# Patient Record
Sex: MALE | Race: WHITE | NOT HISPANIC OR LATINO | Employment: OTHER | URBAN - METROPOLITAN AREA
[De-identification: names, ages, dates, MRNs, and addresses within clinical notes are randomized per-mention and may not be internally consistent; named-entity substitution may affect disease eponyms.]

---

## 2017-07-11 ENCOUNTER — ALLSCRIPTS OFFICE VISIT (OUTPATIENT)
Dept: OTHER | Facility: OTHER | Age: 82
End: 2017-07-11

## 2018-01-13 VITALS
SYSTOLIC BLOOD PRESSURE: 108 MMHG | WEIGHT: 255.38 LBS | HEART RATE: 71 BPM | OXYGEN SATURATION: 97 % | HEIGHT: 75 IN | DIASTOLIC BLOOD PRESSURE: 68 MMHG | BODY MASS INDEX: 31.75 KG/M2 | RESPIRATION RATE: 15 BRPM

## 2018-01-15 ENCOUNTER — ALLSCRIPTS OFFICE VISIT (OUTPATIENT)
Dept: OTHER | Facility: OTHER | Age: 83
End: 2018-01-15

## 2018-01-16 NOTE — PROGRESS NOTES
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) (T84 59XA,Z96 659)   2  Paroxysmal SVT (supraventricular tachycardia) (427 0) (I47 1)   3  Impaired fasting glucose (790 21) (R73 01)   4  Benign essential hypertension (401 1) (I10)    Plan   Benign essential hypertension    · (1) MICROALBUMIN CREATININE RATIO, RANDOM URINE; Status:Active; Requested    for:15Jan2018;    · (1) URINALYSIS (will reflex a microscopy if leukocytes, occult blood, protein or nitrites are    not within normal limits); Status:Active; Requested [id number]; Impaired fasting glucose    · (1) COMPREHENSIVE METABOLIC PANEL; Status:Active; Requested for:15Jan2018;    · (1) HEMOGLOBIN A1C; Status:Active; Requested [id number]; Infected prosthetic knee joint    · Doxycycline Hyclate 100 MG Oral Capsule; Take 1 capsule twice daily   · (1) CBC/PLT/DIFF; Status:Active; Requested for:15Jan2018;    · (1) C-REACTIVE PROTEIN; Status:Active; Requested [id number];    · (1) SED RATE; Status:Active; Requested for:15Jan2018;   Paroxysmal SVT (supraventricular tachycardia)    · (1) T4, FREE; Status:Active; Requested for:15Jan2018;    · (1) TSH; Status:Active; Requested [id number]; Discussion/Summary   Possible side effects of new medications were reviewed with the patient/guardian today  The treatment plan was reviewed with the patient/guardian  The patient/guardian understands and agrees with the treatment plan      Chief Complaint   FOLLOW UP  NO ISSUES    Patient is here today for follow up of chronic conditions described in HPI  History of Present Illness   He sees orthopedics for a h/o MRSA  He is on life-long doxycycline  He does not want to travel to [place] to see him any longer  He has no pain, swelling, or redness  He has no F/C  He takes amoxicillin prior to dental procedures  BP is at goal  He has no HA or vision changes  He has no CP or SOB  He denies palpitations      TSH and free T-4 are at goal on his current dose of Synthroid  He has no hypo or hyperthyroid sx's  Review of Systems        Constitutional: No fever or chills, feels well, no tiredness, no recent weight gain or weight loss  Eyes: No complaints of eye pain, no red eyes, no discharge from eyes, no itchy eyes  ENT: no complaints of earache, no hearing loss, no nosebleeds, no nasal discharge, no sore throat, no hoarseness  Cardiovascular: No complaints of slow heart rate, no fast heart rate, no chest pain, no palpitations, no leg claudication, no lower extremity  Respiratory: No complaints of shortness of breath, no wheezing, no cough, no SOB on exertion, no orthopnea or PND  Gastrointestinal: No complaints of abdominal pain, no constipation, no nausea or vomiting, no diarrhea or bloody stools  Genitourinary: No complaints of dysuria, no incontinence, no hesitancy, no nocturia, no genital lesion, no testicular pain  Musculoskeletal: No complaints of arthralgia, no myalgias, no joint swelling or stiffness, no limb pain or swelling  Integumentary: No complaints of skin rash or skin lesions, no itching, no skin wound, no dry skin  Neurological: No compliants of headache, no confusion, no convulsions, no numbness or tingling, no dizziness or fainting, no limb weakness, no difficulty walking  Psychiatric: Is not suicidal, no sleep disturbances, no anxiety or depression, no change in personality, no emotional problems  Endocrine: No complaints of proptosis, no hot flashes, no muscle weakness, no erectile dysfunction, no deepening of the voice, no feelings of weakness  Hematologic/Lymphatic: No complaints of swollen glands, no swollen glands in the neck, does not bleed easily, no easy bruising  Active Problems   1  Benign essential hypertension (401 1) (I10)   2  Denied: History of substance abuse   3  Impaired fasting glucose (790 21) (R73 01)   4   Infected prosthetic knee joint (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,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) (T84 59XA,Z96 659)   5  Male erectile disorder (607 84) (N52 9)   6  Denied: History of Mental health disorder   7  Mobility impaired (799 89) (Z74 09)   8  Need for influenza vaccination (V04 81) (Z23)   9  Paroxysmal SVT (supraventricular tachycardia) (427 0) (I47 1)    Past Medical History   1  Denied: History of substance abuse   2  Denied: History of Mental health disorder    Surgical History   1  History of Hernia Repair   2  History of Knee Replacement   3  History of Prostate Surgery    Family History   Family History    1  Denied: Family history of mental disorder   2  Denied: Family history of substance abuse    Social History    · Never a smoker    Current Meds    1  Amoxicillin 500 MG Oral Capsule; Therapy: (Recorded:86Xkf9501) to Recorded   2  Doxycycline Hyclate 100 MG Oral Capsule; Take 1 capsule twice daily; Therapy: (Recorded:51Spb5302) to Recorded   3  Irbesartan 300 MG Oral Tablet; take 1 tablet every other day  Requested for: 59RDH7259;     Last Rx:[id number] Ordered   4  Levothyroxine Sodium 150 MCG Oral Tablet; Therapy: (Recorded:10Psy6403) to Recorded   5  Metrogel 1 % External Gel; Therapy: (Recorded:37Ecd4837) to Recorded   6  Viagra 100 MG Oral Tablet; TAKE 1 TABLET DAILY 1 HOUR BEFORE NEEDED; Therapy: 45BFH3513 to (Evaluate:85Fgi2098); Last Rx:[id number] Ordered    Allergies   1  No Known Drug Allergies    Vitals   Vital Signs    Recorded: 76XTH3162 09:09AM   Heart Rate 86   Respiration 15   Systolic 067   Diastolic 72   Height 6 ft 3 in   Weight 256 lb    BMI Calculated 32   BSA Calculated 2 44   O2 Saturation 97     Physical Exam        Constitutional      General appearance: No acute distress, well appearing and well nourished  Pulmonary      Respiratory effort: No increased work of breathing or signs of respiratory distress  Auscultation of lungs: Clear to auscultation, equal breath sounds bilaterally, no wheezes, no rales, no rhonci         Cardiovascular Auscultation of heart: Normal rate and rhythm, normal S1 and S2, without murmurs  Examination of extremities for edema and/or varicosities: Normal        Abdomen      Abdomen: Non-tender, no masses  Liver and spleen: No hepatomegaly or splenomegaly            Signatures    Electronically signed by : Terry Rosen MD; Frankie 15 2018  9:37AM EST                       (Author)

## 2018-01-23 VITALS
SYSTOLIC BLOOD PRESSURE: 122 MMHG | RESPIRATION RATE: 15 BRPM | WEIGHT: 256 LBS | OXYGEN SATURATION: 97 % | DIASTOLIC BLOOD PRESSURE: 72 MMHG | HEART RATE: 86 BPM | HEIGHT: 75 IN | BODY MASS INDEX: 31.83 KG/M2

## 2018-01-31 LAB
CREAT ?TM UR-SCNC: 55.3 UMOL/L
MICROALBUMIN UR-MCNC: 3 MG/L (ref 0–20)
MICROALBUMIN/CREAT UR: 5.4 MG/G{CREAT}

## 2018-02-03 LAB — HBA1C MFR BLD HPLC: 5.8 %

## 2018-02-14 ENCOUNTER — TELEPHONE (OUTPATIENT)
Dept: FAMILY MEDICINE CLINIC | Facility: CLINIC | Age: 83
End: 2018-02-14

## 2018-03-16 ENCOUNTER — OFFICE VISIT (OUTPATIENT)
Dept: FAMILY MEDICINE CLINIC | Facility: CLINIC | Age: 83
End: 2018-03-16
Payer: COMMERCIAL

## 2018-03-16 VITALS
DIASTOLIC BLOOD PRESSURE: 70 MMHG | SYSTOLIC BLOOD PRESSURE: 122 MMHG | BODY MASS INDEX: 31.95 KG/M2 | OXYGEN SATURATION: 96 % | HEART RATE: 70 BPM | RESPIRATION RATE: 15 BRPM | WEIGHT: 255.6 LBS

## 2018-03-16 DIAGNOSIS — E03.9 HYPOTHYROIDISM, UNSPECIFIED TYPE: Primary | ICD-10-CM

## 2018-03-16 DIAGNOSIS — I10 ESSENTIAL HYPERTENSION: ICD-10-CM

## 2018-03-16 DIAGNOSIS — E03.9 HYPOTHYROIDISM, UNSPECIFIED TYPE: ICD-10-CM

## 2018-03-16 DIAGNOSIS — Z96.659 INFECTION OF PROSTHETIC KNEE JOINT, SUBSEQUENT ENCOUNTER: Primary | ICD-10-CM

## 2018-03-16 DIAGNOSIS — T84.59XD INFECTION OF PROSTHETIC KNEE JOINT, SUBSEQUENT ENCOUNTER: Primary | ICD-10-CM

## 2018-03-16 DIAGNOSIS — J18.9 COMMUNITY ACQUIRED PNEUMONIA OF RIGHT LOWER LOBE OF LUNG: ICD-10-CM

## 2018-03-16 PROBLEM — T84.59XA INFECTED PROSTHETIC KNEE JOINT (HCC): Status: ACTIVE | Noted: 2018-03-16

## 2018-03-16 PROCEDURE — 99214 OFFICE O/P EST MOD 30 MIN: CPT | Performed by: FAMILY MEDICINE

## 2018-03-16 PROCEDURE — 3074F SYST BP LT 130 MM HG: CPT | Performed by: FAMILY MEDICINE

## 2018-03-16 PROCEDURE — 1036F TOBACCO NON-USER: CPT | Performed by: FAMILY MEDICINE

## 2018-03-16 PROCEDURE — 3078F DIAST BP <80 MM HG: CPT | Performed by: FAMILY MEDICINE

## 2018-03-16 RX ORDER — LEVOTHYROXINE SODIUM 0.15 MG/1
150 TABLET ORAL DAILY
Qty: 90 TABLET | Refills: 3 | Status: CANCELLED | OUTPATIENT
Start: 2018-03-16

## 2018-03-16 RX ORDER — IRBESARTAN 300 MG/1
1 TABLET ORAL EVERY OTHER DAY
COMMUNITY
End: 2018-03-16 | Stop reason: SDUPTHER

## 2018-03-16 RX ORDER — AZITHROMYCIN 250 MG/1
TABLET, FILM COATED ORAL
Qty: 6 TABLET | Refills: 0 | Status: SHIPPED | OUTPATIENT
Start: 2018-03-16 | End: 2018-03-20

## 2018-03-16 RX ORDER — DOXYCYCLINE HYCLATE 100 MG/1
100 CAPSULE ORAL
COMMUNITY
Start: 2017-02-06 | End: 2018-09-17 | Stop reason: SDUPTHER

## 2018-03-16 RX ORDER — LEVOTHYROXINE SODIUM 0.15 MG/1
TABLET ORAL
COMMUNITY
End: 2018-03-16 | Stop reason: SDUPTHER

## 2018-03-16 RX ORDER — LEVOTHYROXINE SODIUM 0.15 MG/1
150 TABLET ORAL DAILY
Qty: 90 TABLET | Refills: 3 | Status: SHIPPED | OUTPATIENT
Start: 2018-03-16 | End: 2018-07-02 | Stop reason: SDUPTHER

## 2018-03-16 RX ORDER — IRBESARTAN 300 MG/1
300 TABLET ORAL EVERY OTHER DAY
Qty: 45 TABLET | Refills: 3 | Status: SHIPPED | OUTPATIENT
Start: 2018-03-16 | End: 2019-03-15 | Stop reason: SDUPTHER

## 2018-03-16 NOTE — PROGRESS NOTES
Assessment/Plan:    No problem-specific Assessment & Plan notes found for this encounter  Diagnoses and all orders for this visit:    Infection of prosthetic knee joint, subsequent encounter  -     CBC and differential; Future  -     Comprehensive metabolic panel; Future  -     Sedimentation rate, automated; Future  -     C-reactive protein; Future    Hypothyroidism, unspecified type  -     levothyroxine 150 mcg tablet; Take 1 tablet (150 mcg total) by mouth daily    Essential hypertension  -     Microalbumin / creatinine urine ratio  -     Lipid panel; Future    Community acquired pneumonia of right lower lobe of lung (HCC)  -     azithromycin (ZITHROMAX) 250 mg tablet; Take 2 tablets today then 1 tablet daily x 4 days  -     XR chest pa & lateral; Future    Other orders  -     irbesartan (AVAPRO) 300 mg tablet; Take 1 tablet by mouth every other day  -     Discontinue: levothyroxine 150 mcg tablet; Take by mouth  -     doxycycline hyclate (VIBRAMYCIN) 100 mg capsule; Take 100 mg by mouth  -     Multiple Vitamins-Minerals (ICAPS AREDS 2 PO); Take by mouth          Subjective:      Patient ID: Anne Osullivan is a 80 y o  male  He has bilateral total knee replacements  The left knee had a MRSA infection and he has been on twice daily doxycycline since  He will be on this lifelong  His orthopedic surgeon is at a distance  We will be taking over monitoring of his knee  He has no pain  The knee does not swell or get red  His laboratory data is within normal   Limits  He has hypothyroidism  He is taking 150 mcg of Synthroid  He has no hypo or hyperthyroid   Symptoms  His blood pressure is excellent today on   His current regimen  He has no chest pain or shortness of breath  He has no headache or vision changes  His blood sugar was mildly elevated without evidence of diabetes  He has had an upper respiratory tract infection for the last several days     He is taking over-the-counter cough and cold medications with fair relief  The following portions of the patient's history were reviewed and updated as appropriate:   He  has no past medical history on file  He   Patient Active Problem List    Diagnosis Date Noted    Infected prosthetic knee joint ([facility] [address] ) 03/16/2018    Hypothyroidism 03/16/2018    Essential hypertension 03/16/2018     He  has a past surgical history that includes Hernia repair; Replacement total knee; and Prostate surgery  His family history is not on file  He  reports that he has never smoked  He has never used smokeless tobacco  He reports that he does not drink alcohol or use drugs  Current Outpatient Prescriptions   Medication Sig Dispense Refill    doxycycline hyclate (VIBRAMYCIN) 100 mg capsule Take 100 mg by mouth      irbesartan (AVAPRO) 300 mg tablet Take 1 tablet by mouth every other day      levothyroxine 150 mcg tablet Take 1 tablet (150 mcg total) by mouth daily 90 tablet 3    Multiple Vitamins-Minerals (ICAPS AREDS 2 PO) Take by mouth      azithromycin (ZITHROMAX) 250 mg tablet Take 2 tablets today then 1 tablet daily x 4 days 6 tablet 0     No current facility-administered medications for this visit  No current outpatient prescriptions on file prior to visit  No current facility-administered medications on file prior to visit  He has No Known Allergies       Review of Systems   All other systems reviewed and are negative  Objective:      /70 (BP Location: Right arm, Patient Position: Sitting, Cuff Size: Large)   Pulse 70   Resp 15   Wt 116 kg (255 lb 9 6 oz)   SpO2 96%   BMI 31 95 kg/m²          Physical Exam   Constitutional: He appears well-developed and well-nourished  Neck: Normal range of motion  Cardiovascular: Normal rate, regular rhythm, normal heart sounds and intact distal pulses  Pulmonary/Chest: Effort normal    He has a few scattered wheezes  He has some rales at the right lung base  Abdominal: Soft  Bowel sounds are normal    Nursing note and vitals reviewed

## 2018-03-16 NOTE — PATIENT INSTRUCTIONS
You may have bronchitis or even the start of a very mild pneumonia  I have ordered antibiotics and a chest x-ray  You look well and her blood oxygen concentration is within normal limits  I do not feel that you would need hospitalization for this  If your breathing becomes worse, you have a hard time tolerating her medication, you have fevers or chills, or you just do not feel well, please report to the emergency room

## 2018-07-02 DIAGNOSIS — E03.9 HYPOTHYROIDISM, UNSPECIFIED TYPE: ICD-10-CM

## 2018-07-02 RX ORDER — LEVOTHYROXINE SODIUM 0.15 MG/1
150 TABLET ORAL DAILY
Qty: 90 TABLET | Refills: 3 | Status: SHIPPED | OUTPATIENT
Start: 2018-07-02 | End: 2018-09-13 | Stop reason: SDUPTHER

## 2018-09-13 ENCOUNTER — OFFICE VISIT (OUTPATIENT)
Dept: FAMILY MEDICINE CLINIC | Facility: CLINIC | Age: 83
End: 2018-09-13
Payer: COMMERCIAL

## 2018-09-13 VITALS
RESPIRATION RATE: 15 BRPM | DIASTOLIC BLOOD PRESSURE: 82 MMHG | OXYGEN SATURATION: 97 % | SYSTOLIC BLOOD PRESSURE: 140 MMHG | WEIGHT: 258.4 LBS | HEIGHT: 76 IN | BODY MASS INDEX: 31.47 KG/M2 | HEART RATE: 71 BPM

## 2018-09-13 DIAGNOSIS — E03.9 HYPOTHYROIDISM, UNSPECIFIED TYPE: ICD-10-CM

## 2018-09-13 DIAGNOSIS — Z23 NEED FOR INFLUENZA VACCINATION: Primary | ICD-10-CM

## 2018-09-13 DIAGNOSIS — L71.9 ROSACEA: Primary | ICD-10-CM

## 2018-09-13 DIAGNOSIS — I10 ESSENTIAL HYPERTENSION: ICD-10-CM

## 2018-09-13 LAB
ALBUMIN SERPL BCP-MCNC: 3.8 G/DL (ref 3.5–5)
ALP SERPL-CCNC: 89 U/L (ref 46–116)
ALT SERPL W P-5'-P-CCNC: 31 U/L (ref 12–78)
ANION GAP SERPL CALCULATED.3IONS-SCNC: 9 MMOL/L (ref 4–13)
AST SERPL W P-5'-P-CCNC: 20 U/L (ref 5–45)
BILIRUB SERPL-MCNC: 0.97 MG/DL (ref 0.2–1)
BUN SERPL-MCNC: 24 MG/DL (ref 5–25)
CALCIUM SERPL-MCNC: 8.7 MG/DL (ref 8.3–10.1)
CHLORIDE SERPL-SCNC: 108 MMOL/L (ref 100–108)
CO2 SERPL-SCNC: 23 MMOL/L (ref 21–32)
CREAT SERPL-MCNC: 0.94 MG/DL (ref 0.6–1.3)
GFR SERPL CREATININE-BSD FRML MDRD: 74 ML/MIN/1.73SQ M
GLUCOSE SERPL-MCNC: 114 MG/DL (ref 65–140)
POTASSIUM SERPL-SCNC: 4.2 MMOL/L (ref 3.5–5.3)
PROT SERPL-MCNC: 7.4 G/DL (ref 6.4–8.2)
SODIUM SERPL-SCNC: 140 MMOL/L (ref 136–145)
T4 FREE SERPL-MCNC: 0.96 NG/DL (ref 0.76–1.46)
TSH SERPL DL<=0.05 MIU/L-ACNC: 2.7 UIU/ML (ref 0.36–3.74)

## 2018-09-13 PROCEDURE — G0008 ADMIN INFLUENZA VIRUS VAC: HCPCS

## 2018-09-13 PROCEDURE — 36415 COLL VENOUS BLD VENIPUNCTURE: CPT | Performed by: FAMILY MEDICINE

## 2018-09-13 PROCEDURE — 90662 IIV NO PRSV INCREASED AG IM: CPT

## 2018-09-13 PROCEDURE — 80053 COMPREHEN METABOLIC PANEL: CPT | Performed by: FAMILY MEDICINE

## 2018-09-13 PROCEDURE — 84439 ASSAY OF FREE THYROXINE: CPT | Performed by: FAMILY MEDICINE

## 2018-09-13 PROCEDURE — 3725F SCREEN DEPRESSION PERFORMED: CPT | Performed by: FAMILY MEDICINE

## 2018-09-13 PROCEDURE — 99214 OFFICE O/P EST MOD 30 MIN: CPT | Performed by: FAMILY MEDICINE

## 2018-09-13 PROCEDURE — 3008F BODY MASS INDEX DOCD: CPT | Performed by: FAMILY MEDICINE

## 2018-09-13 PROCEDURE — 1160F RVW MEDS BY RX/DR IN RCRD: CPT | Performed by: FAMILY MEDICINE

## 2018-09-13 PROCEDURE — 84443 ASSAY THYROID STIM HORMONE: CPT | Performed by: FAMILY MEDICINE

## 2018-09-13 RX ORDER — METRONIDAZOLE 10 MG/G
GEL TOPICAL DAILY
COMMUNITY
End: 2018-09-13 | Stop reason: SDUPTHER

## 2018-09-13 RX ORDER — LEVOTHYROXINE SODIUM 0.15 MG/1
150 TABLET ORAL DAILY
Qty: 90 TABLET | Refills: 3 | Status: SHIPPED | OUTPATIENT
Start: 2018-09-13 | End: 2019-03-15 | Stop reason: SDUPTHER

## 2018-09-13 RX ORDER — METRONIDAZOLE 10 MG/G
GEL TOPICAL DAILY
Qty: 45 G | Refills: 0 | Status: SHIPPED | OUTPATIENT
Start: 2018-09-13 | End: 2019-03-15 | Stop reason: SDUPTHER

## 2018-09-13 NOTE — PROGRESS NOTES
Assessment/Plan:    No problem-specific Assessment & Plan notes found for this encounter  Diagnoses and all orders for this visit:    Rosacea  -     metroNIDAZOLE (METROGEL) 1 % gel; Apply topically daily    Hypothyroidism, unspecified type  -     levothyroxine 150 mcg tablet; Take 1 tablet (150 mcg total) by mouth daily  -     TSH, 3rd generation; Future  -     T4, free; Future  -     Comprehensive metabolic panel; Future    Essential hypertension  -     TSH, 3rd generation; Future  -     T4, free; Future  -     Comprehensive metabolic panel; Future    Other orders  -     Discontinue: metroNIDAZOLE (METROGEL) 1 % gel; Apply topically daily          Subjective:      Patient ID: Katia White is a 80 y o  male  His BP is at goal   He has no CP or SOB  He has no HA or vision changes  He has hypothyroid  He is due for thyroid lab work  [facility]  He treated them at home  The following portions of the patient's history were reviewed and updated as appropriate:   He  has no past medical history on file  He   Patient Active Problem List    Diagnosis Date Noted    Infected prosthetic knee joint ([facility] [address] ) 03/16/2018    Hypothyroidism 03/16/2018    Essential hypertension 03/16/2018     He  has a past surgical history that includes Hernia repair; Replacement total knee; and Prostate surgery  His family history is not on file  He  reports that he has never smoked  He has never used smokeless tobacco  He reports that he does not drink alcohol or use drugs    Current Outpatient Prescriptions   Medication Sig Dispense Refill    doxycycline hyclate (VIBRAMYCIN) 100 mg capsule Take 100 mg by mouth      irbesartan (AVAPRO) 300 mg tablet Take 1 tablet (300 mg total) by mouth every other day 45 tablet 3    levothyroxine 150 mcg tablet Take 1 tablet (150 mcg total) by mouth daily 90 tablet 3    metroNIDAZOLE (METROGEL) 1 % gel Apply topically daily 45 g 0    Multiple Vitamins-Minerals (ICAPS AREDS 2 PO) Take by mouth       No current facility-administered medications for this visit  Current Outpatient Prescriptions on File Prior to Visit   Medication Sig    doxycycline hyclate (VIBRAMYCIN) 100 mg capsule Take 100 mg by mouth    irbesartan (AVAPRO) 300 mg tablet Take 1 tablet (300 mg total) by mouth every other day    Multiple Vitamins-Minerals (ICAPS AREDS 2 PO) Take by mouth    [DISCONTINUED] levothyroxine 150 mcg tablet Take 1 tablet (150 mcg total) by mouth daily     No current facility-administered medications on file prior to visit  He has No Known Allergies       Review of Systems   All other systems reviewed and are negative  Objective:      /82 (BP Location: Right arm, Patient Position: Sitting, Cuff Size: Large)   Pulse 71   Resp 15   Ht 6' 4" (1 93 m)   Wt 117 kg (258 lb 6 4 oz)   SpO2 97%   BMI 31 45 kg/m²          Physical Exam   Constitutional: He appears well-developed and well-nourished  HENT:   Head: Normocephalic and atraumatic  Right Ear: External ear normal    Left Ear: External ear normal    Nose: Nose normal    Mouth/Throat: Oropharynx is clear and moist    Neck: Normal range of motion  Neck supple  Cardiovascular: Normal rate, regular rhythm, normal heart sounds and intact distal pulses  Pulmonary/Chest: Effort normal and breath sounds normal    Abdominal: Soft  Bowel sounds are normal    Nursing note and vitals reviewed

## 2018-09-17 DIAGNOSIS — Z86.14 HISTORY OF MRSA INFECTION: Primary | ICD-10-CM

## 2018-09-17 RX ORDER — DOXYCYCLINE HYCLATE 100 MG/1
100 CAPSULE ORAL EVERY 12 HOURS SCHEDULED
Qty: 180 CAPSULE | Refills: 3 | Status: SHIPPED | OUTPATIENT
Start: 2018-09-17 | End: 2019-03-15 | Stop reason: SDUPTHER

## 2019-03-15 ENCOUNTER — OFFICE VISIT (OUTPATIENT)
Dept: FAMILY MEDICINE CLINIC | Facility: CLINIC | Age: 84
End: 2019-03-15
Payer: COMMERCIAL

## 2019-03-15 VITALS
HEART RATE: 85 BPM | DIASTOLIC BLOOD PRESSURE: 80 MMHG | HEIGHT: 76 IN | WEIGHT: 257 LBS | BODY MASS INDEX: 31.29 KG/M2 | RESPIRATION RATE: 15 BRPM | OXYGEN SATURATION: 97 % | SYSTOLIC BLOOD PRESSURE: 124 MMHG

## 2019-03-15 DIAGNOSIS — Z86.14 HISTORY OF MRSA INFECTION: ICD-10-CM

## 2019-03-15 DIAGNOSIS — I10 ESSENTIAL HYPERTENSION: ICD-10-CM

## 2019-03-15 DIAGNOSIS — L71.9 ROSACEA: ICD-10-CM

## 2019-03-15 DIAGNOSIS — Z00.00 MEDICARE ANNUAL WELLNESS VISIT, SUBSEQUENT: Primary | ICD-10-CM

## 2019-03-15 DIAGNOSIS — E03.9 HYPOTHYROIDISM, UNSPECIFIED TYPE: ICD-10-CM

## 2019-03-15 DIAGNOSIS — N52.9 ERECTILE DYSFUNCTION, UNSPECIFIED ERECTILE DYSFUNCTION TYPE: ICD-10-CM

## 2019-03-15 PROBLEM — E66.9 CLASS 1 OBESITY WITHOUT SERIOUS COMORBIDITY WITH BODY MASS INDEX (BMI) OF 31.0 TO 31.9 IN ADULT: Status: ACTIVE | Noted: 2019-03-15

## 2019-03-15 PROCEDURE — 1170F FXNL STATUS ASSESSED: CPT | Performed by: FAMILY MEDICINE

## 2019-03-15 PROCEDURE — G0439 PPPS, SUBSEQ VISIT: HCPCS | Performed by: FAMILY MEDICINE

## 2019-03-15 PROCEDURE — 1125F AMNT PAIN NOTED PAIN PRSNT: CPT | Performed by: FAMILY MEDICINE

## 2019-03-15 PROCEDURE — 3074F SYST BP LT 130 MM HG: CPT | Performed by: FAMILY MEDICINE

## 2019-03-15 PROCEDURE — 3079F DIAST BP 80-89 MM HG: CPT | Performed by: FAMILY MEDICINE

## 2019-03-15 PROCEDURE — 1160F RVW MEDS BY RX/DR IN RCRD: CPT | Performed by: FAMILY MEDICINE

## 2019-03-15 RX ORDER — IRBESARTAN 300 MG/1
300 TABLET ORAL EVERY OTHER DAY
Qty: 45 TABLET | Refills: 3 | Status: SHIPPED | OUTPATIENT
Start: 2019-03-15 | End: 2019-06-07 | Stop reason: SDUPTHER

## 2019-03-15 RX ORDER — METRONIDAZOLE 10 MG/G
GEL TOPICAL DAILY
Qty: 45 G | Refills: 0 | Status: SHIPPED | OUTPATIENT
Start: 2019-03-15 | End: 2019-03-18 | Stop reason: SDUPTHER

## 2019-03-15 RX ORDER — LEVOTHYROXINE SODIUM 0.15 MG/1
150 TABLET ORAL DAILY
Qty: 90 TABLET | Refills: 3 | Status: SHIPPED | OUTPATIENT
Start: 2019-03-15 | End: 2019-09-16 | Stop reason: SDUPTHER

## 2019-03-15 RX ORDER — DOXYCYCLINE HYCLATE 100 MG/1
100 CAPSULE ORAL EVERY 12 HOURS SCHEDULED
Qty: 180 CAPSULE | Refills: 3 | Status: SHIPPED | OUTPATIENT
Start: 2019-03-15 | End: 2019-06-07 | Stop reason: SDUPTHER

## 2019-03-15 RX ORDER — SILDENAFIL 100 MG/1
100 TABLET, FILM COATED ORAL DAILY PRN
Qty: 8 TABLET | Refills: 12 | Status: SHIPPED | OUTPATIENT
Start: 2019-03-15 | End: 2019-09-16 | Stop reason: SDUPTHER

## 2019-03-15 NOTE — ASSESSMENT & PLAN NOTE
I recommended diet, exercise, and weight loss  I recommended the AHA beginner walking plan and the Mediterranean Diet  [email or web address]  pdf    [email or web address] pdf

## 2019-03-15 NOTE — PROGRESS NOTES
Assessment and Plan:  Problem List Items Addressed This Visit        Endocrine    Hypothyroidism       Cardiovascular and Mediastinum    Essential hypertension      Other Visit Diagnoses     Rosacea        History of MRSA infection            Health Maintenance Due   Topic Date Due    Medicare Annual Wellness Visit (AWV)  1934    BMI: Followup Plan  08/09/1952    DTaP,Tdap,and Td Vaccines (1 - Tdap) 08/09/1955    Fall Risk  08/09/1999    Pneumococcal PPSV23/PCV13 65+ Years / Low and Medium Risk (2 of 2 - PCV13) 11/20/2002         HPI:  Patient Active Problem List   Diagnosis    Infected prosthetic knee joint ([address] )    Hypothyroidism    Essential hypertension     No past medical history on file  Past Surgical History:   Procedure Laterality Date    HERNIA REPAIR      PROSTATE SURGERY      REPLACEMENT TOTAL KNEE      last assessed: 7/11/17     No family history on file  Social History     Tobacco Use   Smoking Status Never Smoker   Smokeless Tobacco Never Used     Social History     Substance and Sexual Activity   Alcohol Use No      Social History     Substance and Sexual Activity   Drug Use No         Current Outpatient Medications   Medication Sig Dispense Refill    doxycycline hyclate (VIBRAMYCIN) 100 mg capsule Take 1 capsule (100 mg total) by mouth every 12 (twelve) hours 180 capsule 3    irbesartan (AVAPRO) 300 mg tablet Take 1 tablet (300 mg total) by mouth every other day 45 tablet 3    levothyroxine 150 mcg tablet Take 1 tablet (150 mcg total) by mouth daily 90 tablet 3    metroNIDAZOLE (METROGEL) 1 % gel Apply topically daily 45 g 0    Multiple Vitamins-Minerals (ICAPS AREDS 2 PO) Take by mouth       No current facility-administered medications for this visit        No Known Allergies  Immunization History   Administered Date(s) Administered    INFLUENZA 09/29/2016, 10/17/2017    Influenza Split High Dose Preservative Free IM 10/17/2017    Influenza, high dose seasonal 0 5 mL 09/13/2018    Pneumococcal Polysaccharide PPV23 11/20/2001       Patient Care Team:  David Ramirez MD as PCP - General (Family Medicine)    Medicare Screening Tests and Risk Assessments:  John Rangel is here for his Subsequent Wellness visit  Health Risk Assessment:  Patient rates overall health as very good  Patient feels that their physical health rating is Same  Eyesight was rated as Same  Hearing was rated as Same  Patient feels that their emotional and mental health rating is Same  Pain experienced by patient in the last 7 days has been None  Patient states that he has experienced no weight loss or gain in last 6 months  Emotional/Mental Health:  Patient has been feeling nervous/anxious  PHQ-9 Depression Screening:    Frequency of the following problems over the past two weeks:      1  Little interest or pleasure in doing things: 0 - not at all      2  Feeling down, depressed, or hopeless: 0 - not at all  PHQ-2 Score: 0          Broken Bones/Falls: Fall Risk Assessment:    In the past year, patient has experienced: No history of falling in past year          Bladder/Bowel:  Patient has not leaked urine accidently in the last six months  Patient reports no loss of bowel control  Immunizations:  Patient has had a flu vaccination within the last year  Patient has received a pneumonia shot  Patient has received a shingles shot  Patient has received tetanus/diphtheria shot  Date of tetanus/diphtheria shot: 7/1/2009    Home Safety:  Patient does not have trouble with stairs inside or outside of their home  Patient currently reports that there are no safety hazards present in home, working smoke alarms, no working carbon monoxide detectors        Preventative Screenings:   prostate cancer screen performed, colon cancer screen completed, 7/1/2009  cholesterol screen completed, no glaucoma eye exam completed(Additional Comments: Sub total prostatectomy)    Medications:  Patient is currently taking over-the-counter supplements  List of OTC medications includes: ared  Patient is able to manage medications  Lifestyle Choices:  Patient reports no tobacco use  Patient has smoked or used tobacco in the past   Patient has stopped his tobacco use  Tobacco use quit date: 1970  Patient reports no alcohol use  Patient drives a vehicle  Patient wears seat belt  Current level of exercise of physical activity described by patient as: active  Activities of Daily Living:  Can get out of bed by his or her self, able to dress self, able to make own meals, able to do own shopping, able to bathe self, can do own laundry/housekeeping, can manage own money, pay bills and track expenses    Previous Hospitalizations:  No hospitalization or ED visit in past 12 months  Additional Comments: Sherrill esparza - eye and retina - macular degeneration, wet both eyes  German Cooks, MD - annual eye exam  Malcolm Monterroso - orthopedic surgeon - post knee replacements  Porter Oh - audiologist - hearing loss  Sujata Garcia - podiatry    Advanced Directives:  Patient has decided on a power of   Patient has spoken to designated power of   Patient has completed advanced directive    Additional Comments: Araceli Hernandez  [address] [address]  [phone]    Preventative Screening/Counseling:      Cardiovascular:      General: Risks and Benefits Discussed and Screening Current          Diabetes:      General: Risks and Benefits Discussed and Screening Current          Colorectal Cancer:      General: Screening Not Indicated          Prostate Cancer:      General: Screening Not Indicated          Osteoporosis:      General: Screening Not Indicated          AAA:      General: Screening Not Indicated          Glaucoma:      General: Risks and Benefits Discussed          HIV:      General: Screening Not Indicated          Hepatitis C:      General: Screening Not Indicated        Advanced Directives:   Patient has living will for healthcare, has durable POA for healthcare, patient has an advanced directive  Information on ACP and/or AD provided  5 wishes given  Immunizations:      Influenza: Risks & Benefits Discussed and Influenza UTD This Year      Pneumococcal: Risks & Benefits Discussed and Pneumococcal Due Today      Shingrix: Vaccine Status Unknown      Hepatitis B (Low risk patients): Series Not Indicated      TD: Vaccine Status Unknown      TDAP: Vaccine Status Unknown            No exam data present    Physical Exam:  Review of Systems   Gastrointestinal: Negative for bowel incontinence  Psychiatric/Behavioral: The patient is not nervous/anxious  All other systems reviewed and are negative  Vitals:    03/15/19 0731   BP: 124/80   BP Location: Left arm   Patient Position: Sitting   Cuff Size: Large   Pulse: 85   Resp: 15   SpO2: 97%   Weight: 117 kg (257 lb)   Height: 6' 4" (1 93 m)   Body mass index is 31 28 kg/m²  Physical Exam   Constitutional: He is oriented to person, place, and time  He appears well-developed and well-nourished  Neck: Normal range of motion  Neck supple  Cardiovascular: Normal rate, regular rhythm, normal heart sounds and intact distal pulses  Pulmonary/Chest: Effort normal and breath sounds normal    Abdominal: Soft  Bowel sounds are normal    Musculoskeletal: Normal range of motion  Neurological: He is alert and oriented to person, place, and time  Skin: Skin is warm and dry  Capillary refill takes less than 2 seconds  Psychiatric: He has a normal mood and affect  His behavior is normal  Judgment and thought content normal            BMI Counseling: Body mass index is 31 28 kg/m²  Discussed the patient's BMI with him  The BMI is above average  BMI counseling and education was provided to the patient   Nutrition recommendations include reducing portion sizes, decreasing overall calorie intake, 3-5 servings of fruits/vegetables daily, reducing fast food intake, consuming healthier snacks, decreasing soda and/or juice intake, moderation in carbohydrate intake, increasing intake of lean protein, reducing intake of saturated fat and trans fat and reducing intake of cholesterol  Exercise recommendations include moderate aerobic physical activity for 150 minutes/week

## 2019-03-18 DIAGNOSIS — L71.9 ROSACEA: ICD-10-CM

## 2019-03-18 RX ORDER — METRONIDAZOLE 10 MG/G
GEL TOPICAL DAILY
Qty: 60 G | Refills: 0 | Status: SHIPPED | OUTPATIENT
Start: 2019-03-18 | End: 2020-03-16

## 2019-06-07 DIAGNOSIS — I10 ESSENTIAL HYPERTENSION: ICD-10-CM

## 2019-06-07 DIAGNOSIS — Z86.14 HISTORY OF MRSA INFECTION: ICD-10-CM

## 2019-06-07 RX ORDER — DOXYCYCLINE HYCLATE 100 MG/1
100 CAPSULE ORAL EVERY 12 HOURS SCHEDULED
Qty: 180 CAPSULE | Refills: 3 | Status: SHIPPED | OUTPATIENT
Start: 2019-06-07 | End: 2019-09-16 | Stop reason: SDUPTHER

## 2019-06-07 RX ORDER — IRBESARTAN 300 MG/1
300 TABLET ORAL EVERY OTHER DAY
Qty: 45 TABLET | Refills: 3 | Status: SHIPPED | OUTPATIENT
Start: 2019-06-07 | End: 2019-10-07 | Stop reason: SDUPTHER

## 2019-09-16 ENCOUNTER — OFFICE VISIT (OUTPATIENT)
Dept: FAMILY MEDICINE CLINIC | Facility: CLINIC | Age: 84
End: 2019-09-16
Payer: COMMERCIAL

## 2019-09-16 VITALS
HEIGHT: 76 IN | HEART RATE: 83 BPM | DIASTOLIC BLOOD PRESSURE: 76 MMHG | SYSTOLIC BLOOD PRESSURE: 122 MMHG | BODY MASS INDEX: 30.69 KG/M2 | WEIGHT: 252 LBS | OXYGEN SATURATION: 97 %

## 2019-09-16 DIAGNOSIS — N52.9 ERECTILE DYSFUNCTION, UNSPECIFIED ERECTILE DYSFUNCTION TYPE: ICD-10-CM

## 2019-09-16 DIAGNOSIS — Z86.14 HISTORY OF MRSA INFECTION: ICD-10-CM

## 2019-09-16 DIAGNOSIS — R73.01 IMPAIRED FASTING GLUCOSE: ICD-10-CM

## 2019-09-16 DIAGNOSIS — I10 ESSENTIAL HYPERTENSION: ICD-10-CM

## 2019-09-16 DIAGNOSIS — E03.9 HYPOTHYROIDISM, UNSPECIFIED TYPE: Primary | ICD-10-CM

## 2019-09-16 PROCEDURE — 99214 OFFICE O/P EST MOD 30 MIN: CPT | Performed by: FAMILY MEDICINE

## 2019-09-16 PROCEDURE — 3074F SYST BP LT 130 MM HG: CPT | Performed by: FAMILY MEDICINE

## 2019-09-16 PROCEDURE — 3078F DIAST BP <80 MM HG: CPT | Performed by: FAMILY MEDICINE

## 2019-09-16 RX ORDER — LEVOTHYROXINE SODIUM 0.15 MG/1
150 TABLET ORAL DAILY
Qty: 90 TABLET | Refills: 3 | Status: SHIPPED | OUTPATIENT
Start: 2019-09-16 | End: 2019-10-11 | Stop reason: SDUPTHER

## 2019-09-16 RX ORDER — DOXYCYCLINE HYCLATE 100 MG/1
100 CAPSULE ORAL EVERY 12 HOURS SCHEDULED
Qty: 180 CAPSULE | Refills: 3 | Status: SHIPPED | OUTPATIENT
Start: 2019-09-16 | End: 2019-10-11 | Stop reason: SDUPTHER

## 2019-09-16 RX ORDER — SILDENAFIL 100 MG/1
100 TABLET, FILM COATED ORAL DAILY PRN
Qty: 8 TABLET | Refills: 12 | Status: SHIPPED | OUTPATIENT
Start: 2019-09-16 | End: 2020-07-15 | Stop reason: SDUPTHER

## 2019-09-16 NOTE — PROGRESS NOTES
Assessment/Plan:    No problem-specific Assessment & Plan notes found for this encounter  Diagnoses and all orders for this visit:    Hypothyroidism, unspecified type  -     levothyroxine 150 mcg tablet; Take 1 tablet (150 mcg total) by mouth daily    Impaired fasting glucose    Essential hypertension    Erectile dysfunction, unspecified erectile dysfunction type  -     sildenafil (VIAGRA) 100 mg tablet; Take 1 tablet (100 mg total) by mouth daily as needed for erectile dysfunction    History of MRSA infection  -     doxycycline hyclate (VIBRAMYCIN) 100 mg capsule; Take 1 capsule (100 mg total) by mouth every 12 (twelve) hours          Subjective:      Patient ID: Suzette Mena is a 80 y o  male  His BP is  Well controlled on his current regimen  He has no CP or SOB  He has no HA or vision changes  He has no side effects from his current regimen  He has hypothyroidism  His TSH and free T4 are at goal   He has no s/s of hypo or hyperthyroidism  His blood sugar is elevated, but he has no s/s of T2DM  He is very physically active  The following portions of the patient's history were reviewed and updated as appropriate:   He  has no past medical history on file  He   Patient Active Problem List    Diagnosis Date Noted    Male erectile disorder 09/18/2019    Impaired fasting glucose 09/16/2019    Class 1 obesity without serious comorbidity with body mass index (BMI) of 31 0 to 31 9 in adult 03/15/2019    Hypothyroidism 03/16/2018    Essential hypertension 03/16/2018    Chronic osteomyelitis of knee ([facility] [address] ) 10/08/2013     He  has a past surgical history that includes Hernia repair; Replacement total knee; and Prostate surgery  His family history is not on file  He  reports that he has never smoked  He has never used smokeless tobacco  He reports that he does not drink alcohol or use drugs    Current Outpatient Medications   Medication Sig Dispense Refill    doxycycline hyclate (VIBRAMYCIN) 100 mg capsule Take 1 capsule (100 mg total) by mouth every 12 (twelve) hours 180 capsule 3    irbesartan (AVAPRO) 300 mg tablet Take 1 tablet (300 mg total) by mouth every other day 45 tablet 3    levothyroxine 150 mcg tablet Take 1 tablet (150 mcg total) by mouth daily 90 tablet 3    metroNIDAZOLE (METROGEL) 1 % gel Apply topically daily 60 g 0    Multiple Vitamins-Minerals (ICAPS AREDS 2 PO) Take by mouth      neomycin-polymyxin-hydrocortisone (CORTISPORIN) otic solution Administer 4 drops into both ears every 8 (eight) hours for 10 days 10 mL 0    sildenafil (VIAGRA) 100 mg tablet Take 1 tablet (100 mg total) by mouth daily as needed for erectile dysfunction 8 tablet 12     No current facility-administered medications for this visit  Current Outpatient Medications on File Prior to Visit   Medication Sig    metroNIDAZOLE (METROGEL) 1 % gel Apply topically daily    Multiple Vitamins-Minerals (ICAPS AREDS 2 PO) Take by mouth    [DISCONTINUED] irbesartan (AVAPRO) 300 mg tablet Take 1 tablet (300 mg total) by mouth every other day     No current facility-administered medications on file prior to visit  He has No Known Allergies       Review of Systems   All other systems reviewed and are negative  Objective:      /76   Pulse 83   Ht 6' 4" (1 93 m)   Wt 114 kg (252 lb)   SpO2 97%   BMI 30 67 kg/m²          Physical Exam   Constitutional: He is oriented to person, place, and time  He appears well-developed and well-nourished  Neck: Normal range of motion  Neck supple  Cardiovascular: Normal rate, regular rhythm, normal heart sounds and intact distal pulses  Pulmonary/Chest: Effort normal and breath sounds normal    Abdominal: Soft  Bowel sounds are normal    Musculoskeletal: Normal range of motion  Neurological: He is alert and oriented to person, place, and time  Skin: Skin is warm and dry  Capillary refill takes less than 2 seconds     Psychiatric: He has a normal mood and affect  His behavior is normal  Thought content normal    Nursing note and vitals reviewed

## 2019-09-18 ENCOUNTER — OFFICE VISIT (OUTPATIENT)
Dept: FAMILY MEDICINE CLINIC | Facility: CLINIC | Age: 84
End: 2019-09-18
Payer: COMMERCIAL

## 2019-09-18 VITALS
DIASTOLIC BLOOD PRESSURE: 76 MMHG | SYSTOLIC BLOOD PRESSURE: 124 MMHG | TEMPERATURE: 97.5 F | RESPIRATION RATE: 18 BRPM | HEIGHT: 76 IN | OXYGEN SATURATION: 96 % | HEART RATE: 84 BPM | BODY MASS INDEX: 30.93 KG/M2 | WEIGHT: 254 LBS

## 2019-09-18 DIAGNOSIS — H61.23 BILATERAL IMPACTED CERUMEN: Primary | ICD-10-CM

## 2019-09-18 DIAGNOSIS — H60.503 ACUTE OTITIS EXTERNA OF BOTH EARS, UNSPECIFIED TYPE: ICD-10-CM

## 2019-09-18 PROBLEM — N52.9 MALE ERECTILE DISORDER: Status: ACTIVE | Noted: 2019-09-18

## 2019-09-18 PROBLEM — T84.59XA INFECTED PROSTHETIC KNEE JOINT (HCC): Status: RESOLVED | Noted: 2018-03-16 | Resolved: 2019-09-18

## 2019-09-18 PROBLEM — Z96.659 INFECTED PROSTHETIC KNEE JOINT (HCC): Status: RESOLVED | Noted: 2018-03-16 | Resolved: 2019-09-18

## 2019-09-18 PROCEDURE — 99213 OFFICE O/P EST LOW 20 MIN: CPT | Performed by: NURSE PRACTITIONER

## 2019-09-18 NOTE — PROGRESS NOTES
Assessment/Plan:     Diagnoses and all orders for this visit:    Bilateral impacted cerumen  -     Ear cerumen removal    Acute otitis externa of both ears, unspecified type  -     neomycin-polymyxin-hydrocortisone (CORTISPORIN) otic solution; Administer 4 drops into both ears every 8 (eight) hours for 10 days        Subjective:      Patient ID: Cherrie Walter is a 80 y o  male  Patient presents to [address] [place] [address] with complaints of ear congestion  Allergies, medical history and current medications reviewed with patient; patient reports taking all medications as prescribed without issues  Patient C/O bilateral ear congestion, ongoing for a while  Patient states he used OTC ear drops on Monday with irrigation, but did not notice any cerumen  Patient does use hearing aids  Patient Care Team:  Laureen Neal MD as PCP - General (Family Medicine)    Review of Systems   HENT: Positive for ear discharge and hearing loss  All other systems reviewed and are negative  Objective:    /76 (BP Location: Right arm, Patient Position: Sitting, Cuff Size: Large)   Pulse 84   Temp 97 5 °F (36 4 °C) (Temporal)   Resp 18   Ht 6' 4" (1 93 m)   Wt 115 kg (254 lb)   SpO2 96%   BMI 30 92 kg/m²      Physical Exam   Constitutional: He is oriented to person, place, and time  He appears well-developed and well-nourished  No distress  HENT:   Head: Normocephalic and atraumatic  Right Ear: External ear normal  There is swelling (with erythema)  Tympanic membrane is injected  Left Ear: External ear and ear canal normal  Tympanic membrane is injected  Eyes: Conjunctivae and lids are normal    Neck: Normal range of motion  No tracheal deviation present  Cardiovascular: Normal rate  Pulmonary/Chest: Effort normal  No respiratory distress  Abdominal: Normal appearance  He exhibits no distension  There is no guarding  Musculoskeletal: Normal range of motion     Neurological: He is alert and oriented to person, place, and time  Skin: Skin is warm, dry and intact  Psychiatric: He has a normal mood and affect  His speech is normal    Nursing note and vitals reviewed  Ear cerumen removal  Date/Time: 9/18/2019 9:37 AM  Performed by: Raisa Hoskins by: Mark Drake     Patient location:  Clinic  Consent:     Consent obtained:  Verbal    Consent given by:  Patient    Risks discussed:  Pain and dizziness  Universal protocol:     Procedure explained and questions answered to patient or proxy's satisfaction: yes      Patient identity confirmed:  Verbally with patient  Procedure details:     Local anesthetic:  None    Location:  L ear and R ear    Procedure type: irrigation with insturmentation      Insturmentation: curette    Post-procedure details:     Hearing quality:  Improved    Patient tolerance of procedure:   Tolerated well, no immediate complications

## 2019-09-18 NOTE — PATIENT INSTRUCTIONS
Otitis Externa   WHAT YOU NEED TO KNOW:   What is otitis externa? Otitis externa, or swimmer's ear, is an infection in the outer ear canal  This canal goes from the outside of the ear to the eardrum  What causes otitis externa? Otitis externa is most commonly caused by bacteria  It can also be caused by damage to the skin lining your outer ear canal  You can scratch or damage the skin lining when you put cotton swabs or other objects in your ears  What increases my risk for otitis externa? · Swimming    · Hot, humid weather    · Hearing aid use    · A lot of ear wax    · Allergic skin disorders, such as eczema    · Medical conditions that make it easier to get infections, such as diabetes  What are the signs and symptoms of otitis externa? · You have ear pain  · Your outer ear canal is red and swollen  · You have clear fluid or pus leaking out of your ear  · Your outer ear canal is itchy and you see a rash  · You have trouble hearing because your ear is plugged  · You feel a bump in your ear canal, called a polyp  · Flakes of skin fall from your ear  How is otitis externa diagnosed? Your healthcare provider will ask about your signs and symptoms  He will look inside your ears  He may blow a puff of air inside your ears  These tests tell healthcare providers if your eardrums look healthy  You may also need a hearing test    How is otitis externa treated? · NSAIDs , such as ibuprofen, help decrease swelling, pain, and fever  This medicine is available with or without a doctor's order  NSAIDs can cause stomach bleeding or kidney problems in certain people  If you take blood thinner medicine, always ask if NSAIDs are safe for you  Always read the medicine label and follow directions  Do not give these medicines to children under 10months of age without direction from your child's healthcare provider  · Acetaminophen  decreases pain and fever   It is available without a doctor's order  Ask how much to take and how often to take it  Follow directions  Acetaminophen can cause liver damage if not taken correctly  · Ear drops  that contain an antibiotic may be given  The antibiotic helps treat a bacterial infection  You may also be given steroid medicine  The steroid helps decrease redness, swelling, and pain  · Ear wicking  removes fluid or wax from your outer ear canal  Healthcare providers may insert a small tube, called a wick, into your ear to help drain fluid  A wick also may be used to put medicine into your ear canal if the canal is blocked  How do I use eardrops? · Lie down on your side with your infected ear facing up  · Carefully drip the correct number of eardrops into your ear  Have another person help you if possible  · Gently move the outside part of your ear back and forth to help the medicine reach your ear canal      · Stay lying down in the same position (with your ear facing up) for 3 to 5 minutes  How can I prevent otitis externa? · Do not put cotton swabs or foreign objects in your ears  · Wrap a clean moist washcloth around your finger, and use it to clean your outer ear and remove extra ear wax  · Use ear plugs when you swim  Dry your outer ears completely after you swim or bathe  When should I seek immediate care? · You have severe ear pain  · You are suddenly unable to hear at all  · You have new swelling in your face, behind your ears, or in your neck  · You suddenly cannot move part of your face  · Your face suddenly feels numb  When should I contact my healthcare provider? · You have a fever  · Your signs and symptoms do not get better after 2 days of treatment  · Your signs and symptoms go away for a time, but then come back  · You have questions or concerns about your condition or care  CARE AGREEMENT:   You have the right to help plan your care  Learn about your health condition and how it may be treated  Discuss treatment options with your caregivers to decide what care you want to receive  You always have the right to refuse treatment  The above information is an  only  It is not intended as medical advice for individual conditions or treatments  Talk to your doctor, nurse or pharmacist before following any medical regimen to see if it is safe and effective for you  © 2017 [employer] Information is for End User's use only and may not be sold, redistributed or otherwise used for commercial purposes  All illustrations and images included in CareNotes® are the copyrighted property of [employer]  or [employer] Christiansen

## 2019-10-07 DIAGNOSIS — I10 ESSENTIAL HYPERTENSION: ICD-10-CM

## 2019-10-07 RX ORDER — IRBESARTAN 300 MG/1
300 TABLET ORAL EVERY OTHER DAY
Qty: 45 TABLET | Refills: 3 | Status: SHIPPED | OUTPATIENT
Start: 2019-10-07 | End: 2019-10-07 | Stop reason: SDUPTHER

## 2019-10-07 RX ORDER — IRBESARTAN 300 MG/1
300 TABLET ORAL EVERY OTHER DAY
Qty: 10 TABLET | Refills: 3 | Status: SHIPPED | OUTPATIENT
Start: 2019-10-07 | End: 2019-10-11 | Stop reason: SDUPTHER

## 2019-10-11 DIAGNOSIS — I10 ESSENTIAL HYPERTENSION: ICD-10-CM

## 2019-10-11 DIAGNOSIS — Z86.14 HISTORY OF MRSA INFECTION: ICD-10-CM

## 2019-10-11 DIAGNOSIS — E03.9 HYPOTHYROIDISM, UNSPECIFIED TYPE: ICD-10-CM

## 2019-10-11 RX ORDER — IRBESARTAN 300 MG/1
300 TABLET ORAL EVERY OTHER DAY
Qty: 10 TABLET | Refills: 3 | Status: SHIPPED | OUTPATIENT
Start: 2019-10-11 | End: 2020-03-16 | Stop reason: SDUPTHER

## 2019-10-11 RX ORDER — LEVOTHYROXINE SODIUM 0.15 MG/1
150 TABLET ORAL DAILY
Qty: 90 TABLET | Refills: 3 | Status: SHIPPED | OUTPATIENT
Start: 2019-10-11 | End: 2020-05-21

## 2019-10-11 RX ORDER — DOXYCYCLINE HYCLATE 100 MG/1
100 CAPSULE ORAL EVERY 12 HOURS SCHEDULED
Qty: 180 CAPSULE | Refills: 3 | Status: SHIPPED | OUTPATIENT
Start: 2019-10-11 | End: 2020-07-15 | Stop reason: SDUPTHER

## 2019-12-13 ENCOUNTER — OFFICE VISIT (OUTPATIENT)
Dept: FAMILY MEDICINE CLINIC | Facility: CLINIC | Age: 84
End: 2019-12-13
Payer: COMMERCIAL

## 2019-12-13 VITALS
WEIGHT: 256 LBS | BODY MASS INDEX: 31.17 KG/M2 | SYSTOLIC BLOOD PRESSURE: 124 MMHG | DIASTOLIC BLOOD PRESSURE: 78 MMHG | HEIGHT: 76 IN

## 2019-12-13 DIAGNOSIS — Z01.818 PRE-OP TESTING: ICD-10-CM

## 2019-12-13 DIAGNOSIS — Z09 FOLLOW-UP EXAM AFTER TREATMENT: Primary | ICD-10-CM

## 2019-12-13 DIAGNOSIS — H61.22 EXCESSIVE CERUMEN IN EAR CANAL, LEFT: ICD-10-CM

## 2019-12-13 PROCEDURE — 69210 REMOVE IMPACTED EAR WAX UNI: CPT | Performed by: NURSE PRACTITIONER

## 2019-12-13 PROCEDURE — 99213 OFFICE O/P EST LOW 20 MIN: CPT | Performed by: NURSE PRACTITIONER

## 2019-12-13 RX ORDER — BETHANECHOL CHLORIDE 25 MG/1
TABLET ORAL
COMMUNITY
Start: 2013-04-30 | End: 2021-01-01

## 2019-12-13 RX ORDER — METOPROLOL SUCCINATE 25 MG/1
TABLET, EXTENDED RELEASE ORAL
COMMUNITY
End: 2021-01-01

## 2019-12-13 RX ORDER — TAMSULOSIN HYDROCHLORIDE 0.4 MG/1
CAPSULE ORAL
COMMUNITY
End: 2022-01-01 | Stop reason: HOSPADM

## 2019-12-13 RX ORDER — GABAPENTIN 300 MG/1
CAPSULE ORAL
COMMUNITY
End: 2021-01-01

## 2019-12-13 NOTE — PROGRESS NOTES
Assessment/Plan:     Diagnoses and all orders for this visit:    Follow-up exam after treatment    Excessive cerumen in ear canal, left  -     Ear cerumen removal    Pre-op testing  -     ECG 12 lead; Future    Other orders  -     tamsulosin (FLOMAX) 0 4 mg  -     bethanechol (URECHOLINE) 25 mg tablet; Take by mouth  -     gabapentin (NEURONTIN) 300 mg capsule  -     metoprolol succinate (TOPROL-XL) 25 mg 24 hr tablet  -     METRONIDAZOLE ER PO        Subjective:      Patient ID: Agueda Grimaldo is a 80 y o  male  Patient presents to [address] [place] [address] as follow up  Allergies, medical history and current medications reviewed with patient; patient reports taking all medications as prescribed without issues  Patient denies any known blockage of ears or loss of hearing, but does report dryness of ear canal  Patient denies any other problems or complaints  Patient has upcoming appointment for surgical clearance; patient states he will need pre-operative EKG completed  Patient Care Team:  Lenin Dupree MD as PCP - General (Family Medicine)    Review of Systems   HENT: Negative for ear discharge and hearing loss  All other systems reviewed and are negative  Objective:    /78   Ht 6' 4" (1 93 m)   Wt 116 kg (256 lb)   BMI 31 16 kg/m²      Physical Exam   Constitutional: He is oriented to person, place, and time  He appears well-developed and well-nourished  No distress  HENT:   Head: Normocephalic and atraumatic  Right Ear: Tympanic membrane, external ear and ear canal normal  There is drainage (dry skin)  Left Ear: Tympanic membrane, external ear and ear canal normal  There is drainage (dry skin)  Nasal turbinates pink, moist and without exudate  Eyes: Conjunctivae and lids are normal    Neck: Normal range of motion  No tracheal deviation present  Cardiovascular: Normal rate  Pulmonary/Chest: Effort normal  No respiratory distress     Abdominal: Normal appearance  He exhibits no distension  There is no guarding  Musculoskeletal: Normal range of motion  Neurological: He is alert and oriented to person, place, and time  Skin: Skin is warm, dry and intact  Psychiatric: He has a normal mood and affect  His speech is normal    Nursing note and vitals reviewed  Ear cerumen removal  Date/Time: 12/13/2019 12:51 PM  Performed by: Lobo Bateman by: Tal Vega, [facility]     Patient location:  Clinic  Consent:     Consent obtained:  Verbal    Consent given by:  Patient  Universal protocol:     Procedure explained and questions answered to patient or proxy's satisfaction: yes      Patient identity confirmed:  Verbally with patient  Procedure details:     Local anesthetic:  None    Location:  L ear    Procedure type: irrigation with insturmentation      Insturmentation: curette    Post-procedure details:     Hearing quality:  Normal    Patient tolerance of procedure:   Tolerated well, no immediate complications

## 2019-12-13 NOTE — PATIENT INSTRUCTIONS
Wellness Visit for Adults   WHAT YOU NEED TO KNOW:   What is a wellness visit? A wellness visit is when you see your healthcare provider to get screened for health problems  You can also get advice on how to stay healthy  Write down your questions so you remember to ask them  Ask your healthcare provider how often you should have a wellness visit  What happens at a wellness visit? Your healthcare provider will ask about your health, and your family history of health problems  This includes high blood pressure, heart disease, and cancer  He or she will ask if you have symptoms that concern you, if you smoke, and about your mood  You may also be asked about your intake of medicines, supplements, food, and alcohol  Any of the following may be done:  · Your weight  will be checked  Your height may also be checked so your body mass index (BMI) can be calculated  Your BMI shows if you are at a healthy weight  · Your blood pressure  and heart rate will be checked  Your temperature may also be checked  · Blood and urine tests  may be done  Blood tests may be done to check your cholesterol levels  Abnormal cholesterol levels increase your risk for heart disease and stroke  You may also need a blood or urine test to check for diabetes if you are at increased risk  Urine tests may be done to look for signs of an infection or kidney disease  · A physical exam  includes checking your heartbeat and lungs with a stethoscope  Your healthcare provider may also check your skin to look for sun damage  · Screening tests  may be recommended  A screening test is done to check for diseases that may not cause symptoms  The screening tests you may need depend on your age, gender, family history, and lifestyle habits  For example, colorectal screening may be recommended if you are 48years old or older  What screening tests do I need if I am a woman? · A Pap smear  is used to screen for cervical cancer   Pap smears are usually done every 3 to 5 years depending on your age  You may need them more often if you have had abnormal Pap smear test results in the past  Ask your healthcare provider how often you should have a Pap smear  · A mammogram  is an x-ray of your breasts to screen for breast cancer  Experts recommend mammograms every 2 years starting at age 48 years  You may need a mammogram at age 52 years or younger if you have an increased risk for breast cancer  Talk to your healthcare provider about when you should start having mammograms and how often you need them  What vaccines might I need? · Get an influenza vaccine  every year  The influenza vaccine protects you from the flu  Several types of viruses cause the flu  The viruses change over time, so new vaccines are made each year  · Get a tetanus-diphtheria (Td) booster vaccine  every 10 years  This vaccine protects you against tetanus and diphtheria  Tetanus is a severe infection that may cause painful muscle spasms and lockjaw  Diphtheria is a severe bacterial infection that causes a thick covering in the back of your mouth and throat  · Get a human papillomavirus (HPV) vaccine  if you are female and aged 23 to 32 or male 23 to 24 and never received it  This vaccine protects you from HPV infection  HPV is the most common infection spread by sexual contact  HPV may also cause vaginal, penile, and anal cancers  · Get a pneumococcal vaccine  if you are aged 72 years or older  The pneumococcal vaccine is an injection given to protect you from pneumococcal disease  Pneumococcal disease is an infection caused by pneumococcal bacteria  The infection may cause pneumonia, meningitis, or an ear infection  · Get a shingles vaccine  if you are aged 61 or older, even if you have had shingles before  The shingles vaccine is an injection to protect you from the varicella-zoster virus  This is the same virus that causes chickenpox   Shingles is a painful rash that develops in people who had chickenpox or have been exposed to the virus  How can I eat healthy? My Plate is a model for planning healthy meals  It shows the types and amounts of foods that should go on your plate  Fruits and vegetables make up about half of your plate, and grains and protein make up the other half  A serving of dairy is included on the side of your plate  The amount of calories and serving sizes you need depends on your age, gender, weight, and height  Examples of healthy foods are listed below:  · Eat a variety of vegetables  such as dark green, red, and orange vegetables  You can also include canned vegetables low in sodium (salt) and frozen vegetables without added butter or sauces  · Eat a variety of fresh fruits , canned fruit in 100% juice, frozen fruit, and dried fruit  · Include whole grains  At least half of the grains you eat should be whole grains  Examples include whole-wheat bread, wheat pasta, brown rice, and whole-grain cereals such as oatmeal     · Eat a variety of protein foods such as seafood (fish and shellfish), lean meat, and poultry without skin (turkey and chicken)  Examples of lean meats include pork leg, shoulder, or tenderloin, and beef round, sirloin, tenderloin, and extra lean ground beef  Other protein foods include eggs and egg substitutes, beans, peas, soy products, nuts, and seeds  · Choose low-fat dairy products such as skim or 1% milk or low-fat yogurt, cheese, and cottage cheese  · Limit unhealthy fats  such as butter, hard margarine, and shortening  How much exercise do I need? Exercise at least 30 minutes per day on most days of the week  Some examples of exercise include walking, biking, dancing, and swimming  You can also fit in more physical activity by taking the stairs instead of the elevator or parking farther away from stores  Include muscle strengthening activities 2 days each week  Regular exercise provides many health benefits  It helps you manage your weight, and decreases your risk for type 2 diabetes, heart disease, stroke, and high blood pressure  Exercise can also help improve your mood  Ask your healthcare provider about the best exercise plan for you  What are some general health and safety guidelines I should follow? · Do not smoke  Nicotine and other chemicals in cigarettes and cigars can cause lung damage  Ask your healthcare provider for information if you currently smoke and need help to quit  E-cigarettes or smokeless tobacco still contain nicotine  Talk to your healthcare provider before you use these products  · Limit alcohol  A drink of alcohol is 12 ounces of beer, 5 ounces of wine, or 1½ ounces of liquor  · Lose weight, if needed  Being overweight increases your risk of certain health conditions  These include heart disease, high blood pressure, type 2 diabetes, and certain types of cancer  · Protect your skin  Do not sunbathe or use tanning beds  Use sunscreen with a SPF 15 or higher  Apply sunscreen at least 15 minutes before you go outside  Reapply sunscreen every 2 hours  Wear protective clothing, hats, and sunglasses when you are outside  · Drive safely  Always wear your seatbelt  Make sure everyone in your car wears a seatbelt  A seatbelt can save your life if you are in an accident  Do not use your cell phone when you are driving  This could distract you and cause an accident  Pull over if you need to make a call or send a text message  · Practice safe sex  Use latex condoms if are sexually active and have more than one partner  Your healthcare provider may recommend screening tests for sexually transmitted infections (STIs)  · Wear helmets, lifejackets, and protective gear  Always wear a helmet when you ride a bike or motorcycle, go skiing, or play sports that could cause a head injury  Wear protective equipment when you play sports   Wear a lifejacket when you are on a boat or doing water sports  CARE AGREEMENT:   You have the right to help plan your care  Learn about your health condition and how it may be treated  Discuss treatment options with your caregivers to decide what care you want to receive  You always have the right to refuse treatment  The above information is an  only  It is not intended as medical advice for individual conditions or treatments  Talk to your doctor, nurse or pharmacist before following any medical regimen to see if it is safe and effective for you  © 2017 [employer] Information is for End User's use only and may not be sold, redistributed or otherwise used for commercial purposes  All illustrations and images included in CareNotes® are the copyrighted property of [employer]  or Christian Christiansen

## 2019-12-19 ENCOUNTER — HOSPITAL ENCOUNTER (OUTPATIENT)
Dept: NON INVASIVE DIAGNOSTICS | Facility: HOSPITAL | Age: 84
Discharge: HOME | End: 2019-12-19
Payer: COMMERCIAL

## 2019-12-19 DIAGNOSIS — Z01.818 PRE-OP TESTING: ICD-10-CM

## 2019-12-19 LAB
ATRIAL RATE: 65 BPM
P AXIS: 56 DEGREES
PR INTERVAL: 208 MS
QRS AXIS: 232 DEGREES
QRSD INTERVAL: 102 MS
QT INTERVAL: 400 MS
QTC INTERVAL: 416 MS
T WAVE AXIS: 13 DEGREES
VENTRICULAR RATE: 65 BPM

## 2019-12-19 PROCEDURE — 93010 ELECTROCARDIOGRAM REPORT: CPT | Performed by: INTERNAL MEDICINE

## 2019-12-19 PROCEDURE — 93005 ELECTROCARDIOGRAM TRACING: CPT

## 2020-01-08 ENCOUNTER — CONSULT (OUTPATIENT)
Dept: FAMILY MEDICINE CLINIC | Facility: CLINIC | Age: 85
End: 2020-01-08
Payer: COMMERCIAL

## 2020-01-08 VITALS
HEIGHT: 76 IN | HEART RATE: 86 BPM | DIASTOLIC BLOOD PRESSURE: 82 MMHG | WEIGHT: 258.9 LBS | BODY MASS INDEX: 31.53 KG/M2 | OXYGEN SATURATION: 98 % | SYSTOLIC BLOOD PRESSURE: 118 MMHG | RESPIRATION RATE: 18 BRPM | TEMPERATURE: 98.1 F

## 2020-01-08 DIAGNOSIS — E03.9 ACQUIRED HYPOTHYROIDISM: ICD-10-CM

## 2020-01-08 DIAGNOSIS — Z01.818 PRE-OPERATIVE CLEARANCE: Primary | ICD-10-CM

## 2020-01-08 DIAGNOSIS — I10 ESSENTIAL HYPERTENSION: ICD-10-CM

## 2020-01-08 PROCEDURE — 3725F SCREEN DEPRESSION PERFORMED: CPT | Performed by: NURSE PRACTITIONER

## 2020-01-08 PROCEDURE — 99214 OFFICE O/P EST MOD 30 MIN: CPT | Performed by: NURSE PRACTITIONER

## 2020-01-08 NOTE — PROGRESS NOTES
Assessment/Plan:     Diagnoses and all orders for this visit:    Pre-operative clearance  Comments:  Cleared for surgery, as scheduled    Essential hypertension  Comments:  Controlled    Acquired hypothyroidism  Comments:  Controlled        Subjective:      Patient ID: Anoop Matthews is a 80 y o  male  Patient presents to [address] [place] [address] for surgical clearance  Allergies, medical history and current medications reviewed with patient; patient reports taking all medications as prescribed without issues  Patient is scheduled for Cataract Extraction of the left eye with Ophthalmology Dr Makayla Solomon on 1/20/20  EKG completed pre-operatively completed  Patient denies any current problems or complaints  Patient Care Team:  Lit Cassidy MD as PCP - General (Family Medicine)  Eye Consultant Pa (Ophthalmology)    Review of Systems   Respiratory: Negative for shortness of breath  Cardiovascular: Negative for chest pain and palpitations  Gastrointestinal: Negative for abdominal pain  All other systems reviewed and are negative  Objective:    /82 (BP Location: Right arm, Patient Position: Sitting, Cuff Size: Large)   Pulse 86   Temp 98 1 °F (36 7 °C) (Temporal)   Resp 18   Ht 6' 4" (1 93 m)   Wt 117 kg (258 lb 14 4 oz)   SpO2 98%   BMI 31 51 kg/m²      Physical Exam   Constitutional: He is oriented to person, place, and time  He appears well-developed and well-nourished  No distress  HENT:   Head: Normocephalic and atraumatic  Right Ear: Tympanic membrane, external ear and ear canal normal    Left Ear: Tympanic membrane, external ear and ear canal normal    Eyes: Conjunctivae and lids are normal    Neck: Normal range of motion  No tracheal deviation present  Cardiovascular: Normal rate, regular rhythm, S1 normal and S2 normal  Exam reveals distant heart sounds  Pulmonary/Chest: Effort normal and breath sounds normal  No respiratory distress     Abdominal: Normal appearance and bowel sounds are normal  He exhibits no distension  There is no guarding  Musculoskeletal: Normal range of motion  Neurological: He is alert and oriented to person, place, and time  Skin: Skin is warm, dry and intact  Psychiatric: He has a normal mood and affect  His speech is normal    Nursing note and vitals reviewed  BMI Counseling: Body mass index is 31 51 kg/m²  The BMI is above normal  Nutrition recommendations include encouraging healthy choices of fruits and vegetables, consuming healthier snacks, reducing intake of saturated and trans fat and reducing intake of cholesterol  Exercise recommendations include exercising 3-5 times per week  The above recommendations were included in patient instructions

## 2020-03-16 ENCOUNTER — OFFICE VISIT (OUTPATIENT)
Dept: FAMILY MEDICINE CLINIC | Facility: CLINIC | Age: 85
End: 2020-03-16
Payer: COMMERCIAL

## 2020-03-16 VITALS
WEIGHT: 260 LBS | TEMPERATURE: 98.2 F | BODY MASS INDEX: 31.65 KG/M2 | HEART RATE: 68 BPM | SYSTOLIC BLOOD PRESSURE: 128 MMHG | OXYGEN SATURATION: 94 % | DIASTOLIC BLOOD PRESSURE: 76 MMHG

## 2020-03-16 DIAGNOSIS — E66.9 CLASS 1 OBESITY WITHOUT SERIOUS COMORBIDITY WITH BODY MASS INDEX (BMI) OF 31.0 TO 31.9 IN ADULT, UNSPECIFIED OBESITY TYPE: ICD-10-CM

## 2020-03-16 DIAGNOSIS — M86.68 CHRONIC OSTEOMYELITIS OF KNEE (HCC): ICD-10-CM

## 2020-03-16 DIAGNOSIS — I10 ESSENTIAL HYPERTENSION: ICD-10-CM

## 2020-03-16 DIAGNOSIS — E03.9 ACQUIRED HYPOTHYROIDISM: Primary | ICD-10-CM

## 2020-03-16 PROCEDURE — 3074F SYST BP LT 130 MM HG: CPT | Performed by: FAMILY MEDICINE

## 2020-03-16 PROCEDURE — 1160F RVW MEDS BY RX/DR IN RCRD: CPT | Performed by: FAMILY MEDICINE

## 2020-03-16 PROCEDURE — 3078F DIAST BP <80 MM HG: CPT | Performed by: FAMILY MEDICINE

## 2020-03-16 PROCEDURE — 99214 OFFICE O/P EST MOD 30 MIN: CPT | Performed by: FAMILY MEDICINE

## 2020-03-16 PROCEDURE — 4040F PNEUMOC VAC/ADMIN/RCVD: CPT | Performed by: FAMILY MEDICINE

## 2020-03-16 PROCEDURE — 1036F TOBACCO NON-USER: CPT | Performed by: FAMILY MEDICINE

## 2020-03-16 RX ORDER — IRBESARTAN 300 MG/1
300 TABLET ORAL EVERY OTHER DAY
Qty: 10 TABLET | Refills: 3 | Status: SHIPPED | OUTPATIENT
Start: 2020-03-16 | End: 2020-04-16 | Stop reason: SDUPTHER

## 2020-03-16 NOTE — PROGRESS NOTES
Assessment/Plan:    No problem-specific Assessment & Plan notes found for this encounter  Diagnoses and all orders for this visit:    Acquired hypothyroidism  -     Lipid panel; Future  -     Comprehensive metabolic panel; Future  -     TSH, 3rd generation; Future    Essential hypertension  -     irbesartan (AVAPRO) 300 mg tablet; Take 1 tablet (300 mg total) by mouth every other day  -     Lipid panel; Future  -     Comprehensive metabolic panel; Future  -     TSH, 3rd generation; Future    Chronic osteomyelitis of knee ([address] )  -     Lipid panel; Future  -     Comprehensive metabolic panel; Future  -     TSH, 3rd generation; Future    Class 1 obesity without serious comorbidity with body mass index (BMI) of 31 0 to 31 9 in adult, unspecified obesity type  -     Lipid panel; Future  -     Comprehensive metabolic panel; Future  -     TSH, 3rd generation; Future          Subjective:      Patient ID: Agueda Grimaldo is a 80 y o  male  His blood pressure is at goal in the office today  He has no side effects from his current regimen  He has no chest pain or shortness of breath  He has no headache or vision changes  He has no PND, orthopnea, or dyspnea on exertion  His lipid panel is at goal   He is not on a cholesterol medication  He has chronic osteomyelitis of his knee  He is on lifelong doxycycline  He has no pain  He has no fevers or chills  He has hypothyroidism  His TSH and free T4 are at goal on his current dose of levothyroxine  He has no signs or symptoms of hypo or hyperthyroidism  The following portions of the patient's history were reviewed and updated as appropriate:   He  has no past medical history on file    He   Patient Active Problem List    Diagnosis Date Noted    Male erectile disorder 09/18/2019    Impaired fasting glucose 09/16/2019    Class 1 obesity without serious comorbidity with body mass index (BMI) of 31 0 to 31 9 in adult 03/15/2019    Hypothyroidism 03/16/2018    Essential hypertension 03/16/2018    Chronic osteomyelitis of knee ([facility] [address] ) 10/08/2013     He  has a past surgical history that includes Hernia repair; Replacement total knee; and Prostate surgery  His family history is not on file  He  reports that he has never smoked  He has never used smokeless tobacco  He reports that he does not drink alcohol or use drugs  Current Outpatient Medications   Medication Sig Dispense Refill    bethanechol (URECHOLINE) 25 mg tablet Take by mouth      doxycycline hyclate (VIBRAMYCIN) 100 mg capsule Take 1 capsule (100 mg total) by mouth every 12 (twelve) hours 180 capsule 3    gabapentin (NEURONTIN) 300 mg capsule       irbesartan (AVAPRO) 300 mg tablet Take 1 tablet (300 mg total) by mouth every other day 10 tablet 3    levothyroxine 150 mcg tablet Take 1 tablet (150 mcg total) by mouth daily 90 tablet 3    metoprolol succinate (TOPROL-XL) 25 mg 24 hr tablet       METRONIDAZOLE ER PO       Multiple Vitamins-Minerals (ICAPS AREDS 2 PO) Take by mouth      neomycin-polymyxin-hydrocortisone (CORTISPORIN) otic solution Administer 4 drops into both ears every 8 (eight) hours for 10 days 10 mL 0    sildenafil (VIAGRA) 100 mg tablet Take 1 tablet (100 mg total) by mouth daily as needed for erectile dysfunction (Patient not taking: Reported on 12/13/2019) 8 tablet 12    tamsulosin (FLOMAX) 0 4 mg        No current facility-administered medications for this visit        Current Outpatient Medications on File Prior to Visit   Medication Sig    bethanechol (URECHOLINE) 25 mg tablet Take by mouth    doxycycline hyclate (VIBRAMYCIN) 100 mg capsule Take 1 capsule (100 mg total) by mouth every 12 (twelve) hours    gabapentin (NEURONTIN) 300 mg capsule     levothyroxine 150 mcg tablet Take 1 tablet (150 mcg total) by mouth daily    metoprolol succinate (TOPROL-XL) 25 mg 24 hr tablet     METRONIDAZOLE ER PO     Multiple Vitamins-Minerals (ICAPS AREDS 2 PO) Take by mouth    neomycin-polymyxin-hydrocortisone (CORTISPORIN) otic solution Administer 4 drops into both ears every 8 (eight) hours for 10 days    sildenafil (VIAGRA) 100 mg tablet Take 1 tablet (100 mg total) by mouth daily as needed for erectile dysfunction (Patient not taking: Reported on 12/13/2019)    tamsulosin (FLOMAX) 0 4 mg     [DISCONTINUED] irbesartan (AVAPRO) 300 mg tablet Take 1 tablet (300 mg total) by mouth every other day    [DISCONTINUED] metroNIDAZOLE (METROGEL) 1 % gel Apply topically daily (Patient not taking: Reported on 12/13/2019)     No current facility-administered medications on file prior to visit  He has No Known Allergies       Review of Systems   All other systems reviewed and are negative  Objective:      /76   Pulse 68   Temp 98 2 °F (36 8 °C)   Wt 118 kg (260 lb)   SpO2 94%   BMI 31 65 kg/m²          Physical Exam   Constitutional: He is oriented to person, place, and time  He appears well-developed and well-nourished  Neck: Normal range of motion  Neck supple  Cardiovascular: Normal rate, regular rhythm, normal heart sounds and intact distal pulses  Pulmonary/Chest: Effort normal and breath sounds normal    Abdominal: Soft  Bowel sounds are normal    Musculoskeletal: Normal range of motion  Neurological: He is alert and oriented to person, place, and time  Skin: Skin is warm and dry  Capillary refill takes less than 2 seconds  Psychiatric: He has a normal mood and affect  His behavior is normal  Judgment and thought content normal    Nursing note and vitals reviewed

## 2020-04-16 ENCOUNTER — TELEMEDICINE (OUTPATIENT)
Dept: FAMILY MEDICINE CLINIC | Facility: CLINIC | Age: 85
End: 2020-04-16
Payer: COMMERCIAL

## 2020-04-16 DIAGNOSIS — I10 ESSENTIAL HYPERTENSION: Primary | ICD-10-CM

## 2020-04-16 DIAGNOSIS — L71.9 ROSACEA: ICD-10-CM

## 2020-04-16 PROCEDURE — G2012 BRIEF CHECK IN BY MD/QHP: HCPCS | Performed by: STUDENT IN AN ORGANIZED HEALTH CARE EDUCATION/TRAINING PROGRAM

## 2020-04-16 RX ORDER — METRONIDAZOLE 7.5 MG/G
GEL TOPICAL 2 TIMES DAILY
Qty: 70 G | Refills: 0 | Status: SHIPPED | OUTPATIENT
Start: 2020-04-16 | End: 2022-01-01 | Stop reason: HOSPADM

## 2020-04-16 RX ORDER — IRBESARTAN 300 MG/1
300 TABLET ORAL EVERY OTHER DAY
Qty: 45 TABLET | Refills: 1 | Status: SHIPPED | OUTPATIENT
Start: 2020-04-16 | End: 2020-04-23 | Stop reason: SDUPTHER

## 2020-04-20 ENCOUNTER — TELEPHONE (OUTPATIENT)
Dept: FAMILY MEDICINE CLINIC | Facility: CLINIC | Age: 85
End: 2020-04-20

## 2020-04-20 DIAGNOSIS — I10 ESSENTIAL HYPERTENSION: Primary | ICD-10-CM

## 2020-04-20 RX ORDER — LOSARTAN POTASSIUM 100 MG/1
100 TABLET ORAL DAILY
Qty: 90 TABLET | Refills: 3 | Status: SHIPPED | OUTPATIENT
Start: 2020-04-20 | End: 2020-04-23 | Stop reason: CLARIF

## 2020-04-23 ENCOUNTER — TELEPHONE (OUTPATIENT)
Dept: FAMILY MEDICINE CLINIC | Facility: CLINIC | Age: 85
End: 2020-04-23

## 2020-04-23 DIAGNOSIS — I10 ESSENTIAL HYPERTENSION: ICD-10-CM

## 2020-04-23 RX ORDER — IRBESARTAN 150 MG/1
300 TABLET ORAL EVERY OTHER DAY
Qty: 30 TABLET | Refills: 2 | Status: SHIPPED | OUTPATIENT
Start: 2020-04-23 | End: 2020-07-06 | Stop reason: SDUPTHER

## 2020-05-14 ENCOUNTER — OFFICE VISIT (OUTPATIENT)
Dept: FAMILY MEDICINE CLINIC | Facility: CLINIC | Age: 85
End: 2020-05-14
Payer: COMMERCIAL

## 2020-05-14 DIAGNOSIS — Z13.1 SCREENING FOR DIABETES MELLITUS: ICD-10-CM

## 2020-05-14 DIAGNOSIS — Z00.00 ENCOUNTER FOR MEDICARE ANNUAL WELLNESS EXAM: Primary | ICD-10-CM

## 2020-05-14 PROCEDURE — G0438 PPPS, INITIAL VISIT: HCPCS | Performed by: STUDENT IN AN ORGANIZED HEALTH CARE EDUCATION/TRAINING PROGRAM

## 2020-05-14 PROCEDURE — 1036F TOBACCO NON-USER: CPT | Performed by: STUDENT IN AN ORGANIZED HEALTH CARE EDUCATION/TRAINING PROGRAM

## 2020-05-14 PROCEDURE — 3080F DIAST BP >= 90 MM HG: CPT | Performed by: STUDENT IN AN ORGANIZED HEALTH CARE EDUCATION/TRAINING PROGRAM

## 2020-05-14 PROCEDURE — 1170F FXNL STATUS ASSESSED: CPT | Performed by: STUDENT IN AN ORGANIZED HEALTH CARE EDUCATION/TRAINING PROGRAM

## 2020-05-14 PROCEDURE — 1125F AMNT PAIN NOTED PAIN PRSNT: CPT | Performed by: STUDENT IN AN ORGANIZED HEALTH CARE EDUCATION/TRAINING PROGRAM

## 2020-05-14 PROCEDURE — 4040F PNEUMOC VAC/ADMIN/RCVD: CPT | Performed by: STUDENT IN AN ORGANIZED HEALTH CARE EDUCATION/TRAINING PROGRAM

## 2020-05-14 PROCEDURE — 3077F SYST BP >= 140 MM HG: CPT | Performed by: STUDENT IN AN ORGANIZED HEALTH CARE EDUCATION/TRAINING PROGRAM

## 2020-05-16 VITALS
OXYGEN SATURATION: 97 % | SYSTOLIC BLOOD PRESSURE: 180 MMHG | WEIGHT: 260 LBS | HEART RATE: 86 BPM | HEIGHT: 76 IN | BODY MASS INDEX: 31.66 KG/M2 | DIASTOLIC BLOOD PRESSURE: 104 MMHG

## 2020-05-20 LAB — HBA1C MFR BLD HPLC: 5.9 %

## 2020-05-21 DIAGNOSIS — E03.9 ACQUIRED HYPOTHYROIDISM: Primary | ICD-10-CM

## 2020-05-21 RX ORDER — LEVOTHYROXINE SODIUM 175 UG/1
175 TABLET ORAL
Qty: 90 TABLET | Refills: 3 | Status: SHIPPED | OUTPATIENT
Start: 2020-05-21 | End: 2021-02-25

## 2020-06-08 ENCOUNTER — OFFICE VISIT (OUTPATIENT)
Dept: FAMILY MEDICINE CLINIC | Facility: CLINIC | Age: 85
End: 2020-06-08
Payer: COMMERCIAL

## 2020-06-08 VITALS — HEART RATE: 87 BPM | TEMPERATURE: 97.9 F | OXYGEN SATURATION: 97 %

## 2020-06-08 DIAGNOSIS — L03.90 WOUND CELLULITIS: Primary | ICD-10-CM

## 2020-06-08 PROCEDURE — 3080F DIAST BP >= 90 MM HG: CPT | Performed by: FAMILY MEDICINE

## 2020-06-08 PROCEDURE — 1036F TOBACCO NON-USER: CPT | Performed by: FAMILY MEDICINE

## 2020-06-08 PROCEDURE — 3077F SYST BP >= 140 MM HG: CPT | Performed by: FAMILY MEDICINE

## 2020-06-08 PROCEDURE — 1160F RVW MEDS BY RX/DR IN RCRD: CPT | Performed by: FAMILY MEDICINE

## 2020-06-08 PROCEDURE — 99213 OFFICE O/P EST LOW 20 MIN: CPT | Performed by: FAMILY MEDICINE

## 2020-06-08 PROCEDURE — 4040F PNEUMOC VAC/ADMIN/RCVD: CPT | Performed by: FAMILY MEDICINE

## 2020-06-08 PROCEDURE — 1170F FXNL STATUS ASSESSED: CPT | Performed by: FAMILY MEDICINE

## 2020-06-08 RX ORDER — AMOXICILLIN AND CLAVULANATE POTASSIUM 875; 125 MG/1; MG/1
1 TABLET, FILM COATED ORAL EVERY 12 HOURS SCHEDULED
Qty: 14 TABLET | Refills: 0 | Status: SHIPPED | OUTPATIENT
Start: 2020-06-08 | End: 2020-06-08 | Stop reason: SDUPTHER

## 2020-06-08 RX ORDER — AMOXICILLIN AND CLAVULANATE POTASSIUM 875; 125 MG/1; MG/1
1 TABLET, FILM COATED ORAL EVERY 12 HOURS SCHEDULED
Qty: 14 TABLET | Refills: 0 | Status: SHIPPED | OUTPATIENT
Start: 2020-06-08 | End: 2020-06-15

## 2020-06-24 ENCOUNTER — TELEPHONE (OUTPATIENT)
Dept: FAMILY MEDICINE CLINIC | Facility: CLINIC | Age: 85
End: 2020-06-24

## 2020-07-06 DIAGNOSIS — I10 ESSENTIAL HYPERTENSION: ICD-10-CM

## 2020-07-06 RX ORDER — IRBESARTAN 150 MG/1
300 TABLET ORAL EVERY OTHER DAY
Qty: 30 TABLET | Refills: 2 | Status: SHIPPED | OUTPATIENT
Start: 2020-07-06 | End: 2020-07-07 | Stop reason: SDUPTHER

## 2020-07-07 ENCOUNTER — TELEPHONE (OUTPATIENT)
Dept: FAMILY MEDICINE CLINIC | Facility: CLINIC | Age: 85
End: 2020-07-07

## 2020-07-07 DIAGNOSIS — I10 ESSENTIAL HYPERTENSION: ICD-10-CM

## 2020-07-07 RX ORDER — IRBESARTAN 300 MG/1
300 TABLET ORAL EVERY OTHER DAY
Qty: 45 TABLET | Refills: 3 | Status: SHIPPED | OUTPATIENT
Start: 2020-07-07 | End: 2021-01-01

## 2020-07-15 ENCOUNTER — OFFICE VISIT (OUTPATIENT)
Dept: FAMILY MEDICINE CLINIC | Facility: CLINIC | Age: 85
End: 2020-07-15
Payer: COMMERCIAL

## 2020-07-15 VITALS
TEMPERATURE: 98.1 F | DIASTOLIC BLOOD PRESSURE: 93 MMHG | SYSTOLIC BLOOD PRESSURE: 142 MMHG | WEIGHT: 268 LBS | BODY MASS INDEX: 32.62 KG/M2 | HEART RATE: 81 BPM | OXYGEN SATURATION: 97 %

## 2020-07-15 DIAGNOSIS — H60.393 OTHER INFECTIVE ACUTE OTITIS EXTERNA OF BOTH EARS: ICD-10-CM

## 2020-07-15 DIAGNOSIS — N52.8 OTHER MALE ERECTILE DYSFUNCTION: ICD-10-CM

## 2020-07-15 DIAGNOSIS — H61.23 BILATERAL IMPACTED CERUMEN: Primary | ICD-10-CM

## 2020-07-15 DIAGNOSIS — Z86.14 HISTORY OF MRSA INFECTION: ICD-10-CM

## 2020-07-15 DIAGNOSIS — H65.03 NON-RECURRENT ACUTE SEROUS OTITIS MEDIA OF BOTH EARS: ICD-10-CM

## 2020-07-15 PROBLEM — L71.9 ROSACEA: Status: ACTIVE | Noted: 2020-07-15

## 2020-07-15 PROBLEM — H35.30 AGE-RELATED MACULAR DEGENERATION: Status: ACTIVE | Noted: 2020-07-15

## 2020-07-15 PROBLEM — M19.90 OSTEOARTHRITIS: Status: ACTIVE | Noted: 2020-07-15

## 2020-07-15 PROBLEM — H90.3 ASYMMETRICAL SENSORINEURAL HEARING LOSS: Status: ACTIVE | Noted: 2020-07-15

## 2020-07-15 PROCEDURE — 99213 OFFICE O/P EST LOW 20 MIN: CPT | Performed by: NURSE PRACTITIONER

## 2020-07-15 RX ORDER — AMOXICILLIN 500 MG/1
500 CAPSULE ORAL EVERY 8 HOURS SCHEDULED
Qty: 21 CAPSULE | Refills: 0 | Status: SHIPPED | OUTPATIENT
Start: 2020-07-15 | End: 2020-07-22

## 2020-07-15 RX ORDER — DOXYCYCLINE HYCLATE 100 MG/1
100 CAPSULE ORAL EVERY 12 HOURS SCHEDULED
Qty: 180 CAPSULE | Refills: 3 | Status: SHIPPED | OUTPATIENT
Start: 2020-07-15 | End: 2020-07-22 | Stop reason: SDUPTHER

## 2020-07-15 RX ORDER — SILDENAFIL 100 MG/1
100 TABLET, FILM COATED ORAL DAILY PRN
Qty: 8 TABLET | Refills: 12 | Status: SHIPPED | OUTPATIENT
Start: 2020-07-15 | End: 2021-01-19 | Stop reason: SDUPTHER

## 2020-07-15 NOTE — PROGRESS NOTES
Assessment/Plan:     Diagnoses and all orders for this visit:    Bilateral impacted cerumen  -     Ear cerumen removal    Non-recurrent acute serous otitis media of both ears  -     amoxicillin (AMOXIL) 500 mg capsule; Take 1 capsule (500 mg total) by mouth every 8 (eight) hours for 7 days    Other infective acute otitis externa of both ears  -     neomycin-polymyxin-hydrocortisone (CORTISPORIN) otic solution; Administer 4 drops into both ears every 8 (eight) hours for 7 days    Other male erectile dysfunction  -     sildenafil (VIAGRA) 100 mg tablet; Take 1 tablet (100 mg total) by mouth daily as needed for erectile dysfunction    History of MRSA infection  -     Discontinue: doxycycline hyclate (VIBRAMYCIN) 100 mg capsule; Take 1 capsule (100 mg total) by mouth every 12 (twelve) hours        Subjective:      Patient ID: Kelsi Aldana is a 80 y o  male  Patient presents to [address] [place] [address] with complaints of blocked ears  Allergies, medical history and current medications reviewed with patient; patient reports taking all medications as prescribed without issues  Patient c/o bilateral ear blockage, ongoing for about 1 month  Patient states he has noticed increased difficulty with hearing  Patient denies any ear pain or irritation  Patient states he is due for new hearing aides from the [place] in November  Patient Care Team:  Hank Barkley MD as PCP - General (Family Medicine)  Eye Consultant Pa (Ophthalmology)    Review of Systems   HENT: Positive for congestion and ear discharge  Negative for ear pain  All other systems reviewed and are negative  Objective:    /93   Pulse 81   Temp 98 1 °F (36 7 °C)   Wt 122 kg (268 lb)   SpO2 97%   PF 83 L/min   BMI 32 62 kg/m²      Physical Exam  Vitals signs and nursing note reviewed  Constitutional:       General: He is not in acute distress  Appearance: Normal appearance  He is well-developed     HENT:      Head: Normocephalic and atraumatic  Right Ear: External ear normal  Swelling (with erythema) present  A middle ear effusion is present  Left Ear: External ear normal  Swelling (with erythema) present  A middle ear effusion is present  Eyes:      General: Lids are normal       Conjunctiva/sclera: Conjunctivae normal    Neck:      Musculoskeletal: Normal range of motion  Trachea: No tracheal deviation  Cardiovascular:      Rate and Rhythm: Normal rate  Pulmonary:      Effort: Pulmonary effort is normal  No respiratory distress  Abdominal:      General: There is no distension  Tenderness: There is no guarding  Musculoskeletal: Normal range of motion  Skin:     General: Skin is warm and dry  Neurological:      Mental Status: He is alert and oriented to person, place, and time  Psychiatric:         Speech: Speech normal         Ear cerumen removal    Date/Time: 9/1/2020 7:45 PM  Performed by: YOLETTE Hathaway  Authorized by: Vidya Tucker [address] [place] [address]     Patient location:  Clinic  Consent:     Consent obtained:  Verbal    Consent given by:  Patient    Risks discussed:  Pain and dizziness  Universal protocol:     Procedure explained and questions answered to patient or proxy's satisfaction: yes      Patient identity confirmed:  Verbally with patient  Procedure details:     Location:  L ear and R ear    Procedure type: irrigation with instrumentation      Instrumentation: curette    Post-procedure details:     Hearing quality:  Improved    Patient tolerance of procedure:   Tolerated well, no immediate complications

## 2020-07-22 DIAGNOSIS — Z86.14 HISTORY OF MRSA INFECTION: ICD-10-CM

## 2020-07-22 RX ORDER — DOXYCYCLINE HYCLATE 100 MG/1
100 CAPSULE ORAL EVERY 12 HOURS SCHEDULED
Qty: 180 CAPSULE | Refills: 3 | Status: SHIPPED | OUTPATIENT
Start: 2020-07-22 | End: 2020-07-23 | Stop reason: SDUPTHER

## 2020-07-23 DIAGNOSIS — Z86.14 HISTORY OF MRSA INFECTION: ICD-10-CM

## 2020-07-23 RX ORDER — DOXYCYCLINE HYCLATE 100 MG/1
100 CAPSULE ORAL EVERY 12 HOURS SCHEDULED
Qty: 180 CAPSULE | Refills: 3 | Status: SHIPPED | OUTPATIENT
Start: 2020-07-23 | End: 2021-01-19 | Stop reason: ALTCHOICE

## 2020-09-01 PROCEDURE — 69210 REMOVE IMPACTED EAR WAX UNI: CPT | Performed by: NURSE PRACTITIONER

## 2020-09-16 ENCOUNTER — OFFICE VISIT (OUTPATIENT)
Dept: FAMILY MEDICINE CLINIC | Facility: CLINIC | Age: 85
End: 2020-09-16
Payer: COMMERCIAL

## 2020-09-16 VITALS
DIASTOLIC BLOOD PRESSURE: 86 MMHG | HEART RATE: 89 BPM | OXYGEN SATURATION: 97 % | BODY MASS INDEX: 31.17 KG/M2 | TEMPERATURE: 98.7 F | HEIGHT: 76 IN | WEIGHT: 256 LBS | SYSTOLIC BLOOD PRESSURE: 138 MMHG

## 2020-09-16 DIAGNOSIS — R26.89 BALANCE PROBLEM: Primary | ICD-10-CM

## 2020-09-16 DIAGNOSIS — Z23 NEED FOR IMMUNIZATION AGAINST INFLUENZA: Primary | ICD-10-CM

## 2020-09-16 DIAGNOSIS — H91.93 BILATERAL HEARING LOSS, UNSPECIFIED HEARING LOSS TYPE: ICD-10-CM

## 2020-09-16 DIAGNOSIS — E66.9 CLASS 1 OBESITY WITHOUT SERIOUS COMORBIDITY WITH BODY MASS INDEX (BMI) OF 31.0 TO 31.9 IN ADULT, UNSPECIFIED OBESITY TYPE: ICD-10-CM

## 2020-09-16 DIAGNOSIS — I10 ESSENTIAL HYPERTENSION: ICD-10-CM

## 2020-09-16 PROCEDURE — 3079F DIAST BP 80-89 MM HG: CPT | Performed by: FAMILY MEDICINE

## 2020-09-16 PROCEDURE — 3075F SYST BP GE 130 - 139MM HG: CPT | Performed by: FAMILY MEDICINE

## 2020-09-16 PROCEDURE — G0008 ADMIN INFLUENZA VIRUS VAC: HCPCS

## 2020-09-16 PROCEDURE — 1036F TOBACCO NON-USER: CPT | Performed by: FAMILY MEDICINE

## 2020-09-16 PROCEDURE — 99213 OFFICE O/P EST LOW 20 MIN: CPT | Performed by: FAMILY MEDICINE

## 2020-09-16 PROCEDURE — 90662 IIV NO PRSV INCREASED AG IM: CPT

## 2020-09-16 NOTE — PROGRESS NOTES
Assessment/Plan:    Neal Lee is a 80year old male with past medical history of hypothyroidism, hypertension, chronic osteoarthritis of both knees status post total knee replacement, obesity presents today for follow-up visit  Patient has trouble going from sitting to standing position  Get up and go test prolonged  Patient would benefit with home physical therapy  [facility]  BMI Counseling: Body mass index is 31 16 kg/m²  The BMI is above normal  Nutrition recommendations include reducing portion sizes, decreasing overall calorie intake, 3-5 servings of fruits/vegetables daily, reducing fast food intake, consuming healthier snacks, decreasing soda and/or juice intake, moderation in carbohydrate intake, increasing intake of lean protein, reducing intake of saturated fat and trans fat and reducing intake of cholesterol  Exercise recommendations include moderate aerobic physical activity for 150 minutes/week and strength training exercises  F/u in 3 months  Case d/w Dr Balbir Trammell  Diagnoses and all orders for this visit:    Balance problem  -     Ambulatory referral to Physical Therapy; Future    Essential hypertension    Bilateral hearing loss, unspecified hearing loss type    Class 1 obesity without serious comorbidity with body mass index (BMI) of 31 0 to 31 9 in adult, unspecified obesity type        Subjective:      Patient ID: Anne Osullivan is a 80 y o  male  HPI     Patient is a 80year old male with past medical history of hypothyroidism, hypertension, chronic osteoarthritis of both knees status post total knee replacement, obesity presents today for follow-up visit  Patient states that he has balance problem  Denies any previous fall  Denies any dizziness, chest pain, short of breath, blurry vision  Patient does have bilateral hearing loss and wears bilateral hearing aids for past 4 years    [facility]  Denied any ear pain, discharge  No other concerns at this time  Patient will receive flu vaccine today  The following portions of the patient's history were reviewed and updated as appropriate: allergies, current medications, past family history, past medical history, past social history, past surgical history and problem list     Review of Systems   Constitutional: Negative for chills and fever  HENT: Negative for congestion, rhinorrhea and sore throat  Eyes: Negative for visual disturbance  Respiratory: Negative for cough and shortness of breath  Cardiovascular: Negative for chest pain and palpitations  Gastrointestinal: Negative for abdominal pain, nausea and vomiting  Genitourinary: Negative for dysuria  Musculoskeletal: Positive for gait problem (balance problem)  Negative for back pain  Skin: Negative for color change  Neurological: Negative for dizziness, weakness and headaches  Hematological: Does not bruise/bleed easily  Psychiatric/Behavioral: Negative for confusion  Objective:      /86   Pulse 89   Temp 98 7 °F (37 1 °C)   Ht 6' 4" (1 93 m)   Wt 116 kg (256 lb)   SpO2 97%   BMI 31 16 kg/m²          Physical Exam  Vitals signs and nursing note reviewed  Constitutional:       General: He is not in acute distress  Appearance: He is well-developed  HENT:      Head: Normocephalic  Ears:      Comments: Able to visualize tympanic membrane bilaterally with light reflex  Minimal dry cerumen in outer ear canals  Eyes:      General: No scleral icterus  Right eye: No discharge  Left eye: No discharge  Conjunctiva/sclera: Conjunctivae normal    Neck:      Musculoskeletal: Normal range of motion  Cardiovascular:      Rate and Rhythm: Normal rate and regular rhythm  Heart sounds: Normal heart sounds  No murmur     Pulmonary:      Effort: Pulmonary effort is normal  No respiratory distress  Breath sounds: Normal breath sounds  No wheezing  Abdominal:      General: Bowel sounds are normal  There is no distension  Palpations: Abdomen is soft  Tenderness: There is no abdominal tenderness  Musculoskeletal:         General: No tenderness  Lymphadenopathy:      Cervical: No cervical adenopathy  Skin:     Findings: No erythema  Neurological:      Mental Status: He is alert and oriented to person, place, and time  Comments: Get up and go test: Patient has trouble getting up from sitting position and requires assistant to stand up      Psychiatric:         Behavior: Behavior normal

## 2020-11-04 ENCOUNTER — OFFICE VISIT (OUTPATIENT)
Dept: FAMILY MEDICINE CLINIC | Facility: CLINIC | Age: 85
End: 2020-11-04
Payer: COMMERCIAL

## 2020-11-04 VITALS
BODY MASS INDEX: 31.13 KG/M2 | HEIGHT: 76 IN | OXYGEN SATURATION: 96 % | DIASTOLIC BLOOD PRESSURE: 68 MMHG | TEMPERATURE: 98.2 F | RESPIRATION RATE: 18 BRPM | SYSTOLIC BLOOD PRESSURE: 98 MMHG | WEIGHT: 255.6 LBS | HEART RATE: 72 BPM

## 2020-11-04 DIAGNOSIS — H61.23 EXCESSIVE CERUMEN IN BOTH EAR CANALS: Primary | ICD-10-CM

## 2020-11-04 PROCEDURE — 1160F RVW MEDS BY RX/DR IN RCRD: CPT | Performed by: NURSE PRACTITIONER

## 2020-11-04 PROCEDURE — 3074F SYST BP LT 130 MM HG: CPT | Performed by: NURSE PRACTITIONER

## 2020-11-04 PROCEDURE — 99213 OFFICE O/P EST LOW 20 MIN: CPT | Performed by: NURSE PRACTITIONER

## 2020-11-04 PROCEDURE — 69210 REMOVE IMPACTED EAR WAX UNI: CPT | Performed by: NURSE PRACTITIONER

## 2020-11-04 PROCEDURE — 1036F TOBACCO NON-USER: CPT | Performed by: NURSE PRACTITIONER

## 2020-11-04 PROCEDURE — 3078F DIAST BP <80 MM HG: CPT | Performed by: NURSE PRACTITIONER

## 2021-01-01 ENCOUNTER — OFFICE VISIT (OUTPATIENT)
Dept: FAMILY MEDICINE CLINIC | Facility: CLINIC | Age: 86
End: 2021-01-01
Payer: COMMERCIAL

## 2021-01-01 ENCOUNTER — TELEPHONE (OUTPATIENT)
Dept: FAMILY MEDICINE CLINIC | Facility: CLINIC | Age: 86
End: 2021-01-01

## 2021-01-01 ENCOUNTER — RA CDI HCC (OUTPATIENT)
Dept: OTHER | Facility: HOSPITAL | Age: 86
End: 2021-01-01

## 2021-01-01 VITALS
SYSTOLIC BLOOD PRESSURE: 136 MMHG | OXYGEN SATURATION: 96 % | TEMPERATURE: 98.3 F | BODY MASS INDEX: 31.9 KG/M2 | HEART RATE: 83 BPM | HEIGHT: 76 IN | DIASTOLIC BLOOD PRESSURE: 74 MMHG | WEIGHT: 262 LBS

## 2021-01-01 VITALS
BODY MASS INDEX: 31.17 KG/M2 | TEMPERATURE: 98.1 F | HEART RATE: 89 BPM | OXYGEN SATURATION: 96 % | HEIGHT: 76 IN | DIASTOLIC BLOOD PRESSURE: 82 MMHG | WEIGHT: 256 LBS | SYSTOLIC BLOOD PRESSURE: 142 MMHG

## 2021-01-01 DIAGNOSIS — I10 ESSENTIAL HYPERTENSION: ICD-10-CM

## 2021-01-01 DIAGNOSIS — M25.511 CHRONIC RIGHT SHOULDER PAIN: ICD-10-CM

## 2021-01-01 DIAGNOSIS — N52.9 MALE ERECTILE DISORDER: ICD-10-CM

## 2021-01-01 DIAGNOSIS — H35.3231 EXUDATIVE AGE-RELATED MACULAR DEGENERATION, BILATERAL, WITH ACTIVE CHOROIDAL NEOVASCULARIZATION (HCC): ICD-10-CM

## 2021-01-01 DIAGNOSIS — R73.01 IMPAIRED FASTING GLUCOSE: ICD-10-CM

## 2021-01-01 DIAGNOSIS — Z00.00 MEDICARE ANNUAL WELLNESS VISIT, SUBSEQUENT: Primary | ICD-10-CM

## 2021-01-01 DIAGNOSIS — M86.68 CHRONIC OSTEOMYELITIS OF KNEE (HCC): ICD-10-CM

## 2021-01-01 DIAGNOSIS — G89.29 CHRONIC RIGHT SHOULDER PAIN: ICD-10-CM

## 2021-01-01 DIAGNOSIS — E03.9 HYPOTHYROIDISM, UNSPECIFIED TYPE: ICD-10-CM

## 2021-01-01 DIAGNOSIS — E03.9 ACQUIRED HYPOTHYROIDISM: ICD-10-CM

## 2021-01-01 LAB
CREAT ?TM UR-SCNC: 86.4 UMOL/L
EXT MICROALBUMIN URINE RANDOM: 1
HBA1C MFR BLD HPLC: 6.1 %
MICROALBUMIN/CREAT UR: 11.6 MG/G{CREAT}

## 2021-01-01 PROCEDURE — 99214 OFFICE O/P EST MOD 30 MIN: CPT | Performed by: FAMILY MEDICINE

## 2021-01-01 PROCEDURE — 1101F PT FALLS ASSESS-DOCD LE1/YR: CPT | Performed by: FAMILY MEDICINE

## 2021-01-01 PROCEDURE — 1170F FXNL STATUS ASSESSED: CPT | Performed by: FAMILY MEDICINE

## 2021-01-01 PROCEDURE — 1125F AMNT PAIN NOTED PAIN PRSNT: CPT | Performed by: FAMILY MEDICINE

## 2021-01-01 PROCEDURE — G0439 PPPS, SUBSEQ VISIT: HCPCS | Performed by: FAMILY MEDICINE

## 2021-01-01 PROCEDURE — 1036F TOBACCO NON-USER: CPT | Performed by: FAMILY MEDICINE

## 2021-01-01 PROCEDURE — 99213 OFFICE O/P EST LOW 20 MIN: CPT | Performed by: FAMILY MEDICINE

## 2021-01-01 PROCEDURE — 1160F RVW MEDS BY RX/DR IN RCRD: CPT | Performed by: FAMILY MEDICINE

## 2021-01-01 PROCEDURE — 3725F SCREEN DEPRESSION PERFORMED: CPT | Performed by: FAMILY MEDICINE

## 2021-01-01 PROCEDURE — 3288F FALL RISK ASSESSMENT DOCD: CPT | Performed by: FAMILY MEDICINE

## 2021-01-01 RX ORDER — TADALAFIL 20 MG/1
20 TABLET ORAL DAILY PRN
Qty: 30 TABLET | Refills: 5 | Status: SHIPPED | OUTPATIENT
Start: 2021-01-01 | End: 2022-01-01 | Stop reason: HOSPADM

## 2021-01-01 RX ORDER — IRBESARTAN 300 MG/1
TABLET ORAL
Qty: 45 TABLET | Refills: 3 | Status: SHIPPED | OUTPATIENT
Start: 2021-01-01 | End: 2022-01-01 | Stop reason: HOSPADM

## 2021-01-01 RX ORDER — TADALAFIL 20 MG/1
20 TABLET ORAL DAILY PRN
Qty: 10 TABLET | Refills: 0 | Status: SHIPPED | OUTPATIENT
Start: 2021-01-01 | End: 2021-01-01 | Stop reason: SDUPTHER

## 2021-01-01 RX ORDER — DOXYCYCLINE HYCLATE 100 MG/1
CAPSULE ORAL
COMMUNITY
Start: 2021-02-25 | End: 2021-01-01

## 2021-01-19 ENCOUNTER — OFFICE VISIT (OUTPATIENT)
Dept: FAMILY MEDICINE CLINIC | Facility: CLINIC | Age: 86
End: 2021-01-19
Payer: COMMERCIAL

## 2021-01-19 VITALS
TEMPERATURE: 97.7 F | BODY MASS INDEX: 31.05 KG/M2 | HEART RATE: 81 BPM | WEIGHT: 255 LBS | HEIGHT: 76 IN | SYSTOLIC BLOOD PRESSURE: 136 MMHG | DIASTOLIC BLOOD PRESSURE: 78 MMHG | OXYGEN SATURATION: 96 %

## 2021-01-19 DIAGNOSIS — E03.9 ACQUIRED HYPOTHYROIDISM: ICD-10-CM

## 2021-01-19 DIAGNOSIS — N52.8 OTHER MALE ERECTILE DYSFUNCTION: Primary | ICD-10-CM

## 2021-01-19 DIAGNOSIS — N52.9 MALE ERECTILE DISORDER: ICD-10-CM

## 2021-01-19 DIAGNOSIS — R60.0 BILATERAL LOWER EXTREMITY EDEMA: ICD-10-CM

## 2021-01-19 DIAGNOSIS — I10 ESSENTIAL HYPERTENSION: ICD-10-CM

## 2021-01-19 DIAGNOSIS — R73.03 PREDIABETES: ICD-10-CM

## 2021-01-19 PROCEDURE — 1036F TOBACCO NON-USER: CPT | Performed by: FAMILY MEDICINE

## 2021-01-19 PROCEDURE — 3725F SCREEN DEPRESSION PERFORMED: CPT | Performed by: FAMILY MEDICINE

## 2021-01-19 PROCEDURE — 99214 OFFICE O/P EST MOD 30 MIN: CPT | Performed by: FAMILY MEDICINE

## 2021-01-19 RX ORDER — TADALAFIL 20 MG/1
20 TABLET ORAL DAILY PRN
Qty: 3 TABLET | Refills: 0 | Status: SHIPPED | OUTPATIENT
Start: 2021-01-19 | End: 2021-01-01 | Stop reason: SDUPTHER

## 2021-01-19 RX ORDER — SILDENAFIL 100 MG/1
100 TABLET, FILM COATED ORAL DAILY PRN
Qty: 8 TABLET | Refills: 12 | Status: SHIPPED | OUTPATIENT
Start: 2021-01-19 | End: 2022-01-01 | Stop reason: HOSPADM

## 2021-01-19 NOTE — ASSESSMENT & PLAN NOTE
Viagra refilled  Will try 3 tablets of 20 mg of Cialis for better efficacy  Labs ordered to exclude other potentiate and causes of ED  Recommended foreplay to enhance Viagra effects

## 2021-01-19 NOTE — PROGRESS NOTES
Assessment/Plan:    Hypothyroidism  Last TSH done 6 months ago was 4  Repeat TSH  Essential hypertension  Blood pressure stable continue current medication regimen    Male erectile disorder  Viagra refilled  Will try 3 tablets of 20 mg of Cialis for better efficacy  Labs ordered to exclude other potentiate and causes of ED  Recommended foreplay to enhance Viagra effects  Bilateral lower extremity edema  Had +2 pitting edema bilaterally on exam   Echo and labs ordered to determine cause of edema  Diagnoses and all orders for this visit:    Other male erectile dysfunction  -     sildenafil (VIAGRA) 100 mg tablet; Take 1 tablet (100 mg total) by mouth daily as needed for erectile dysfunction    Acquired hypothyroidism  -     TSH, 3rd generation with Free T4 reflex; Future    Male erectile disorder  -     tadalafil (CIALIS) 20 MG tablet; Take 1 tablet (20 mg total) by mouth daily as needed for erectile dysfunction    Essential hypertension  -     Lipid Panel with Direct LDL reflex; Future  -     Microalbumin / creatinine urine ratio  -     Comprehensive metabolic panel; Future    Bilateral lower extremity edema  -     Comprehensive metabolic panel; Future  -     Echo complete with contrast if indicated; Future  -     UA w Reflex to Microscopic w Reflex to Culture -Lab Collect    Prediabetes  -     HEMOGLOBIN A1C W/ EAG ESTIMATION; Future    Other orders  -     aflibercept 2 MG/0 05ML SOLN; 2 mg by Intravitreal route      Opal Senior is to [facility] in 3 months for f/u  He understood, acknowledged and agreed to the plan above  All his questions and concerns were answered and addressed  Case discussed with Dr Guerrero Lopez    Subjective:      Patient ID: Chavo Hartman is a 80 y o  male  Opal Senior is an 79 y/o man that was seen and examined in the office today for follow up  Today he is concerned about erectile dysfunction that he has had for several years following prostate cancer and surgery    He states that Viagra has been helping him the majority of the time however recently states that is not working as well as it used to  He is currently on 100 mg of Viagra  He is also taking over-the-counter supplements from [facility] to help with he ED  He states that he has early morning erections  He denies having any symptoms on review of systems  The following portions of the patient's history were reviewed and updated as appropriate: allergies, current medications, past family history, past medical history, past social history, past surgical history and problem list     Review of Systems   All other systems reviewed and are negative  Objective:      /78   Pulse 81   Temp 97 7 °F (36 5 °C)   Ht 6' 4" (1 93 m)   Wt 116 kg (255 lb)   SpO2 96%   BMI 31 04 kg/m²          Physical Exam  Vitals signs and nursing note reviewed  Constitutional:       Appearance: Normal appearance  He is obese  HENT:      Head: Normocephalic and atraumatic  Cardiovascular:      Rate and Rhythm: Normal rate and regular rhythm  Pulses: Normal pulses  Heart sounds: Normal heart sounds  Pulmonary:      Effort: Pulmonary effort is normal       Breath sounds: Normal breath sounds  Abdominal:      General: Abdomen is flat  Bowel sounds are normal       Palpations: Abdomen is soft  Tenderness: There is no right CVA tenderness or left CVA tenderness  Musculoskeletal: Normal range of motion  Right lower leg: Edema present  Left lower leg: Edema present  Comments: Bilateral +2 pitting edema extending from the ankles to the knees with left greater than right   Skin:     General: Skin is warm  Capillary Refill: Capillary refill takes less than 2 seconds  Neurological:      General: No focal deficit present  Mental Status: He is alert and oriented to person, place, and time

## 2021-02-22 ENCOUNTER — HOSPITAL ENCOUNTER (OUTPATIENT)
Dept: NON INVASIVE DIAGNOSTICS | Facility: HOSPITAL | Age: 86
Discharge: HOME | End: 2021-02-22
Payer: COMMERCIAL

## 2021-02-22 DIAGNOSIS — R60.0 BILATERAL LOWER EXTREMITY EDEMA: ICD-10-CM

## 2021-02-22 PROCEDURE — C8929 TTE W OR WO FOL WCON,DOPPLER: HCPCS

## 2021-02-22 PROCEDURE — 93306 TTE W/DOPPLER COMPLETE: CPT | Performed by: INTERNAL MEDICINE

## 2021-02-22 RX ADMIN — PERFLUTREN 3 ML/MIN: 6.52 INJECTION, SUSPENSION INTRAVENOUS at 10:58

## 2021-02-25 DIAGNOSIS — E03.9 ACQUIRED HYPOTHYROIDISM: ICD-10-CM

## 2021-02-25 RX ORDER — LEVOTHYROXINE SODIUM 175 MCG
TABLET ORAL
Qty: 90 TABLET | Refills: 3 | Status: SHIPPED | OUTPATIENT
Start: 2021-02-25 | End: 2022-01-01 | Stop reason: DRUGHIGH

## 2021-02-26 ENCOUNTER — TELEPHONE (OUTPATIENT)
Dept: FAMILY MEDICINE CLINIC | Facility: CLINIC | Age: 86
End: 2021-02-26

## 2021-04-19 NOTE — PROGRESS NOTES
Assessment/Plan:    Tory Maldonado is a 80year old male with PMHx of hypothyroidism, b/l macular degeneration, erectile dysfunction, OA, BPH/ reported prostate cancer s/p prostatectomy presents today for follow up  Rx Cialis 20 mg per dose for erectile dysfunction and advised not to use with Viagra at the same time  Rx Voltaren gel for right shoulder pain  Patient declined physical therapy at this time  Last TSH mildly elevated at 4 45 with WNL T4  Advised to repeat TSH in 2 months  F/u in 6 months  Case d/w Dr Gema Chirinos  Diagnoses and all orders for this visit:    BMI 31 0-31 9,adult  BMI Counseling: Body mass index is 31 89 kg/m²  The BMI is above normal  Nutrition recommendations include reducing portion sizes, decreasing overall calorie intake, 3-5 servings of fruits/vegetables daily, reducing fast food intake, consuming healthier snacks, decreasing soda and/or juice intake, moderation in carbohydrate intake, increasing intake of lean protein, reducing intake of saturated fat and trans fat and reducing intake of cholesterol  Exercise recommendations include moderate aerobic physical activity for 150 minutes/week, exercising 3-5 times per week and strength training exercises  Hypothyroidism, unspecified type  -     TSH, 3rd generation with Free T4 reflex; Future    Male erectile disorder  -     tadalafil (CIALIS) 20 MG tablet; Take 1 tablet (20 mg total) by mouth daily as needed for erectile dysfunction    Chronic right shoulder pain  -     Diclofenac Sodium (VOLTAREN) 1 %; Apply 2 g topically 4 (four) times a day    Essential hypertension    Other orders  -     doxycycline hyclate (VIBRAMYCIN) 100 mg capsule        Subjective:      Patient ID: Agustin Lucas is a 80 y o  male  HPI     Patient is a 80year old male with PMHx of hypothyroidism, b/l macular degeneration, erectile dysfunction, OA, BPH/ reported prostate cancer s/p prostatectomy presents today for follow up   Patient reports erectile dysfunction after prostatectomy currently on Viagara 100 mg  At previous visit patient was prescribed Cialis however patient unable to get the medication from the pharmacy  He would like to try Cialis at this time  BP controlled today  Denied any CP, dizziness, abdominal pain, change in BM, SOB  Patient reports chronic right shoulder pain sustained right shoulder injury during the war many years ago  He reports limited ROM and unable to lift up his right shoulder  Denied any pain radiation  He has been rubbing his right shoulder with CBD oil with partial relief  The following portions of the patient's history were reviewed and updated as appropriate: allergies, current medications, past family history, past medical history, past social history, past surgical history and problem list     Review of Systems   Constitutional: Negative for chills and fever  HENT: Negative for ear pain and sore throat  Eyes: Negative for pain and visual disturbance  Respiratory: Negative for cough and shortness of breath  Cardiovascular: Negative for chest pain and palpitations  Gastrointestinal: Negative for abdominal pain and vomiting  Genitourinary: Negative for dysuria and hematuria  Musculoskeletal: Positive for arthralgias (Right shoulder pain)  Negative for back pain  Skin: Negative for color change and rash  Neurological: Negative for seizures and syncope  All other systems reviewed and are negative  Objective:      /74   Pulse 83   Temp 98 3 °F (36 8 °C)   Ht 6' 4" (1 93 m)   Wt 119 kg (262 lb)   SpO2 96%   BMI 31 89 kg/m²          Physical Exam  Vitals signs and nursing note reviewed  Constitutional:       General: He is not in acute distress  Appearance: He is well-developed  HENT:      Head: Normocephalic  Mouth/Throat:      Pharynx: No oropharyngeal exudate  Eyes:      General: No scleral icterus  Right eye: No discharge           Left eye: No discharge  Conjunctiva/sclera: Conjunctivae normal    Neck:      Musculoskeletal: Normal range of motion  Cardiovascular:      Rate and Rhythm: Normal rate and regular rhythm  Heart sounds: Normal heart sounds  No murmur  Pulmonary:      Effort: Pulmonary effort is normal  No respiratory distress  Breath sounds: Normal breath sounds  No wheezing  Abdominal:      General: Bowel sounds are normal  There is no distension  Palpations: Abdomen is soft  Tenderness: There is no abdominal tenderness  Musculoskeletal:         General: No tenderness  Right lower leg: No edema  Left lower leg: No edema  Comments: Right shoulder exam: pain elicited at passive and active shoulder abduction and flexion at 40 degree  Lymphadenopathy:      Cervical: No cervical adenopathy  Skin:     Findings: No erythema  Neurological:      Mental Status: He is alert and oriented to person, place, and time     Psychiatric:         Behavior: Behavior normal

## 2021-05-26 NOTE — TELEPHONE ENCOUNTER
Pharmacy called about the patients Cialis that Dr Botello List sent in recently but had Viagra sent to him 2 weeks ago  Pharmacy is asking which med is he supposed to be taking

## 2021-08-17 PROBLEM — H35.3231 EXUDATIVE AGE-RELATED MACULAR DEGENERATION, BILATERAL, WITH ACTIVE CHOROIDAL NEOVASCULARIZATION (HCC): Status: ACTIVE | Noted: 2020-07-15

## 2021-08-17 NOTE — PROGRESS NOTES
[facility] [employer]  coding opportunities          Number of diagnosis code(s) already on the problem list added to FYI fla               Number of suggestions used: 2         Patients insurance company: [address]  (Medicare Advantage and [employer])     Visit status: Patient arrived for their scheduled appointment     Provider never responded to [employer]  coding request     [facility]  coding opportunities          Number of diagnosis code(s) already on the problem list added to FYI fla      [id number] Exudative age-related macular degeneration, bilateral, with active choroidal neovascularization ([facility] [address] )*    M86 68 Chronic osteomyelitis of knee ([employer] [facility] [address] )    Next appt; 21                 Patients insurance company: [address]  (Medicare Advantage and [employer])

## 2021-08-23 NOTE — PATIENT INSTRUCTIONS
Medicare Preventive Visit Patient Instructions  Thank you for completing your Welcome to Medicare Visit or Medicare Annual Wellness Visit today  Your next wellness visit will be due in one year (8/24/2022)  The screening/preventive services that you may require over the next 5-10 years are detailed below  Some tests may not apply to you based off risk factors and/or age  Screening tests ordered at today's visit but not completed yet may show as past due  Also, please note that scanned in results may not display below  Preventive Screenings:  Service Recommendations Previous Testing/Comments   Colorectal Cancer Screening  · Colonoscopy    · Fecal Occult Blood Test (FOBT)/Fecal Immunochemical Test (FIT)  · Fecal DNA/Cologuard Test  · Flexible Sigmoidoscopy Age: 54-65 years old   Colonoscopy: every 10 years (May be performed more frequently if at higher risk)  OR  FOBT/FIT: every 1 year  OR  Cologuard: every 3 years  OR  Sigmoidoscopy: every 5 years  Screening may be recommended earlier than age 48 if at higher risk for colorectal cancer  Also, an individualized decision between you and your healthcare provider will decide whether screening between the ages of 74-80 would be appropriate   Colonoscopy: Not on file  FOBT/FIT: Not on file  Cologuard: Not on file  Sigmoidoscopy: Not on file    Screening Not Indicated     Prostate Cancer Screening Individualized decision between patient and health care provider in men between ages of 53-78   Medicare will cover every 12 months beginning on the day after your 50th birthday PSA: No results in last 5 years     Screening Not Indicated     Hepatitis C Screening Once for adults born between [facility]  More frequently in patients at high risk for Hepatitis C Hep C Antibody: Not on file        Diabetes Screening 1-2 times per year if you're at risk for diabetes or have pre-diabetes Fasting glucose: No results in last 5 years   A1C: 6 1 %    Screening Current   Cholesterol Screening Once every 5 years if you don't have a lipid disorder  May order more often based on risk factors  Lipid panel: 05/20/2020    Screening Current      Other Preventive Screenings Covered by Medicare:  1  Abdominal Aortic Aneurysm (AAA) Screening: covered once if your at risk  You're considered to be at risk if you have a family history of AAA or a male between the age of 73-68 who smoking at least 100 cigarettes in your lifetime  2  Lung Cancer Screening: covers low dose CT scan once per year if you meet all of the following conditions: (1) Age 50-69; (2) No signs or symptoms of lung cancer; (3) Current smoker or have quit smoking within the last 15 years; (4) You have a tobacco smoking history of at least 30 pack years (packs per day x number of years you smoked); (5) You get a written order from a healthcare provider  3  Glaucoma Screening: covered annually if you're considered high risk: (1) You have diabetes OR (2) Family history of glaucoma OR (3)  aged 48 and older OR (3)  American aged 72 and older  3  Osteoporosis Screening: covered every 2 years if you meet one of the following conditions: (1) Have a vertebral abnormality; (2) On glucocorticoid therapy for more than 3 months; (3) Have primary hyperparathyroidism; (4) On osteoporosis medications and need to assess response to drug therapy  5  HIV Screening: covered annually if you're between the age of 12-76  Also covered annually if you are younger than 13 and older than 72 with risk factors for HIV infection  For pregnant patients, it is covered up to 3 times per pregnancy      Immunizations:  Immunization Recommendations   Influenza Vaccine Annual influenza vaccination during flu season is recommended for all persons aged >= 6 months who do not have contraindications   Pneumococcal Vaccine (Prevnar and Pneumovax)  * Prevnar = PCV13  * Pneumovax = PPSV23 Adults 25-60 years old: 1-3 doses may be recommended based on certain risk factors  Adults 72 years old: Prevnar (PCV13) vaccine recommended followed by Pneumovax (PPSV23) vaccine  If already received PPSV23 since turning 65, then PCV13 recommended at least one year after PPSV23 dose  Hepatitis B Vaccine 3 dose series if at intermediate or high risk (ex: diabetes, end stage renal disease, liver disease)   Tetanus (Td) Vaccine - COST NOT COVERED BY MEDICARE PART B Following completion of primary series, a booster dose should be given every 10 years to maintain immunity against tetanus  Td may also be given as tetanus wound prophylaxis  Tdap Vaccine - COST NOT COVERED BY MEDICARE PART B Recommended at least once for all adults  For pregnant patients, recommended with each pregnancy  Shingles Vaccine (Shingrix) - COST NOT COVERED BY MEDICARE PART B  2 shot series recommended in those aged 48 and above     Health Maintenance Due:  There are no preventive care reminders to display for this patient  Immunizations Due:      Topic Date Due    Influenza Vaccine (1) 09/01/2021     Advance Directives   What are advance directives? Advance directives are legal documents that state your wishes and plans for medical care  These plans are made ahead of time in case you lose your ability to make decisions for yourself  Advance directives can apply to any medical decision, such as the treatments you want, and if you want to donate organs  What are the types of advance directives? There are many types of advance directives, and each state has rules about how to use them  You may choose a combination of any of the following:  · Living will: This is a written record of the treatment you want  You can also choose which treatments you do not want, which to limit, and which to stop at a certain time  This includes surgery, medicine, IV fluid, and tube feedings  · Durable power of  for healthcare [facility]):   This is a written record that states who you want to make healthcare choices for you when you are unable to make them for yourself  This person, called a proxy, is usually a family member or a friend  You may choose more than 1 proxy  · Do not resuscitate (DNR) order:  A DNR order is used in case your heart stops beating or you stop breathing  It is a request not to have certain forms of treatment, such as CPR  A DNR order may be included in other types of advance directives  · Medical directive: This covers the care that you want if you are in a coma, near death, or unable to make decisions for yourself  You can list the treatments you want for each condition  Treatment may include pain medicine, surgery, blood transfusions, dialysis, IV or tube feedings, and a ventilator (breathing machine)  · Values history: This document has questions about your views, beliefs, and how you feel and think about life  This information can help others choose the care that you would choose  Why are advance directives important? An advance directive helps you control your care  Although spoken wishes may be used, it is better to have your wishes written down  Spoken wishes can be misunderstood, or not followed  Treatments may be given even if you do not want them  An advance directive may make it easier for your family to make difficult choices about your care  Fall Prevention    Fall prevention  includes ways to make your home and other areas safer  It also includes ways you can move more carefully to prevent a fall  Health conditions that cause changes in your blood pressure, vision, or muscle strength and coordination may increase your risk for falls  Medicines may also increase your risk for falls if they make you dizzy, weak, or sleepy  Fall prevention tips:   · Stand or sit up slowly  · Use assistive devices as directed  · Wear shoes that fit well and have soles that   · Wear a personal alarm  · Stay active  · Manage your medical conditions      Home Safety Tips:  · Add items to prevent falls in the bathroom  · Keep paths clear  · Install bright lights in your home  · Keep items you use often on shelves within reach  · Paint or place reflective tape on the edges of your stairs  Weight Management   Why it is important to manage your weight:  Being overweight increases your risk of health conditions such as heart disease, high blood pressure, type 2 diabetes, and certain types of cancer  It can also increase your risk for osteoarthritis, sleep apnea, and other respiratory problems  Aim for a slow, steady weight loss  Even a small amount of weight loss can lower your risk of health problems  How to lose weight safely:  A safe and healthy way to lose weight is to eat fewer calories and get regular exercise  You can lose up about 1 pound a week by decreasing the number of calories you eat by 500 calories each day  Healthy meal plan for weight management:  A healthy meal plan includes a variety of foods, contains fewer calories, and helps you stay healthy  A healthy meal plan includes the following:  · Eat whole-grain foods more often  A healthy meal plan should contain fiber  Fiber is the part of grains, fruits, and vegetables that is not broken down by your body  Whole-grain foods are healthy and provide extra fiber in your diet  Some examples of whole-grain foods are whole-wheat breads and pastas, oatmeal, brown rice, and bulgur  · Eat a variety of vegetables every day  Include dark, leafy greens such as spinach, kale, chalino greens, and mustard greens  Eat yellow and orange vegetables such as carrots, sweet potatoes, and winter squash  · Eat a variety of fruits every day  Choose fresh or canned fruit (canned in its own juice or light syrup) instead of juice  Fruit juice has very little or no fiber  · Eat low-fat dairy foods  Drink fat-free (skim) milk or 1% milk  Eat fat-free yogurt and low-fat cottage cheese   Try low-fat cheeses such as mozzarella and other reduced-fat cheeses  · Choose meat and other protein foods that are low in fat  Choose beans or other legumes such as split peas or lentils  Choose fish, skinless poultry (chicken or turkey), or lean cuts of red meat (beef or pork)  Before you cook meat or poultry, cut off any visible fat  · Use less fat and oil  Try baking foods instead of frying them  Add less fat, such as margarine, sour cream, regular salad dressing and mayonnaise to foods  Eat fewer high-fat foods  Some examples of high-fat foods include french fries, doughnuts, ice cream, and cakes  · Eat fewer sweets  Limit foods and drinks that are high in sugar  This includes candy, cookies, regular soda, and sweetened drinks  Exercise:  Exercise at least 30 minutes per day on most days of the week  Some examples of exercise include walking, biking, dancing, and swimming  You can also fit in more physical activity by taking the stairs instead of the elevator or parking farther away from stores  Ask your healthcare provider about the best exercise plan for you  © Copyright [employer] 2018 Information is for End User's use only and may not be sold, redistributed or otherwise used for commercial purposes   All illustrations and images included in CareNotes® are the copyrighted property of [employer]  or [address] [place] [address] [employer]

## 2021-08-23 NOTE — PROGRESS NOTES
Assessment and Plan:     Problem List Items Addressed This Visit     None           Preventive health issues were discussed with patient, and age appropriate screening tests were ordered as noted in patient's After Visit Summary  Personalized health advice and appropriate referrals for health education or preventive services given if needed, as noted in patient's After Visit Summary  History of Present Illness:     Patient presents for Welcome to Medicare visit  Patient Care Team:  Williams Chapman MD as PCP - General (Family Medicine)  [facility] (Ophthalmology)     Review of Systems:     Review of Systems   Problem List:     Patient Active Problem List   Diagnosis    Hypothyroidism    Essential hypertension    Class 1 obesity without serious comorbidity with body mass index (BMI) of 31 0 to 31 9 in adult    Impaired fasting glucose    Chronic osteomyelitis of knee ([address] )    Male erectile disorder    Exudative age-related macular degeneration, bilateral, with active choroidal neovascularization ([address] )    Asymmetrical sensorineural hearing loss    Osteoarthritis    Rosacea    Bilateral lower extremity edema      Past Medical and Surgical History:     No past medical history on file  Past Surgical History:   Procedure Laterality Date    HERNIA REPAIR      PROSTATE SURGERY      REPLACEMENT TOTAL KNEE      last assessed: 7/11/17      Family History:     No family history on file     Social History:     Social History     Socioeconomic History    Marital status: /Civil Union     Spouse name: Not on file    Number of children: Not on file    Years of education: Not on file    Highest education level: Not on file   Occupational History    Not on file   Tobacco Use    Smoking status: Never Smoker    Smokeless tobacco: Never Used   Substance and Sexual Activity    Alcohol use: No    Drug use: No    Sexual activity: Not on file   Other Topics Concern    Not on file   Social History Narrative    Not on file     Social Determinants of Health     Financial Resource Strain:     Difficulty of Paying Living Expenses:    Food Insecurity:     Worried About Running Out of Food in the Last Year:     [address] in the Last Year:    Transportation Needs:     Lack of Transportation (Medical):      Lack of Transportation (Non-Medical):    Physical Activity:     Days of Exercise per Week:     Minutes of Exercise per Session:    Stress:     Feeling of Stress :    Social Connections:     Frequency of Communication with Friends and Family:     Frequency of Social Gatherings with Friends and Family:     Attends Protestant Services:     Active Member of Clubs or Organizations:     Attends Club or Organization Meetings:     Marital Status:    Intimate Partner Violence:     Fear of Current or Ex-Partner:     Emotionally Abused:     Physically Abused:     Sexually Abused:       Medications and Allergies:     Current Outpatient Medications   Medication Sig Dispense Refill    aflibercept 2 MG/0 05ML SOLN 2 mg by Intravitreal route      bethanechol (URECHOLINE) 25 mg tablet Take by mouth      Diclofenac Sodium (VOLTAREN) 1 % Apply 2 g topically 4 (four) times a day 1 Tube 0    doxycycline hyclate (VIBRAMYCIN) 100 mg capsule       gabapentin (NEURONTIN) 300 mg capsule       irbesartan (AVAPRO) 300 mg tablet TAKE 1 TABLET EVERY OTHER  DAY 45 tablet 3    metoprolol succinate (TOPROL-XL) 25 mg 24 hr tablet       metroNIDAZOLE (METROGEL) 0 75 % gel Apply topically 2 (two) times a day 70 g 0    Multiple Vitamins-Minerals (ICAPS AREDS 2 PO) Take by mouth      neomycin-polymyxin-hydrocortisone (CORTISPORIN) otic solution Administer 4 drops into both ears every 8 (eight) hours for 7 days 10 mL 0    sildenafil (VIAGRA) 100 mg tablet Take 1 tablet (100 mg total) by mouth daily as needed for erectile dysfunction 8 tablet 12    Synthroid 175 MCG tablet TAKE 1 TABLET DAILY IN THE EARLY MORNING 90 tablet 3    tadalafil (CIALIS) 20 MG tablet Take 1 tablet (20 mg total) by mouth daily as needed for erectile dysfunction 10 tablet 0    tamsulosin (FLOMAX) 0 4 mg        No current facility-administered medications for this visit  No Known Allergies   Immunizations:     Immunization History   Administered Date(s) Administered    INFLUENZA 09/29/2016, 10/17/2017, 09/25/2020    Influenza Split High Dose Preservative Free IM 08/29/2016, 10/17/2017, 09/01/2018, 08/26/2019    Influenza, high dose seasonal 0 7 mL 09/13/2018, 09/16/2020    Pneumococcal Conjugate 13-Valent 11/02/2017    Pneumococcal Polysaccharide PPV23 11/20/2001    SARS-CoV-2 / COVID-19 mRNA IM (Moderna) 02/04/2021, 03/04/2021    Tdap 07/01/2009, 12/20/2019    Zoster 05/01/2011    Zoster Vaccine Recombinant 03/12/2019, 04/06/2019, 07/01/2019    influenza, trivalent, adjuvanted 08/26/2019      Health Maintenance: There are no preventive care reminders to display for this patient  Topic Date Due    Influenza Vaccine (1) 09/01/2021      Medicare Screening Tests and Risk Assessments:     Shameka Martino is here for his Subsequent Wellness visit  Last Medicare Wellness visit information reviewed, patient interviewed and updates made to the record today  Health Risk Assessment:   Patient rates overall health as very good  Patient feels that their physical health rating is much better  Patient is very satisfied with their life  Eyesight was rated as same  Hearing was rated as same  Patient feels that their emotional and mental health rating is much better  Patients states they are never, rarely angry  Patient states they are never, rarely unusually tired/fatigued  Pain experienced in the last 7 days has been a lot  Patient's pain rating has been 10/10  Patient states that he has experienced no weight loss or gain in last 6 months  Depression Screening:   PHQ-2 Score: 0      Fall Risk Screening:    In the past year, patient has experienced: history of falling in past year    Number of falls: 1  Injured during fall?: No    Feels unsteady when standing or walking?: Yes    Worried about falling?: Yes      Home Safety:  Patient does not have trouble with stairs inside or outside of their home  Patient has working smoke alarms and has working carbon monoxide detector  Home safety hazards include: none  Nutrition:   Current diet is Regular, No Added Salt and Limited junk food  Medications:   Patient is not currently taking any over-the-counter supplements  Patient is able to manage medications  Activities of Daily Living (ADLs)/Instrumental Activities of Daily Living (IADLs):   Walk and transfer into and out of bed and chair?: Yes  Dress and groom yourself?: No    Feed yourself? Yes  Do your laundry/housekeeping?: No  Manage your money, pay your bills and track your expenses?: Yes  Make your own meals?: No    Do your own shopping?: Yes    Previous Hospitalizations:   Any hospitalizations or ED visits within the last 12 months?: No      Advance Care Planning:   Living will: Yes    Durable POA for healthcare:  Yes    Advanced directive: Yes      PREVENTIVE SCREENINGS      Cardiovascular Screening:    General: Screening Current      Diabetes Screening:     General: Screening Current      Colorectal Cancer Screening:     General: Screening Not Indicated      Prostate Cancer Screening:    General: Screening Not Indicated      Lung Cancer Screening:     General: Screening Not Indicated    Screening, Brief Intervention, and Referral to Treatment (SBIRT)    Screening  Typical number of drinks in a day: 0    Single Item Drug Screening:  How often have you used an illegal drug (including marijuana) or a prescription medication for non-medical reasons in the past year? never    Single Item Drug Screen Score: 0  Interpretation: Negative screen for possible drug use disorder    No exam data present     Physical Exam:     There were no vitals taken for this visit      Physical Exam     Yanni Seaman MD

## 2021-08-23 NOTE — PROGRESS NOTES
Assessment/Plan:    Hypothyroidism  Stable  Continue current  Impaired fasting glucose  Stable  Continue current  Essential hypertension  Stable  Continue current  Exudative age-related macular degeneration, bilateral, with active choroidal neovascularization (HCC)  Stable  Continue current  Chronic osteomyelitis of knee (HCC)  Stable  Continue current  Diagnoses and all orders for this visit:    Acquired hypothyroidism    Impaired fasting glucose    Essential hypertension    Exudative age-related macular degeneration, bilateral, with active choroidal neovascularization (HCC)    Chronic osteomyelitis of knee (HCC)          Subjective:      Patient ID: Amanda Hall is a 80 y o  male  His BP is generally well controlled on his current regimen  He denies CP or SOB  He has no HA or vision changes  He is getting injections for macular degeneration  He has impaired fasting glucose but no s/s or T2DM  His weight is stable  His lipids are at goal on his current regimen  His TSH is at goal on his current regimen  He has no s/s of hypothyroidism  The following portions of the patient's history were reviewed and updated as appropriate:   He  has no past medical history on file    He   Patient Active Problem List    Diagnosis Date Noted    Bilateral lower extremity edema 01/19/2021    Exudative age-related macular degeneration, bilateral, with active choroidal neovascularization ([address] ) 07/15/2020    Asymmetrical sensorineural hearing loss 07/15/2020    Osteoarthritis 07/15/2020    Rosacea 07/15/2020    Male erectile disorder 09/18/2019    Impaired fasting glucose 09/16/2019    Class 1 obesity without serious comorbidity with body mass index (BMI) of 31 0 to 31 9 in adult 03/15/2019    Hypothyroidism 03/16/2018    Essential hypertension 03/16/2018    Chronic osteomyelitis of knee ([address] ) 10/08/2013     He  has a past surgical history that includes Hernia repair; Replacement total knee; and Prostate surgery  His family history is not on file  He  reports that he has never smoked  He has never used smokeless tobacco  He reports that he does not drink alcohol and does not use drugs  Current Outpatient Medications   Medication Sig Dispense Refill    Diclofenac Sodium (VOLTAREN) 1 % Apply 2 g topically 4 (four) times a day 1 Tube 0    irbesartan (AVAPRO) 300 mg tablet TAKE 1 TABLET EVERY OTHER  DAY 45 tablet 3    metroNIDAZOLE (METROGEL) 0 75 % gel Apply topically 2 (two) times a day 70 g 0    Multiple Vitamins-Minerals (ICAPS AREDS 2 PO) Take by mouth      Synthroid 175 MCG tablet TAKE 1 TABLET DAILY IN THE EARLY MORNING 90 tablet 3    neomycin-polymyxin-hydrocortisone (CORTISPORIN) otic solution Administer 4 drops into both ears every 8 (eight) hours for 7 days 10 mL 0    sildenafil (VIAGRA) 100 mg tablet Take 1 tablet (100 mg total) by mouth daily as needed for erectile dysfunction 8 tablet 12    tadalafil (CIALIS) 20 MG tablet Take 1 tablet (20 mg total) by mouth daily as needed for erectile dysfunction 10 tablet 0    tamsulosin (FLOMAX) 0 4 mg  (Patient not taking: Reported on 8/23/2021)       No current facility-administered medications for this visit       Current Outpatient Medications on File Prior to Visit   Medication Sig    Diclofenac Sodium (VOLTAREN) 1 % Apply 2 g topically 4 (four) times a day    irbesartan (AVAPRO) 300 mg tablet TAKE 1 TABLET EVERY OTHER  DAY    metroNIDAZOLE (METROGEL) 0 75 % gel Apply topically 2 (two) times a day    Multiple Vitamins-Minerals (ICAPS AREDS 2 PO) Take by mouth    Synthroid 175 MCG tablet TAKE 1 TABLET DAILY IN THE EARLY MORNING    neomycin-polymyxin-hydrocortisone (CORTISPORIN) otic solution Administer 4 drops into both ears every 8 (eight) hours for 7 days    sildenafil (VIAGRA) 100 mg tablet Take 1 tablet (100 mg total) by mouth daily as needed for erectile dysfunction    tadalafil (CIALIS) 20 MG tablet Take 1 tablet (20 mg total) by mouth daily as needed for erectile dysfunction    tamsulosin (FLOMAX) 0 4 mg  (Patient not taking: Reported on 8/23/2021)    [DISCONTINUED] aflibercept 2 MG/0 05ML SOLN 2 mg by Intravitreal route (Patient not taking: Reported on 8/23/2021)    [DISCONTINUED] bethanechol (URECHOLINE) 25 mg tablet Take by mouth (Patient not taking: Reported on 8/23/2021)    [DISCONTINUED] doxycycline hyclate (VIBRAMYCIN) 100 mg capsule  (Patient not taking: Reported on 8/23/2021)    [DISCONTINUED] gabapentin (NEURONTIN) 300 mg capsule  (Patient not taking: Reported on 8/23/2021)    [DISCONTINUED] metoprolol succinate (TOPROL-XL) 25 mg 24 hr tablet  (Patient not taking: Reported on 8/23/2021)     No current facility-administered medications on file prior to visit  He has No Known Allergies       Review of Systems   All other systems reviewed and are negative  Objective:      /82   Pulse 89   Temp 98 1 °F (36 7 °C)   Ht 6' 4" (1 93 m)   Wt 116 kg (256 lb)   SpO2 96%   BMI 31 16 kg/m²          Physical Exam  Vitals and nursing note reviewed  Constitutional:       Appearance: Normal appearance  He is obese  HENT:      Head: Normocephalic and atraumatic  Cardiovascular:      Rate and Rhythm: Normal rate and regular rhythm  Pulses: Normal pulses  Heart sounds: Normal heart sounds  Pulmonary:      Breath sounds: Normal breath sounds  Abdominal:      General: Abdomen is flat  Bowel sounds are normal       Palpations: Abdomen is soft  Musculoskeletal:         General: Normal range of motion  Cervical back: Normal range of motion and neck supple  Skin:     General: Skin is warm and dry  Capillary Refill: Capillary refill takes less than 2 seconds  Neurological:      General: No focal deficit present  Mental Status: He is alert and oriented to person, place, and time  Mental status is at baseline     Psychiatric:         Mood and Affect: Mood normal  Behavior: Behavior normal          Thought Content:  Thought content normal          Judgment: Judgment normal

## 2022-01-01 ENCOUNTER — TRANSITIONAL CARE MANAGEMENT (OUTPATIENT)
Dept: FAMILY MEDICINE CLINIC | Facility: CLINIC | Age: 87
End: 2022-01-01

## 2022-01-01 ENCOUNTER — HOSPITAL ENCOUNTER (OUTPATIENT)
Dept: ULTRASOUND IMAGING | Facility: HOSPITAL | Age: 87
Discharge: HOME | End: 2022-01-24
Attending: FAMILY MEDICINE
Payer: COMMERCIAL

## 2022-01-01 ENCOUNTER — APPOINTMENT (INPATIENT)
Dept: RADIOLOGY | Facility: HOSPITAL | Age: 87
DRG: 435 | End: 2022-01-01
Payer: COMMERCIAL

## 2022-01-01 ENCOUNTER — TELEPHONE (OUTPATIENT)
Dept: FAMILY MEDICINE CLINIC | Facility: CLINIC | Age: 87
End: 2022-01-01

## 2022-01-01 ENCOUNTER — HOSPITAL ENCOUNTER (INPATIENT)
Facility: HOSPITAL | Age: 87
LOS: 18 days | DRG: 435 | End: 2022-02-28
Attending: INTERNAL MEDICINE | Admitting: INTERNAL MEDICINE
Payer: COMMERCIAL

## 2022-01-01 ENCOUNTER — LAB (OUTPATIENT)
Dept: LAB | Facility: CLINIC | Age: 87
End: 2022-01-01
Payer: COMMERCIAL

## 2022-01-01 ENCOUNTER — APPOINTMENT (INPATIENT)
Dept: GASTROENTEROLOGY | Facility: HOSPITAL | Age: 87
DRG: 435 | End: 2022-01-01
Payer: COMMERCIAL

## 2022-01-01 ENCOUNTER — APPOINTMENT (OUTPATIENT)
Dept: MRI IMAGING | Facility: HOSPITAL | Age: 87
End: 2022-01-01
Payer: COMMERCIAL

## 2022-01-01 ENCOUNTER — APPOINTMENT (INPATIENT)
Dept: RADIOLOGY | Facility: HOSPITAL | Age: 87
DRG: 435 | End: 2022-01-01
Attending: INTERNAL MEDICINE
Payer: COMMERCIAL

## 2022-01-01 ENCOUNTER — APPOINTMENT (INPATIENT)
Dept: CT IMAGING | Facility: HOSPITAL | Age: 87
DRG: 441 | End: 2022-01-01
Payer: COMMERCIAL

## 2022-01-01 ENCOUNTER — CONSULT (OUTPATIENT)
Dept: GASTROENTEROLOGY | Facility: CLINIC | Age: 87
End: 2022-01-01
Payer: COMMERCIAL

## 2022-01-01 ENCOUNTER — OFFICE VISIT (OUTPATIENT)
Dept: FAMILY MEDICINE CLINIC | Facility: CLINIC | Age: 87
End: 2022-01-01
Payer: COMMERCIAL

## 2022-01-01 ENCOUNTER — TELEPHONE (OUTPATIENT)
Dept: LAB | Facility: HOSPITAL | Age: 87
End: 2022-01-01

## 2022-01-01 ENCOUNTER — ANESTHESIA (INPATIENT)
Dept: GASTROENTEROLOGY | Facility: HOSPITAL | Age: 87
DRG: 435 | End: 2022-01-01
Payer: COMMERCIAL

## 2022-01-01 ENCOUNTER — APPOINTMENT (INPATIENT)
Dept: ULTRASOUND IMAGING | Facility: HOSPITAL | Age: 87
DRG: 441 | End: 2022-01-01
Payer: COMMERCIAL

## 2022-01-01 ENCOUNTER — ANESTHESIA EVENT (INPATIENT)
Dept: GASTROENTEROLOGY | Facility: HOSPITAL | Age: 87
DRG: 435 | End: 2022-01-01
Payer: COMMERCIAL

## 2022-01-01 ENCOUNTER — HOSPITAL ENCOUNTER (INPATIENT)
Facility: HOSPITAL | Age: 87
LOS: 1 days | DRG: 441 | End: 2022-02-09
Attending: EMERGENCY MEDICINE | Admitting: FAMILY MEDICINE
Payer: COMMERCIAL

## 2022-01-01 ENCOUNTER — NURSE TRIAGE (OUTPATIENT)
Dept: OTHER | Facility: OTHER | Age: 87
End: 2022-01-01

## 2022-01-01 ENCOUNTER — TELEPHONE (OUTPATIENT)
Dept: GASTROENTEROLOGY | Facility: CLINIC | Age: 87
End: 2022-01-01

## 2022-01-01 ENCOUNTER — LAB (OUTPATIENT)
Dept: LAB | Facility: HOSPITAL | Age: 87
End: 2022-01-01
Attending: FAMILY MEDICINE
Payer: COMMERCIAL

## 2022-01-01 ENCOUNTER — APPOINTMENT (INPATIENT)
Dept: MRI IMAGING | Facility: HOSPITAL | Age: 87
DRG: 441 | End: 2022-01-01
Payer: COMMERCIAL

## 2022-01-01 ENCOUNTER — APPOINTMENT (INPATIENT)
Dept: NON INVASIVE DIAGNOSTICS | Facility: HOSPITAL | Age: 87
DRG: 441 | End: 2022-01-01
Payer: COMMERCIAL

## 2022-01-01 ENCOUNTER — TELEPHONE (OUTPATIENT)
Dept: OTHER | Facility: OTHER | Age: 87
End: 2022-01-01

## 2022-01-01 ENCOUNTER — APPOINTMENT (OUTPATIENT)
Dept: GASTROENTEROLOGY | Facility: HOSPITAL | Age: 87
DRG: 435 | End: 2022-01-01
Payer: COMMERCIAL

## 2022-01-01 VITALS
SYSTOLIC BLOOD PRESSURE: 126 MMHG | BODY MASS INDEX: 27.13 KG/M2 | DIASTOLIC BLOOD PRESSURE: 68 MMHG | WEIGHT: 222.8 LBS | HEIGHT: 76 IN | HEART RATE: 78 BPM | TEMPERATURE: 96.4 F

## 2022-01-01 VITALS
RESPIRATION RATE: 16 BRPM | HEIGHT: 76 IN | DIASTOLIC BLOOD PRESSURE: 46 MMHG | TEMPERATURE: 98.2 F | WEIGHT: 239.86 LBS | OXYGEN SATURATION: 95 % | BODY MASS INDEX: 29.21 KG/M2 | SYSTOLIC BLOOD PRESSURE: 81 MMHG | HEART RATE: 72 BPM

## 2022-01-01 VITALS
HEIGHT: 76 IN | DIASTOLIC BLOOD PRESSURE: 72 MMHG | BODY MASS INDEX: 31.17 KG/M2 | TEMPERATURE: 97.8 F | HEART RATE: 91 BPM | SYSTOLIC BLOOD PRESSURE: 134 MMHG | WEIGHT: 256 LBS | OXYGEN SATURATION: 96 %

## 2022-01-01 VITALS
OXYGEN SATURATION: 96 % | HEART RATE: 82 BPM | HEIGHT: 76 IN | TEMPERATURE: 98.3 F | DIASTOLIC BLOOD PRESSURE: 79 MMHG | SYSTOLIC BLOOD PRESSURE: 141 MMHG | WEIGHT: 264.11 LBS | BODY MASS INDEX: 32.16 KG/M2 | RESPIRATION RATE: 18 BRPM

## 2022-01-01 DIAGNOSIS — N17.9 ACUTE KIDNEY INJURY (HCC): ICD-10-CM

## 2022-01-01 DIAGNOSIS — C22.1 CHOLANGIOCARCINOMA (HCC): Primary | ICD-10-CM

## 2022-01-01 DIAGNOSIS — M86.68 CHRONIC OSTEOMYELITIS OF KNEE (HCC): ICD-10-CM

## 2022-01-01 DIAGNOSIS — R53.1 GENERALIZED WEAKNESS: ICD-10-CM

## 2022-01-01 DIAGNOSIS — Z79.899 MEDICATION MANAGEMENT: ICD-10-CM

## 2022-01-01 DIAGNOSIS — R94.31 ABNORMAL EKG: ICD-10-CM

## 2022-01-01 DIAGNOSIS — K74.60 CIRRHOSIS OF LIVER WITH ASCITES, UNSPECIFIED HEPATIC CIRRHOSIS TYPE (HCC): ICD-10-CM

## 2022-01-01 DIAGNOSIS — R26.89 LOSS OF BALANCE: ICD-10-CM

## 2022-01-01 DIAGNOSIS — R79.89 ELEVATED LIVER FUNCTION TESTS: ICD-10-CM

## 2022-01-01 DIAGNOSIS — D69.6 PLATELETS DECREASED (HCC): ICD-10-CM

## 2022-01-01 DIAGNOSIS — R17 JAUNDICE: Primary | ICD-10-CM

## 2022-01-01 DIAGNOSIS — I51.89 DIASTOLIC DYSFUNCTION: ICD-10-CM

## 2022-01-01 DIAGNOSIS — K83.1 OBSTRUCTIVE JAUNDICE: ICD-10-CM

## 2022-01-01 DIAGNOSIS — R19.09 MASS OF RIGHT INGUINAL REGION: ICD-10-CM

## 2022-01-01 DIAGNOSIS — E03.9 ACQUIRED HYPOTHYROIDISM: ICD-10-CM

## 2022-01-01 DIAGNOSIS — R74.01 TRANSAMINITIS: ICD-10-CM

## 2022-01-01 DIAGNOSIS — Z51.5 COMFORT MEASURES ONLY STATUS: ICD-10-CM

## 2022-01-01 DIAGNOSIS — E03.9 HYPOTHYROIDISM, UNSPECIFIED TYPE: ICD-10-CM

## 2022-01-01 DIAGNOSIS — R53.1 GENERALIZED WEAKNESS: Primary | ICD-10-CM

## 2022-01-01 DIAGNOSIS — Z86.14 HISTORY OF MRSA INFECTION: ICD-10-CM

## 2022-01-01 DIAGNOSIS — R17 ELEVATED BILIRUBIN: ICD-10-CM

## 2022-01-01 DIAGNOSIS — Z53.20 ASSESSMENT OF PHYSICAL HEALTH DECLINED: ICD-10-CM

## 2022-01-01 DIAGNOSIS — I10 ESSENTIAL HYPERTENSION: ICD-10-CM

## 2022-01-01 DIAGNOSIS — R74.8 ELEVATED LIVER ENZYMES: Primary | ICD-10-CM

## 2022-01-01 DIAGNOSIS — R79.89 ELEVATED LFTS: Primary | ICD-10-CM

## 2022-01-01 DIAGNOSIS — R18.8 CIRRHOSIS OF LIVER WITH ASCITES, UNSPECIFIED HEPATIC CIRRHOSIS TYPE (HCC): ICD-10-CM

## 2022-01-01 DIAGNOSIS — R39.89 ABNORMAL URINE COLOR: ICD-10-CM

## 2022-01-01 DIAGNOSIS — R79.89 ELEVATED LFTS: ICD-10-CM

## 2022-01-01 LAB
25(OH)D3 SERPL-MCNC: 30 NG/ML (ref 30–100)
A1AT SERPL-MCNC: 192 MG/DL (ref 101–187)
ABO GROUP BLD: NORMAL
ABO GROUP BLD: NORMAL
ACTIN IGG SERPL-ACNC: 11 UNITS (ref 0–19)
AFP-TM SERPL-MCNC: 1.8 NG/ML (ref 0.5–8)
ALBUMIN FLD-MCNC: <0.6 G/DL
ALBUMIN SERPL BCP-MCNC: 2.2 G/DL (ref 3.5–5)
ALBUMIN SERPL BCP-MCNC: 2.2 G/DL (ref 3.5–5)
ALBUMIN SERPL BCP-MCNC: 2.3 G/DL (ref 3.5–5)
ALBUMIN SERPL BCP-MCNC: 2.4 G/DL (ref 3.5–5)
ALBUMIN SERPL BCP-MCNC: 2.4 G/DL (ref 3.5–5)
ALBUMIN SERPL BCP-MCNC: 2.5 G/DL (ref 3.5–5)
ALBUMIN SERPL BCP-MCNC: 2.6 G/DL (ref 3.5–5)
ALBUMIN SERPL BCP-MCNC: 2.7 G/DL (ref 3.5–5)
ALBUMIN SERPL BCP-MCNC: 2.8 G/DL (ref 3.5–5)
ALBUMIN SERPL BCP-MCNC: 2.9 G/DL (ref 3.5–5)
ALBUMIN SERPL BCP-MCNC: 2.9 G/DL (ref 3.5–5)
ALBUMIN SERPL BCP-MCNC: 3 G/DL (ref 3.5–5)
ALBUMIN SERPL BCP-MCNC: 3.1 G/DL (ref 3.5–5)
ALBUMIN SERPL BCP-MCNC: 3.4 G/DL (ref 3.5–5)
ALP SERPL-CCNC: 268 U/L (ref 46–116)
ALP SERPL-CCNC: 308 U/L (ref 46–116)
ALP SERPL-CCNC: 325 U/L (ref 46–116)
ALP SERPL-CCNC: 383 U/L (ref 46–116)
ALP SERPL-CCNC: 394 U/L (ref 46–116)
ALP SERPL-CCNC: 402 U/L (ref 46–116)
ALP SERPL-CCNC: 404 U/L (ref 46–116)
ALP SERPL-CCNC: 448 U/L (ref 46–116)
ALP SERPL-CCNC: 456 U/L (ref 46–116)
ALP SERPL-CCNC: 464 U/L (ref 46–116)
ALP SERPL-CCNC: 566 U/L (ref 46–116)
ALP SERPL-CCNC: 570 U/L (ref 46–116)
ALP SERPL-CCNC: 716 U/L (ref 46–116)
ALP SERPL-CCNC: 741 U/L (ref 46–116)
ALP SERPL-CCNC: 804 U/L (ref 46–116)
ALP SERPL-CCNC: 826 U/L (ref 46–116)
ALP SERPL-CCNC: 940 U/L (ref 46–116)
ALP SERPL-CCNC: 976 U/L (ref 46–116)
ALT SERPL W P-5'-P-CCNC: 101 U/L (ref 12–78)
ALT SERPL W P-5'-P-CCNC: 120 U/L (ref 12–78)
ALT SERPL W P-5'-P-CCNC: 123 U/L (ref 12–78)
ALT SERPL W P-5'-P-CCNC: 128 U/L (ref 12–78)
ALT SERPL W P-5'-P-CCNC: 143 U/L (ref 12–78)
ALT SERPL W P-5'-P-CCNC: 180 U/L (ref 12–78)
ALT SERPL W P-5'-P-CCNC: 216 U/L (ref 12–78)
ALT SERPL W P-5'-P-CCNC: 43 U/L (ref 12–78)
ALT SERPL W P-5'-P-CCNC: 47 U/L (ref 12–78)
ALT SERPL W P-5'-P-CCNC: 48 U/L (ref 12–78)
ALT SERPL W P-5'-P-CCNC: 48 U/L (ref 12–78)
ALT SERPL W P-5'-P-CCNC: 50 U/L (ref 12–78)
ALT SERPL W P-5'-P-CCNC: 57 U/L (ref 12–78)
ALT SERPL W P-5'-P-CCNC: 62 U/L (ref 12–78)
ALT SERPL W P-5'-P-CCNC: 71 U/L (ref 12–78)
ALT SERPL W P-5'-P-CCNC: 78 U/L (ref 12–78)
ALT SERPL W P-5'-P-CCNC: 87 U/L (ref 12–78)
ALT SERPL W P-5'-P-CCNC: 89 U/L (ref 12–78)
AMMONIA PLAS-SCNC: 56 UMOL/L (ref 11–35)
AMMONIA PLAS-SCNC: 58 UMOL/L (ref 11–35)
ANION GAP SERPL CALCULATED.3IONS-SCNC: 10 MMOL/L (ref 4–13)
ANION GAP SERPL CALCULATED.3IONS-SCNC: 6 MMOL/L (ref 4–13)
ANION GAP SERPL CALCULATED.3IONS-SCNC: 7 MMOL/L (ref 4–13)
ANION GAP SERPL CALCULATED.3IONS-SCNC: 8 MMOL/L (ref 4–13)
ANION GAP SERPL CALCULATED.3IONS-SCNC: 9 MMOL/L (ref 4–13)
ANISOCYTOSIS BLD QL SMEAR: PRESENT
ANISOCYTOSIS BLD QL SMEAR: PRESENT
APAP SERPL-MCNC: <2 UG/ML (ref 10–20)
APPEARANCE FLD: ABNORMAL
APTT PPP: 30 SECONDS (ref 23–37)
ARTERIAL PATENCY WRIST A: YES
ARTIFACT: PRESENT
ARTIFACT: PRESENT
AST SERPL W P-5'-P-CCNC: 110 U/L (ref 5–45)
AST SERPL W P-5'-P-CCNC: 127 U/L (ref 5–45)
AST SERPL W P-5'-P-CCNC: 133 U/L (ref 5–45)
AST SERPL W P-5'-P-CCNC: 141 U/L (ref 5–45)
AST SERPL W P-5'-P-CCNC: 141 U/L (ref 5–45)
AST SERPL W P-5'-P-CCNC: 155 U/L (ref 5–45)
AST SERPL W P-5'-P-CCNC: 156 U/L (ref 5–45)
AST SERPL W P-5'-P-CCNC: 207 U/L (ref 5–45)
AST SERPL W P-5'-P-CCNC: 227 U/L (ref 5–45)
AST SERPL W P-5'-P-CCNC: 245 U/L (ref 5–45)
AST SERPL W P-5'-P-CCNC: 57 U/L (ref 5–45)
AST SERPL W P-5'-P-CCNC: 63 U/L (ref 5–45)
AST SERPL W P-5'-P-CCNC: 64 U/L (ref 5–45)
AST SERPL W P-5'-P-CCNC: 66 U/L (ref 5–45)
AST SERPL W P-5'-P-CCNC: 67 U/L (ref 5–45)
AST SERPL W P-5'-P-CCNC: 69 U/L (ref 5–45)
AST SERPL W P-5'-P-CCNC: 70 U/L (ref 5–45)
AST SERPL W P-5'-P-CCNC: 79 U/L (ref 5–45)
ATRIAL RATE: 69 BPM
BACTERIA BLD CULT: NORMAL
BACTERIA BLD CULT: NORMAL
BACTERIA SPEC BFLD CULT: NO GROWTH
BACTERIA UR CULT: NORMAL
BACTERIA UR QL AUTO: ABNORMAL /HPF
BACTERIA UR QL AUTO: ABNORMAL /HPF
BACTERIA UR QL AUTO: NORMAL /HPF
BASE EX.OXY STD BLDV CALC-SCNC: 89 % (ref 60–80)
BASE EXCESS BLDA CALC-SCNC: -3.5 MMOL/L
BASE EXCESS BLDV CALC-SCNC: -4.1 MMOL/L
BASOPHILS # BLD AUTO: 0.04 THOUSANDS/ΜL (ref 0–0.1)
BASOPHILS # BLD AUTO: 0.05 THOUSANDS/ΜL (ref 0–0.1)
BASOPHILS # BLD AUTO: 0.08 THOUSANDS/ΜL (ref 0–0.1)
BASOPHILS # BLD MANUAL: 0.09 THOUSAND/UL (ref 0–0.1)
BASOPHILS # BLD MANUAL: 0.23 THOUSAND/UL (ref 0–0.1)
BASOPHILS NFR BLD AUTO: 1 % (ref 0–1)
BASOPHILS NFR MAR MANUAL: 1 % (ref 0–1)
BASOPHILS NFR MAR MANUAL: 3 % (ref 0–1)
BILIRUB DIRECT SERPL-MCNC: 0.66 MG/DL (ref 0–0.2)
BILIRUB DIRECT SERPL-MCNC: 10.28 MG/DL (ref 0–0.2)
BILIRUB DIRECT SERPL-MCNC: 13.24 MG/DL (ref 0–0.2)
BILIRUB DIRECT SERPL-MCNC: 20.52 MG/DL (ref 0–0.2)
BILIRUB DIRECT SERPL-MCNC: 26.48 MG/DL (ref 0–0.2)
BILIRUB SERPL-MCNC: 1.76 MG/DL (ref 0.2–1)
BILIRUB SERPL-MCNC: 13.62 MG/DL (ref 0.2–1)
BILIRUB SERPL-MCNC: 16.05 MG/DL (ref 0.2–1)
BILIRUB SERPL-MCNC: 16.34 MG/DL (ref 0.2–1)
BILIRUB SERPL-MCNC: 18.98 MG/DL (ref 0.2–1)
BILIRUB SERPL-MCNC: 19.07 MG/DL (ref 0.2–1)
BILIRUB SERPL-MCNC: 22.5 MG/DL (ref 0.2–1)
BILIRUB SERPL-MCNC: 23.9 MG/DL (ref 0.2–1)
BILIRUB SERPL-MCNC: 24.69 MG/DL (ref 0.2–1)
BILIRUB SERPL-MCNC: 24.79 MG/DL (ref 0.2–1)
BILIRUB SERPL-MCNC: 25.15 MG/DL (ref 0.2–1)
BILIRUB SERPL-MCNC: 25.95 MG/DL (ref 0.2–1)
BILIRUB SERPL-MCNC: 28.48 MG/DL (ref 0.2–1)
BILIRUB SERPL-MCNC: 28.96 MG/DL (ref 0.2–1)
BILIRUB SERPL-MCNC: 30.37 MG/DL (ref 0.2–1)
BILIRUB SERPL-MCNC: 30.38 MG/DL (ref 0.2–1)
BILIRUB SERPL-MCNC: 31.07 MG/DL (ref 0.2–1)
BILIRUB SERPL-MCNC: 33.82 MG/DL (ref 0.2–1)
BILIRUB UR QL STRIP: ABNORMAL
BILIRUB UR QL STRIP: ABNORMAL
BILIRUB UR QL STRIP: NEGATIVE
BLD GP AB SCN SERPL QL: NEGATIVE
BUN SERPL-MCNC: 22 MG/DL (ref 5–25)
BUN SERPL-MCNC: 23 MG/DL (ref 5–25)
BUN SERPL-MCNC: 23 MG/DL (ref 5–25)
BUN SERPL-MCNC: 25 MG/DL (ref 5–25)
BUN SERPL-MCNC: 28 MG/DL (ref 5–25)
BUN SERPL-MCNC: 29 MG/DL (ref 5–25)
BUN SERPL-MCNC: 30 MG/DL (ref 5–25)
BUN SERPL-MCNC: 32 MG/DL (ref 5–25)
BUN SERPL-MCNC: 34 MG/DL (ref 5–25)
BUN SERPL-MCNC: 36 MG/DL (ref 5–25)
BUN SERPL-MCNC: 37 MG/DL (ref 5–25)
BUN SERPL-MCNC: 39 MG/DL (ref 5–25)
BUN SERPL-MCNC: 40 MG/DL (ref 5–25)
BUN SERPL-MCNC: 41 MG/DL (ref 5–25)
BUN SERPL-MCNC: 44 MG/DL (ref 5–25)
BUN SERPL-MCNC: 45 MG/DL (ref 5–25)
BUN SERPL-MCNC: 53 MG/DL (ref 5–25)
BUN SERPL-MCNC: 57 MG/DL (ref 5–25)
BUN SERPL-MCNC: 62 MG/DL (ref 5–25)
CALCIUM ALBUM COR SERPL-MCNC: 10 MG/DL (ref 8.3–10.1)
CALCIUM ALBUM COR SERPL-MCNC: 10.1 MG/DL (ref 8.3–10.1)
CALCIUM ALBUM COR SERPL-MCNC: 8.5 MG/DL (ref 8.3–10.1)
CALCIUM ALBUM COR SERPL-MCNC: 9 MG/DL (ref 8.3–10.1)
CALCIUM ALBUM COR SERPL-MCNC: 9.1 MG/DL (ref 8.3–10.1)
CALCIUM ALBUM COR SERPL-MCNC: 9.2 MG/DL (ref 8.3–10.1)
CALCIUM ALBUM COR SERPL-MCNC: 9.3 MG/DL (ref 8.3–10.1)
CALCIUM ALBUM COR SERPL-MCNC: 9.4 MG/DL (ref 8.3–10.1)
CALCIUM ALBUM COR SERPL-MCNC: 9.7 MG/DL (ref 8.3–10.1)
CALCIUM ALBUM COR SERPL-MCNC: 9.8 MG/DL (ref 8.3–10.1)
CALCIUM ALBUM COR SERPL-MCNC: 9.9 MG/DL (ref 8.3–10.1)
CALCIUM ALBUM COR SERPL-MCNC: 9.9 MG/DL (ref 8.3–10.1)
CALCIUM SERPL-MCNC: 7.7 MG/DL (ref 8.3–10.1)
CALCIUM SERPL-MCNC: 8.1 MG/DL (ref 8.3–10.1)
CALCIUM SERPL-MCNC: 8.1 MG/DL (ref 8.3–10.1)
CALCIUM SERPL-MCNC: 8.2 MG/DL (ref 8.3–10.1)
CALCIUM SERPL-MCNC: 8.3 MG/DL (ref 8.3–10.1)
CALCIUM SERPL-MCNC: 8.3 MG/DL (ref 8.3–10.1)
CALCIUM SERPL-MCNC: 8.4 MG/DL (ref 8.3–10.1)
CALCIUM SERPL-MCNC: 8.5 MG/DL (ref 8.3–10.1)
CALCIUM SERPL-MCNC: 8.6 MG/DL (ref 8.3–10.1)
CALCIUM SERPL-MCNC: 8.8 MG/DL (ref 8.3–10.1)
CALCIUM SERPL-MCNC: 9.1 MG/DL (ref 8.3–10.1)
CALCIUM SERPL-MCNC: 9.1 MG/DL (ref 8.3–10.1)
CANCER AG19-9 SERPL-ACNC: ABNORMAL U/ML (ref 0–35)
CAOX CRY URNS QL MICRO: ABNORMAL /HPF
CEA SERPL-MCNC: 6.8 NG/ML (ref 0–3)
CHLORIDE SERPL-SCNC: 104 MMOL/L (ref 100–108)
CHLORIDE SERPL-SCNC: 106 MMOL/L (ref 100–108)
CHLORIDE SERPL-SCNC: 107 MMOL/L (ref 100–108)
CHLORIDE SERPL-SCNC: 108 MMOL/L (ref 100–108)
CHLORIDE SERPL-SCNC: 109 MMOL/L (ref 100–108)
CHLORIDE SERPL-SCNC: 110 MMOL/L (ref 100–108)
CHLORIDE SERPL-SCNC: 110 MMOL/L (ref 100–108)
CHLORIDE SERPL-SCNC: 111 MMOL/L (ref 100–108)
CHLORIDE SERPL-SCNC: 112 MMOL/L (ref 100–108)
CHLORIDE SERPL-SCNC: 112 MMOL/L (ref 100–108)
CHLORIDE SERPL-SCNC: 113 MMOL/L (ref 100–108)
CHLORIDE SERPL-SCNC: 114 MMOL/L (ref 100–108)
CHLORIDE SERPL-SCNC: 114 MMOL/L (ref 100–108)
CK SERPL-CCNC: 74 U/L (ref 39–308)
CK SERPL-CCNC: 87 U/L (ref 39–308)
CLARITY UR: ABNORMAL
CLARITY UR: CLEAR
CLARITY UR: CLEAR
CO2 SERPL-SCNC: 17 MMOL/L (ref 21–32)
CO2 SERPL-SCNC: 19 MMOL/L (ref 21–32)
CO2 SERPL-SCNC: 20 MMOL/L (ref 21–32)
CO2 SERPL-SCNC: 21 MMOL/L (ref 21–32)
CO2 SERPL-SCNC: 22 MMOL/L (ref 21–32)
CO2 SERPL-SCNC: 23 MMOL/L (ref 21–32)
CO2 SERPL-SCNC: 23 MMOL/L (ref 21–32)
COLOR FLD: YELLOW
COLOR UR: ABNORMAL
COLOR UR: ABNORMAL
COLOR UR: YELLOW
CREAT SERPL-MCNC: 0.69 MG/DL (ref 0.6–1.3)
CREAT SERPL-MCNC: 0.7 MG/DL (ref 0.6–1.3)
CREAT SERPL-MCNC: 0.77 MG/DL (ref 0.6–1.3)
CREAT SERPL-MCNC: 0.81 MG/DL (ref 0.6–1.3)
CREAT SERPL-MCNC: 0.84 MG/DL (ref 0.6–1.3)
CREAT SERPL-MCNC: 0.85 MG/DL (ref 0.6–1.3)
CREAT SERPL-MCNC: 0.85 MG/DL (ref 0.6–1.3)
CREAT SERPL-MCNC: 0.92 MG/DL (ref 0.6–1.3)
CREAT SERPL-MCNC: 0.96 MG/DL (ref 0.6–1.3)
CREAT SERPL-MCNC: 1.06 MG/DL (ref 0.6–1.3)
CREAT SERPL-MCNC: 1.38 MG/DL (ref 0.6–1.3)
CREAT SERPL-MCNC: 1.4 MG/DL (ref 0.6–1.3)
CREAT SERPL-MCNC: 1.7 MG/DL (ref 0.6–1.3)
CREAT SERPL-MCNC: 1.81 MG/DL (ref 0.6–1.3)
CREAT SERPL-MCNC: 1.86 MG/DL (ref 0.6–1.3)
CREAT SERPL-MCNC: 1.87 MG/DL (ref 0.6–1.3)
CREAT SERPL-MCNC: 2.24 MG/DL (ref 0.6–1.3)
CREAT SERPL-MCNC: 2.39 MG/DL (ref 0.6–1.3)
CREAT SERPL-MCNC: 2.61 MG/DL (ref 0.6–1.3)
CREAT UR-MCNC: 142 MG/DL
EOSINOPHIL # BLD AUTO: 0.09 THOUSAND/ΜL (ref 0–0.61)
EOSINOPHIL # BLD AUTO: 0.11 THOUSAND/ΜL (ref 0–0.61)
EOSINOPHIL # BLD AUTO: 0.12 THOUSAND/ΜL (ref 0–0.61)
EOSINOPHIL # BLD AUTO: 0.13 THOUSAND/ΜL (ref 0–0.61)
EOSINOPHIL # BLD AUTO: 0.16 THOUSAND/ΜL (ref 0–0.61)
EOSINOPHIL # BLD AUTO: 0.17 THOUSAND/ΜL (ref 0–0.61)
EOSINOPHIL # BLD AUTO: 0.22 THOUSAND/ΜL (ref 0–0.61)
EOSINOPHIL # BLD MANUAL: 0.09 THOUSAND/UL (ref 0–0.4)
EOSINOPHIL # BLD MANUAL: 0.23 THOUSAND/UL (ref 0–0.4)
EOSINOPHIL NFR BLD AUTO: 1 % (ref 0–6)
EOSINOPHIL NFR BLD AUTO: 2 % (ref 0–6)
EOSINOPHIL NFR BLD AUTO: 3 % (ref 0–6)
EOSINOPHIL NFR BLD MANUAL: 1 % (ref 0–6)
EOSINOPHIL NFR BLD MANUAL: 3 % (ref 0–6)
ERYTHROCYTE [DISTWIDTH] IN BLOOD BY AUTOMATED COUNT: 15.8 % (ref 11.6–15.1)
ERYTHROCYTE [DISTWIDTH] IN BLOOD BY AUTOMATED COUNT: 18 % (ref 11.6–15.1)
ERYTHROCYTE [DISTWIDTH] IN BLOOD BY AUTOMATED COUNT: 18.1 % (ref 11.6–15.1)
ERYTHROCYTE [DISTWIDTH] IN BLOOD BY AUTOMATED COUNT: 18.6 % (ref 11.6–15.1)
ERYTHROCYTE [DISTWIDTH] IN BLOOD BY AUTOMATED COUNT: 18.8 % (ref 11.6–15.1)
ERYTHROCYTE [DISTWIDTH] IN BLOOD BY AUTOMATED COUNT: 18.9 % (ref 11.6–15.1)
ERYTHROCYTE [DISTWIDTH] IN BLOOD BY AUTOMATED COUNT: 19.9 % (ref 11.6–15.1)
ERYTHROCYTE [DISTWIDTH] IN BLOOD BY AUTOMATED COUNT: 20.7 % (ref 11.6–15.1)
ERYTHROCYTE [DISTWIDTH] IN BLOOD BY AUTOMATED COUNT: 20.8 % (ref 11.6–15.1)
ERYTHROCYTE [DISTWIDTH] IN BLOOD BY AUTOMATED COUNT: 20.8 % (ref 11.6–15.1)
ERYTHROCYTE [DISTWIDTH] IN BLOOD BY AUTOMATED COUNT: 21.2 % (ref 11.6–15.1)
ERYTHROCYTE [DISTWIDTH] IN BLOOD BY AUTOMATED COUNT: 21.3 % (ref 11.6–15.1)
ERYTHROCYTE [DISTWIDTH] IN BLOOD BY AUTOMATED COUNT: 21.5 % (ref 11.6–15.1)
EST. AVERAGE GLUCOSE BLD GHB EST-MCNC: 105 MG/DL
FERRITIN SERPL-MCNC: 554 NG/ML (ref 8–388)
FLUAV RNA RESP QL NAA+PROBE: NEGATIVE
FLUBV RNA RESP QL NAA+PROBE: NEGATIVE
FOLATE SERPL-MCNC: 12 NG/ML (ref 3.1–17.5)
GFR SERPL CREATININE-BSD FRML MDRD: 21 ML/MIN/1.73SQ M
GFR SERPL CREATININE-BSD FRML MDRD: 23 ML/MIN/1.73SQ M
GFR SERPL CREATININE-BSD FRML MDRD: 25 ML/MIN/1.73SQ M
GFR SERPL CREATININE-BSD FRML MDRD: 31 ML/MIN/1.73SQ M
GFR SERPL CREATININE-BSD FRML MDRD: 31 ML/MIN/1.73SQ M
GFR SERPL CREATININE-BSD FRML MDRD: 32 ML/MIN/1.73SQ M
GFR SERPL CREATININE-BSD FRML MDRD: 35 ML/MIN/1.73SQ M
GFR SERPL CREATININE-BSD FRML MDRD: 44 ML/MIN/1.73SQ M
GFR SERPL CREATININE-BSD FRML MDRD: 45 ML/MIN/1.73SQ M
GFR SERPL CREATININE-BSD FRML MDRD: 62 ML/MIN/1.73SQ M
GFR SERPL CREATININE-BSD FRML MDRD: 70 ML/MIN/1.73SQ M
GFR SERPL CREATININE-BSD FRML MDRD: 74 ML/MIN/1.73SQ M
GFR SERPL CREATININE-BSD FRML MDRD: 78 ML/MIN/1.73SQ M
GFR SERPL CREATININE-BSD FRML MDRD: 79 ML/MIN/1.73SQ M
GFR SERPL CREATININE-BSD FRML MDRD: 81 ML/MIN/1.73SQ M
GFR SERPL CREATININE-BSD FRML MDRD: 84 ML/MIN/1.73SQ M
GFR SERPL CREATININE-BSD FRML MDRD: 85 ML/MIN/1.73SQ M
GGT SERPL-CCNC: 721 U/L (ref 5–85)
GLUCOSE FLD-MCNC: 142 MG/DL
GLUCOSE P FAST SERPL-MCNC: 116 MG/DL (ref 65–99)
GLUCOSE SERPL-MCNC: 102 MG/DL (ref 65–140)
GLUCOSE SERPL-MCNC: 102 MG/DL (ref 65–140)
GLUCOSE SERPL-MCNC: 105 MG/DL (ref 65–140)
GLUCOSE SERPL-MCNC: 111 MG/DL (ref 65–140)
GLUCOSE SERPL-MCNC: 111 MG/DL (ref 65–140)
GLUCOSE SERPL-MCNC: 112 MG/DL (ref 65–140)
GLUCOSE SERPL-MCNC: 117 MG/DL (ref 65–140)
GLUCOSE SERPL-MCNC: 118 MG/DL (ref 65–140)
GLUCOSE SERPL-MCNC: 119 MG/DL (ref 65–140)
GLUCOSE SERPL-MCNC: 120 MG/DL (ref 65–140)
GLUCOSE SERPL-MCNC: 121 MG/DL (ref 65–140)
GLUCOSE SERPL-MCNC: 123 MG/DL (ref 65–140)
GLUCOSE SERPL-MCNC: 131 MG/DL (ref 65–140)
GLUCOSE SERPL-MCNC: 134 MG/DL (ref 65–140)
GLUCOSE SERPL-MCNC: 139 MG/DL (ref 65–140)
GLUCOSE SERPL-MCNC: 140 MG/DL (ref 65–140)
GLUCOSE SERPL-MCNC: 86 MG/DL (ref 65–140)
GLUCOSE SERPL-MCNC: 99 MG/DL (ref 65–140)
GLUCOSE SERPL-MCNC: 99 MG/DL (ref 65–140)
GLUCOSE UR STRIP-MCNC: ABNORMAL MG/DL
GLUCOSE UR STRIP-MCNC: NEGATIVE MG/DL
GLUCOSE UR STRIP-MCNC: NEGATIVE MG/DL
GRAM STN SPEC: NORMAL
HAV IGM SER QL: NORMAL
HAV IGM SER QL: NORMAL
HBA1C MFR BLD: 5.3 %
HBV CORE IGM SER QL: NORMAL
HBV CORE IGM SER QL: NORMAL
HBV SURFACE AG SER QL: NORMAL
HBV SURFACE AG SER QL: NORMAL
HCO3 BLDA-SCNC: 20.8 MMOL/L (ref 22–28)
HCO3 BLDV-SCNC: 21.4 MMOL/L (ref 24–30)
HCT VFR BLD AUTO: 24.4 % (ref 36.5–49.3)
HCT VFR BLD AUTO: 25.7 % (ref 36.5–49.3)
HCT VFR BLD AUTO: 26.7 % (ref 36.5–49.3)
HCT VFR BLD AUTO: 27.4 % (ref 36.5–49.3)
HCT VFR BLD AUTO: 28.2 % (ref 36.5–49.3)
HCT VFR BLD AUTO: 29.7 % (ref 36.5–49.3)
HCT VFR BLD AUTO: 29.9 % (ref 36.5–49.3)
HCT VFR BLD AUTO: 32.7 % (ref 36.5–49.3)
HCT VFR BLD AUTO: 32.8 % (ref 36.5–49.3)
HCT VFR BLD AUTO: 34.9 % (ref 36.5–49.3)
HCT VFR BLD AUTO: 36.2 % (ref 36.5–49.3)
HCT VFR BLD AUTO: 37.6 % (ref 36.5–49.3)
HCT VFR BLD AUTO: 39.3 % (ref 36.5–49.3)
HCV AB SER QL: NORMAL
HCV AB SER QL: NORMAL
HGB BLD-MCNC: 10.1 G/DL (ref 12–17)
HGB BLD-MCNC: 10.2 G/DL (ref 12–17)
HGB BLD-MCNC: 10.5 G/DL (ref 12–17)
HGB BLD-MCNC: 10.6 G/DL (ref 12–17)
HGB BLD-MCNC: 11.6 G/DL (ref 12–17)
HGB BLD-MCNC: 11.7 G/DL (ref 12–17)
HGB BLD-MCNC: 12.4 G/DL (ref 12–17)
HGB BLD-MCNC: 12.4 G/DL (ref 12–17)
HGB BLD-MCNC: 13 G/DL (ref 12–17)
HGB BLD-MCNC: 13.1 G/DL (ref 12–17)
HGB BLD-MCNC: 8.9 G/DL (ref 12–17)
HGB BLD-MCNC: 9.1 G/DL (ref 12–17)
HGB BLD-MCNC: 9.6 G/DL (ref 12–17)
HGB UR QL STRIP.AUTO: NEGATIVE
HIV 1+2 AB+HIV1 P24 AG SERPL QL IA: NORMAL
HOROWITZ INDEX BLDA+IHG-RTO: 50 MM[HG]
HYALINE CASTS #/AREA URNS LPF: ABNORMAL /LPF
HYPERCHROMIA BLD QL SMEAR: PRESENT
IMM GRANULOCYTES # BLD AUTO: 0.02 THOUSAND/UL (ref 0–0.2)
IMM GRANULOCYTES # BLD AUTO: 0.04 THOUSAND/UL (ref 0–0.2)
IMM GRANULOCYTES # BLD AUTO: 0.05 THOUSAND/UL (ref 0–0.2)
IMM GRANULOCYTES # BLD AUTO: 0.09 THOUSAND/UL (ref 0–0.2)
IMM GRANULOCYTES # BLD AUTO: 0.13 THOUSAND/UL (ref 0–0.2)
IMM GRANULOCYTES NFR BLD AUTO: 0 % (ref 0–2)
IMM GRANULOCYTES NFR BLD AUTO: 1 % (ref 0–2)
INR PPP: 1.19 (ref 0.84–1.19)
INR PPP: 1.2 (ref 0.84–1.19)
INR PPP: 1.22 (ref 0.84–1.19)
INR PPP: 1.33 (ref 0.84–1.19)
INR PPP: 1.33 (ref 0.84–1.19)
INR PPP: 1.45 (ref 0.84–1.19)
IRON SATN MFR SERPL: 38 % (ref 20–50)
IRON SERPL-MCNC: 76 UG/DL (ref 65–175)
KETONES UR STRIP-MCNC: ABNORMAL MG/DL
KETONES UR STRIP-MCNC: ABNORMAL MG/DL
KETONES UR STRIP-MCNC: NEGATIVE MG/DL
LACTATE SERPL-SCNC: 1.9 MMOL/L (ref 0.5–2)
LACTATE SERPL-SCNC: 2 MMOL/L (ref 0.5–2)
LACTATE SERPL-SCNC: 2.2 MMOL/L (ref 0.5–2)
LACTATE SERPL-SCNC: 2.3 MMOL/L (ref 0.5–2)
LACTATE SERPL-SCNC: 2.3 MMOL/L (ref 0.5–2)
LDH FLD L TO P-CCNC: 112 U/L
LEUKOCYTE ESTERASE UR QL STRIP: ABNORMAL
LEUKOCYTE ESTERASE UR QL STRIP: ABNORMAL
LEUKOCYTE ESTERASE UR QL STRIP: NEGATIVE
LYMPHOCYTES # BLD AUTO: 0.83 THOUSAND/UL (ref 0.6–4.47)
LYMPHOCYTES # BLD AUTO: 0.85 THOUSANDS/ΜL (ref 0.6–4.47)
LYMPHOCYTES # BLD AUTO: 0.96 THOUSANDS/ΜL (ref 0.6–4.47)
LYMPHOCYTES # BLD AUTO: 1.07 THOUSAND/UL (ref 0.6–4.47)
LYMPHOCYTES # BLD AUTO: 1.11 THOUSANDS/ΜL (ref 0.6–4.47)
LYMPHOCYTES # BLD AUTO: 1.17 THOUSANDS/ΜL (ref 0.6–4.47)
LYMPHOCYTES # BLD AUTO: 1.2 THOUSANDS/ΜL (ref 0.6–4.47)
LYMPHOCYTES # BLD AUTO: 1.21 THOUSANDS/ΜL (ref 0.6–4.47)
LYMPHOCYTES # BLD AUTO: 1.4 THOUSANDS/ΜL (ref 0.6–4.47)
LYMPHOCYTES # BLD AUTO: 11 % (ref 14–44)
LYMPHOCYTES # BLD AUTO: 12 % (ref 14–44)
LYMPHOCYTES NFR BLD AUTO: 10 % (ref 14–44)
LYMPHOCYTES NFR BLD AUTO: 11 % (ref 14–44)
LYMPHOCYTES NFR BLD AUTO: 12 %
LYMPHOCYTES NFR BLD AUTO: 15 % (ref 14–44)
LYMPHOCYTES NFR BLD AUTO: 16 % (ref 14–44)
LYMPHOCYTES NFR BLD AUTO: 17 %
LYMPHOCYTES NFR BLD AUTO: 20 % (ref 14–44)
LYMPHOCYTES NFR BLD AUTO: 3 %
MACROCYTES BLD QL AUTO: PRESENT
MAGNESIUM SERPL-MCNC: 1.9 MG/DL (ref 1.6–2.6)
MAGNESIUM SERPL-MCNC: 1.9 MG/DL (ref 1.6–2.6)
MAGNESIUM SERPL-MCNC: 2 MG/DL (ref 1.6–2.6)
MAGNESIUM SERPL-MCNC: 2.1 MG/DL (ref 1.6–2.6)
MCH RBC QN AUTO: 33.4 PG (ref 26.8–34.3)
MCH RBC QN AUTO: 34.3 PG (ref 26.8–34.3)
MCH RBC QN AUTO: 34.8 PG (ref 26.8–34.3)
MCH RBC QN AUTO: 35 PG (ref 26.8–34.3)
MCH RBC QN AUTO: 35 PG (ref 26.8–34.3)
MCH RBC QN AUTO: 35.3 PG (ref 26.8–34.3)
MCH RBC QN AUTO: 35.5 PG (ref 26.8–34.3)
MCH RBC QN AUTO: 35.8 PG (ref 26.8–34.3)
MCH RBC QN AUTO: 36 PG (ref 26.8–34.3)
MCH RBC QN AUTO: 36.9 PG (ref 26.8–34.3)
MCHC RBC AUTO-ENTMCNC: 33.1 G/DL (ref 31.4–37.4)
MCHC RBC AUTO-ENTMCNC: 34.3 G/DL (ref 31.4–37.4)
MCHC RBC AUTO-ENTMCNC: 34.8 G/DL (ref 31.4–37.4)
MCHC RBC AUTO-ENTMCNC: 35.1 G/DL (ref 31.4–37.4)
MCHC RBC AUTO-ENTMCNC: 35.4 G/DL (ref 31.4–37.4)
MCHC RBC AUTO-ENTMCNC: 35.4 G/DL (ref 31.4–37.4)
MCHC RBC AUTO-ENTMCNC: 35.5 G/DL (ref 31.4–37.4)
MCHC RBC AUTO-ENTMCNC: 35.7 G/DL (ref 31.4–37.4)
MCHC RBC AUTO-ENTMCNC: 35.8 G/DL (ref 31.4–37.4)
MCHC RBC AUTO-ENTMCNC: 36 G/DL (ref 31.4–37.4)
MCHC RBC AUTO-ENTMCNC: 36.2 G/DL (ref 31.4–37.4)
MCHC RBC AUTO-ENTMCNC: 36.5 G/DL (ref 31.4–37.4)
MCHC RBC AUTO-ENTMCNC: 36.9 G/DL (ref 31.4–37.4)
MCV RBC AUTO: 100 FL (ref 82–98)
MCV RBC AUTO: 101 FL (ref 82–98)
MCV RBC AUTO: 101 FL (ref 82–98)
MCV RBC AUTO: 97 FL (ref 82–98)
MCV RBC AUTO: 98 FL (ref 82–98)
MCV RBC AUTO: 99 FL (ref 82–98)
METAMYELOCYTES NFR BLD MANUAL: 1 % (ref 0–1)
METHYLMALONATE SERPL-SCNC: 115 NMOL/L (ref 0–378)
MICROALBUMIN UR-MCNC: 17.2 MG/L (ref 0–20)
MICROALBUMIN/CREAT 24H UR: 12 MG/G CREATININE (ref 0–30)
MITOCHONDRIA M2 IGG SER-ACNC: <20 UNITS (ref 0–20)
MONO+MESO NFR FLD MANUAL: 3 %
MONOCYTES # BLD AUTO: 0.53 THOUSAND/UL (ref 0–1.22)
MONOCYTES # BLD AUTO: 0.79 THOUSAND/ΜL (ref 0.17–1.22)
MONOCYTES # BLD AUTO: 0.83 THOUSAND/UL (ref 0–1.22)
MONOCYTES # BLD AUTO: 0.87 THOUSAND/ΜL (ref 0.17–1.22)
MONOCYTES # BLD AUTO: 0.94 THOUSAND/ΜL (ref 0.17–1.22)
MONOCYTES # BLD AUTO: 1.02 THOUSAND/ΜL (ref 0.17–1.22)
MONOCYTES # BLD AUTO: 1.08 THOUSAND/ΜL (ref 0.17–1.22)
MONOCYTES # BLD AUTO: 1.11 THOUSAND/ΜL (ref 0.17–1.22)
MONOCYTES # BLD AUTO: 1.19 THOUSAND/ΜL (ref 0.17–1.22)
MONOCYTES NFR BLD AUTO: 11 % (ref 4–12)
MONOCYTES NFR BLD AUTO: 11 % (ref 4–12)
MONOCYTES NFR BLD AUTO: 12 % (ref 4–12)
MONOCYTES NFR BLD AUTO: 13 % (ref 4–12)
MONOCYTES NFR BLD AUTO: 13 % (ref 4–12)
MONOCYTES NFR BLD AUTO: 14 % (ref 4–12)
MONOCYTES NFR BLD AUTO: 15 % (ref 4–12)
MONOCYTES NFR BLD AUTO: 22 %
MONOCYTES NFR BLD AUTO: 29 %
MONOCYTES NFR BLD AUTO: 49 %
MONOCYTES NFR BLD: 11 % (ref 4–12)
MONOCYTES NFR BLD: 6 % (ref 4–12)
MRSA NOSE QL CULT: NORMAL
MUCOUS THREADS UR QL AUTO: ABNORMAL
MUCOUS THREADS UR QL AUTO: ABNORMAL
NEUTROPHILS # BLD AUTO: 4.7 THOUSANDS/ΜL (ref 1.85–7.62)
NEUTROPHILS # BLD AUTO: 5.05 THOUSANDS/ΜL (ref 1.85–7.62)
NEUTROPHILS # BLD AUTO: 5.11 THOUSANDS/ΜL (ref 1.85–7.62)
NEUTROPHILS # BLD AUTO: 5.11 THOUSANDS/ΜL (ref 1.85–7.62)
NEUTROPHILS # BLD AUTO: 5.25 THOUSANDS/ΜL (ref 1.85–7.62)
NEUTROPHILS # BLD AUTO: 5.64 THOUSANDS/ΜL (ref 1.85–7.62)
NEUTROPHILS # BLD AUTO: 7.23 THOUSANDS/ΜL (ref 1.85–7.62)
NEUTROPHILS # BLD MANUAL: 5.46 THOUSAND/UL (ref 1.85–7.62)
NEUTROPHILS # BLD MANUAL: 7.04 THOUSAND/UL (ref 1.85–7.62)
NEUTS BAND NFR BLD MANUAL: 2 % (ref 0–8)
NEUTS SEG NFR BLD AUTO: 34 %
NEUTS SEG NFR BLD AUTO: 59 %
NEUTS SEG NFR BLD AUTO: 65 % (ref 43–75)
NEUTS SEG NFR BLD AUTO: 66 % (ref 43–75)
NEUTS SEG NFR BLD AUTO: 66 % (ref 43–75)
NEUTS SEG NFR BLD AUTO: 67 % (ref 43–75)
NEUTS SEG NFR BLD AUTO: 69 % (ref 43–75)
NEUTS SEG NFR BLD AUTO: 72 %
NEUTS SEG NFR BLD AUTO: 72 % (ref 43–75)
NEUTS SEG NFR BLD AUTO: 74 % (ref 43–75)
NEUTS SEG NFR BLD AUTO: 75 % (ref 43–75)
NEUTS SEG NFR BLD AUTO: 77 % (ref 43–75)
NITRITE UR QL STRIP: NEGATIVE
NITRITE UR QL STRIP: POSITIVE
NITRITE UR QL STRIP: POSITIVE
NON-SQ EPI CELLS URNS QL MICRO: ABNORMAL /HPF
NON-SQ EPI CELLS URNS QL MICRO: ABNORMAL /HPF
NON-SQ EPI CELLS URNS QL MICRO: NORMAL /HPF
NRBC BLD AUTO-RTO: 0 /100 WBCS
NT-PROBNP SERPL-MCNC: 242 PG/ML
O2 CT BLDA-SCNC: 15 ML/DL (ref 16–23)
O2 CT BLDV-SCNC: 13.4 ML/DL
OVALOCYTES BLD QL SMEAR: PRESENT
OXYHGB MFR BLDA: 98 % (ref 94–97)
P AXIS: 53 DEGREES
PCO2 BLDA: 34.9 MM HG (ref 36–44)
PCO2 BLDV: 40.4 MM HG (ref 42–50)
PEEP RESPIRATORY: 6 CM[H2O]
PH BLDA: 7.39 [PH] (ref 7.35–7.45)
PH BLDV: 7.34 [PH] (ref 7.3–7.4)
PH UR STRIP.AUTO: 6 [PH]
PH UR STRIP.AUTO: 6.5 [PH]
PH UR STRIP.AUTO: 6.5 [PH]
PHOSPHATE SERPL-MCNC: 1.9 MG/DL (ref 2.3–4.1)
PHOSPHATE SERPL-MCNC: 2.4 MG/DL (ref 2.3–4.1)
PHOSPHATE SERPL-MCNC: 2.7 MG/DL (ref 2.3–4.1)
PHOSPHATE SERPL-MCNC: 2.8 MG/DL (ref 2.3–4.1)
PLATELET # BLD AUTO: 131 THOUSANDS/UL (ref 149–390)
PLATELET # BLD AUTO: 143 THOUSANDS/UL (ref 149–390)
PLATELET # BLD AUTO: 153 THOUSANDS/UL (ref 149–390)
PLATELET # BLD AUTO: 154 THOUSANDS/UL (ref 149–390)
PLATELET # BLD AUTO: 156 THOUSANDS/UL (ref 149–390)
PLATELET # BLD AUTO: 158 THOUSANDS/UL (ref 149–390)
PLATELET # BLD AUTO: 164 THOUSANDS/UL (ref 149–390)
PLATELET # BLD AUTO: 165 THOUSANDS/UL (ref 149–390)
PLATELET # BLD AUTO: 166 THOUSANDS/UL (ref 149–390)
PLATELET # BLD AUTO: 171 THOUSANDS/UL (ref 149–390)
PLATELET # BLD AUTO: 171 THOUSANDS/UL (ref 149–390)
PLATELET # BLD AUTO: 198 THOUSANDS/UL (ref 149–390)
PLATELET # BLD AUTO: 199 THOUSANDS/UL (ref 149–390)
PLATELET BLD QL SMEAR: ADEQUATE
PLATELET BLD QL SMEAR: ADEQUATE
PMV BLD AUTO: 10.5 FL (ref 8.9–12.7)
PMV BLD AUTO: 10.6 FL (ref 8.9–12.7)
PMV BLD AUTO: 10.8 FL (ref 8.9–12.7)
PMV BLD AUTO: 10.9 FL (ref 8.9–12.7)
PMV BLD AUTO: 11.1 FL (ref 8.9–12.7)
PMV BLD AUTO: 11.2 FL (ref 8.9–12.7)
PMV BLD AUTO: 11.2 FL (ref 8.9–12.7)
PMV BLD AUTO: 11.3 FL (ref 8.9–12.7)
PMV BLD AUTO: 11.3 FL (ref 8.9–12.7)
PMV BLD AUTO: 11.6 FL (ref 8.9–12.7)
PMV BLD AUTO: 11.7 FL (ref 8.9–12.7)
PMV BLD AUTO: 11.9 FL (ref 8.9–12.7)
PMV BLD AUTO: 12 FL (ref 8.9–12.7)
PO2 BLDA: 187.9 MM HG (ref 75–129)
PO2 BLDV: 61.6 MM HG (ref 35–45)
POIKILOCYTOSIS BLD QL SMEAR: PRESENT
POIKILOCYTOSIS BLD QL SMEAR: PRESENT
POTASSIUM SERPL-SCNC: 3.1 MMOL/L (ref 3.5–5.3)
POTASSIUM SERPL-SCNC: 3.2 MMOL/L (ref 3.5–5.3)
POTASSIUM SERPL-SCNC: 3.3 MMOL/L (ref 3.5–5.3)
POTASSIUM SERPL-SCNC: 3.4 MMOL/L (ref 3.5–5.3)
POTASSIUM SERPL-SCNC: 3.5 MMOL/L (ref 3.5–5.3)
POTASSIUM SERPL-SCNC: 3.7 MMOL/L (ref 3.5–5.3)
POTASSIUM SERPL-SCNC: 3.8 MMOL/L (ref 3.5–5.3)
POTASSIUM SERPL-SCNC: 3.9 MMOL/L (ref 3.5–5.3)
POTASSIUM SERPL-SCNC: 4 MMOL/L (ref 3.5–5.3)
POTASSIUM SERPL-SCNC: 4 MMOL/L (ref 3.5–5.3)
POTASSIUM SERPL-SCNC: 4.4 MMOL/L (ref 3.5–5.3)
PR INTERVAL: 212 MS
PROCALCITONIN SERPL-MCNC: 0.97 NG/ML
PROCALCITONIN SERPL-MCNC: 1.22 NG/ML
PROT FLD-MCNC: <2 G/DL
PROT SERPL-MCNC: 5.1 G/DL (ref 6.4–8.2)
PROT SERPL-MCNC: 5.4 G/DL (ref 6.4–8.2)
PROT SERPL-MCNC: 5.4 G/DL (ref 6.4–8.2)
PROT SERPL-MCNC: 5.5 G/DL (ref 6.4–8.2)
PROT SERPL-MCNC: 5.6 G/DL (ref 6.4–8.2)
PROT SERPL-MCNC: 5.7 G/DL (ref 6.4–8.2)
PROT SERPL-MCNC: 5.9 G/DL (ref 6.4–8.2)
PROT SERPL-MCNC: 5.9 G/DL (ref 6.4–8.2)
PROT SERPL-MCNC: 6 G/DL (ref 6.4–8.2)
PROT SERPL-MCNC: 6 G/DL (ref 6.4–8.2)
PROT SERPL-MCNC: 6.4 G/DL (ref 6.4–8.2)
PROT SERPL-MCNC: 6.5 G/DL (ref 6.4–8.2)
PROT SERPL-MCNC: 6.8 G/DL (ref 6.4–8.2)
PROT SERPL-MCNC: 7.1 G/DL (ref 6.4–8.2)
PROT UR STRIP-MCNC: ABNORMAL MG/DL
PROT UR STRIP-MCNC: ABNORMAL MG/DL
PROT UR STRIP-MCNC: NEGATIVE MG/DL
PROTHROMBIN TIME: 14.5 SECONDS (ref 11.6–14.5)
PROTHROMBIN TIME: 14.6 SECONDS (ref 11.6–14.5)
PROTHROMBIN TIME: 14.9 SECONDS (ref 11.6–14.5)
PROTHROMBIN TIME: 15.9 SECONDS (ref 11.6–14.5)
PROTHROMBIN TIME: 15.9 SECONDS (ref 11.6–14.5)
PROTHROMBIN TIME: 17 SECONDS (ref 11.6–14.5)
QRS AXIS: -63 DEGREES
QRSD INTERVAL: 102 MS
QT INTERVAL: 434 MS
QTC INTERVAL: 465 MS
RBC # BLD AUTO: 2.51 MILLION/UL (ref 3.88–5.62)
RBC # BLD AUTO: 2.56 MILLION/UL (ref 3.88–5.62)
RBC # BLD AUTO: 2.67 MILLION/UL (ref 3.88–5.62)
RBC # BLD AUTO: 2.74 MILLION/UL (ref 3.88–5.62)
RBC # BLD AUTO: 2.85 MILLION/UL (ref 3.88–5.62)
RBC # BLD AUTO: 2.99 MILLION/UL (ref 3.88–5.62)
RBC # BLD AUTO: 3 MILLION/UL (ref 3.88–5.62)
RBC # BLD AUTO: 3.3 MILLION/UL (ref 3.88–5.62)
RBC # BLD AUTO: 3.31 MILLION/UL (ref 3.88–5.62)
RBC # BLD AUTO: 3.56 MILLION/UL (ref 3.88–5.62)
RBC # BLD AUTO: 3.62 MILLION/UL (ref 3.88–5.62)
RBC # BLD AUTO: 3.71 MILLION/UL (ref 3.88–5.62)
RBC # BLD AUTO: 3.89 MILLION/UL (ref 3.88–5.62)
RBC #/AREA URNS AUTO: ABNORMAL /HPF
RBC #/AREA URNS AUTO: ABNORMAL /HPF
RBC #/AREA URNS AUTO: NORMAL /HPF
RBC MORPH BLD: PRESENT
RBC MORPH BLD: PRESENT
RH BLD: POSITIVE
RH BLD: POSITIVE
RSV RNA RESP QL NAA+PROBE: NEGATIVE
RYE IGE QN: NEGATIVE
SARS-COV-2 RNA RESP QL NAA+PROBE: NEGATIVE
SITE: ABNORMAL
SODIUM 24H UR-SCNC: 27 MOL/L
SODIUM SERPL-SCNC: 134 MMOL/L (ref 136–145)
SODIUM SERPL-SCNC: 136 MMOL/L (ref 136–145)
SODIUM SERPL-SCNC: 137 MMOL/L (ref 136–145)
SODIUM SERPL-SCNC: 137 MMOL/L (ref 136–145)
SODIUM SERPL-SCNC: 138 MMOL/L (ref 136–145)
SODIUM SERPL-SCNC: 139 MMOL/L (ref 136–145)
SODIUM SERPL-SCNC: 140 MMOL/L (ref 136–145)
SODIUM SERPL-SCNC: 142 MMOL/L (ref 136–145)
SODIUM SERPL-SCNC: 143 MMOL/L (ref 136–145)
SP GR UR STRIP.AUTO: 1.02 (ref 1–1.03)
SPECIMEN EXPIRATION DATE: NORMAL
SPECIMEN SOURCE: ABNORMAL
T WAVE AXIS: 46 DEGREES
T4 FREE SERPL-MCNC: 1.12 NG/DL (ref 0.76–1.46)
TARGETS BLD QL SMEAR: PRESENT
TARGETS BLD QL SMEAR: PRESENT
TIBC SERPL-MCNC: 200 UG/DL (ref 250–450)
TOTAL CELLS COUNTED SPEC: 100
TSH SERPL DL<=0.05 MIU/L-ACNC: 4.93 UIU/ML (ref 0.36–3.74)
TSH SERPL DL<=0.05 MIU/L-ACNC: 7.45 UIU/ML (ref 0.36–3.74)
URATE SERPL-MCNC: 4.4 MG/DL (ref 4.2–8)
UROBILINOGEN UR QL STRIP.AUTO: 2 E.U./DL
VENT AC: 16
VENT- AC: AC
VENTRICULAR RATE: 69 BPM
VIT B12 SERPL-MCNC: 502 PG/ML (ref 100–900)
VT SETTING VENT: 500 ML
WBC # BLD AUTO: 11.58 THOUSAND/UL (ref 4.31–10.16)
WBC # BLD AUTO: 13.3 THOUSAND/UL (ref 4.31–10.16)
WBC # BLD AUTO: 6.83 THOUSAND/UL (ref 4.31–10.16)
WBC # BLD AUTO: 7.08 THOUSAND/UL (ref 4.31–10.16)
WBC # BLD AUTO: 7.43 THOUSAND/UL (ref 4.31–10.16)
WBC # BLD AUTO: 7.58 THOUSAND/UL (ref 4.31–10.16)
WBC # BLD AUTO: 7.59 THOUSAND/UL (ref 4.31–10.16)
WBC # BLD AUTO: 7.61 THOUSAND/UL (ref 4.31–10.16)
WBC # BLD AUTO: 7.66 THOUSAND/UL (ref 4.31–10.16)
WBC # BLD AUTO: 7.7 THOUSAND/UL (ref 4.31–10.16)
WBC # BLD AUTO: 8.47 THOUSAND/UL (ref 4.31–10.16)
WBC # BLD AUTO: 8.91 THOUSAND/UL (ref 4.31–10.16)
WBC # BLD AUTO: 9.72 THOUSAND/UL (ref 4.31–10.16)
WBC # FLD MANUAL: 1179 /UL
WBC # FLD MANUAL: 1871 /UL
WBC # FLD MANUAL: 402 /UL
WBC #/AREA URNS AUTO: ABNORMAL /HPF
WBC #/AREA URNS AUTO: ABNORMAL /HPF
WBC #/AREA URNS AUTO: NORMAL /HPF

## 2022-01-01 PROCEDURE — NC001 PR NO CHARGE: Performed by: INTERNAL MEDICINE

## 2022-01-01 PROCEDURE — 83735 ASSAY OF MAGNESIUM: CPT

## 2022-01-01 PROCEDURE — 97110 THERAPEUTIC EXERCISES: CPT

## 2022-01-01 PROCEDURE — 87205 SMEAR GRAM STAIN: CPT | Performed by: STUDENT IN AN ORGANIZED HEALTH CARE EDUCATION/TRAINING PROGRAM

## 2022-01-01 PROCEDURE — 83605 ASSAY OF LACTIC ACID: CPT | Performed by: STUDENT IN AN ORGANIZED HEALTH CARE EDUCATION/TRAINING PROGRAM

## 2022-01-01 PROCEDURE — 85027 COMPLETE CBC AUTOMATED: CPT | Performed by: INTERNAL MEDICINE

## 2022-01-01 PROCEDURE — 36415 COLL VENOUS BLD VENIPUNCTURE: CPT | Performed by: EMERGENCY MEDICINE

## 2022-01-01 PROCEDURE — 89051 BODY FLUID CELL COUNT: CPT | Performed by: STUDENT IN AN ORGANIZED HEALTH CARE EDUCATION/TRAINING PROGRAM

## 2022-01-01 PROCEDURE — 97535 SELF CARE MNGMENT TRAINING: CPT

## 2022-01-01 PROCEDURE — 88112 CYTOPATH CELL ENHANCE TECH: CPT | Performed by: PATHOLOGY

## 2022-01-01 PROCEDURE — 99223 1ST HOSP IP/OBS HIGH 75: CPT | Performed by: INTERNAL MEDICINE

## 2022-01-01 PROCEDURE — 49083 ABD PARACENTESIS W/IMAGING: CPT | Performed by: RADIOLOGY

## 2022-01-01 PROCEDURE — 86038 ANTINUCLEAR ANTIBODIES: CPT | Performed by: EMERGENCY MEDICINE

## 2022-01-01 PROCEDURE — 43242 EGD US FINE NEEDLE BX/ASPIR: CPT | Performed by: INTERNAL MEDICINE

## 2022-01-01 PROCEDURE — 49083 ABD PARACENTESIS W/IMAGING: CPT | Performed by: PHYSICIAN ASSISTANT

## 2022-01-01 PROCEDURE — 99232 SBSQ HOSP IP/OBS MODERATE 35: CPT | Performed by: PHYSICIAN ASSISTANT

## 2022-01-01 PROCEDURE — 88173 CYTOPATH EVAL FNA REPORT: CPT | Performed by: PATHOLOGY

## 2022-01-01 PROCEDURE — 99215 OFFICE O/P EST HI 40 MIN: CPT | Performed by: FAMILY MEDICINE

## 2022-01-01 PROCEDURE — 82570 ASSAY OF URINE CREATININE: CPT | Performed by: FAMILY MEDICINE

## 2022-01-01 PROCEDURE — 99232 SBSQ HOSP IP/OBS MODERATE 35: CPT | Performed by: INTERNAL MEDICINE

## 2022-01-01 PROCEDURE — 97116 GAIT TRAINING THERAPY: CPT

## 2022-01-01 PROCEDURE — 85610 PROTHROMBIN TIME: CPT | Performed by: PHYSICIAN ASSISTANT

## 2022-01-01 PROCEDURE — 85025 COMPLETE CBC W/AUTO DIFF WBC: CPT

## 2022-01-01 PROCEDURE — G1004 CDSM NDSC: HCPCS

## 2022-01-01 PROCEDURE — 49083 ABD PARACENTESIS W/IMAGING: CPT

## 2022-01-01 PROCEDURE — 36415 COLL VENOUS BLD VENIPUNCTURE: CPT

## 2022-01-01 PROCEDURE — 99233 SBSQ HOSP IP/OBS HIGH 50: CPT | Performed by: PHYSICIAN ASSISTANT

## 2022-01-01 PROCEDURE — 80053 COMPREHEN METABOLIC PANEL: CPT | Performed by: STUDENT IN AN ORGANIZED HEALTH CARE EDUCATION/TRAINING PROGRAM

## 2022-01-01 PROCEDURE — 36600 WITHDRAWAL OF ARTERIAL BLOOD: CPT

## 2022-01-01 PROCEDURE — 80053 COMPREHEN METABOLIC PANEL: CPT | Performed by: PHYSICIAN ASSISTANT

## 2022-01-01 PROCEDURE — 80053 COMPREHEN METABOLIC PANEL: CPT | Performed by: INTERNAL MEDICINE

## 2022-01-01 PROCEDURE — 0W3P8ZZ CONTROL BLEEDING IN GASTROINTESTINAL TRACT, VIA NATURAL OR ARTIFICIAL OPENING ENDOSCOPIC: ICD-10-PCS | Performed by: INTERNAL MEDICINE

## 2022-01-01 PROCEDURE — 86900 BLOOD TYPING SEROLOGIC ABO: CPT

## 2022-01-01 PROCEDURE — 83735 ASSAY OF MAGNESIUM: CPT | Performed by: INTERNAL MEDICINE

## 2022-01-01 PROCEDURE — 82248 BILIRUBIN DIRECT: CPT | Performed by: SURGERY

## 2022-01-01 PROCEDURE — 43274 ERCP DUCT STENT PLACEMENT: CPT | Performed by: INTERNAL MEDICINE

## 2022-01-01 PROCEDURE — 43276 ERCP STENT EXCHANGE W/DILATE: CPT | Performed by: INTERNAL MEDICINE

## 2022-01-01 PROCEDURE — 80048 BASIC METABOLIC PNL TOTAL CA: CPT

## 2022-01-01 PROCEDURE — 88305 TISSUE EXAM BY PATHOLOGIST: CPT | Performed by: PATHOLOGY

## 2022-01-01 PROCEDURE — 82948 REAGENT STRIP/BLOOD GLUCOSE: CPT

## 2022-01-01 PROCEDURE — 85610 PROTHROMBIN TIME: CPT | Performed by: INTERNAL MEDICINE

## 2022-01-01 PROCEDURE — 83605 ASSAY OF LACTIC ACID: CPT | Performed by: INTERNAL MEDICINE

## 2022-01-01 PROCEDURE — 99291 CRITICAL CARE FIRST HOUR: CPT | Performed by: INTERNAL MEDICINE

## 2022-01-01 PROCEDURE — 83918 ORGANIC ACIDS TOTAL QUANT: CPT | Performed by: INTERNAL MEDICINE

## 2022-01-01 PROCEDURE — 82043 UR ALBUMIN QUANTITATIVE: CPT | Performed by: FAMILY MEDICINE

## 2022-01-01 PROCEDURE — 94760 N-INVAS EAR/PLS OXIMETRY 1: CPT

## 2022-01-01 PROCEDURE — 83615 LACTATE (LD) (LDH) ENZYME: CPT | Performed by: STUDENT IN AN ORGANIZED HEALTH CARE EDUCATION/TRAINING PROGRAM

## 2022-01-01 PROCEDURE — 99233 SBSQ HOSP IP/OBS HIGH 50: CPT | Performed by: INTERNAL MEDICINE

## 2022-01-01 PROCEDURE — 97530 THERAPEUTIC ACTIVITIES: CPT

## 2022-01-01 PROCEDURE — 1036F TOBACCO NON-USER: CPT | Performed by: FAMILY MEDICINE

## 2022-01-01 PROCEDURE — C1874 STENT, COATED/COV W/DEL SYS: HCPCS

## 2022-01-01 PROCEDURE — 0W9G3ZZ DRAINAGE OF PERITONEAL CAVITY, PERCUTANEOUS APPROACH: ICD-10-PCS | Performed by: INTERNAL MEDICINE

## 2022-01-01 PROCEDURE — 84300 ASSAY OF URINE SODIUM: CPT | Performed by: INTERNAL MEDICINE

## 2022-01-01 PROCEDURE — 80076 HEPATIC FUNCTION PANEL: CPT

## 2022-01-01 PROCEDURE — 0241U HB NFCT DS VIR RESP RNA 4 TRGT: CPT | Performed by: FAMILY MEDICINE

## 2022-01-01 PROCEDURE — 80048 BASIC METABOLIC PNL TOTAL CA: CPT | Performed by: PHYSICIAN ASSISTANT

## 2022-01-01 PROCEDURE — 82977 ASSAY OF GGT: CPT

## 2022-01-01 PROCEDURE — 80076 HEPATIC FUNCTION PANEL: CPT | Performed by: EMERGENCY MEDICINE

## 2022-01-01 PROCEDURE — 97163 PT EVAL HIGH COMPLEX 45 MIN: CPT

## 2022-01-01 PROCEDURE — 0W9G3ZX DRAINAGE OF PERITONEAL CAVITY, PERCUTANEOUS APPROACH, DIAGNOSTIC: ICD-10-PCS | Performed by: RADIOLOGY

## 2022-01-01 PROCEDURE — 99285 EMERGENCY DEPT VISIT HI MDM: CPT | Performed by: EMERGENCY MEDICINE

## 2022-01-01 PROCEDURE — 82105 ALPHA-FETOPROTEIN SERUM: CPT | Performed by: INTERNAL MEDICINE

## 2022-01-01 PROCEDURE — 82550 ASSAY OF CK (CPK): CPT | Performed by: INTERNAL MEDICINE

## 2022-01-01 PROCEDURE — 97168 OT RE-EVAL EST PLAN CARE: CPT

## 2022-01-01 PROCEDURE — 87205 SMEAR GRAM STAIN: CPT | Performed by: INTERNAL MEDICINE

## 2022-01-01 PROCEDURE — 99285 EMERGENCY DEPT VISIT HI MDM: CPT

## 2022-01-01 PROCEDURE — 85610 PROTHROMBIN TIME: CPT | Performed by: EMERGENCY MEDICINE

## 2022-01-01 PROCEDURE — 97167 OT EVAL HIGH COMPLEX 60 MIN: CPT

## 2022-01-01 PROCEDURE — 74177 CT ABD & PELVIS W/CONTRAST: CPT

## 2022-01-01 PROCEDURE — 83540 ASSAY OF IRON: CPT | Performed by: STUDENT IN AN ORGANIZED HEALTH CARE EDUCATION/TRAINING PROGRAM

## 2022-01-01 PROCEDURE — 87389 HIV-1 AG W/HIV-1&-2 AB AG IA: CPT | Performed by: INTERNAL MEDICINE

## 2022-01-01 PROCEDURE — 99239 HOSP IP/OBS DSCHRG MGMT >30: CPT | Performed by: INTERNAL MEDICINE

## 2022-01-01 PROCEDURE — 81001 URINALYSIS AUTO W/SCOPE: CPT | Performed by: EMERGENCY MEDICINE

## 2022-01-01 PROCEDURE — 84439 ASSAY OF FREE THYROXINE: CPT

## 2022-01-01 PROCEDURE — C9113 INJ PANTOPRAZOLE SODIUM, VIA: HCPCS

## 2022-01-01 PROCEDURE — 88342 IMHCHEM/IMCYTCHM 1ST ANTB: CPT | Performed by: PATHOLOGY

## 2022-01-01 PROCEDURE — 84145 PROCALCITONIN (PCT): CPT | Performed by: INTERNAL MEDICINE

## 2022-01-01 PROCEDURE — 97164 PT RE-EVAL EST PLAN CARE: CPT

## 2022-01-01 PROCEDURE — 80074 ACUTE HEPATITIS PANEL: CPT

## 2022-01-01 PROCEDURE — 76705 ECHO EXAM OF ABDOMEN: CPT

## 2022-01-01 PROCEDURE — 84157 ASSAY OF PROTEIN OTHER: CPT | Performed by: STUDENT IN AN ORGANIZED HEALTH CARE EDUCATION/TRAINING PROGRAM

## 2022-01-01 PROCEDURE — 85730 THROMBOPLASTIN TIME PARTIAL: CPT | Performed by: EMERGENCY MEDICINE

## 2022-01-01 PROCEDURE — 86301 IMMUNOASSAY TUMOR CA 19-9: CPT | Performed by: INTERNAL MEDICINE

## 2022-01-01 PROCEDURE — 82140 ASSAY OF AMMONIA: CPT

## 2022-01-01 PROCEDURE — C1769 GUIDE WIRE: HCPCS

## 2022-01-01 PROCEDURE — 80076 HEPATIC FUNCTION PANEL: CPT | Performed by: STUDENT IN AN ORGANIZED HEALTH CARE EDUCATION/TRAINING PROGRAM

## 2022-01-01 PROCEDURE — 74018 RADEX ABDOMEN 1 VIEW: CPT

## 2022-01-01 PROCEDURE — 87040 BLOOD CULTURE FOR BACTERIA: CPT

## 2022-01-01 PROCEDURE — 99204 OFFICE O/P NEW MOD 45 MIN: CPT | Performed by: INTERNAL MEDICINE

## 2022-01-01 PROCEDURE — 71045 X-RAY EXAM CHEST 1 VIEW: CPT

## 2022-01-01 PROCEDURE — 99222 1ST HOSP IP/OBS MODERATE 55: CPT | Performed by: NURSE PRACTITIONER

## 2022-01-01 PROCEDURE — 82945 GLUCOSE OTHER FLUID: CPT | Performed by: STUDENT IN AN ORGANIZED HEALTH CARE EDUCATION/TRAINING PROGRAM

## 2022-01-01 PROCEDURE — C1876 STENT, NON-COA/NON-COV W/DEL: HCPCS

## 2022-01-01 PROCEDURE — 80048 BASIC METABOLIC PNL TOTAL CA: CPT | Performed by: INTERNAL MEDICINE

## 2022-01-01 PROCEDURE — 82042 OTHER SOURCE ALBUMIN QUAN EA: CPT | Performed by: STUDENT IN AN ORGANIZED HEALTH CARE EDUCATION/TRAINING PROGRAM

## 2022-01-01 PROCEDURE — 80048 BASIC METABOLIC PNL TOTAL CA: CPT | Performed by: STUDENT IN AN ORGANIZED HEALTH CARE EDUCATION/TRAINING PROGRAM

## 2022-01-01 PROCEDURE — 94003 VENT MGMT INPAT SUBQ DAY: CPT

## 2022-01-01 PROCEDURE — 84550 ASSAY OF BLOOD/URIC ACID: CPT

## 2022-01-01 PROCEDURE — 93010 ELECTROCARDIOGRAM REPORT: CPT | Performed by: INTERNAL MEDICINE

## 2022-01-01 PROCEDURE — 0W9G3ZZ DRAINAGE OF PERITONEAL CAVITY, PERCUTANEOUS APPROACH: ICD-10-PCS | Performed by: RADIOLOGY

## 2022-01-01 PROCEDURE — 85610 PROTHROMBIN TIME: CPT

## 2022-01-01 PROCEDURE — 80074 ACUTE HEPATITIS PANEL: CPT | Performed by: INTERNAL MEDICINE

## 2022-01-01 PROCEDURE — 82140 ASSAY OF AMMONIA: CPT | Performed by: EMERGENCY MEDICINE

## 2022-01-01 PROCEDURE — 87070 CULTURE OTHR SPECIMN AEROBIC: CPT | Performed by: INTERNAL MEDICINE

## 2022-01-01 PROCEDURE — A9585 GADOBUTROL INJECTION: HCPCS | Performed by: EMERGENCY MEDICINE

## 2022-01-01 PROCEDURE — 71250 CT THORAX DX C-: CPT

## 2022-01-01 PROCEDURE — 84100 ASSAY OF PHOSPHORUS: CPT

## 2022-01-01 PROCEDURE — 85027 COMPLETE CBC AUTOMATED: CPT

## 2022-01-01 PROCEDURE — 99233 SBSQ HOSP IP/OBS HIGH 50: CPT | Performed by: FAMILY MEDICINE

## 2022-01-01 PROCEDURE — 88341 IMHCHEM/IMCYTCHM EA ADD ANTB: CPT | Performed by: PATHOLOGY

## 2022-01-01 PROCEDURE — 87086 URINE CULTURE/COLONY COUNT: CPT | Performed by: INTERNAL MEDICINE

## 2022-01-01 PROCEDURE — 80143 DRUG ASSAY ACETAMINOPHEN: CPT | Performed by: INTERNAL MEDICINE

## 2022-01-01 PROCEDURE — 99232 SBSQ HOSP IP/OBS MODERATE 35: CPT | Performed by: SURGERY

## 2022-01-01 PROCEDURE — 86381 MITOCHONDRIAL ANTIBODY EACH: CPT | Performed by: EMERGENCY MEDICINE

## 2022-01-01 PROCEDURE — 85025 COMPLETE CBC W/AUTO DIFF WBC: CPT | Performed by: INTERNAL MEDICINE

## 2022-01-01 PROCEDURE — 81001 URINALYSIS AUTO W/SCOPE: CPT | Performed by: INTERNAL MEDICINE

## 2022-01-01 PROCEDURE — 99233 SBSQ HOSP IP/OBS HIGH 50: CPT | Performed by: NURSE PRACTITIONER

## 2022-01-01 PROCEDURE — 82805 BLOOD GASES W/O2 SATURATION: CPT

## 2022-01-01 PROCEDURE — 85025 COMPLETE CBC W/AUTO DIFF WBC: CPT | Performed by: PHYSICIAN ASSISTANT

## 2022-01-01 PROCEDURE — 80048 BASIC METABOLIC PNL TOTAL CA: CPT | Performed by: EMERGENCY MEDICINE

## 2022-01-01 PROCEDURE — 80076 HEPATIC FUNCTION PANEL: CPT | Performed by: PHYSICIAN ASSISTANT

## 2022-01-01 PROCEDURE — 74328 X-RAY BILE DUCT ENDOSCOPY: CPT

## 2022-01-01 PROCEDURE — 86901 BLOOD TYPING SEROLOGIC RH(D): CPT

## 2022-01-01 PROCEDURE — 80053 COMPREHEN METABOLIC PANEL: CPT

## 2022-01-01 PROCEDURE — 0DJ08ZZ INSPECTION OF UPPER INTESTINAL TRACT, VIA NATURAL OR ARTIFICIAL OPENING ENDOSCOPIC: ICD-10-PCS | Performed by: INTERNAL MEDICINE

## 2022-01-01 PROCEDURE — 83605 ASSAY OF LACTIC ACID: CPT

## 2022-01-01 PROCEDURE — 86850 RBC ANTIBODY SCREEN: CPT

## 2022-01-01 PROCEDURE — 87070 CULTURE OTHR SPECIMN AEROBIC: CPT | Performed by: STUDENT IN AN ORGANIZED HEALTH CARE EDUCATION/TRAINING PROGRAM

## 2022-01-01 PROCEDURE — 83550 IRON BINDING TEST: CPT | Performed by: STUDENT IN AN ORGANIZED HEALTH CARE EDUCATION/TRAINING PROGRAM

## 2022-01-01 PROCEDURE — 82746 ASSAY OF FOLIC ACID SERUM: CPT | Performed by: INTERNAL MEDICINE

## 2022-01-01 PROCEDURE — 1036F TOBACCO NON-USER: CPT | Performed by: INTERNAL MEDICINE

## 2022-01-01 PROCEDURE — 74183 MRI ABD W/O CNTR FLWD CNTR: CPT

## 2022-01-01 PROCEDURE — 84443 ASSAY THYROID STIM HORMONE: CPT | Performed by: INTERNAL MEDICINE

## 2022-01-01 PROCEDURE — 87081 CULTURE SCREEN ONLY: CPT | Performed by: INTERNAL MEDICINE

## 2022-01-01 PROCEDURE — 93970 EXTREMITY STUDY: CPT | Performed by: SURGERY

## 2022-01-01 PROCEDURE — 94002 VENT MGMT INPAT INIT DAY: CPT

## 2022-01-01 PROCEDURE — 86015 ACTIN ANTIBODY EACH: CPT | Performed by: EMERGENCY MEDICINE

## 2022-01-01 PROCEDURE — NC001 PR NO CHARGE: Performed by: STUDENT IN AN ORGANIZED HEALTH CARE EDUCATION/TRAINING PROGRAM

## 2022-01-01 PROCEDURE — 85007 BL SMEAR W/DIFF WBC COUNT: CPT | Performed by: INTERNAL MEDICINE

## 2022-01-01 PROCEDURE — 74181 MRI ABDOMEN W/O CONTRAST: CPT

## 2022-01-01 PROCEDURE — 82103 ALPHA-1-ANTITRYPSIN TOTAL: CPT | Performed by: EMERGENCY MEDICINE

## 2022-01-01 PROCEDURE — 89051 BODY FLUID CELL COUNT: CPT | Performed by: INTERNAL MEDICINE

## 2022-01-01 PROCEDURE — 85027 COMPLETE CBC AUTOMATED: CPT | Performed by: STUDENT IN AN ORGANIZED HEALTH CARE EDUCATION/TRAINING PROGRAM

## 2022-01-01 PROCEDURE — 0F798DZ DILATION OF COMMON BILE DUCT WITH INTRALUMINAL DEVICE, VIA NATURAL OR ARTIFICIAL OPENING ENDOSCOPIC: ICD-10-PCS | Performed by: INTERNAL MEDICINE

## 2022-01-01 PROCEDURE — 49083 ABD PARACENTESIS W/IMAGING: CPT | Performed by: INTERNAL MEDICINE

## 2022-01-01 PROCEDURE — 0FPB8DZ REMOVAL OF INTRALUMINAL DEVICE FROM HEPATOBILIARY DUCT, VIA NATURAL OR ARTIFICIAL OPENING ENDOSCOPIC: ICD-10-PCS | Performed by: INTERNAL MEDICINE

## 2022-01-01 PROCEDURE — 82550 ASSAY OF CK (CPK): CPT

## 2022-01-01 PROCEDURE — 76770 US EXAM ABDO BACK WALL COMP: CPT

## 2022-01-01 PROCEDURE — 88172 CYTP DX EVAL FNA 1ST EA SITE: CPT | Performed by: PATHOLOGY

## 2022-01-01 PROCEDURE — 0F758DZ DILATION OF RIGHT HEPATIC DUCT WITH INTRALUMINAL DEVICE, VIA NATURAL OR ARTIFICIAL OPENING ENDOSCOPIC: ICD-10-PCS | Performed by: INTERNAL MEDICINE

## 2022-01-01 PROCEDURE — 99222 1ST HOSP IP/OBS MODERATE 55: CPT | Performed by: SURGERY

## 2022-01-01 PROCEDURE — 85027 COMPLETE CBC AUTOMATED: CPT | Performed by: PHYSICIAN ASSISTANT

## 2022-01-01 PROCEDURE — 83880 ASSAY OF NATRIURETIC PEPTIDE: CPT | Performed by: INTERNAL MEDICINE

## 2022-01-01 PROCEDURE — 84100 ASSAY OF PHOSPHORUS: CPT | Performed by: INTERNAL MEDICINE

## 2022-01-01 PROCEDURE — 93970 EXTREMITY STUDY: CPT

## 2022-01-01 PROCEDURE — 85025 COMPLETE CBC W/AUTO DIFF WBC: CPT | Performed by: EMERGENCY MEDICINE

## 2022-01-01 PROCEDURE — 81001 URINALYSIS AUTO W/SCOPE: CPT | Performed by: FAMILY MEDICINE

## 2022-01-01 PROCEDURE — 99222 1ST HOSP IP/OBS MODERATE 55: CPT | Performed by: INTERNAL MEDICINE

## 2022-01-01 PROCEDURE — 82378 CARCINOEMBRYONIC ANTIGEN: CPT | Performed by: INTERNAL MEDICINE

## 2022-01-01 PROCEDURE — 1160F RVW MEDS BY RX/DR IN RCRD: CPT | Performed by: FAMILY MEDICINE

## 2022-01-01 PROCEDURE — 93005 ELECTROCARDIOGRAM TRACING: CPT

## 2022-01-01 PROCEDURE — 82728 ASSAY OF FERRITIN: CPT | Performed by: STUDENT IN AN ORGANIZED HEALTH CARE EDUCATION/TRAINING PROGRAM

## 2022-01-01 PROCEDURE — 84443 ASSAY THYROID STIM HORMONE: CPT

## 2022-01-01 PROCEDURE — 82607 VITAMIN B-12: CPT

## 2022-01-01 PROCEDURE — 83036 HEMOGLOBIN GLYCOSYLATED A1C: CPT | Performed by: INTERNAL MEDICINE

## 2022-01-01 PROCEDURE — 82306 VITAMIN D 25 HYDROXY: CPT

## 2022-01-01 RX ORDER — OXYCODONE HYDROCHLORIDE 5 MG/1
2.5 TABLET ORAL EVERY 6 HOURS SCHEDULED
Status: DISCONTINUED | OUTPATIENT
Start: 2022-01-01 | End: 2022-01-01

## 2022-01-01 RX ORDER — OXYCODONE HYDROCHLORIDE 5 MG/1
5 TABLET ORAL EVERY 4 HOURS PRN
Status: DISCONTINUED | OUTPATIENT
Start: 2022-01-01 | End: 2022-01-01 | Stop reason: HOSPADM

## 2022-01-01 RX ORDER — PROPOFOL 10 MG/ML
INJECTION, EMULSION INTRAVENOUS AS NEEDED
Status: DISCONTINUED | OUTPATIENT
Start: 2022-01-01 | End: 2022-01-01

## 2022-01-01 RX ORDER — ALBUMIN (HUMAN) 12.5 G/50ML
25 SOLUTION INTRAVENOUS ONCE
Status: COMPLETED | OUTPATIENT
Start: 2022-01-01 | End: 2022-01-01

## 2022-01-01 RX ORDER — SODIUM BICARBONATE 650 MG/1
650 TABLET ORAL
Status: COMPLETED | OUTPATIENT
Start: 2022-01-01 | End: 2022-01-01

## 2022-01-01 RX ORDER — PANTOPRAZOLE SODIUM 40 MG/1
40 INJECTION, POWDER, FOR SOLUTION INTRAVENOUS EVERY 12 HOURS SCHEDULED
Status: DISCONTINUED | OUTPATIENT
Start: 2022-01-01 | End: 2022-01-01

## 2022-01-01 RX ORDER — LORAZEPAM 1 MG/1
1 TABLET ORAL EVERY 6 HOURS PRN
Status: DISCONTINUED | OUTPATIENT
Start: 2022-01-01 | End: 2022-01-01

## 2022-01-01 RX ORDER — PROPOFOL 10 MG/ML
INJECTION, EMULSION INTRAVENOUS
Status: COMPLETED
Start: 2022-01-01 | End: 2022-01-01

## 2022-01-01 RX ORDER — LACTULOSE 20 G/30ML
20 SOLUTION ORAL 3 TIMES DAILY
Status: DISCONTINUED | OUTPATIENT
Start: 2022-01-01 | End: 2022-01-01

## 2022-01-01 RX ORDER — ALBUMIN, HUMAN INJ 5% 5 %
25 SOLUTION INTRAVENOUS EVERY 8 HOURS
Status: DISCONTINUED | OUTPATIENT
Start: 2022-01-01 | End: 2022-01-01

## 2022-01-01 RX ORDER — LOSARTAN POTASSIUM 25 MG/1
25 TABLET ORAL DAILY
Status: DISCONTINUED | OUTPATIENT
Start: 2022-01-01 | End: 2022-01-01

## 2022-01-01 RX ORDER — POTASSIUM CHLORIDE 20 MEQ/1
40 TABLET, EXTENDED RELEASE ORAL ONCE
Status: COMPLETED | OUTPATIENT
Start: 2022-01-01 | End: 2022-01-01

## 2022-01-01 RX ORDER — FUROSEMIDE 20 MG/1
20 TABLET ORAL DAILY
Status: DISCONTINUED | OUTPATIENT
Start: 2022-01-01 | End: 2022-01-01

## 2022-01-01 RX ORDER — DOXYCYCLINE HYCLATE 100 MG/1
100 CAPSULE ORAL DAILY
Qty: 90 CAPSULE | Refills: 0
Start: 2022-01-01 | End: 2022-01-01 | Stop reason: HOSPADM

## 2022-01-01 RX ORDER — ALBUMIN (HUMAN) 12.5 G/50ML
25 SOLUTION INTRAVENOUS EVERY 6 HOURS
Status: DISCONTINUED | OUTPATIENT
Start: 2022-01-01 | End: 2022-01-01

## 2022-01-01 RX ORDER — FENTANYL CITRATE 50 UG/ML
INJECTION, SOLUTION INTRAMUSCULAR; INTRAVENOUS AS NEEDED
Status: DISCONTINUED | OUTPATIENT
Start: 2022-01-01 | End: 2022-01-01

## 2022-01-01 RX ORDER — ALBUMIN, HUMAN INJ 5% 5 %
25 SOLUTION INTRAVENOUS ONCE
Status: COMPLETED | OUTPATIENT
Start: 2022-01-01 | End: 2022-01-01

## 2022-01-01 RX ORDER — LORAZEPAM 2 MG/ML
1 INJECTION INTRAMUSCULAR EVERY 4 HOURS PRN
Refills: 0
Start: 2022-01-01 | End: 2022-03-10

## 2022-01-01 RX ORDER — OXYCODONE HYDROCHLORIDE 5 MG/1
5 TABLET ORAL EVERY 4 HOURS PRN
Qty: 10 TABLET | Refills: 0 | Status: SHIPPED | OUTPATIENT
Start: 2022-01-01 | End: 2022-03-10

## 2022-01-01 RX ORDER — ALBUMIN (HUMAN) 12.5 G/50ML
25 SOLUTION INTRAVENOUS EVERY 12 HOURS
Status: DISCONTINUED | OUTPATIENT
Start: 2022-01-01 | End: 2022-01-01

## 2022-01-01 RX ORDER — LEVOTHYROXINE SODIUM 0.1 MG/1
200 TABLET ORAL
Status: CANCELLED | OUTPATIENT
Start: 2022-01-01

## 2022-01-01 RX ORDER — SODIUM CHLORIDE, SODIUM LACTATE, POTASSIUM CHLORIDE, CALCIUM CHLORIDE 600; 310; 30; 20 MG/100ML; MG/100ML; MG/100ML; MG/100ML
INJECTION, SOLUTION INTRAVENOUS CONTINUOUS PRN
Status: DISCONTINUED | OUTPATIENT
Start: 2022-01-01 | End: 2022-01-01

## 2022-01-01 RX ORDER — ROCURONIUM BROMIDE 10 MG/ML
INJECTION, SOLUTION INTRAVENOUS AS NEEDED
Status: DISCONTINUED | OUTPATIENT
Start: 2022-01-01 | End: 2022-01-01

## 2022-01-01 RX ORDER — HALOPERIDOL 5 MG/ML
1 INJECTION INTRAMUSCULAR EVERY 4 HOURS PRN
Qty: 1 ML | Refills: 0
Start: 2022-01-01

## 2022-01-01 RX ORDER — ONDANSETRON 2 MG/ML
INJECTION INTRAMUSCULAR; INTRAVENOUS AS NEEDED
Status: DISCONTINUED | OUTPATIENT
Start: 2022-01-01 | End: 2022-01-01

## 2022-01-01 RX ORDER — ALBUMIN (HUMAN) 12.5 G/50ML
75 SOLUTION INTRAVENOUS ONCE
Status: DISCONTINUED | OUTPATIENT
Start: 2022-01-01 | End: 2022-01-01

## 2022-01-01 RX ORDER — LACTULOSE 20 G/30ML
20 SOLUTION ORAL 2 TIMES DAILY
Status: DISCONTINUED | OUTPATIENT
Start: 2022-01-01 | End: 2022-01-01

## 2022-01-01 RX ORDER — PROPOFOL 10 MG/ML
5-50 INJECTION, EMULSION INTRAVENOUS
Status: DISCONTINUED | OUTPATIENT
Start: 2022-01-01 | End: 2022-01-01

## 2022-01-01 RX ORDER — DEXAMETHASONE SODIUM PHOSPHATE 10 MG/ML
INJECTION, SOLUTION INTRAMUSCULAR; INTRAVENOUS AS NEEDED
Status: DISCONTINUED | OUTPATIENT
Start: 2022-01-01 | End: 2022-01-01

## 2022-01-01 RX ORDER — HEPARIN SODIUM 5000 [USP'U]/ML
5000 INJECTION, SOLUTION INTRAVENOUS; SUBCUTANEOUS EVERY 8 HOURS SCHEDULED
Status: DISCONTINUED | OUTPATIENT
Start: 2022-01-01 | End: 2022-01-01

## 2022-01-01 RX ORDER — EPHEDRINE SULFATE 50 MG/ML
INJECTION INTRAVENOUS AS NEEDED
Status: DISCONTINUED | OUTPATIENT
Start: 2022-01-01 | End: 2022-01-01

## 2022-01-01 RX ORDER — LORAZEPAM 0.5 MG/1
0.5 TABLET ORAL
Status: DISCONTINUED | OUTPATIENT
Start: 2022-01-01 | End: 2022-01-01

## 2022-01-01 RX ORDER — LORAZEPAM 2 MG/ML
1 INJECTION INTRAMUSCULAR 2 TIMES DAILY
Status: DISCONTINUED | OUTPATIENT
Start: 2022-01-01 | End: 2022-01-01 | Stop reason: HOSPADM

## 2022-01-01 RX ORDER — LORAZEPAM 2 MG/ML
1 INJECTION INTRAMUSCULAR EVERY 4 HOURS PRN
Status: DISCONTINUED | OUTPATIENT
Start: 2022-01-01 | End: 2022-01-01 | Stop reason: HOSPADM

## 2022-01-01 RX ORDER — ALBUMIN (HUMAN) 12.5 G/50ML
25 SOLUTION INTRAVENOUS ONCE
Status: CANCELLED | OUTPATIENT
Start: 2022-01-01 | End: 2022-01-01

## 2022-01-01 RX ORDER — PROPOFOL 10 MG/ML
INJECTION, EMULSION INTRAVENOUS CONTINUOUS PRN
Status: DISCONTINUED | OUTPATIENT
Start: 2022-01-01 | End: 2022-01-01

## 2022-01-01 RX ORDER — ALBUMIN (HUMAN) 12.5 G/50ML
12.5 SOLUTION INTRAVENOUS ONCE
Status: CANCELLED | OUTPATIENT
Start: 2022-01-01 | End: 2022-01-01

## 2022-01-01 RX ORDER — LEVOTHYROXINE SODIUM 0.2 MG/1
200 TABLET ORAL
Qty: 30 TABLET | Refills: 1 | Status: SHIPPED | OUTPATIENT
Start: 2022-01-01 | End: 2022-01-01 | Stop reason: HOSPADM

## 2022-01-01 RX ORDER — SUCCINYLCHOLINE/SOD CL,ISO/PF 100 MG/5ML
SYRINGE (ML) INTRAVENOUS AS NEEDED
Status: DISCONTINUED | OUTPATIENT
Start: 2022-01-01 | End: 2022-01-01

## 2022-01-01 RX ORDER — HYDROMORPHONE HCL/PF 1 MG/ML
0.5 SYRINGE (ML) INJECTION
Status: DISCONTINUED | OUTPATIENT
Start: 2022-01-01 | End: 2022-01-01 | Stop reason: HOSPADM

## 2022-01-01 RX ORDER — LANOLIN ALCOHOL/MO/W.PET/CERES
3 CREAM (GRAM) TOPICAL
Status: DISCONTINUED | OUTPATIENT
Start: 2022-01-01 | End: 2022-01-01

## 2022-01-01 RX ORDER — LIDOCAINE HYDROCHLORIDE 10 MG/ML
INJECTION, SOLUTION EPIDURAL; INFILTRATION; INTRACAUDAL; PERINEURAL AS NEEDED
Status: DISCONTINUED | OUTPATIENT
Start: 2022-01-01 | End: 2022-01-01

## 2022-01-01 RX ORDER — LORAZEPAM 1 MG/1
1 TABLET ORAL 2 TIMES DAILY
Status: DISCONTINUED | OUTPATIENT
Start: 2022-01-01 | End: 2022-01-01

## 2022-01-01 RX ORDER — SPIRONOLACTONE 50 MG/1
50 TABLET, FILM COATED ORAL DAILY
Status: DISCONTINUED | OUTPATIENT
Start: 2022-01-01 | End: 2022-01-01

## 2022-01-01 RX ORDER — OXYCODONE HYDROCHLORIDE 5 MG/1
2.5 TABLET ORAL
Status: DISCONTINUED | OUTPATIENT
Start: 2022-01-01 | End: 2022-01-01

## 2022-01-01 RX ORDER — MIRTAZAPINE 15 MG/1
7.5 TABLET, FILM COATED ORAL
Status: DISCONTINUED | OUTPATIENT
Start: 2022-01-01 | End: 2022-01-01

## 2022-01-01 RX ORDER — LACTULOSE 20 G/30ML
10 SOLUTION ORAL DAILY
Status: DISCONTINUED | OUTPATIENT
Start: 2022-01-01 | End: 2022-01-01 | Stop reason: HOSPADM

## 2022-01-01 RX ORDER — LORAZEPAM 0.5 MG/1
0.5 TABLET ORAL EVERY 6 HOURS PRN
Status: DISCONTINUED | OUTPATIENT
Start: 2022-01-01 | End: 2022-01-01

## 2022-01-01 RX ORDER — POTASSIUM CHLORIDE 14.9 MG/ML
20 INJECTION INTRAVENOUS ONCE
Status: COMPLETED | OUTPATIENT
Start: 2022-01-01 | End: 2022-01-01

## 2022-01-01 RX ORDER — FUROSEMIDE 10 MG/ML
40 INJECTION INTRAMUSCULAR; INTRAVENOUS DAILY
Status: DISCONTINUED | OUTPATIENT
Start: 2022-01-01 | End: 2022-01-01

## 2022-01-01 RX ORDER — FENTANYL CITRATE 50 UG/ML
50 INJECTION, SOLUTION INTRAMUSCULAR; INTRAVENOUS EVERY 2 HOUR PRN
Status: DISCONTINUED | OUTPATIENT
Start: 2022-01-01 | End: 2022-01-01

## 2022-01-01 RX ORDER — ALBUMIN, HUMAN INJ 5% 5 %
SOLUTION INTRAVENOUS CONTINUOUS PRN
Status: DISCONTINUED | OUTPATIENT
Start: 2022-01-01 | End: 2022-01-01

## 2022-01-01 RX ORDER — LOSARTAN POTASSIUM 25 MG/1
25 TABLET ORAL DAILY
Status: DISCONTINUED | OUTPATIENT
Start: 2022-01-01 | End: 2022-01-01 | Stop reason: HOSPADM

## 2022-01-01 RX ORDER — SODIUM CHLORIDE, SODIUM GLUCONATE, SODIUM ACETATE, POTASSIUM CHLORIDE, MAGNESIUM CHLORIDE, SODIUM PHOSPHATE, DIBASIC, AND POTASSIUM PHOSPHATE .53; .5; .37; .037; .03; .012; .00082 G/100ML; G/100ML; G/100ML; G/100ML; G/100ML; G/100ML; G/100ML
50 INJECTION, SOLUTION INTRAVENOUS CONTINUOUS
Status: DISCONTINUED | OUTPATIENT
Start: 2022-01-01 | End: 2022-01-01 | Stop reason: HOSPADM

## 2022-01-01 RX ORDER — OXYCODONE HYDROCHLORIDE 5 MG/1
5 TABLET ORAL
Status: DISCONTINUED | OUTPATIENT
Start: 2022-01-01 | End: 2022-01-01

## 2022-01-01 RX ORDER — LOSARTAN POTASSIUM 25 MG/1
25 TABLET ORAL DAILY
Status: CANCELLED | OUTPATIENT
Start: 2022-01-01

## 2022-01-01 RX ORDER — LACTULOSE 20 G/30ML
10 SOLUTION ORAL DAILY
Status: DISCONTINUED | OUTPATIENT
Start: 2022-01-01 | End: 2022-01-01

## 2022-01-01 RX ORDER — LEVOTHYROXINE SODIUM 0.1 MG/1
200 TABLET ORAL
Status: DISCONTINUED | OUTPATIENT
Start: 2022-01-01 | End: 2022-01-01

## 2022-01-01 RX ORDER — LEVOTHYROXINE SODIUM 0.1 MG/1
200 TABLET ORAL
Status: DISCONTINUED | OUTPATIENT
Start: 2022-01-01 | End: 2022-01-01 | Stop reason: HOSPADM

## 2022-01-01 RX ORDER — SODIUM CHLORIDE, SODIUM GLUCONATE, SODIUM ACETATE, POTASSIUM CHLORIDE, MAGNESIUM CHLORIDE, SODIUM PHOSPHATE, DIBASIC, AND POTASSIUM PHOSPHATE .53; .5; .37; .037; .03; .012; .00082 G/100ML; G/100ML; G/100ML; G/100ML; G/100ML; G/100ML; G/100ML
50 INJECTION, SOLUTION INTRAVENOUS CONTINUOUS
Status: CANCELLED | OUTPATIENT
Start: 2022-01-01

## 2022-01-01 RX ORDER — MIDODRINE HYDROCHLORIDE 5 MG/1
5 TABLET ORAL
Status: DISCONTINUED | OUTPATIENT
Start: 2022-01-01 | End: 2022-01-01

## 2022-01-01 RX ORDER — CHLORHEXIDINE GLUCONATE 0.12 MG/ML
15 RINSE ORAL EVERY 12 HOURS SCHEDULED
Status: DISCONTINUED | OUTPATIENT
Start: 2022-01-01 | End: 2022-01-01

## 2022-01-01 RX ORDER — HALOPERIDOL 5 MG/ML
1 INJECTION INTRAMUSCULAR EVERY 4 HOURS PRN
Status: DISCONTINUED | OUTPATIENT
Start: 2022-01-01 | End: 2022-01-01 | Stop reason: HOSPADM

## 2022-01-01 RX ORDER — LIDOCAINE WITH 8.4% SOD BICARB 0.9%(10ML)
SYRINGE (ML) INJECTION CODE/TRAUMA/SEDATION MEDICATION
Status: COMPLETED | OUTPATIENT
Start: 2022-01-01 | End: 2022-01-01

## 2022-01-01 RX ORDER — SODIUM CHLORIDE, SODIUM GLUCONATE, SODIUM ACETATE, POTASSIUM CHLORIDE, MAGNESIUM CHLORIDE, SODIUM PHOSPHATE, DIBASIC, AND POTASSIUM PHOSPHATE .53; .5; .37; .037; .03; .012; .00082 G/100ML; G/100ML; G/100ML; G/100ML; G/100ML; G/100ML; G/100ML
100 INJECTION, SOLUTION INTRAVENOUS CONTINUOUS
Status: DISCONTINUED | OUTPATIENT
Start: 2022-01-01 | End: 2022-01-01

## 2022-01-01 RX ORDER — LANOLIN ALCOHOL/MO/W.PET/CERES
6 CREAM (GRAM) TOPICAL
Status: DISCONTINUED | OUTPATIENT
Start: 2022-01-01 | End: 2022-01-01

## 2022-01-01 RX ORDER — HYDROMORPHONE HCL/PF 1 MG/ML
0.5 SYRINGE (ML) INJECTION
Status: DISCONTINUED | OUTPATIENT
Start: 2022-01-01 | End: 2022-01-01

## 2022-01-01 RX ORDER — LACTULOSE 20 G/30ML
10 SOLUTION ORAL DAILY
Status: CANCELLED | OUTPATIENT
Start: 2022-01-01

## 2022-01-01 RX ORDER — PHENYLEPHRINE HYDROCHLORIDE 10 MG/ML
INJECTION INTRAVENOUS
Status: COMPLETED
Start: 2022-01-01 | End: 2022-01-01

## 2022-01-01 RX ORDER — LORAZEPAM 2 MG/ML
0.5 INJECTION INTRAMUSCULAR EVERY 4 HOURS PRN
Status: DISCONTINUED | OUTPATIENT
Start: 2022-01-01 | End: 2022-01-01

## 2022-01-01 RX ORDER — ALBUMIN (HUMAN) 12.5 G/50ML
12.5 SOLUTION INTRAVENOUS EVERY 8 HOURS
Status: DISCONTINUED | OUTPATIENT
Start: 2022-01-01 | End: 2022-01-01

## 2022-01-01 RX ORDER — ALBUMIN, HUMAN INJ 5% 5 %
12.5 SOLUTION INTRAVENOUS ONCE
Status: COMPLETED | OUTPATIENT
Start: 2022-01-01 | End: 2022-01-01

## 2022-01-01 RX ADMIN — LEVOTHYROXINE SODIUM 200 MCG: 100 TABLET ORAL at 05:10

## 2022-01-01 RX ADMIN — NOREPINEPHRINE BITARTRATE 2 MCG/MIN: 1 INJECTION, SOLUTION, CONCENTRATE INTRAVENOUS at 07:56

## 2022-01-01 RX ADMIN — CEFTRIAXONE 2000 MG: 2 INJECTION, POWDER, FOR SOLUTION INTRAMUSCULAR; INTRAVENOUS at 21:00

## 2022-01-01 RX ADMIN — POTASSIUM CHLORIDE 40 MEQ: 1500 TABLET, EXTENDED RELEASE ORAL at 09:18

## 2022-01-01 RX ADMIN — Medication 6 MG: at 21:58

## 2022-01-01 RX ADMIN — Medication 3 MG: at 21:00

## 2022-01-01 RX ADMIN — CEFTRIAXONE 2000 MG: 2 INJECTION, POWDER, FOR SOLUTION INTRAMUSCULAR; INTRAVENOUS at 21:57

## 2022-01-01 RX ADMIN — LACTULOSE 10 G: 20 SOLUTION ORAL at 08:01

## 2022-01-01 RX ADMIN — ALBUMIN (HUMAN) 25 G: 12.5 INJECTION, SOLUTION INTRAVENOUS at 10:22

## 2022-01-01 RX ADMIN — LACTULOSE 20 G: 20 SOLUTION ORAL at 08:54

## 2022-01-01 RX ADMIN — LEVOTHYROXINE SODIUM 200 MCG: 100 TABLET ORAL at 05:39

## 2022-01-01 RX ADMIN — SODIUM BICARBONATE 650 MG TABLET 650 MG: at 17:12

## 2022-01-01 RX ADMIN — HEPARIN SODIUM 5000 UNITS: 5000 INJECTION INTRAVENOUS; SUBCUTANEOUS at 13:04

## 2022-01-01 RX ADMIN — HEPARIN SODIUM 5000 UNITS: 5000 INJECTION INTRAVENOUS; SUBCUTANEOUS at 13:06

## 2022-01-01 RX ADMIN — LORAZEPAM 0.5 MG: 0.5 TABLET ORAL at 21:36

## 2022-01-01 RX ADMIN — EPHEDRINE SULFATE 10 MG: 50 INJECTION INTRAVENOUS at 16:34

## 2022-01-01 RX ADMIN — PROPOFOL 50 MCG/KG/MIN: 10 INJECTION, EMULSION INTRAVENOUS at 19:25

## 2022-01-01 RX ADMIN — NOREPINEPHRINE BITARTRATE 7 MCG/MIN: 1 INJECTION, SOLUTION, CONCENTRATE INTRAVENOUS at 01:45

## 2022-01-01 RX ADMIN — OXYCODONE HYDROCHLORIDE 2.5 MG: 5 TABLET ORAL at 13:03

## 2022-01-01 RX ADMIN — ALBUMIN (HUMAN) 12.5 G: 0.25 INJECTION, SOLUTION INTRAVENOUS at 16:52

## 2022-01-01 RX ADMIN — LORAZEPAM 1 MG: 2 INJECTION INTRAMUSCULAR; INTRAVENOUS at 18:40

## 2022-01-01 RX ADMIN — FENTANYL CITRATE 50 MCG: 50 INJECTION INTRAMUSCULAR; INTRAVENOUS at 15:22

## 2022-01-01 RX ADMIN — LACTULOSE 20 G: 20 SOLUTION ORAL at 17:04

## 2022-01-01 RX ADMIN — HEPARIN SODIUM 5000 UNITS: 5000 INJECTION INTRAVENOUS; SUBCUTANEOUS at 05:01

## 2022-01-01 RX ADMIN — Medication 6 MG: at 21:33

## 2022-01-01 RX ADMIN — FUROSEMIDE 20 MG: 20 TABLET ORAL at 11:07

## 2022-01-01 RX ADMIN — ALBUMIN (HUMAN) 25 G: 12.5 INJECTION, SOLUTION INTRAVENOUS at 02:09

## 2022-01-01 RX ADMIN — ALBUMIN (HUMAN) 25 G: 0.25 INJECTION, SOLUTION INTRAVENOUS at 01:07

## 2022-01-01 RX ADMIN — HEPARIN SODIUM 5000 UNITS: 5000 INJECTION INTRAVENOUS; SUBCUTANEOUS at 21:53

## 2022-01-01 RX ADMIN — CEFTRIAXONE 2000 MG: 2 INJECTION, POWDER, FOR SOLUTION INTRAMUSCULAR; INTRAVENOUS at 22:42

## 2022-01-01 RX ADMIN — FUROSEMIDE 40 MG: 10 INJECTION, SOLUTION INTRAMUSCULAR; INTRAVENOUS at 13:04

## 2022-01-01 RX ADMIN — CEFTRIAXONE 2000 MG: 2 INJECTION, POWDER, FOR SOLUTION INTRAMUSCULAR; INTRAVENOUS at 21:58

## 2022-01-01 RX ADMIN — LACTULOSE 10 G: 20 SOLUTION ORAL at 14:57

## 2022-01-01 RX ADMIN — MIRTAZAPINE 7.5 MG: 15 TABLET, FILM COATED ORAL at 22:12

## 2022-01-01 RX ADMIN — ALBUMIN (HUMAN) 25 G: 12.5 INJECTION, SOLUTION INTRAVENOUS at 04:50

## 2022-01-01 RX ADMIN — HEPARIN SODIUM 5000 UNITS: 5000 INJECTION INTRAVENOUS; SUBCUTANEOUS at 21:26

## 2022-01-01 RX ADMIN — ROCURONIUM BROMIDE 50 MG: 50 INJECTION, SOLUTION INTRAVENOUS at 16:32

## 2022-01-01 RX ADMIN — HEPARIN SODIUM 5000 UNITS: 5000 INJECTION INTRAVENOUS; SUBCUTANEOUS at 06:36

## 2022-01-01 RX ADMIN — MIDODRINE HYDROCHLORIDE 5 MG: 5 TABLET ORAL at 11:05

## 2022-01-01 RX ADMIN — LACTULOSE 10 G: 20 SOLUTION ORAL at 10:58

## 2022-01-01 RX ADMIN — MIDODRINE HYDROCHLORIDE 5 MG: 5 TABLET ORAL at 06:01

## 2022-01-01 RX ADMIN — POTASSIUM CHLORIDE 40 MEQ: 1500 TABLET, EXTENDED RELEASE ORAL at 23:55

## 2022-01-01 RX ADMIN — OXYCODONE HYDROCHLORIDE 2.5 MG: 5 TABLET ORAL at 05:34

## 2022-01-01 RX ADMIN — PROPOFOL 40 MCG/KG/MIN: 10 INJECTION, EMULSION INTRAVENOUS at 00:54

## 2022-01-01 RX ADMIN — OXYCODONE HYDROCHLORIDE 2.5 MG: 5 TABLET ORAL at 12:17

## 2022-01-01 RX ADMIN — SPIRONOLACTONE 50 MG: 50 TABLET ORAL at 11:07

## 2022-01-01 RX ADMIN — PHENYLEPHRINE HYDROCHLORIDE 40 MCG/MIN: 10 INJECTION INTRAVENOUS at 15:31

## 2022-01-01 RX ADMIN — LACTULOSE 10 G: 20 SOLUTION ORAL at 10:39

## 2022-01-01 RX ADMIN — ENOXAPARIN SODIUM 40 MG: 40 INJECTION SUBCUTANEOUS at 14:57

## 2022-01-01 RX ADMIN — PROPOFOL 15 MCG/KG/MIN: 10 INJECTION, EMULSION INTRAVENOUS at 23:03

## 2022-01-01 RX ADMIN — ALBUMIN (HUMAN) 25 G: 0.25 INJECTION, SOLUTION INTRAVENOUS at 20:07

## 2022-01-01 RX ADMIN — MIRTAZAPINE 7.5 MG: 15 TABLET, FILM COATED ORAL at 22:15

## 2022-01-01 RX ADMIN — SODIUM CHLORIDE, SODIUM GLUCONATE, SODIUM ACETATE, POTASSIUM CHLORIDE, MAGNESIUM CHLORIDE, SODIUM PHOSPHATE, DIBASIC, AND POTASSIUM PHOSPHATE 50 ML/HR: .53; .5; .37; .037; .03; .012; .00082 INJECTION, SOLUTION INTRAVENOUS at 18:24

## 2022-01-01 RX ADMIN — Medication 6 MG: at 21:36

## 2022-01-01 RX ADMIN — LEVOTHYROXINE SODIUM 200 MCG: 100 TABLET ORAL at 05:34

## 2022-01-01 RX ADMIN — Medication 6 MG: at 22:15

## 2022-01-01 RX ADMIN — OXYCODONE HYDROCHLORIDE 2.5 MG: 5 TABLET ORAL at 16:17

## 2022-01-01 RX ADMIN — HEPARIN SODIUM 5000 UNITS: 5000 INJECTION INTRAVENOUS; SUBCUTANEOUS at 05:07

## 2022-01-01 RX ADMIN — MIRTAZAPINE 7.5 MG: 15 TABLET, FILM COATED ORAL at 21:02

## 2022-01-01 RX ADMIN — MIRTAZAPINE 7.5 MG: 15 TABLET, FILM COATED ORAL at 22:43

## 2022-01-01 RX ADMIN — OXYCODONE HYDROCHLORIDE 2.5 MG: 5 TABLET ORAL at 01:13

## 2022-01-01 RX ADMIN — HEPARIN SODIUM 5000 UNITS: 5000 INJECTION INTRAVENOUS; SUBCUTANEOUS at 12:56

## 2022-01-01 RX ADMIN — POTASSIUM CHLORIDE 40 MEQ: 1500 TABLET, EXTENDED RELEASE ORAL at 14:58

## 2022-01-01 RX ADMIN — CHLORHEXIDINE GLUCONATE 15 ML: 1.2 SOLUTION ORAL at 20:07

## 2022-01-01 RX ADMIN — PANTOPRAZOLE SODIUM 40 MG: 40 INJECTION, POWDER, FOR SOLUTION INTRAVENOUS at 08:54

## 2022-01-01 RX ADMIN — SODIUM CHLORIDE, SODIUM GLUCONATE, SODIUM ACETATE, POTASSIUM CHLORIDE, MAGNESIUM CHLORIDE, SODIUM PHOSPHATE, DIBASIC, AND POTASSIUM PHOSPHATE 50 ML/HR: .53; .5; .37; .037; .03; .012; .00082 INJECTION, SOLUTION INTRAVENOUS at 01:27

## 2022-01-01 RX ADMIN — CEFTRIAXONE 2000 MG: 2 INJECTION, POWDER, FOR SOLUTION INTRAMUSCULAR; INTRAVENOUS at 21:38

## 2022-01-01 RX ADMIN — LEVOTHYROXINE SODIUM 200 MCG: 100 TABLET ORAL at 04:41

## 2022-01-01 RX ADMIN — HEPARIN SODIUM 5000 UNITS: 5000 INJECTION INTRAVENOUS; SUBCUTANEOUS at 23:27

## 2022-01-01 RX ADMIN — LIDOCAINE HYDROCHLORIDE 50 MG: 10 INJECTION, SOLUTION EPIDURAL; INFILTRATION; INTRACAUDAL; PERINEURAL at 15:22

## 2022-01-01 RX ADMIN — Medication 6 MG: at 22:43

## 2022-01-01 RX ADMIN — HEPARIN SODIUM 5000 UNITS: 5000 INJECTION INTRAVENOUS; SUBCUTANEOUS at 05:55

## 2022-01-01 RX ADMIN — OXYCODONE HYDROCHLORIDE 2.5 MG: 5 TABLET ORAL at 21:33

## 2022-01-01 RX ADMIN — Medication 6 MG: at 22:11

## 2022-01-01 RX ADMIN — PROPOFOL 15 MCG/KG/MIN: 10 INJECTION, EMULSION INTRAVENOUS at 06:00

## 2022-01-01 RX ADMIN — ALBUMIN (HUMAN) 25 G: 0.25 INJECTION, SOLUTION INTRAVENOUS at 20:24

## 2022-01-01 RX ADMIN — LEVOTHYROXINE SODIUM 200 MCG: 100 TABLET ORAL at 06:34

## 2022-01-01 RX ADMIN — ALBUMIN (HUMAN) 25 G: 0.25 INJECTION, SOLUTION INTRAVENOUS at 18:46

## 2022-01-01 RX ADMIN — CEFTRIAXONE 2000 MG: 2 INJECTION, POWDER, FOR SOLUTION INTRAMUSCULAR; INTRAVENOUS at 21:26

## 2022-01-01 RX ADMIN — ALBUMIN (HUMAN) 25 G: 0.25 INJECTION, SOLUTION INTRAVENOUS at 08:16

## 2022-01-01 RX ADMIN — FUROSEMIDE 20 MG: 20 TABLET ORAL at 08:54

## 2022-01-01 RX ADMIN — ENOXAPARIN SODIUM 40 MG: 40 INJECTION SUBCUTANEOUS at 08:22

## 2022-01-01 RX ADMIN — HEPARIN SODIUM 5000 UNITS: 5000 INJECTION INTRAVENOUS; SUBCUTANEOUS at 12:36

## 2022-01-01 RX ADMIN — LACTULOSE 20 G: 20 SOLUTION ORAL at 17:58

## 2022-01-01 RX ADMIN — OXYCODONE HYDROCHLORIDE 2.5 MG: 5 TABLET ORAL at 06:12

## 2022-01-01 RX ADMIN — ALBUMIN (HUMAN) 12.5 G: 12.5 INJECTION, SOLUTION INTRAVENOUS at 16:43

## 2022-01-01 RX ADMIN — FENTANYL CITRATE 25 MCG: 50 INJECTION INTRAMUSCULAR; INTRAVENOUS at 14:29

## 2022-01-01 RX ADMIN — LEVOTHYROXINE SODIUM 200 MCG: 100 TABLET ORAL at 05:40

## 2022-01-01 RX ADMIN — LEVOTHYROXINE SODIUM 200 MCG: 100 TABLET ORAL at 07:31

## 2022-01-01 RX ADMIN — SODIUM CHLORIDE, SODIUM LACTATE, POTASSIUM CHLORIDE, AND CALCIUM CHLORIDE: .6; .31; .03; .02 INJECTION, SOLUTION INTRAVENOUS at 15:19

## 2022-01-01 RX ADMIN — MIRTAZAPINE 7.5 MG: 15 TABLET, FILM COATED ORAL at 21:33

## 2022-01-01 RX ADMIN — PANTOPRAZOLE SODIUM 40 MG: 40 INJECTION, POWDER, FOR SOLUTION INTRAVENOUS at 08:01

## 2022-01-01 RX ADMIN — LEVOTHYROXINE SODIUM 200 MCG: 100 TABLET ORAL at 05:53

## 2022-01-01 RX ADMIN — ALBUMIN (HUMAN) 25 G: 0.25 INJECTION, SOLUTION INTRAVENOUS at 07:32

## 2022-01-01 RX ADMIN — OXYCODONE HYDROCHLORIDE 2.5 MG: 5 TABLET ORAL at 16:04

## 2022-01-01 RX ADMIN — HEPARIN SODIUM 5000 UNITS: 5000 INJECTION INTRAVENOUS; SUBCUTANEOUS at 05:36

## 2022-01-01 RX ADMIN — Medication 6 MG: at 22:55

## 2022-01-01 RX ADMIN — OXYCODONE HYDROCHLORIDE 2.5 MG: 5 TABLET ORAL at 12:47

## 2022-01-01 RX ADMIN — LACTULOSE 10 G: 20 SOLUTION ORAL at 07:18

## 2022-01-01 RX ADMIN — LOSARTAN POTASSIUM 25 MG: 25 TABLET, FILM COATED ORAL at 08:18

## 2022-01-01 RX ADMIN — OXYCODONE HYDROCHLORIDE 2.5 MG: 5 TABLET ORAL at 09:20

## 2022-01-01 RX ADMIN — FENTANYL CITRATE 50 MCG: 50 INJECTION INTRAMUSCULAR; INTRAVENOUS at 15:03

## 2022-01-01 RX ADMIN — MIDODRINE HYDROCHLORIDE 5 MG: 5 TABLET ORAL at 07:35

## 2022-01-01 RX ADMIN — PROPOFOL 200 MG: 10 INJECTION, EMULSION INTRAVENOUS at 15:22

## 2022-01-01 RX ADMIN — OXYCODONE HYDROCHLORIDE 2.5 MG: 5 TABLET ORAL at 05:27

## 2022-01-01 RX ADMIN — HYDROMORPHONE HYDROCHLORIDE 0.5 MG: 1 INJECTION, SOLUTION INTRAMUSCULAR; INTRAVENOUS; SUBCUTANEOUS at 18:40

## 2022-01-01 RX ADMIN — HEPARIN SODIUM 5000 UNITS: 5000 INJECTION INTRAVENOUS; SUBCUTANEOUS at 20:24

## 2022-01-01 RX ADMIN — LACTULOSE 10 G: 20 SOLUTION ORAL at 08:28

## 2022-01-01 RX ADMIN — Medication 6 MG: at 23:27

## 2022-01-01 RX ADMIN — HYDROMORPHONE HYDROCHLORIDE 0.5 MG: 1 INJECTION, SOLUTION INTRAMUSCULAR; INTRAVENOUS; SUBCUTANEOUS at 12:44

## 2022-01-01 RX ADMIN — PANTOPRAZOLE SODIUM 40 MG: 40 INJECTION, POWDER, FOR SOLUTION INTRAVENOUS at 08:28

## 2022-01-01 RX ADMIN — ALBUMIN (HUMAN) 25 G: 0.25 INJECTION, SOLUTION INTRAVENOUS at 18:33

## 2022-01-01 RX ADMIN — MIRTAZAPINE 7.5 MG: 15 TABLET, FILM COATED ORAL at 23:55

## 2022-01-01 RX ADMIN — PROPOFOL 120 MCG/KG/MIN: 10 INJECTION, EMULSION INTRAVENOUS at 16:51

## 2022-01-01 RX ADMIN — HEPARIN SODIUM 5000 UNITS: 5000 INJECTION INTRAVENOUS; SUBCUTANEOUS at 13:49

## 2022-01-01 RX ADMIN — POTASSIUM CHLORIDE 40 MEQ: 1500 TABLET, EXTENDED RELEASE ORAL at 12:56

## 2022-01-01 RX ADMIN — PANTOPRAZOLE SODIUM 40 MG: 40 INJECTION, POWDER, FOR SOLUTION INTRAVENOUS at 21:43

## 2022-01-01 RX ADMIN — ROCURONIUM BROMIDE 50 MG: 50 INJECTION, SOLUTION INTRAVENOUS at 14:09

## 2022-01-01 RX ADMIN — SODIUM CHLORIDE, SODIUM GLUCONATE, SODIUM ACETATE, POTASSIUM CHLORIDE, MAGNESIUM CHLORIDE, SODIUM PHOSPHATE, DIBASIC, AND POTASSIUM PHOSPHATE 50 ML/HR: .53; .5; .37; .037; .03; .012; .00082 INJECTION, SOLUTION INTRAVENOUS at 14:57

## 2022-01-01 RX ADMIN — HEPARIN SODIUM 5000 UNITS: 5000 INJECTION INTRAVENOUS; SUBCUTANEOUS at 13:55

## 2022-01-01 RX ADMIN — CHLORHEXIDINE GLUCONATE 15 ML: 1.2 SOLUTION ORAL at 23:27

## 2022-01-01 RX ADMIN — LACTULOSE 20 G: 20 SOLUTION ORAL at 08:28

## 2022-01-01 RX ADMIN — SODIUM CHLORIDE, SODIUM GLUCONATE, SODIUM ACETATE, POTASSIUM CHLORIDE, MAGNESIUM CHLORIDE, SODIUM PHOSPHATE, DIBASIC, AND POTASSIUM PHOSPHATE 50 ML/HR: .53; .5; .37; .037; .03; .012; .00082 INJECTION, SOLUTION INTRAVENOUS at 18:54

## 2022-01-01 RX ADMIN — LEVOTHYROXINE SODIUM 200 MCG: 100 TABLET ORAL at 05:01

## 2022-01-01 RX ADMIN — HEPARIN SODIUM 5000 UNITS: 5000 INJECTION INTRAVENOUS; SUBCUTANEOUS at 05:14

## 2022-01-01 RX ADMIN — PROPOFOL 50 MG: 10 INJECTION, EMULSION INTRAVENOUS at 16:24

## 2022-01-01 RX ADMIN — EPHEDRINE SULFATE 10 MG: 50 INJECTION INTRAVENOUS at 16:38

## 2022-01-01 RX ADMIN — LACTULOSE 20 G: 20 SOLUTION ORAL at 23:27

## 2022-01-01 RX ADMIN — LACTULOSE 10 G: 20 SOLUTION ORAL at 08:18

## 2022-01-01 RX ADMIN — ALBUMIN (HUMAN) 25 G: 0.25 INJECTION, SOLUTION INTRAVENOUS at 13:11

## 2022-01-01 RX ADMIN — LEVOTHYROXINE SODIUM 200 MCG: 100 TABLET ORAL at 05:04

## 2022-01-01 RX ADMIN — OXYCODONE HYDROCHLORIDE 2.5 MG: 5 TABLET ORAL at 16:27

## 2022-01-01 RX ADMIN — PANTOPRAZOLE SODIUM 40 MG: 40 INJECTION, POWDER, FOR SOLUTION INTRAVENOUS at 20:07

## 2022-01-01 RX ADMIN — LACTULOSE 10 G: 20 SOLUTION ORAL at 08:23

## 2022-01-01 RX ADMIN — HEPARIN SODIUM 5000 UNITS: 5000 INJECTION INTRAVENOUS; SUBCUTANEOUS at 21:58

## 2022-01-01 RX ADMIN — ALBUMIN (HUMAN) 25 G: 12.5 INJECTION, SOLUTION INTRAVENOUS at 01:19

## 2022-01-01 RX ADMIN — LACTULOSE 10 G: 20 SOLUTION ORAL at 12:36

## 2022-01-01 RX ADMIN — SPIRONOLACTONE 50 MG: 50 TABLET ORAL at 10:29

## 2022-01-01 RX ADMIN — LEVOTHYROXINE SODIUM 200 MCG: 100 TABLET ORAL at 05:36

## 2022-01-01 RX ADMIN — HEPARIN SODIUM 5000 UNITS: 5000 INJECTION INTRAVENOUS; SUBCUTANEOUS at 22:42

## 2022-01-01 RX ADMIN — SODIUM BICARBONATE 650 MG TABLET 650 MG: at 08:28

## 2022-01-01 RX ADMIN — LEVOTHYROXINE SODIUM 200 MCG: 100 TABLET ORAL at 05:55

## 2022-01-01 RX ADMIN — ENOXAPARIN SODIUM 40 MG: 40 INJECTION SUBCUTANEOUS at 09:42

## 2022-01-01 RX ADMIN — ENOXAPARIN SODIUM 40 MG: 40 INJECTION SUBCUTANEOUS at 08:23

## 2022-01-01 RX ADMIN — LEVOTHYROXINE SODIUM 200 MCG: 100 TABLET ORAL at 06:09

## 2022-01-01 RX ADMIN — PHENYLEPHRINE HYDROCHLORIDE 50 MCG/MIN: 10 INJECTION INTRAVENOUS at 14:25

## 2022-01-01 RX ADMIN — DEXAMETHASONE SODIUM PHOSPHATE 10 MG: 10 INJECTION, SOLUTION INTRAMUSCULAR; INTRAVENOUS at 15:33

## 2022-01-01 RX ADMIN — CEFTRIAXONE 2000 MG: 2 INJECTION, POWDER, FOR SOLUTION INTRAMUSCULAR; INTRAVENOUS at 21:52

## 2022-01-01 RX ADMIN — PROPOFOL 45 MCG/KG/MIN: 10 INJECTION, EMULSION INTRAVENOUS at 03:58

## 2022-01-01 RX ADMIN — CEFTRIAXONE 2000 MG: 2 INJECTION, POWDER, FOR SOLUTION INTRAMUSCULAR; INTRAVENOUS at 23:02

## 2022-01-01 RX ADMIN — HEPARIN SODIUM 5000 UNITS: 5000 INJECTION INTRAVENOUS; SUBCUTANEOUS at 05:40

## 2022-01-01 RX ADMIN — ALBUMIN (HUMAN): 12.5 INJECTION, SOLUTION INTRAVENOUS at 15:47

## 2022-01-01 RX ADMIN — CHLORHEXIDINE GLUCONATE 15 ML: 1.2 SOLUTION ORAL at 08:28

## 2022-01-01 RX ADMIN — PANTOPRAZOLE SODIUM 40 MG: 40 INJECTION, POWDER, FOR SOLUTION INTRAVENOUS at 23:27

## 2022-01-01 RX ADMIN — IOHEXOL 5 ML: 240 INJECTION, SOLUTION INTRATHECAL; INTRAVASCULAR; INTRAVENOUS; ORAL at 16:30

## 2022-01-01 RX ADMIN — OXYCODONE HYDROCHLORIDE 2.5 MG: 5 TABLET ORAL at 21:37

## 2022-01-01 RX ADMIN — LEVOTHYROXINE SODIUM 200 MCG: 100 TABLET ORAL at 05:14

## 2022-01-01 RX ADMIN — HEPARIN SODIUM 5000 UNITS: 5000 INJECTION INTRAVENOUS; SUBCUTANEOUS at 21:00

## 2022-01-01 RX ADMIN — LOSARTAN POTASSIUM 25 MG: 25 TABLET, FILM COATED ORAL at 08:21

## 2022-01-01 RX ADMIN — PROPOFOL 25 MCG/KG/MIN: 10 INJECTION, EMULSION INTRAVENOUS at 10:19

## 2022-01-01 RX ADMIN — GADOBUTROL 12 ML: 604.72 INJECTION INTRAVENOUS at 15:59

## 2022-01-01 RX ADMIN — LACTULOSE 10 G: 20 SOLUTION ORAL at 08:16

## 2022-01-01 RX ADMIN — MIDODRINE HYDROCHLORIDE 5 MG: 5 TABLET ORAL at 10:30

## 2022-01-01 RX ADMIN — CEFTRIAXONE 2000 MG: 2 INJECTION, POWDER, FOR SOLUTION INTRAMUSCULAR; INTRAVENOUS at 21:43

## 2022-01-01 RX ADMIN — ALBUMIN (HUMAN) 25 G: 0.25 INJECTION, SOLUTION INTRAVENOUS at 00:13

## 2022-01-01 RX ADMIN — ALBUMIN (HUMAN) 25 G: 12.5 INJECTION, SOLUTION INTRAVENOUS at 17:55

## 2022-01-01 RX ADMIN — IOHEXOL 100 ML: 350 INJECTION, SOLUTION INTRAVENOUS at 12:39

## 2022-01-01 RX ADMIN — HYDROMORPHONE HYDROCHLORIDE 0.5 MG: 1 INJECTION, SOLUTION INTRAMUSCULAR; INTRAVENOUS; SUBCUTANEOUS at 09:53

## 2022-01-01 RX ADMIN — ALBUMIN (HUMAN) 25 G: 12.5 INJECTION, SOLUTION INTRAVENOUS at 00:02

## 2022-01-01 RX ADMIN — LEVOTHYROXINE SODIUM 200 MCG: 100 TABLET ORAL at 06:12

## 2022-01-01 RX ADMIN — FENTANYL CITRATE 50 MCG: 50 INJECTION INTRAMUSCULAR; INTRAVENOUS at 15:36

## 2022-01-01 RX ADMIN — IOHEXOL 33 ML: 240 INJECTION, SOLUTION INTRATHECAL; INTRAVASCULAR; INTRAVENOUS; ORAL at 15:00

## 2022-01-01 RX ADMIN — Medication 3 MG: at 21:58

## 2022-01-01 RX ADMIN — LEVOTHYROXINE SODIUM 200 MCG: 100 TABLET ORAL at 05:27

## 2022-01-01 RX ADMIN — LACTULOSE 10 G: 20 SOLUTION ORAL at 09:42

## 2022-01-01 RX ADMIN — ALBUMIN (HUMAN): 12.5 INJECTION, SOLUTION INTRAVENOUS at 14:47

## 2022-01-01 RX ADMIN — LACTULOSE 10 G: 20 SOLUTION ORAL at 08:22

## 2022-01-01 RX ADMIN — ENOXAPARIN SODIUM 40 MG: 40 INJECTION SUBCUTANEOUS at 09:14

## 2022-01-01 RX ADMIN — OXYCODONE HYDROCHLORIDE 2.5 MG: 5 TABLET ORAL at 20:41

## 2022-01-01 RX ADMIN — CHLORHEXIDINE GLUCONATE 15 ML: 1.2 SOLUTION ORAL at 08:53

## 2022-01-01 RX ADMIN — ONDANSETRON 4 MG: 2 INJECTION INTRAMUSCULAR; INTRAVENOUS at 16:22

## 2022-01-01 RX ADMIN — NOREPINEPHRINE BITARTRATE 5 MCG/MIN: 1 INJECTION, SOLUTION, CONCENTRATE INTRAVENOUS at 19:26

## 2022-01-01 RX ADMIN — OXYCODONE HYDROCHLORIDE 2.5 MG: 5 TABLET ORAL at 07:50

## 2022-01-01 RX ADMIN — SODIUM CHLORIDE, SODIUM LACTATE, POTASSIUM CHLORIDE, AND CALCIUM CHLORIDE: .6; .31; .03; .02 INJECTION, SOLUTION INTRAVENOUS at 14:06

## 2022-01-01 RX ADMIN — Medication 200 MG: at 15:22

## 2022-01-01 RX ADMIN — LOSARTAN POTASSIUM 25 MG: 25 TABLET, FILM COATED ORAL at 09:14

## 2022-01-01 RX ADMIN — SODIUM BICARBONATE 650 MG TABLET 650 MG: at 17:51

## 2022-01-01 RX ADMIN — ONDANSETRON 4 MG: 2 INJECTION INTRAMUSCULAR; INTRAVENOUS at 15:33

## 2022-01-01 RX ADMIN — ALBUMIN (HUMAN) 12.5 G: 0.25 INJECTION, SOLUTION INTRAVENOUS at 07:18

## 2022-01-01 RX ADMIN — PROPOFOL 150 MG: 10 INJECTION, EMULSION INTRAVENOUS at 14:09

## 2022-01-01 RX ADMIN — HEPARIN SODIUM 5000 UNITS: 5000 INJECTION INTRAVENOUS; SUBCUTANEOUS at 21:46

## 2022-01-01 RX ADMIN — HEPARIN SODIUM 5000 UNITS: 5000 INJECTION INTRAVENOUS; SUBCUTANEOUS at 14:00

## 2022-01-01 RX ADMIN — Medication 6 MG: at 21:53

## 2022-01-01 RX ADMIN — ALBUMIN (HUMAN) 25 G: 0.25 INJECTION, SOLUTION INTRAVENOUS at 12:42

## 2022-01-01 RX ADMIN — HEPARIN SODIUM 5000 UNITS: 5000 INJECTION INTRAVENOUS; SUBCUTANEOUS at 22:55

## 2022-01-01 RX ADMIN — LACTULOSE 10 G: 20 SOLUTION ORAL at 09:14

## 2022-01-01 RX ADMIN — HEPARIN SODIUM 5000 UNITS: 5000 INJECTION INTRAVENOUS; SUBCUTANEOUS at 15:15

## 2022-01-01 RX ADMIN — LOSARTAN POTASSIUM 25 MG: 25 TABLET, FILM COATED ORAL at 08:23

## 2022-01-01 RX ADMIN — FENTANYL CITRATE 25 MCG: 50 INJECTION INTRAMUSCULAR; INTRAVENOUS at 14:33

## 2022-01-01 RX ADMIN — Medication 10 ML: at 14:58

## 2022-01-01 RX ADMIN — LORAZEPAM 0.5 MG: 2 INJECTION INTRAMUSCULAR; INTRAVENOUS at 15:36

## 2022-01-01 RX ADMIN — LORAZEPAM 0.5 MG: 0.5 TABLET ORAL at 21:39

## 2022-01-01 RX ADMIN — PROPOFOL 15 MCG/KG/MIN: 10 INJECTION, EMULSION INTRAVENOUS at 16:05

## 2022-01-01 RX ADMIN — Medication 6 MG: at 21:26

## 2022-01-01 RX ADMIN — HEPARIN SODIUM 5000 UNITS: 5000 INJECTION INTRAVENOUS; SUBCUTANEOUS at 05:53

## 2022-01-01 RX ADMIN — Medication 6 MG: at 21:01

## 2022-01-01 RX ADMIN — CHLORHEXIDINE GLUCONATE 15 ML: 1.2 SOLUTION ORAL at 21:43

## 2022-01-01 RX ADMIN — ALBUMIN (HUMAN) 25 G: 12.5 INJECTION, SOLUTION INTRAVENOUS at 10:31

## 2022-01-01 RX ADMIN — POTASSIUM CHLORIDE 20 MEQ: 14.9 INJECTION, SOLUTION INTRAVENOUS at 10:35

## 2022-01-01 RX ADMIN — HEPARIN SODIUM 5000 UNITS: 5000 INJECTION INTRAVENOUS; SUBCUTANEOUS at 21:02

## 2022-01-01 RX ADMIN — PROPOFOL 45 MCG/KG/MIN: 10 INJECTION, EMULSION INTRAVENOUS at 06:28

## 2022-01-01 RX ADMIN — LEVOTHYROXINE SODIUM 200 MCG: 100 TABLET ORAL at 05:08

## 2022-01-01 RX ADMIN — Medication 100 MG: at 14:09

## 2022-01-01 RX ADMIN — SODIUM BICARBONATE 650 MG TABLET 650 MG: at 12:36

## 2022-01-01 RX ADMIN — MIRTAZAPINE 7.5 MG: 15 TABLET, FILM COATED ORAL at 23:27

## 2022-01-01 RX ADMIN — MIDODRINE HYDROCHLORIDE 5 MG: 5 TABLET ORAL at 17:04

## 2022-01-01 RX ADMIN — HEPARIN SODIUM 5000 UNITS: 5000 INJECTION INTRAVENOUS; SUBCUTANEOUS at 07:32

## 2022-01-01 RX ADMIN — ALBUMIN (HUMAN) 12.5 G: 0.25 INJECTION, SOLUTION INTRAVENOUS at 01:29

## 2022-01-01 RX ADMIN — LEVOTHYROXINE SODIUM 200 MCG: 100 TABLET ORAL at 06:00

## 2022-01-01 RX ADMIN — LORAZEPAM 0.5 MG: 0.5 TABLET ORAL at 22:11

## 2022-01-01 RX ADMIN — Medication 3 MG: at 20:24

## 2022-01-01 RX ADMIN — LORAZEPAM 1 MG: 2 INJECTION INTRAMUSCULAR; INTRAVENOUS at 09:54

## 2022-01-01 RX ADMIN — CHLORHEXIDINE GLUCONATE 15 ML: 1.2 SOLUTION ORAL at 08:01

## 2022-01-01 RX ADMIN — ENOXAPARIN SODIUM 40 MG: 40 INJECTION SUBCUTANEOUS at 17:18

## 2022-01-01 RX ADMIN — ALBUMIN (HUMAN) 25 G: 0.25 INJECTION, SOLUTION INTRAVENOUS at 10:58

## 2022-01-01 RX ADMIN — PROPOFOL 45 MCG/KG/MIN: 10 INJECTION, EMULSION INTRAVENOUS at 21:28

## 2022-01-01 RX ADMIN — HYDROMORPHONE HYDROCHLORIDE 0.5 MG: 1 INJECTION, SOLUTION INTRAMUSCULAR; INTRAVENOUS; SUBCUTANEOUS at 16:20

## 2022-01-01 RX ADMIN — MIRTAZAPINE 7.5 MG: 15 TABLET, FILM COATED ORAL at 21:37

## 2022-01-20 NOTE — PROGRESS NOTES
Assessment/Plan:    1  Generalized weakness  Ambulatory Referral to Neurology    TSH, 3rd generation with Free T4 reflex    CK (with reflex to MB)    Urinalysis with microscopic    CBC and differential    Magnesium    Basic metabolic panel    Hepatic function panel    Phosphorus    Uric acid    Vitamin B12    Vitamin D 25 hydroxy   2  Medication management  doxycycline hyclate (VIBRAMYCIN) 100 mg capsule    TSH, 3rd generation with Free T4 reflex    CK (with reflex to MB)    Urinalysis with microscopic    CBC and differential    Magnesium    Basic metabolic panel    Hepatic function panel    Phosphorus    Uric acid    Vitamin B12    Vitamin D 25 hydroxy   3  Chronic osteomyelitis of knee (HCC)  TSH, 3rd generation with Free T4 reflex    CK (with reflex to MB)    Urinalysis with microscopic    CBC and differential    Magnesium    Basic metabolic panel    Hepatic function panel    Phosphorus    Uric acid    Vitamin B12    Vitamin D 25 hydroxy   4  Loss of balance  Ambulatory Referral to Neurology    TSH, 3rd generation with Free T4 reflex    CK (with reflex to MB)    Urinalysis with microscopic    CBC and differential    Magnesium    Basic metabolic panel    Hepatic function panel    Phosphorus    Uric acid    Vitamin B12    Vitamin D 25 hydroxy   5  Acquired hypothyroidism  TSH, 3rd generation with Free T4 reflex    CK (with reflex to MB)    Urinalysis with microscopic    CBC and differential    Magnesium    Basic metabolic panel    Hepatic function panel    Phosphorus    Uric acid    Vitamin B12    Vitamin D 25 hydroxy   6  Essential hypertension  TSH, 3rd generation with Free T4 reflex    CK (with reflex to MB)    Urinalysis with microscopic    CBC and differential    Magnesium    Basic metabolic panel    Hepatic function panel    Phosphorus    Uric acid    Vitamin B12    Vitamin D 25 hydroxy   7   History of MRSA infection  TSH, 3rd generation with Free T4 reflex    CK (with reflex to MB)    Urinalysis with microscopic    CBC and differential    Magnesium    Basic metabolic panel    Hepatic function panel    Phosphorus    Uric acid    Vitamin B12    Vitamin D 25 hydroxy   8  Assessment of physical health declined       - I am concerned for this patient given decline in his overall health -- it appears it is more rapid over the past month however progressing for ~ 6 months to 1 year  He will see Neurology in Feb, this appt made today prior to them leaving  Can take to ED for any acute changes -- no acute focal deficits  Balance issues and Weakness appear to be major symptoms/signs on examination  Labs drawn to r/o organic issues  There is mention of tea colored urine - will investigate this as well with u/a and CK levels  They are to be very careful with him in the meantime, would avoid steps, If possible, otherwise, should have assistance  Wife and CARLTON are on board with plan as is patient  Patient/Caretaker verbalized understanding and were in agreement with today's assessment and plan  Time was taken to address any questions patient/caretaker had  Indication/Risks/Benefits of medication(s) as prescribed were discussed with the patient/caretaker  The patient verbalized understanding and agreement and elects to take medications as prescribed  Time was taken to answer any questions the patient/caretaker may have had  Total time I spent caring for this patient on the day of the encounter - 60 minutes  10 minutes spent before the visit  45 minutes spent during the visit  5 minutes spent after the visit     Chief Complaint   Patient presents with    Follow-up     bmi follow up    Fall     pt states he fell 12/24, fell backwards and hit his head on concrete thinks he may have had concussions    Extremity Weakness     states he is having increased weakness in extremities      Subjective:      Patient ID: Jean Marie Hoskins is a 80 y o  male      Patient is here with his wife and brother in law out of concern for decline in overall health  There has been progressive worsening in his balance and he is now with weakness -- mainly shoulder/pelvic girdles  He has disabling in jury to his RUE due to the war many years ago but over the past 6 months, decline in his LUE and now, his LEs  There is also global dec in his energy/interest and becoming a bit more forgetful  Wife shares that she feels his tone of voice is also lower and more mumbling  He is to the point, where in order to get up from a seated position, he has to rock for several minutes to get up  - There was an incident in early January where when he was in his basement lifting weights, he was not able to lift himself off the weight bench  He subsequently laid there for an hour before help came  He is usually able to use the weight bar to pull himself up, could not do this  Did not have the strength in his proximal UEs (quads/hamstrings) to stand up  This is getting "a bit" better but still not good causing him to rock to get up     - Prior to Covid, he was working out outside the house -- has resorted to home workouts 2/2 this  - there was an incident on 12/24 where he fell out of a chair and hit his head  They did not seek medical care for this  There was some increased confusion at the time but again, most of this, is decline for the past 6 months  There was no gross bruising to the back of the head/LOC with this incident  No vomiting     - he has b/l macular degeneration, 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, cont to get tx in the L eye, can see out of this eye  This is progressive worsening over time  Does follow closely with ophthalmology  - there is decrease in his ability to make good decisions over time  Decreased focus and concentration - progressive over time but worse since head injury on 12/24     - has progressive worsening balance over time    Was doing home PT for this with [facility] but did not do well with this per wife, did not continue -- he could not do the exercises as his balance was so poor  He is now having to use a walking stick as his balance has declined  - s/p b/l knee replacements  Is on chronic 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  Today, he "just does not feel right "  He is taking his meds  His wife also informs me when asking about urinary symptoms, "his urine is tea colored "  No issues peeing  There is h/o constipation, this is no worse than normal - milk products tend to make this worse  She does admit to some increased dry skin  He has been eating less red meat but still eats this  HTN - has been stable  Takes his meds qod and has for some time, per wife  The following portions of the patient's history were reviewed and updated as appropriate: allergies, current medications, past family history, past medical history, past social history, past surgical history and problem list     Review of Systems   Constitutional: Positive for activity change and fatigue  HENT: Negative for congestion, sinus pressure, sinus pain, tinnitus and voice change  Eyes: Positive for visual disturbance  Respiratory: Negative for cough, choking, chest tightness and shortness of breath  Cardiovascular: Positive for leg swelling (this is baseline )  Negative for chest pain  Gastrointestinal: Positive for constipation  Negative for abdominal pain and diarrhea  Genitourinary:        See hpi     Musculoskeletal: Positive for arthralgias and myalgias  Skin: Positive for rash (LLE, skin color changes )  Neurological: Positive for weakness  Negative for dizziness, speech difficulty, light-headedness, numbness and headaches  Psychiatric/Behavioral: Positive for sleep disturbance  The patient is not nervous/anxious  Objective: There were no vitals taken for this visit  Physical Exam  Vitals and nursing note reviewed     Constitutional:       General: He is not in acute distress  Appearance: He is not ill-appearing  Comments: Very slow to respond to my question  He is hearing impaired, wearing his hearing aids  HENT:      Head: Normocephalic and atraumatic  Eyes:      Conjunctiva/sclera: Conjunctivae normal    Cardiovascular:      Rate and Rhythm: Normal rate and regular rhythm  Heart sounds: Normal heart sounds  Pulmonary:      Effort: Pulmonary effort is normal       Breath sounds: Normal breath sounds  No wheezing, rhonchi or rales  Abdominal:      Palpations: Abdomen is soft  Musculoskeletal:      Cervical back: Neck supple  Comments: He is sitting upright in chair as exam began  He has dec ROM in his b/l UEs, he has adhesive capsulitis, bilaterally, passively, I cannot flex/extend his b/l GH joints past ~ 90 degrees  Apparently that is chronic in the RUE, newer in the LUE  He has dec ROM of his hip and knee joints, weak, difficult to fully examine these given seated position  His gait is shuffled and guarded, has a walking stick with him  He was not able to get up out of chair on his own  He rocked back and forth attempting to gain "strength" to get up, could not do this  He needed assistance  It appears he has contractures in his knees, when standing, they are in flexed position  I did not perform Romberg testing today for safety sake, his gait is unstable with eyes open  He has dec bulk and tone of UE/LE muscles -- wife and CARLTON tell me he did have much better bulk/tone about one year ago  His wife was worried about his handwriting, this seemed okay today when asked to write his name  It took him some time but was certainly legible  His heel-->shin was wnl  Zofia slow but coordinated  CN II-XII intact today though weak with resisted neck mm testing  Lymphadenopathy:      Cervical: No cervical adenopathy  Skin:     General: Skin is warm and dry        Comments: Seborrhea noted on his scalp     Neurological: Mental Status: He is alert and oriented to person, place, and time  Motor: Weakness present        Gait: Gait abnormal    Psychiatric:         Mood and Affect: Mood normal          Behavior: Behavior normal

## 2022-01-23 NOTE — TELEPHONE ENCOUNTER
I personally phoned pt  Pt answered and is hard of hearing  Esther Pugh, his wife got on the phone who was with him at his appt with me on Friday  We discussed his elev LFTs, mildly low Platelets  TSH is mildly elev as well (likely less contributory to what is going on)  BMP looks good as do Mg2+ and Phosphorus  Vit Levels also look good  U/A is untelling  CK levels are normal   We will get STAT US of his liver to further evaluate what might be going on, appears hepatic in nature  He ultimately will likely need to see a specialist for further investigation  I sent increased dose of synthroid to pharmacy 200 mcg, up from 175 to the pharmacy for him -- will recheck level sin ~ 6w  Sent to [facility] as we will see what levels do in f/u  Again discussed with wife that this is likely not the main issue, here  She is on board with plan of care  Sending this to [facility] office staff to help get this STAT US scheduled for patient  They are willing to go to [facility] [place] [facility] OR Suzette Camargo, DO

## 2022-01-24 NOTE — TELEPHONE ENCOUNTER
Attempted to phone patient on both numbers in the EMR, unsuccessfully  Staff, please have him get a few additional labs that have been ordered  They do NOT have to fast, can be done, whenever  We will do a referral to Gastroenterology at this point as his Ultrasound is not telling for any major findings yet his liver function studies suggest otherwise  Rather than order additional imaging that GI may want different of, we will have them see him and suggest further work up  If they will not be able to see him within the next couple of weeks, we can certainly order additional imaging like CT or MRI of the abdomen prior to this time  Labs entered, Dr Leo Samuels is going to enter GI referral for him as he feels he can likely get him in with [address] in a reasonable amount of time  DINAH Worthington, DO

## 2022-01-24 NOTE — TELEPHONE ENCOUNTER
Pt called office  I did relay this information  Spoke to pt wife  Pt wife is aware of all of the above  They plan to get BW done tomorrow  She was extremely appreciative of the office's prompt attention to pt and wanted to make sure you knew

## 2022-01-24 NOTE — TELEPHONE ENCOUNTER
Attempted to contact patient to arrange appointment  No answer, unable to leave message  Request from Dr Tapia/Dr Mary Jane Saenz to see patient //Alk phos 570/t bili 1 [id number]  RUQ U/S is normal      Will attempt to contact patient again at later time

## 2022-01-27 NOTE — TELEPHONE ENCOUNTER
pts wife called back, states pt has appt with [facility] on 2/2 at 9am  Just wanted to make you aware,  ty

## 2022-02-02 PROBLEM — D69.6 PLATELETS DECREASED (HCC): Status: ACTIVE | Noted: 2022-01-01

## 2022-02-02 NOTE — PROGRESS NOTES
[facility] - Outpatient Consultation  Carlos Alberto Herrera 80 y o  male MRN: [id number]  Encounter: [id number]          ASSESSMENT AND PLAN:      1  Elevated LFTs  -     Ambulatory Referral to Gastroenterology    This is an 59-year-old white male with new onset of elevated LFTs  With acute onset of elevated LFTs, underlying liver disease is highly unlikely  More likely would be biliary obstruction he did secondary to CBD stones or tumor  Other possibilities would be liver lesions  I will obtain an MRCP as initial diagnostic study  Further workup will be based on the results of the MRCP  Thank you so much for referring this patient   ______________________________________________________________________    HPI:  Cristela Reese is an 59-year-old white male who is referred because of elevated LFTs  He denies any abdominal pain, he had an ultrasound which was essentially negative but did not reveal a gallbladder although he has not had a cholecystectomy  His common bile duct was normal in size  His LFTs showed an alkaline phosphatase greater than 500 and markedly elevated transaminases but only slightly elevated bilirubin  His LFTs were normal in March of last year  REVIEW OF SYSTEMS:    CONSTITUTIONAL: Denies any fever, chills, rigors, and weight loss  HEENT: No earache or tinnitus  Denies hearing loss or visual disturbances  CARDIOVASCULAR: No chest pain or palpitations  RESPIRATORY: Denies any cough, hemoptysis, shortness of breath or dyspnea on exertion  GASTROINTESTINAL: As noted in the History of Present Illness  GENITOURINARY: No problems with urination  Denies any hematuria or dysuria  NEUROLOGIC: No dizziness or vertigo, denies headaches  MUSCULOSKELETAL: Denies any muscle or joint pain  SKIN: Denies skin rashes or itching  ENDOCRINE: Denies excessive thirst  Denies intolerance to heat or cold  PSYCHOSOCIAL: Denies depression or anxiety   Denies any recent memory loss  Historical Information   History reviewed  No pertinent past medical history  Past Surgical History:   Procedure Laterality Date    HERNIA REPAIR      PROSTATE SURGERY      REPLACEMENT TOTAL KNEE      last assessed: 7/11/17     Social History   Social History     Substance and Sexual Activity   Alcohol Use No     Social History     Substance and Sexual Activity   Drug Use No     Social History     Tobacco Use   Smoking Status Never Smoker   Smokeless Tobacco Never Used     History reviewed  No pertinent family history  Meds/Allergies       Current Outpatient Medications:     doxycycline hyclate (VIBRAMYCIN) 100 mg capsule    irbesartan (AVAPRO) 300 mg tablet    levothyroxine (Synthroid) 200 mcg tablet    metroNIDAZOLE (METROGEL) 0 75 % gel    Multiple Vitamins-Minerals (ICAPS AREDS 2 PO)    sildenafil (VIAGRA) 100 mg tablet    tadalafil (CIALIS) 20 MG tablet    tamsulosin (FLOMAX) 0 4 mg    Diclofenac Sodium (VOLTAREN) 1 %    neomycin-polymyxin-hydrocortisone (CORTISPORIN) otic solution    No Known Allergies        Objective     Blood pressure 126/68, pulse 78, temperature (!) 96 4 °F (35 8 °C), temperature source Tympanic, height 6' 4" (1 93 m), weight 101 kg (222 lb 12 8 oz)  Body mass index is 27 12 kg/m²  PHYSICAL EXAM:      General Appearance:   Alert, cooperative, no distress   HEENT:   Normocephalic, atraumatic, anicteric  Neck:  Supple, symmetrical, trachea midline   Lungs:   Clear to auscultation bilaterally; no rales, rhonchi or wheezing; respirations unlabored    Heart[de-identified]   Regular rate and rhythm; no murmur, rub, or gallop     Abdomen:   Soft, non-tender, non-distended; normal bowel sounds; no masses, no organomegaly    Genitalia:   Deferred    Rectal:   Deferred    Extremities:  No cyanosis, clubbing or edema    Pulses:  2+ and symmetric    Skin:  No jaundice, rashes, or lesions    Lymph nodes:  No palpable cervical lymphadenopathy        Lab Results:   No visits with results within 1 Day(s) from this visit  Latest known visit with results is:   Lab on 01/26/2022   Component Date Value    Ammonia 01/26/2022 56*    GGT 01/26/2022 721*    Hepatitis B Surface Ag 01/26/2022 Non-reactive     Hep A IgM 01/26/2022 Non-reactive     Hepatitis C Ab 01/26/2022 Non-reactive     Hep B C IgM 01/26/2022 Non-reactive          Radiology Results:   US right upper quadrant with liver dopplers    Result Date: 1/24/2022  Narrative: RIGHT UPPER QUADRANT ULTRASOUND WITH LIVER DOPPLER INDICATION:     R53 1: Weakness R26 89: Other abnormalities of gait and mobility R79 89: Other specified abnormal findings of blood chemistry  COMPARISON:  None  TECHNIQUE:   Real-time ultrasound of the right upper quadrant was performed with a curvilinear transducer with both volumetric sweeps and still imaging techniques  FINDINGS: PANCREAS:  Visualized portions of the pancreas are within normal limits  AORTA AND IVC:  Visualized portions are normal for patient age  LIVER: Size:  Within normal range  The liver measures 10 9 cm in the midclavicular line  Contour:  Questionable subtle surface nodularity  Parenchyma:  Echogenicity and echotexture are within normal limits  No evidence of suspicious mass  LIVER DOPPLER: The main portal vein and primary branch segments are patent and hepatopetal with normal spectral waveform  Hepatic veins are patent  Spectral waveforms within normal limits  Main hepatic artery appears normal size, patent with normal spectral waveform  BILIARY: Gallbladder not visualized  No intrahepatic biliary dilatation  CBD measures 5 mm  No choledocholithiasis  KIDNEY: Right kidney measures 10 7 x 7 5 x 5 2 cm  Within normal limits  ASCITES:   Small to moderate volume right upper quadrant ascites  Impression: Questionable subtle liver surface nodularity with an otherwise normal-appearing liver, suggesting the possibility of early cirrhosis   Patent hepatic vasculature with normal direction of flow  Small to moderate volume right upper quadrant ascites   Workstation performed: [id number]

## 2022-02-02 NOTE — PATIENT INSTRUCTIONS
Called and scheduled patient  for his MRI abdomen  w wo contrast and MRCP on  2/ 10/ 2022 @ 8:30 pm @ [facility]  Patient family expressed understanding

## 2022-02-07 NOTE — TELEPHONE ENCOUNTER
I spoke to patient's wife over the phone  Since he is not having fever, abdominal pain, or confusion, I do not feel he needs to urgently go to the emergency room  We can check outpatient blood work including liver and kidney function  His wife will call lab services to come to the house to draw his blood ASAP  I told them to keep the MRI/MRCP appointment as scheduled for Thursday  If he would develop fever, abdominal pain, confusion, they should bring him to the ED immediately

## 2022-02-07 NOTE — TELEPHONE ENCOUNTER
Reason for Disposition   Jaundice    Answer Assessment - Initial Assessment Questions  1  EYES: "Are the whites of the eyes yellow?"      Yes, per wife, noticed it this morning  2  ONSET: "When did the jaundice begin?"      Noticed it this morning  3  FEVER: "Do you have a fever?" If Yes, ask: "What is it, how was it measured, and when did it start?"      Denies  4  VOMITING: "Have you been vomiting?"       Denies  5  DEHYDRATION: "Have you been able to keep any liquids down?" "When was the last time you urinated?" "Do you feel dizzy?"      Patient is drinking, but he does feel weak  6  ABDOMINAL PAIN: "Are you having any abdominal pain?" If yes, ask: "How bad is it?"  (e g , mild, moderate, severe)   - MILD - doesn't interfere with normal activities    - MODERATE - interferes with normal activities or awakens from sleep    - SEVERE - patient doesn't want to move (R/O peritonitis)       Denies  7  CONTACT: "Have you been around anyone who was jaundiced?"      Denies  8  CAUSE: "What do you think is causing the jaundice?"     Wife worried about an obstruction  9  OTHER SYMPTOMS: "Do you have any other symptoms?" (e g , shaking chills, itching, blood in stool)      Decreased urine output, only 120ml today  Encouraging him to drink more water, but urine still dark, and output has markedly decreased  Wife is concerned about ; wanted to know if MRI could be moved up sooner than Thursday since  is acting weaker along with noticable jaundice that started today  Patient does have bouts of confusion, but per wife this has been happening with him  Patient is more tired and sleeping more than usual as well      Protocols used: [facility]

## 2022-02-08 PROBLEM — R17 JAUNDICE: Status: ACTIVE | Noted: 2022-01-01

## 2022-02-08 PROBLEM — E72.20 HYPERAMMONEMIA (HCC): Status: ACTIVE | Noted: 2022-01-01

## 2022-02-08 PROBLEM — R74.01 TRANSAMINITIS: Status: ACTIVE | Noted: 2022-01-01

## 2022-02-08 PROBLEM — E80.6 HYPERBILIRUBINEMIA: Status: ACTIVE | Noted: 2022-01-01

## 2022-02-08 PROBLEM — R71.8 ELEVATED MCV: Status: ACTIVE | Noted: 2022-01-01

## 2022-02-08 NOTE — PLAN OF CARE
Problem: Potential for Falls  Goal: Patient will remain free of falls  Description: INTERVENTIONS:  - Educate patient/family on patient safety including physical limitations  - Instruct patient to call for assistance with activity   - Consult OT/PT to assist with strengthening/mobility   - Keep Call bell within reach  - Keep bed low and locked with side rails adjusted as appropriate  - Keep care items and personal belongings within reach  - Initiate and maintain comfort rounds  - Make Fall Risk Sign visible to staff  - Offer Toileting every 1-2 Hours, in advance of need  - Initiate/Maintain bed and chair alarm  - Obtain necessary fall risk management equipment: fall socks  - Apply yellow socks and bracelet for high fall risk patients  - Consider moving patient to room near nurses station  Outcome: Progressing     Problem: MOBILITY - ADULT  Goal: Maintain or return to baseline ADL function  Description: INTERVENTIONS:  -  Assess patient's ability to carry out ADLs; assess patient's baseline for ADL function and identify physical deficits which impact ability to perform ADLs (bathing, care of mouth/teeth, toileting, grooming, dressing, etc )  - Assess/evaluate cause of self-care deficits   - Assess range of motion  - Assess patient's mobility; develop plan if impaired  - Assess patient's need for assistive devices and provide as appropriate  - Encourage maximum independence but intervene and supervise when necessary  - Involve family in performance of ADLs  - Assess for home care needs following discharge   - Consider OT consult to assist with ADL evaluation and planning for discharge  - Provide patient education as appropriate  Outcome: Progressing  Goal: Maintains/Returns to pre admission functional level  Description: INTERVENTIONS:  - Perform BMAT or MOVE assessment daily    - Set and communicate daily mobility goal to care team and patient/family/caregiver     - Collaborate with rehabilitation services on mobility goals if consulted  - Perform Range of Motion 3-4 times a day  - Reposition patient every 1-2 hours    - Dangle patient 3-4 times a day  - Stand patient 3-4 times a day  - Ambulate patient 3-4 times a day  - Out of bed to chair 3-4 times a day   - Out of bed for meals 3-4 times a day  - Out of bed for toileting  - Record patient progress and toleration of activity level   Outcome: Progressing     Problem: PAIN - ADULT  Goal: Verbalizes/displays adequate comfort level or baseline comfort level  Description: Interventions:  - Encourage patient to monitor pain and request assistance  - Assess pain using appropriate pain scale  - Administer analgesics based on type and severity of pain and evaluate response  - Implement non-pharmacological measures as appropriate and evaluate response  - Consider cultural and social influences on pain and pain management  - Notify physician/advanced practitioner if interventions unsuccessful or patient reports new pain  Outcome: Progressing     Problem: INFECTION - ADULT  Goal: Absence or prevention of progression during hospitalization  Description: INTERVENTIONS:  - Assess and monitor for signs and symptoms of infection  - Monitor lab/diagnostic results  - Monitor all insertion sites, i e  indwelling lines, tubes, and drains  - Monitor endotracheal if appropriate and nasal secretions for changes in amount and color  - [place] appropriate cooling/warming therapies per order  - Administer medications as ordered  - Instruct and encourage patient and family to use good hand hygiene technique  - Identify and instruct in appropriate isolation precautions for identified infection/condition  Outcome: Progressing  Goal: Absence of fever/infection during neutropenic period  Description: INTERVENTIONS:  - Monitor WBC    Outcome: Progressing     Problem: SAFETY ADULT  Goal: Patient will remain free of falls  Description: INTERVENTIONS:  - Educate patient/family on patient safety including physical limitations  - Instruct patient to call for assistance with activity   - Consult OT/PT to assist with strengthening/mobility   - Keep Call bell within reach  - Keep bed low and locked with side rails adjusted as appropriate  - Keep care items and personal belongings within reach  - Initiate and maintain comfort rounds  - Make Fall Risk Sign visible to staff  - Offer Toileting every 1-2 Hours, in advance of need  - Initiate/Maintain bed and chair alarm  - Obtain necessary fall risk management equipment: fall socks  - Apply yellow socks and bracelet for high fall risk patients  - Consider moving patient to room near nurses station  Outcome: Progressing  Goal: Maintain or return to baseline ADL function  Description: INTERVENTIONS:  -  Assess patient's ability to carry out ADLs; assess patient's baseline for ADL function and identify physical deficits which impact ability to perform ADLs (bathing, care of mouth/teeth, toileting, grooming, dressing, etc )  - Assess/evaluate cause of self-care deficits   - Assess range of motion  - Assess patient's mobility; develop plan if impaired  - Assess patient's need for assistive devices and provide as appropriate  - Encourage maximum independence but intervene and supervise when necessary  - Involve family in performance of ADLs  - Assess for home care needs following discharge   - Consider OT consult to assist with ADL evaluation and planning for discharge  - Provide patient education as appropriate  Outcome: Progressing     Problem: DISCHARGE PLANNING  Goal: Discharge to home or other facility with appropriate resources  Description: INTERVENTIONS:  - Identify barriers to discharge w/patient and caregiver  - Arrange for needed discharge resources and transportation as appropriate  - Identify discharge learning needs (meds, wound care, etc )  - Arrange for interpretive services to assist at discharge as needed  - Refer to Case Management Department for coordinating discharge planning if the patient needs post-hospital services based on physician/advanced practitioner order or complex needs related to functional status, cognitive ability, or social support system  Outcome: Progressing     Problem: Knowledge Deficit  Goal: Patient/family/caregiver demonstrates understanding of disease process, treatment plan, medications, and discharge instructions  Description: Complete learning assessment and assess knowledge base    Interventions:  - Provide teaching at level of understanding  - Provide teaching via preferred learning methods  Outcome: Progressing     Problem: METABOLIC, FLUID AND ELECTROLYTES - ADULT  Goal: Electrolytes maintained within normal limits  Description: INTERVENTIONS:  - Monitor labs and assess patient for signs and symptoms of electrolyte imbalances  - Administer electrolyte replacement as ordered  - Monitor response to electrolyte replacements, including repeat lab results as appropriate  - Instruct patient on fluid and nutrition as appropriate  Outcome: Progressing  Goal: Fluid balance maintained  Description: INTERVENTIONS:  - Monitor labs   - Monitor I/O and WT  - Instruct patient on fluid and nutrition as appropriate  - Assess for signs & symptoms of volume excess or deficit  Outcome: Progressing  Goal: Glucose maintained within target range  Description: INTERVENTIONS:  - Monitor Blood Glucose as ordered  - Assess for signs and symptoms of hyperglycemia and hypoglycemia  - Administer ordered medications to maintain glucose within target range  - Assess nutritional intake and initiate nutrition service referral as needed  Outcome: Progressing

## 2022-02-08 NOTE — ASSESSMENT & PLAN NOTE
· Check a folate level    Results for [place] (MRN [id number]) as of 2/8/2022 12:01   Ref   Range 1/21/2022 08:09   Vitamin B-12 Latest Ref Range: 100 - 900 pg/mL 502

## 2022-02-08 NOTE — H&P
[address] [facility] [address] [place]  H&P- [address] 1934, 80 y o  male MRN: [id number]  Unit/Bed#: KF90 Encounter: [id number]  Primary Care Provider: Karly Lima MD   Date and time admitted to hospital: 2/8/2022  8:41 AM    * Hyperbilirubinemia  Assessment & Plan  · Admit to med/surg level of care  · Marked hyperbilirubinemia concerning for biliary obstruction versus decompensated liver cirrhosis  · A right upper quadrant ultrasound was ordered in the ED and is pending at this time  · Check a CT scan of the abdomen/pelvis with IV contrast   · Check an MRCP  · Consult Gastroenterology  · Transaminitis serology work-up per Gastroenterology  · Check an acetaminophen level  · Avoid all hepatotoxic agents  · Check COVID-19 PCR testing  · Follow the total bilirubin level    Results for Bogdan Lal (MRN [id number]) as of 2/8/2022 12:01   Ref  Range 2/8/2022 10:10   TOTAL BILIRUBIN Latest Ref Range: 0 20 - 1 00 mg/dL 13 62 (H)       Jaundice  Assessment & Plan  · Please see the assessment and plan for "Hyperbilirubinemia"    Transaminitis  Assessment & Plan  · Please see the assessment and plan for "Hyperbilirubinemia"    Results from last 7 days   Lab Units 02/08/22  1010   SODIUM mmol/L 138   POTASSIUM mmol/L 4 0   CHLORIDE mmol/L 106   CO2 mmol/L 23   BUN mg/dL 23   CREATININE mg/dL 0 81   CALCIUM mg/dL 8 3   ALK PHOS U/L 976*   ALT U/L 216*   AST U/L 207*         Elevated MCV  Assessment & Plan  · Check a folate level    Results for Bogdan Lal (MRN [id number]) as of 2/8/2022 12:01   Ref   Range 1/21/2022 08:09   Vitamin B-12 Latest Ref Range: 100 - 900 pg/mL 502       Hyperammonemia (HCC)  Assessment & Plan  · Due to liver disease  · Initiate lactulose 10 grams PO Qdaily for a goal 2-3 soft bowel movements per 24 hours    Bilateral lower extremity edema  Assessment & Plan  · Check a venous duplex of the bilateral lower extremities    Essential hypertension  Assessment & Plan  · Continue ARB therapy  · Follow the blood pressure trend    VTE Pharmacologic Prophylaxis: VTE Score: 6 High Risk (Score >/= 5) - Pharmacological DVT Prophylaxis Ordered: enoxaparin (Lovenox)  Sequential Compression Devices Ordered  Code Status: Level 1-Full Code    Anticipated Length of Stay: Patient will be admitted on an inpatient basis with an anticipated length of stay of greater than 2 midnights secondary to the need for a CT scan of the abdomen/pelvis, for an MRCP, for a Gastroenterology consultation, and for a complete work-up of the signficiant hyperbilirubinemia and transaminitis  Total Time for Visit, including Counseling / Coordination of Care: 60 minutes Greater than 50% of this total time spent on direct patient counseling and coordination of care  Chief Complaint:  Generalized weakness    History of Present Illness:  Priyank Amaro is a 80 y o  male who presents to the emergency department with the complaint of generalized weakness  Over the last 2 weeks, the patient has been experiencing generalized weakness  The generalized weakness is severe in intensity and constant in nature  Associated symptoms include jaundice, fatigue, decreased urination, and bilateral lower extremity edema  Nothing seemed to improve his symptoms  Review of Systems:  Review of Systems:  Per HPI, all other systems have been reviewed and were negative  Past Medical and Surgical History:   History reviewed  No pertinent past medical history  Past Surgical History:   Procedure Laterality Date    HERNIA REPAIR      PROSTATE SURGERY      REPLACEMENT TOTAL KNEE      last assessed: 7/11/17       Meds/Allergies:  Prior to Admission medications    Medication Sig Start Date End Date Taking?  Authorizing Provider   Diclofenac Sodium (VOLTAREN) 1 % Apply 2 g topically 4 (four) times a day  Patient not taking: Reported on 2/2/2022 4/19/21   Chacha Edward MD   doxycycline hyclate (VIBRAMYCIN) 100 mg capsule Take 1 capsule (100 mg total) by mouth in the morning 1/20/22 4/20/22  Callie Ogden DO   irbesartan (AVAPRO) 300 mg tablet TAKE 1 TABLET EVERY OTHER  DAY 6/3/21   Brijesh Reinoso MD   levothyroxine (Synthroid) 200 mcg tablet Take 1 tablet (200 mcg total) by mouth daily in the early morning 1/23/22   Callie Liz DO   metroNIDAZOLE (METROGEL) 0 75 % gel Apply topically 2 (two) times a day 4/16/20   Emeka Daniel MD   Multiple Vitamins-Minerals (ICAPS AREDS 2 PO) Take by mouth    Historical Provider, MD   neomycin-polymyxin-hydrocortisone (CORTISPORIN) otic solution Administer 4 drops into both ears every 8 (eight) hours for 7 days 7/15/20 7/22/20  YOLETTE Ramon   sildenafil (VIAGRA) 100 mg tablet Take 1 tablet (100 mg total) by mouth daily as needed for erectile dysfunction 1/19/21   Jaylen Calderón MD   tadalafil (CIALIS) 20 MG tablet Take 1 tablet (20 mg total) by mouth daily as needed for erectile dysfunction 8/23/21   Brijesh Reinoso MD   tamsulosin (FLOMAX) 0 4 mg      Historical Provider, MD     I have reviewed home medications with patient personally      Allergies: No Known Allergies    Social History:  Marital Status: /Civil Union     Substance Use History:   Social History     Substance and Sexual Activity   Alcohol Use No     Social History     Tobacco Use   Smoking Status Never Smoker   Smokeless Tobacco Never Used     Social History     Substance and Sexual Activity   Drug Use No       Family History:  Non-contributory    Physical Exam:     Vitals:   Blood Pressure: 140/75 (02/08/22 1130)  Pulse: 63 (02/08/22 1130)  Temperature: (!) 97 1 °F (36 2 °C) (02/08/22 0845)  Temp Source: Temporal (02/08/22 0845)  Respirations: 19 (02/08/22 1130)  Height: 6' 4" (193 cm) (02/08/22 1100)  Weight - Scale: 120 kg (264 lb 1 8 oz) (02/08/22 0845)  SpO2: 98 % (02/08/22 1130)    Physical Exam   General:  NAD, follows commands  HEENT:  NC/AT, mucous membranes moist  Neck:  Supple, No JVP elevation  CV:  + S1, + S2, RRR  Pulm:  Lung fields are CTA bilaterally  Abd:  Soft, Non-tender, Non-distended  Ext:  No clubbing/cyanosis, Edema of the bilateral lower extremities  Skin:  Jaundice-appearing  Neuro:  Awake, alert, oriented  Psych:  Normal mood and affect      Additional Data:     Lab Results:  Results from last 7 days   Lab Units 02/08/22  1010   WBC Thousand/uL 7 61   HEMOGLOBIN g/dL 12 4   HEMATOCRIT % 36 2*   PLATELETS Thousands/uL 156   NEUTROS PCT % 66   LYMPHS PCT % 16   MONOS PCT % 15*   EOS PCT % 1     Results from last 7 days   Lab Units 02/08/22  1010   SODIUM mmol/L 138   POTASSIUM mmol/L 4 0   CHLORIDE mmol/L 106   CO2 mmol/L 23   BUN mg/dL 23   CREATININE mg/dL 0 81   ANION GAP mmol/L 9   CALCIUM mg/dL 8 3   ALBUMIN g/dL 2 7*   TOTAL BILIRUBIN mg/dL 13 62*   ALK PHOS U/L 976*   ALT U/L 216*   AST U/L 207*   GLUCOSE RANDOM mg/dL 112     Results from last 7 days   Lab Units 02/08/22  1010   INR  1 19                   Imaging: No pertinent imaging reviewed  US right upper quadrant with liver dopplers    (Results Pending)   MRI ED order    (Results Pending)   CT abdomen pelvis w contrast    (Results Pending)   VAS lower limb venous duplex study, complete bilateral    (Results Pending)       EKG and Other Studies Reviewed on Admission:   · EKG: NSR  HR 69 bpm     ** Please Note: This note has been constructed using a voice recognition system   **

## 2022-02-08 NOTE — ASSESSMENT & PLAN NOTE
· Admit to med/surg level of care  · Marked hyperbilirubinemia concerning for biliary obstruction versus decompensated liver cirrhosis  · A right upper quadrant ultrasound was ordered in the ED and is pending at this time  · Check a CT scan of the abdomen/pelvis with IV contrast   · Check an MRCP  · Consult Gastroenterology  · Transaminitis serology work-up per Gastroenterology  · Check an acetaminophen level  · Avoid all hepatotoxic agents  · Check COVID-19 PCR testing  · Follow the total bilirubin level    Results for Dilip Pichardo (MRN [id number]) as of 2/8/2022 12:01   Ref   Range 2/8/2022 10:10   TOTAL BILIRUBIN Latest Ref Range: 0 20 - 1 00 mg/dL 13 62 (H)

## 2022-02-08 NOTE — ASSESSMENT & PLAN NOTE
· Please see the assessment and plan for "Hyperbilirubinemia"    Results from last 7 days   Lab Units 02/08/22  1010   SODIUM mmol/L 138   POTASSIUM mmol/L 4 0   CHLORIDE mmol/L 106   CO2 mmol/L 23   BUN mg/dL 23   CREATININE mg/dL 0 81   CALCIUM mg/dL 8 3   ALK PHOS U/L 976*   ALT U/L 216*   AST U/L 207*

## 2022-02-08 NOTE — CASE MANAGEMENT
Case Management Progress Note    Patient name Thang Dunn  Location /983-89 MRN [id number]  : 1934 Date 2022       LOS (days): 0  Geometric Mean LOS (GMLOS) (days): 4 70  Days to GMLOS:4 6        OBJECTIVE:        Current admission status: Inpatient  Preferred Pharmacy:   [facility]  [address]  Phone: [phone] Fax: [phone] [facility], [address] [place] [address]  Phone: [phone] Fax: [phone] AID-[id number] [facility], [address] [address]  Phone: [phone] Fax: [phone]    Primary Care Provider: Lizet Umanzor MD    Primary Insurance: [facility]  Secondary Insurance:     PROGRESS NOTE:Pt to go to [address] [place] [address] tomorrow () for MRI abdomen and MRCP and come back afterwards  Per Tana Cuello they can get pt in at 10 am and requested a 9 am  time  CM working on arranging transport

## 2022-02-08 NOTE — ED PROVIDER NOTES
EMERGENCY DEPARTMENT ENCOUNTER NOTE    This note has been generated using a voice recognition software  There may be typographic, grammatic, or word substitution errors that have escaped editorial review  ? CHIEF COMPLAINT  Chief Complaint   Patient presents with    Weakness - Generalized     weakness and decreased urination for 2 weeks       HPI  Kelsi Artist is a 80 y o  male with no significant past medical history presenting with jaundice  Patient recently developed elevated liver enzymes and was seen by Dr Afsaneh Patton on 02/02/2022  Patient is suspected to have more of a biliary obstruction picture and is slated to undergo MRCP on Thursday, in 2 days  In the past 2 weeks, patient had progressively decreasing urine output  His abdomen is a lot more bloated than usual   His legs are also starting to swell  Per wife, he has occasional episodes of confusion, although this appears to be fluctuating  Patient himself reports feeling very fatigued  He denies chest pain  He denies shortness of breath  He has not had any fevers or chills  He has not had any abdominal pain, just abdominal bloating  He has not had any blood in his stools or dark tarry stools  He has been eating and drinking although his meals are decreased  REVIEW OF SYSTEMS    Constitutional: denies fevers, chills  Visual/Eyes: no changes in vision  HENT: no rhinorrhea, no sore throat  Cardiac: no chest pain  Respiratory: no shortness of breath, no cough  GI:  Jaundice, no abdominal pain, nausea, vomiting, or diarrhea  : no burning with urination  Heme/Onc: no easy bruising  Endocrine: no diabetes  Neuro: no focal weakness or numbness, no headaches    Ten systems reviewed and negative unless otherwise noted in HPI and above    PAST MEDICAL HISTORY  History reviewed  No pertinent past medical history      SURGICAL HISTORY  Past Surgical History:   Procedure Laterality Date    HERNIA REPAIR      PROSTATE SURGERY      REPLACEMENT TOTAL KNEE      last assessed: 7/11/17       FAMILY HISTORY  History reviewed  No pertinent family history  CURRENT MEDICATIONS  No current facility-administered medications on file prior to encounter       Current Outpatient Medications on File Prior to Encounter   Medication Sig    Diclofenac Sodium (VOLTAREN) 1 % Apply 2 g topically 4 (four) times a day (Patient not taking: Reported on 2/2/2022 )    doxycycline hyclate (VIBRAMYCIN) 100 mg capsule Take 1 capsule (100 mg total) by mouth in the morning    irbesartan (AVAPRO) 300 mg tablet TAKE 1 TABLET EVERY OTHER  DAY    levothyroxine (Synthroid) 200 mcg tablet Take 1 tablet (200 mcg total) by mouth daily in the early morning    metroNIDAZOLE (METROGEL) 0 75 % gel Apply topically 2 (two) times a day    Multiple Vitamins-Minerals (ICAPS AREDS 2 PO) Take by mouth    neomycin-polymyxin-hydrocortisone (CORTISPORIN) otic solution Administer 4 drops into both ears every 8 (eight) hours for 7 days    sildenafil (VIAGRA) 100 mg tablet Take 1 tablet (100 mg total) by mouth daily as needed for erectile dysfunction    tadalafil (CIALIS) 20 MG tablet Take 1 tablet (20 mg total) by mouth daily as needed for erectile dysfunction    tamsulosin (FLOMAX) 0 4 mg         ALLERGIES  No Known Allergies    SOCIAL HISTORY  Social History     Socioeconomic History    Marital status: /Civil Union     Spouse name: None    Number of children: None    Years of education: None    Highest education level: None   Occupational History    None   Tobacco Use    Smoking status: Never Smoker    Smokeless tobacco: Never Used   Vaping Use    Vaping Use: Never used   Substance and Sexual Activity    Alcohol use: No    Drug use: No    Sexual activity: None   Other Topics Concern    None   Social History Narrative    None     Social Determinants of Health     Financial Resource Strain: Not on file   Food Insecurity: Not on file   Transportation Needs: Not on file Physical Activity: Not on file   Stress: Not on file   Social Connections: Not on file   Intimate Partner Violence: Not on file   Housing Stability: Not on file       PHYSICAL EXAM    /66 (BP Location: Left arm)   Pulse 65   Temp (!) 97 1 °F (36 2 °C) (Temporal)   Resp 19   Wt 120 kg (264 lb 1 8 oz)   SpO2 100%   BMI 32 15 kg/m²   Vital signs and nursing notes reviewed    CONSTITUTIONAL: male appearing stated age resting in bed, in no acute distress  HEENT: atraumatic, normocephalic  Jaundiced with scleral icterus  Moist oral mucosa  CARDIOVASCULAR/CHEST: RRR, no M/R/G  2+ radial pulses  PULMONARY: Breathing comfortably on RA  Breath sounds are equal and clear to auscultation  ABDOMEN:  Distended, soft, bowel sounds present, normoactive, nontender, negative Fraser's  MSK: moves all extremities, no deformities, 1+ pitting edema in right lower extremity, 2+ pitting edema in left lower extremity  NEURO: Awake, alert, and oriented x 3  Face symmetric  Moves all extremities spontaneously  No focal neurologic deficits  SKIN: Warm, appears well-perfused  MENTAL STATUS: Normal affect      ? LABS AND TESTS    Results Reviewed     Procedure Component Value Units Date/Time    Acetaminophen level-"If concentration is detectable, please discuss with medical  on call " [[id number]]  (Abnormal) Collected: 02/08/22 1010    Lab Status: Final result Specimen: Blood from Arm, Right Updated: 02/08/22 1251     Acetaminophen Level <2 0 ug/mL     MELODIE Screen w/ Reflex to Titer/Pattern [[id number]] Collected: 02/08/22 1145    Lab Status: In process Specimen: Blood from Arm, Right Updated: 02/08/22 1156    Anti-smooth muscle antibody, IgG [[id number]] Collected: 02/08/22 1145    Lab Status: In process Specimen: Blood from Arm, Right Updated: 02/08/22 1156    Alpha-1-antitrypsin [[id number]] Collected: 02/08/22 1145    Lab Status:  In process Specimen: Blood from Arm, Right Updated: 02/08/22 1156    Antimitochondrial antibody [[id number]] Collected: 02/08/22 1145    Lab Status:  In process Specimen: Blood from Arm, Right Updated: 02/08/22 1156    Protime-INR [[id number]]  (Normal) Collected: 02/08/22 1010    Lab Status: Final result Specimen: Blood from Arm, Right Updated: 02/08/22 1054     Protime 14 5 seconds      INR 1 19    APTT [[id number]]  (Normal) Collected: 02/08/22 1010    Lab Status: Final result Specimen: Blood from Arm, Right Updated: 02/08/22 1054     PTT 30 seconds     Hepatic function panel [[id number]]  (Abnormal) Collected: 02/08/22 1010    Lab Status: Final result Specimen: Blood from Arm, Right Updated: 02/08/22 1048     Total Bilirubin 13 62 mg/dL      Bilirubin, Direct 10 28 mg/dL      Alkaline Phosphatase 976 U/L       U/L       U/L      Total Protein 6 5 g/dL      Albumin 2 7 g/dL     Basic metabolic panel [[id number]] Collected: 02/08/22 1010    Lab Status: Final result Specimen: Blood from Arm, Right Updated: 02/08/22 1048     Sodium 138 mmol/L      Potassium 4 0 mmol/L      Chloride 106 mmol/L      CO2 23 mmol/L      ANION GAP 9 mmol/L      BUN 23 mg/dL      Creatinine 0 81 mg/dL      Glucose 112 mg/dL      Calcium 8 3 mg/dL      eGFR 79 ml/min/1 73sq m     Narrative:      [place] guidelines for Chronic Kidney Disease (CKD):     Stage 1 with normal or high GFR (GFR > 90 mL/min/1 73 square meters)    Stage 2 Mild CKD (GFR = 60-89 mL/min/1 73 square meters)    Stage 3A Moderate CKD (GFR = 45-59 mL/min/1 73 square meters)    Stage 3B Moderate CKD (GFR = 30-44 mL/min/1 73 square meters)    Stage 4 Severe CKD (GFR = 15-29 mL/min/1 73 square meters)    Stage 5 End Stage CKD (GFR <15 mL/min/1 73 square meters)  Note: GFR calculation is accurate only with a steady state creatinine    Ammonia [[id number]]  (Abnormal) Collected: 02/08/22 1010    Lab Status: Final result Specimen: Blood from Arm, Right Updated: 02/08/22 1042     Ammonia 58 umol/L     CBC and differential [[id number]]  (Abnormal) Collected: 02/08/22 1010    Lab Status: Final result Specimen: Blood from Arm, Right Updated: 02/08/22 1033     WBC 7 61 Thousand/uL      RBC 3 62 Million/uL      Hemoglobin 12 4 g/dL      Hematocrit 36 2 %       fL      MCH 34 3 pg      MCHC 34 3 g/dL      RDW 18 1 %      MPV 11 3 fL      Platelets 387 Thousands/uL      nRBC 0 /100 WBCs      Neutrophils Relative 66 %      Immat GRANS % 1 %      Lymphocytes Relative 16 %      Monocytes Relative 15 %      Eosinophils Relative 1 %      Basophils Relative 1 %      Neutrophils Absolute 5 11 Thousands/µL      Immature Grans Absolute 0 04 Thousand/uL      Lymphocytes Absolute 1 21 Thousands/µL      Monocytes Absolute 1 11 Thousand/µL      Eosinophils Absolute 0 09 Thousand/µL      Basophils Absolute 0 05 Thousands/µL           US right upper quadrant with liver dopplers   Final Result by Alma Miner MD (02/08 1421)      1  Cirrhosis  Increased hepatic echogenicity may be due to hepatic steatosis and/or hepatic parenchymal disease  2   Patent central hepatic vessels with normal direction of flow  Increased phasicity of the portal veins which can be seen with cirrhosis, right heart failure, or tricuspid regurgitation  3   Gallbladder not visualized  Intrahepatic biliary ductal dilatation better visualized on subsequent CT  CBD not visualized  Please refer to subsequent CT for description  4   Moderate ascites  Workstation performed: [id number]         MRI abdomen wo contrast and mrcp    (Results Pending)       ED COURSE & MEDICAL DECISION MAKING  ECG 12 Lead Documentation Only    Date/Time: 2/8/2022 9:00 AM  Performed by: Aron Blair MD  Authorized by: Aron Blair MD     Comments:      Normal sinus rhythm, ventricular rate 69, MT interval 212 consistent with first-degree block, , , left axis deviation, no STEMI                   Medications - No data to display    59-year-old male presenting with jaundice  Vital signs reviewed, afebrile, within normal limits  Medical record reviewed including most recent labs and GI outpatient visit  Differential diagnosis for jaundice includes biliary obstruction such as due to cholelithiasis, stricture, mass, with other etiology of symptoms such as cirrhosis, acute cholecystitis, considered  At this time, there is nothing to suggest ascending cholangitis  BMP today reveals intact renal function  LFTs reveal AST/ALT 2 7/216, alk-phos 976, total bilirubin 13 62, direct bilirubin 10 28  INR is normal at 1 29  ED Course as of 02/08/22 1504   Tue Feb 08, 2022   1120 Reaching out to [facility] with GI for further recommendations  1128 GI recommends obtaining a repeat liver ultrasound with dopplers, alpha 1 antitrypsin, christal, anti mitochondrial, anti smooth muscle and chronic hep panel, as well as MRCP     Given report of difficulty with urination, will obtain UA to rule out UTI  Patient and wife updated at bedside  Patient admitted to Internal Medicine for further evaluation of his jaundice  Again, at this time, nothing to suggest ascending cholangitis      MDM  Number of Diagnoses or Management Options  Jaundice: new and requires workup     Amount and/or Complexity of Data Reviewed  Clinical lab tests: ordered and reviewed  Tests in the radiology section of CPT®: ordered and reviewed  Tests in the medicine section of CPT®: ordered and reviewed  Review and summarize past medical records: yes  Discuss the patient with other providers: yes (GI, internal medicine)  Independent visualization of images, tracings, or specimens: yes    Risk of Complications, Morbidity, and/or Mortality  Presenting problems: high  Diagnostic procedures: high  Management options: high    Patient Progress  Patient progress: stable      CLINICAL IMPRESSION  Final diagnoses:   Jaundice       DISPOSITION  Time reflects when diagnosis was documented in both MDM as applicable and the Disposition within this note     Time User Action Codes Description Comment    2/8/2022 11:49 AM Ala Kathi Add [R17] Elevated bilirubin     2/8/2022 11:50 AM Donnalee Leak Add [R17] Jaundice     2/8/2022 11:50 AM Donnalee Leak Modify [R17] Elevated bilirubin     2/8/2022 11:50 AM Donnalee Leak Modify [R17] Jaundice     2/8/2022 12:29 PM Lorence Pali Modify [R17] Jaundice       ED Disposition     ED Disposition Condition Date/Time Comment    Admit Stable e Feb 8, 2022 11:50 AM Case was discussed with Dr Ricardo Mcgrath and the patient's admission status was agreed to be Admission Status: inpatient status to the service of Dr Ricardo Mcgrath           Follow-up Information    None            Aron Blair MD  02/08/22 3763

## 2022-02-08 NOTE — ASSESSMENT & PLAN NOTE
· Due to liver disease  · Initiate lactulose 10 grams PO Qdaily for a goal 2-3 soft bowel movements per 24 hours

## 2022-02-09 PROBLEM — K83.8 COMMON BILE DUCT DILATATION: Status: ACTIVE | Noted: 2022-01-01

## 2022-02-09 PROBLEM — I81 THROMBOSIS, PORTAL VEIN: Status: ACTIVE | Noted: 2022-01-01

## 2022-02-09 PROBLEM — I51.89 DIASTOLIC DYSFUNCTION: Status: ACTIVE | Noted: 2022-01-01

## 2022-02-09 PROBLEM — R19.09 MASS OF RIGHT INGUINAL REGION: Status: ACTIVE | Noted: 2022-01-01

## 2022-02-09 PROBLEM — R93.2 ABNORMAL MRI, LIVER: Status: ACTIVE | Noted: 2022-01-01

## 2022-02-09 PROBLEM — R91.1 LUNG NODULE: Status: ACTIVE | Noted: 2022-01-01

## 2022-02-09 PROBLEM — R79.89 ELEVATED TSH: Status: ACTIVE | Noted: 2022-01-01

## 2022-02-09 PROBLEM — K74.60 LIVER CIRRHOSIS (HCC): Status: ACTIVE | Noted: 2022-01-01

## 2022-02-09 PROBLEM — R94.31 ABNORMAL EKG: Status: ACTIVE | Noted: 2022-01-01

## 2022-02-09 PROBLEM — R82.90 ABNORMAL URINALYSIS: Status: ACTIVE | Noted: 2022-01-01

## 2022-02-09 NOTE — ASSESSMENT & PLAN NOTE
· Please see the assessment and plan for "Hyperbilirubinemia"    Results from last 7 days   Lab Units 02/09/22  0604 02/08/22  1010   SODIUM mmol/L 136 138   POTASSIUM mmol/L 3 8 4 0   CHLORIDE mmol/L 106 106   CO2 mmol/L 20* 23   BUN mg/dL 22 23   CREATININE mg/dL 0 77 0 81   CALCIUM mg/dL 8 2* 8 3   ALK PHOS U/L 940* 976*   ALT U/L 180* 216*   AST U/L 156* 207*

## 2022-02-09 NOTE — EMTALA/ACUTE CARE TRANSFER
[place] [address] SURGICAL [address]  [address] [facility] [address]  Dept: [phone]      ACUTE CARE TRANSFER CONSENT    NAME Karthik Cruz                                         1934                              MRN [id number]    I have been informed of my rights regarding examination, treatment, and transfer   by Dr Pepito Mccullough DO    Benefits: To evaluate the common bile duct dilatation and hyperbilirubinemia with an ERCP and possible EUS (endoscopic ultrasound)    Risks:  Ambulance transportation risk      Consent for Transfer:  I acknowledge that my medical condition has been evaluated and explained to me by the treating physician or other qualified medical person and/or my attending physician, who has recommended that I be transferred to the service of Dr [facility]) at [address] [place] [address]  The above potential benefits of such transfer, the potential risks associated with such transfer, and the probable risks of not being transferred have been explained to me, and I fully understand them  The doctor has explained that, in my case, the benefits of transfer outweigh the risks  I agree to be transferred  I authorize the performance of emergency medical procedures and treatments upon me in both transit and upon arrival at the receiving facility  Additionally, I authorize the release of any and all medical records to the receiving facility and request they be transported with me, if possible  I understand that the safest mode of transportation during a medical emergency is an ambulance and that the Hospital advocates the use of this mode of transport  Risks of traveling to the receiving facility by car, including absence of medical control, life sustaining equipment, such as oxygen, and medical personnel has been explained to me and I fully understand them      ([address])  [  ] I consent to the stated transfer and to be transported by ambulance/helicopter  [  ]  I consent to the stated transfer, but refuse transportation by ambulance and accept full responsibility for my transportation by car  I understand the risks of non-ambulance transfers and I exonerate the Hospital and its staff from any deterioration in my condition that results from this refusal     X___________________________________________    DATE  22  TIME________  Signature of patient or legally responsible individual signing on patient behalf           RELATIONSHIP TO PATIENT_________________________          Provider Certification    NAME Priyank Amaro                                        [facility] 1934                              MRN [id number]    A medical screening exam was performed on the above named patient  Based on the examination:    Condition Necessitating Transfer:  Hyperbilirubinemia with common bile duct dilatation requiring an ERCP at [address] [place] [address]    Patient Condition:  Hemodynamically stable    Reason for Transfer:  Hyperbilirubinemia with common bile duct dilatation requiring an ERCP at [address] [place] [address]    Transfer Requirements: [address]  · Space available and qualified personnel available for treatment as acknowledged by    · Agreed to accept transfer and to provide appropriate medical treatment as acknowledged by          · Appropriate medical records of the examination and treatment of the patient are provided at the time of transfer   [address] [facility], [place], [address] _______  · Transfer will be performed by qualified personnel from    and appropriate transfer equipment as required, including the use of necessary and appropriate life support measures      Provider Certification: I have examined the patient and explained the following risks and benefits of being transferred/refusing transfer to the patient/family:         Based on these reasonable risks and benefits to the patient and/or the unborn child(americo), and based upon the information available at the time of the patients examination, I certify that the medical benefits reasonably to be expected from the provision of appropriate medical treatments at another medical facility outweigh the increasing risks, if any, to the individuals medical condition, and in the case of labor to the unborn child, from effecting the transfer      X____________________________________________ DATE 02/09/22        TIME_______      ORIGINAL - SEND TO MEDICAL RECORDS   COPY - SEND WITH PATIENT DURING TRANSFER

## 2022-02-09 NOTE — PHYSICAL THERAPY NOTE
PHYSICAL THERAPY        Patient Name: Janice HELM Date: 2/9/2022 02/09/22 1408   PT Last Visit   PT Visit Date 02/09/22   Note Type   Note type Cancelled Session   Cancel Reasons Other   Additional Comments Pt being transferred to hospitals for higher level of care; will d/c current PT orders       Geoffrey Velarde, PT

## 2022-02-09 NOTE — ASSESSMENT & PLAN NOTE
· Check a methylmalonic acid level and folate level    Results for Traci Jane (MRN [id number]) as of 2/8/2022 12:01   Ref   Range 1/21/2022 08:09   Vitamin B-12 Latest Ref Range: 100 - 900 pg/mL 502

## 2022-02-09 NOTE — ASSESSMENT & PLAN NOTE
· Continue his home dose of PO levothyroxine  · Will need a repeat TSH in 1-2 weeks with his PCP  · If the TSH level remains elevated as an outpatient, he will need an increased dose of PO levothyroxine prescribed by his PCP  Results for Heaven Green (MRN [id number]) as of 2/9/2022 17:59   Ref   Range 2/9/2022 06:04   TSH 3RD GENERATON Latest Ref Range: 0 358 - 3 740 uIU/mL 4 931 (H)

## 2022-02-09 NOTE — ASSESSMENT & PLAN NOTE
· Outpatient General Surgery evaluation    Venous duplex of the bilateral lower extremities (02/08/2022):  Note: There is a well defined hypoechoic non-vascularized cystic-type structure  in the groin, 5 1 cm  Candace Blend

## 2022-02-09 NOTE — CONSULTS
Consultation - [facility]  Arianne Norman 80 y o  male MRN: [id number]  Unit/Bed#: 426-01 Encounter: [id number]        Consults    Reason for Consult / Principal Problem:     1  Elevated bilirubin   2  Jaundice      ASSESSMENT AND PLAN:      1  Elevated bilirubin   2  Jaundice  3  Hyperammonemia    Patient presented to the emergency department with weakness, fatigue and jaundice  He was found to have  a total bilirubin of 13 62, an alkaline phosphatase of 976, an AST of 207 and an ALT of 216  Patient's AST, ALT and alkaline phosphatase have all down trended today however his total bilirubin has increased to 16 05  Patient was also found to have a mildly elevated ammonia level  Patient is alert and oriented x4 on exam but does state that he is very forgetful  CT of the abdomen concerning for CBD obstructing mass or stricture as well as a few possible ill-defined hypodense areas in the liver which cannot exclude metastasis  Patient is recommended to undergo MRI/MRCP which has been ordered  Would recommend transfer for ERCP  - follow-up MRI/MRCP  - transfer for ERCP  - trend LFTs  - agree with 10 g lactulose daily    ______________________________________________________________________    HPI:  Eden Hamilton is an 19-year-old male with a past medical history of hypertension and hyperlipidemia that presented to the emergency department with weakness and jaundice  Patient was found to have a total bilirubin of 13 62, an alkaline phosphatase of 976, an AST of 207 and an ALT of 216  Patient's AST, ALT and alkaline phosphatase have all down trended today however his total bilirubin has increased to 16 05  He was also found to have an ammonia level of 58 on admission  Patient underwent ultrasound with liver Dopplers that revealed patent veins, possible cirrhosis, moderate ascites and intrahepatic biliary ductal dilatation and he was recommended to undergo CT scan    CT scan revealed diffuse intrahepatic biliary ductal dilatation with the appearance of an ill-defined soft tissue in the expected location of the CBD which are suspicious for obstructing mass or stricture, a few possible ill-defined hypodense areas in the liver in which metastasis cannot be excluded  Patient is recommended to undergo MRI with MRCP  He reports abdominal bloating but denies abdominal pain, nausea, vomiting, black or bloody stools  REVIEW OF SYSTEMS:    CONSTITUTIONAL: Denies any fever, chills, rigors, and weight loss  HEENT: No earache or tinnitus  Denies hearing loss or visual disturbances  CARDIOVASCULAR: No chest pain or palpitations  RESPIRATORY: Denies any cough, hemoptysis, shortness of breath or dyspnea on exertion  GASTROINTESTINAL: As noted in the History of Present Illness  GENITOURINARY: No problems with urination  Denies any hematuria or dysuria  NEUROLOGIC: No dizziness or vertigo, denies headaches  MUSCULOSKELETAL: Denies any muscle or joint pain  SKIN: Denies skin rashes or itching  ENDOCRINE: Denies excessive thirst  Denies intolerance to heat or cold  PSYCHOSOCIAL: Denies depression or anxiety  Denies any recent memory loss  Historical Information   History reviewed  No pertinent past medical history  Past Surgical History:   Procedure Laterality Date    HERNIA REPAIR      PROSTATE SURGERY      REPLACEMENT TOTAL KNEE      last assessed: 7/11/17     Social History   Social History     Substance and Sexual Activity   Alcohol Use Never     Social History     Substance and Sexual Activity   Drug Use No     Social History     Tobacco Use   Smoking Status Never Smoker   Smokeless Tobacco Never Used     History reviewed  No pertinent family history      Meds/Allergies     Medications Prior to Admission   Medication    doxycycline hyclate (VIBRAMYCIN) 100 mg capsule    irbesartan (AVAPRO) 300 mg tablet    levothyroxine (Synthroid) 200 mcg tablet    metroNIDAZOLE (METROGEL) 0 75 % gel    Multiple Vitamins-Minerals (ICAPS AREDS 2 PO)    Diclofenac Sodium (VOLTAREN) 1 %    neomycin-polymyxin-hydrocortisone (CORTISPORIN) otic solution    sildenafil (VIAGRA) 100 mg tablet    tadalafil (CIALIS) 20 MG tablet    tamsulosin (FLOMAX) 0 4 mg     Current Facility-Administered Medications   Medication Dose Route Frequency    enoxaparin (LOVENOX) subcutaneous injection 40 mg  40 mg Subcutaneous Q24H    lactulose oral solution 10 g  10 g Oral Daily    levothyroxine tablet 200 mcg  200 mcg Oral Early Morning    losartan (COZAAR) tablet 25 mg  25 mg Oral Daily    multi-electrolyte (PLASMALYTE-A/ISOLYTE-S PH 7 4) IV solution  50 mL/hr Intravenous Continuous       No Known Allergies        Objective     Blood pressure 133/82, pulse 83, temperature 98 1 °F (36 7 °C), temperature source Oral, resp  rate 18, height 6' 4" (1 93 m), weight 120 kg (264 lb 1 8 oz), SpO2 93 %  Body mass index is 32 15 kg/m²  Intake/Output Summary (Last 24 hours) at 2/9/2022 1007  Last data filed at 2/9/2022 9388  Gross per 24 hour   Intake 420 ml   Output 600 ml   Net -180 ml         PHYSICAL EXAM:      General Appearance:   Alert, cooperative, no distress   HEENT:   Normocephalic, atraumatic  Scleral icterus noted     Neck:  Supple, symmetrical, trachea midline   Lungs:   Clear to auscultation bilaterally; no rales, rhonchi or wheezing; respirations unlabored    Heart[de-identified]   Regular rate and rhythm; no murmur, rub, or gallop  Abdomen:   Soft, non-tender, distended; normal bowel sounds; no masses, no organomegaly    Genitalia:   Deferred    Rectal:   Deferred    Extremities:  No cyanosis, clubbing or edema    Pulses:  2+ and symmetric all extremities    Skin:  No rashes, or lesions    Jaundice noted              Lab Results:   Admission on 02/08/2022   Component Date Value    WBC 02/08/2022 7 61     RBC 02/08/2022 3 62*    Hemoglobin 02/08/2022 12 4     Hematocrit 02/08/2022 36 2*    MCV 02/08/2022 100*    MCH 02/08/2022 34 3     MCHC 02/08/2022 34 3     RDW 02/08/2022 18 1*    MPV 02/08/2022 11 3     Platelets 88/39/6173 156     nRBC 02/08/2022 0     Neutrophils Relative 02/08/2022 66     Immat GRANS % 02/08/2022 1     Lymphocytes Relative 02/08/2022 16     Monocytes Relative 02/08/2022 15*    Eosinophils Relative 02/08/2022 1     Basophils Relative 02/08/2022 1     Neutrophils Absolute 02/08/2022 5 11     Immature Grans Absolute 02/08/2022 0 04     Lymphocytes Absolute 02/08/2022 1 21     Monocytes Absolute 02/08/2022 1 11     Eosinophils Absolute 02/08/2022 0 09     Basophils Absolute 02/08/2022 0 05     Protime 02/08/2022 14 5     INR 02/08/2022 1 19     PTT 02/08/2022 30     Color, UA 02/08/2022 Jennifer     Clarity, UA 02/08/2022 Clear     Specific Gravity, UA 02/08/2022 1 025     pH, UA 02/08/2022 6 0     Leukocytes, UA 02/08/2022 Negative     Nitrite, UA 02/08/2022 Positive*    Protein, UA 02/08/2022 Trace*    Glucose, UA 02/08/2022 Negative     Ketones, UA 02/08/2022 Trace*    Urobilinogen, UA 02/08/2022 2 0*    Bilirubin, UA 02/08/2022 Interference- unable to analyze*    Blood, UA 02/08/2022 Negative     Ammonia 02/08/2022 58*    Sodium 02/08/2022 138     Potassium 02/08/2022 4 0     Chloride 02/08/2022 106     CO2 02/08/2022 23     ANION GAP 02/08/2022 9     BUN 02/08/2022 23     Creatinine 02/08/2022 0 81     Glucose 02/08/2022 112     Calcium 02/08/2022 8 3     eGFR 02/08/2022 79     Total Bilirubin 02/08/2022 13 62*    Bilirubin, Direct 02/08/2022 10 28*    Alkaline Phosphatase 02/08/2022 976*    AST 02/08/2022 207*    ALT 02/08/2022 216*    Total Protein 02/08/2022 6 5     Albumin 02/08/2022 2 7*    A-1 Antitrypsin 02/08/2022 192*    SARS-CoV-2 02/08/2022 Negative     INFLUENZA A PCR 02/08/2022 Negative     INFLUENZA B PCR 02/08/2022 Negative     RSV PCR 02/08/2022 Negative     RBC, UA 02/08/2022 0-1     WBC, UA 02/08/2022 0-1     Epithelial Cells 02/08/2022 Occasional     Bacteria, UA 02/08/2022 Occasional     Ca Oxalate Hiwot, UA 02/08/2022 Occasional*    MUCUS THREADS 02/08/2022 Occasional*    Acetaminophen Level 02/08/2022 <2 0*    WBC 02/09/2022 7 59     RBC 02/09/2022 3 56*    Hemoglobin 02/09/2022 12 4     Hematocrit 02/09/2022 34 9*    MCV 02/09/2022 98     MCH 02/09/2022 34 8*    MCHC 02/09/2022 35 5     RDW 02/09/2022 18 0*    MPV 02/09/2022 10 8     Platelets 48/80/2071 165     nRBC 02/09/2022 0     Neutrophils Relative 02/09/2022 69     Immat GRANS % 02/09/2022 1     Lymphocytes Relative 02/09/2022 15     Monocytes Relative 02/09/2022 13*    Eosinophils Relative 02/09/2022 1     Basophils Relative 02/09/2022 1     Neutrophils Absolute 02/09/2022 5 25     Immature Grans Absolute 02/09/2022 0 05     Lymphocytes Absolute 02/09/2022 1 11     Monocytes Absolute 02/09/2022 1 02     Eosinophils Absolute 02/09/2022 0 11     Basophils Absolute 02/09/2022 0 05     Total CK 02/09/2022 74     Sodium 02/09/2022 136     Potassium 02/09/2022 3 8     Chloride 02/09/2022 106     CO2 02/09/2022 20*    ANION GAP 02/09/2022 10     BUN 02/09/2022 22     Creatinine 02/09/2022 0 77     Glucose 02/09/2022 105     Calcium 02/09/2022 8 2*    Corrected Calcium 02/09/2022 9 4     AST 02/09/2022 156*    ALT 02/09/2022 180*    Alkaline Phosphatase 02/09/2022 940*    Total Protein 02/09/2022 6 8     Albumin 02/09/2022 2 5*    Total Bilirubin 02/09/2022 16 05*    eGFR 02/09/2022 81     Magnesium 02/09/2022 1 9     Phosphorus 02/09/2022 2 8     Procalcitonin 02/09/2022 1 22*    LACTIC ACID 02/09/2022 2 0     TSH 3RD GENERATON 02/09/2022 4 931*    NT-proBNP 02/09/2022 242     Protime 02/09/2022 14 6*    INR 02/09/2022 1 20*    Ventricular Rate 02/08/2022 69     Atrial Rate 02/08/2022 69     WI Interval 02/08/2022 212     QRSD Interval 02/08/2022 102     QT Interval 02/08/2022 434     QTC Interval 02/08/2022 465     P Axis 02/08/2022 53     QRS Axis 02/08/2022 -61     T Wave Axis 02/08/2022 46        Imaging Studies: I have personally reviewed pertinent imaging studies

## 2022-02-09 NOTE — OCCUPATIONAL THERAPY NOTE
Occupational Therapy         Patient Name: Aileen Rodriguez  NSYWQ'F Date: 2/9/2022 02/09/22 1403   OT Last Visit   OT Visit Date 02/09/22   Note Type   Note type Cancelled Session   Cancel Reasons Other   Additional Comments pt is to be transferred to higher level of care; will complete OT orders at this time     Howard, OT

## 2022-02-09 NOTE — CASE MANAGEMENT
Case Management Progress Note    Patient name Beni Vega  Location /235-60 MRN [id number]  : 1934 Date 2022       LOS (days): 1  Geometric Mean LOS (GMLOS) (days): 4 70  Days to GMLOS:3 9        OBJECTIVE:        Current admission status: Inpatient  Preferred Pharmacy:   [facility] [address]  Phone: [phone] Fax: [address], [address] [place] [address]  Phone: [phone] Fax: [phone] [id number] [place] [address] 68  [address] [place] [address]  Phone: [phone] Fax: [phone]    Primary Care Provider: Norma Dinh MD    Primary Insurance: [facility] REP  Secondary Insurance:     [address] ambulance picking pt up at 9am to transport him to MRI at [facility] and bring him back afterwards

## 2022-02-09 NOTE — ASSESSMENT & PLAN NOTE
· Outpatient Cardiology evaluation    ECG 12 lead  Order: [id number]   Status: Final result     Visible to patient: Yes (not seen)     Next appt: 02/14/2022 at 08:20 AM in Gastroenterology Xavi Madsen MD)     0 Result Notes    Component Ref Range & Units 2/8/22 0850 12/19/19 1016   Ventricular Rate BPM 69  65    Atrial Rate BPM 69  65    SC Interval ms 212  208    QRSD Interval ms 102  102    QT Interval ms 434  400    QTC Interval ms 465  416    P Axis degrees 53  56    QRS Axis degrees -63  232    T Wave Axis degrees 46  13              Narrative & Impression    Sinus rhythm with 1st degree A-V block  Left axis deviation  Low voltage QRS  Abnormal ECG  When compared with ECG of 19-DEC-2019 10:16,  Incomplete right bundle branch block is no longer Present  QT has lengthened  Confirmed by Nilo Pollock ([id number]) on 2/9/2022 9:20:41 AM      Specimen Collected: 02/08/22 08:50 Last

## 2022-02-09 NOTE — DISCHARGE SUMMARY
[address] [facility] [address]  Discharge- Jean Marie Hoskins 1934, 80 y o  male MRN: [id number]  Unit/Bed#: 426-01 Encounter: [id number]  Primary Care Provider: Marie Tolliver MD   Date and time admitted to hospital: 2/8/2022  8:41 AM    * Hyperbilirubinemia  Assessment & Plan  · Marked hyperbilirubinemia concerning for biliary obstruction versus decompensated liver cirrhosis  · The patient was seen in consultation by Gastroenterology  · Transaminitis serology work-up per Gastroenterology  · Check an acetaminophen level  · Avoid all hepatotoxic agents  · Check tumor markers including a CEA level and CA 19-9 level  · Per Gastroenterology's recommendations, the patient requires transfer to a higher level of care with the ability to complete an ERCP and possible EUS (endoscopic ultrasound)  The patient will be transferred to the service of Dr Virginia Mc[facility] Att[facility]) at [address] [place] [address]  Results for Traci Jane (MRN [id number]) as of 2/9/2022 17:59   Ref  Range 2/8/2022 10:10 2/8/2022 11:45 2/8/2022 11:53 2/8/2022 14:40 2/9/2022 06:03 2/9/2022 06:04   TOTAL BILIRUBIN Latest Ref Range: 0 20 - 1 00 mg/dL 13 62 (H)     16 05 (H)       MRI of the abdomen/MRCP (02/09/2022): IMPRESSION:     1   Biliary ductal dilatation with abrupt cut off of the common duct at the andreas hepatis  Evaluation of the pancreas is degraded on the current study, but there is heterogeneous soft tissue density seen at the andreas hepatis on prior CT that may arise from the pancreatic head or could have primary biliary origin  Though there are numerous large gallstones, including at the gallbladder neck, these do not appear to cause extrinsic compression of the duct (i e , no suggestion of Mirizzi syndrome)  Recommend endoscopic ultrasound for further evaluation      2   Occlusion of the main portal vein, also at the andreas hepatis    Evaluation of the vessels is degraded by lack of IV contrast and patient motion      3   Ill-defined areas of hypoenhancement seen on prior CT are incompletely evaluated without contrast   This may represent confluent fibrosis  Infiltrative mass is felt to be less likely given the geographic appearance of the associated increased T2   signal      4   Cirrhosis      5  Hepatic iron overload  Common bile duct dilatation  Assessment & Plan  · The patient was seen in consultation by Gastroenterology  · Per Gastroenterology's recommendations, the patient requires transfer to a higher level of care with the ability to complete an ERCP and possible EUS (endoscopic ultrasound)  The patient will be transferred to the service of Dr Roxi Phan [facility] Attending Physician) at [address] [place] [address]      Jaundice  Assessment & Plan  · Please see the assessment and plan for "Hyperbilirubinemia"    Transaminitis  Assessment & Plan  · Please see the assessment and plan for "Hyperbilirubinemia"    Results from last 7 days   Lab Units 02/09/22  0604 02/08/22  1010   SODIUM mmol/L 136 138   POTASSIUM mmol/L 3 8 4 0   CHLORIDE mmol/L 106 106   CO2 mmol/L 20* 23   BUN mg/dL 22 23   CREATININE mg/dL 0 77 0 81   CALCIUM mg/dL 8 2* 8 3   ALK PHOS U/L 940* 976*   ALT U/L 180* 216*   AST U/L 156* 207*         Abnormal EKG  Assessment & Plan  · Outpatient Cardiology evaluation    ECG 12 lead  Order: [id number]   Status: Final result     Visible to patient: Yes (not seen)     Next appt: 02/14/2022 at 08:20 AM in Gastroenterology Светлана Wray MD)     0 Result Notes    Component Ref Range & Units 2/8/22 0850 12/19/19 1016   Ventricular Rate BPM 69  65    Atrial Rate BPM 69  65    OH Interval ms 212  208    QRSD Interval ms 102  102    QT Interval ms 434  400    QTC Interval ms 465  416    P Axis degrees 53  56    QRS Axis degrees -63  232    T Wave Axis degrees 46  13              Narrative & Impression    Sinus rhythm with 1st degree A-V block  Left axis deviation  Low voltage QRS  Abnormal ECG  When compared with ECG of 19-DEC-2019 10:16,  Incomplete right bundle branch block is no longer Present  QT has lengthened  Confirmed by Melania Giles ([id number]) on 2/9/2022 9:20:41 AM      Specimen Collected: 02/08/22 08:50 Last                Diastolic dysfunction  Assessment & Plan  · Outpatient Cardiology evaluation    Mass of right inguinal region  Assessment & Plan  · Outpatient General Surgery evaluation    Venous duplex of the bilateral lower extremities (02/08/2022):  Note: There is a well defined hypoechoic non-vascularized cystic-type structure  in the groin, 5 1 cm       Thrombosis, portal vein  Assessment & Plan  · Will require full anticoagulation after ERCP and possible EUS is completed  · Continue lovenox 40 mg SQ every 24 hours for now as DVT prophylaxis     MRI of the abdomen/MRCP (02/09/2022):  -Occlusion of the main portal vein, also at the andreas hepatis  Evaluation of the vessels is degraded by lack of IV contrast and patient motion  Abnormal MRI, liver  Assessment & Plan  · Check an iron panel  · Additional workup per Gastroenterology  · Likely will require a liver biopsy    MRI of the abdomen/MRCP (02/08/2022):  4  Cirrhosis      5  Hepatic iron overload  Elevated TSH  Assessment & Plan  · Continue his home dose of PO levothyroxine  · Will need a repeat TSH in 1-2 weeks with his PCP  · If the TSH level remains elevated as an outpatient, he will need an increased dose of PO levothyroxine prescribed by his PCP  Results for Rosita Ambrose (MRN [id number]) as of 2/9/2022 17:59   Ref  Range 2/9/2022 06:04   TSH 3RD GENERATON Latest Ref Range: 0 358 - 3 740 uIU/mL 4 931 (H)       Elevated MCV  Assessment & Plan  · Check a methylmalonic acid level and folate level    Results for Rosita Ambrose (MRN [id number]) as of 2/8/2022 12:01   Ref   Range 1/21/2022 08:09   Vitamin B-12 Latest Ref Range: 100 - 900 pg/mL 502 Hyperammonemia (HCC)  Assessment & Plan  · Due to liver disease  · Initiate lactulose 10 grams PO Qdaily for a goal 2-3 soft bowel movements per 24 hours    Bilateral lower extremity edema  Assessment & Plan  · No evidence of DVT on venous duplex of the bilateral lower extremities completed on 02/08/2022    Essential hypertension  Assessment & Plan  · Continue ARB therapy  · Follow the blood pressure trend    Hypothyroidism  Assessment & Plan  · Continue his home dose of PO levothyroxine  · Will need a repeat TSH in 1-2 weeks with his PCP  · If the TSH level remains elevated as an outpatient, he will need an increased dose of PO levothyroxine prescribed by his PCP  Results for Karl Beltran (MRN [id number]) as of 2/9/2022 17:59   Ref  Range 2/9/2022 06:04   TSH 3RD GENERATON Latest Ref Range: 0 358 - 3 740 uIU/mL 4 931 (H)     Discharging Physician / Practitioner: Umberto Mccain DO  PCP: David Ramirez MD  Admission Date:   Admission Orders (From admission, onward)     Ordered        02/08/22 1151  Inpatient Admission  Once                      Discharge Date/Transfer Date: 02/09/22    Consultations During Hospital Stay:  · Gastroenterology    Procedures Performed:   · None    Significant Findings / Test Results:   MRI abdomen wo contrast and mrcp    Result Date: 2/9/2022  Impression: 1  Biliary ductal dilatation with abrupt cut off of the common duct at the andreas hepatis  Evaluation of the pancreas is degraded on the current study, but there is heterogeneous soft tissue density seen at the andreas hepatis on prior CT that may arise from the pancreatic head or could have primary biliary origin  Though there are numerous large gallstones, including at the gallbladder neck, these do not appear to cause extrinsic compression of the duct (i e , no suggestion of Mirizzi syndrome)  Recommend endoscopic ultrasound for further evaluation   2   Occlusion of the main portal vein, also at the andreas hepatis  Evaluation of the vessels is degraded by lack of IV contrast and patient motion  3   Ill-defined areas of hypoenhancement seen on prior CT are incompletely evaluated without contrast   This may represent confluent fibrosis  Infiltrative mass is felt to be less likely given the geographic appearance of the associated increased T2 signal  4   Cirrhosis  5   Hepatic iron overload  The study was marked in EPIC for significant notification  Workstation performed: [id number]     CT abdomen pelvis w contrast    Addendum Date: 2/9/2022    Additionally there is 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 in the area of abnormal soft tissue  Hazy density around the celiac artery and SMA of uncertain significance given the presence of ascites and can be reassessed on follow-up  Result Date: 2/9/2022  Impression: 1  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  Moderate volume ascites  2   Diffuse intrahepatic biliary ductal dilatation  Proximal common bile duct appears tortuous and dilated measuring 13 mm with abrupt cut off  The remainder of the CBD is not visualized and there appears to be ill-defined soft tissue in the expected location  Findings are suspicious for obstructing mass/stricture  Recommend gastroenterology consultation  3   Limited visualization of a few possible ill-defined hypodense areas in the liver, incompletely characterized and cannot exclude metastasis  This can be evaluated with follow-up contrast-enhanced abdomen MR/MRCP  4   Left lower lobe 5 mm nodules  Recommend nonemergent dedicated chest CT  I personally discussed this study with Oscar Diaz on 2/8/2022 at 2:10 PM  Workstation performed: [id number]     US right upper quadrant with liver dopplers    Result Date: 2/8/2022  Impression: 1  Cirrhosis  Increased hepatic echogenicity may be due to hepatic steatosis and/or hepatic parenchymal disease    2   Patent central hepatic vessels with normal direction of flow  Increased phasicity of the portal veins which can be seen with cirrhosis, right heart failure, or tricuspid regurgitation  3   Gallbladder not visualized  Intrahepatic biliary ductal dilatation better visualized on subsequent CT  CBD not visualized  Please refer to subsequent CT for description  4   Moderate ascites  Workstation performed: [id number]     VAS lower limb venous duplex study, complete bilateral    Status: Final result       PACS Images     Show images for VAS lower limb venous duplex study, complete bilateral    Study Result    Narrative & Impression      [facility] REPORT  CLINICAL:  Indications:  Patient presents with bilateral lower extremity thigh pain s/p workout x 1  month ago  CONCLUSION:     Impression:  RIGHT LOWER LIMB:  No evidence of acute or chronic deep vein thrombosis  No evidence of superficial thrombophlebitis noted  Doppler evaluation shows a normal response to augmentation maneuvers  Popliteal, posterior tibial and anterior tibial arterial Doppler waveforms are  triphasic  Note: There is a well defined hypoechoic non-vascularized cystic-type structure  in the groin, 5 1 cm  Zoraida Call LEFT LOWER LIMB:  No evidence of acute or chronic deep vein thrombosis  No evidence of superficial thrombophlebitis noted  Doppler evaluation shows a normal response to augmentation maneuvers  Popliteal, posterior tibial and anterior tibial arterial Doppler waveforms are  triphasic  Technical findings were faxed to chart       SIGNATURE:  Electronically Signed by: Daija Palacios on 2022-02-08 05:24:03 PM       ECG 12 lead  Order: [id number]   Status: Final result     Visible to patient: Yes (not seen)     Next appt: 02/14/2022 at 08:20 AM in Gastroenterology Sharonda Lima MD)     0 Result Notes    Component Ref Range & Units 2/8/22 0850 12/19/19 1016   Ventricular Rate BPM 69  65    Atrial Rate BPM 69  65    ND Interval ms 212  208    QRSD Interval ms 102  102    QT Interval ms 434  400    QTC Interval ms 465  416    P Axis degrees 53  56    QRS Axis degrees -63  232    T Wave Axis degrees 46  13              Narrative & Impression    Sinus rhythm with 1st degree A-V block  Left axis deviation  Low voltage QRS  Abnormal ECG  When compared with ECG of 19-DEC-2019 10:16,  Incomplete right bundle branch block is no longer Present  QT has lengthened  Confirmed by Seth Tucker ([id number]) on 2/9/2022 9:20:41 AM      Specimen Collected: 02/08/22 08:50 Last            Results from last 7 days   Lab Units 02/09/22  0603 02/08/22  1010   WBC Thousand/uL 7 59 7 61   HEMOGLOBIN g/dL 12 4 12 4   HEMATOCRIT % 34 9* 36 2*   PLATELETS Thousands/uL 165 156     Results from last 7 days   Lab Units 02/09/22  0604 02/08/22  1010   SODIUM mmol/L 136 138   POTASSIUM mmol/L 3 8 4 0   CHLORIDE mmol/L 106 106   CO2 mmol/L 20* 23   BUN mg/dL 22 23   CREATININE mg/dL 0 77 0 81   CALCIUM mg/dL 8 2* 8 3     Results from last 7 days   Lab Units 02/09/22  0604   MAGNESIUM mg/dL 1 9     Results from last 7 days   Lab Units 02/09/22  0604 02/08/22  1010   ALK PHOS U/L 940* 976*   BILIRUBIN DIRECT mg/dL  --  10 28*   ALT U/L 180* 216*   AST U/L 156* 207*     Microbiology Results (last 21 days)    Collected Updated Procedure Result Status Patient Facility Result Comment    02/08/2022 1440 02/08/2022 1445 MRSA culture [[id number]]   Nares from Nose    In process [address]  Component Value   No component results              02/08/2022 1153 02/08/2022 1331 COVID/FLU/RSV [[id number]]    Nares from Nasopharyngeal Swab    Final result [address] URL to browser: [email or web address]   SARS-CoV-2 assay is a Nucleic Acid Amplification assay intended for the   qualitative detection of nucleic acid from SARS-CoV-2 in nasopharyngeal   swabs  Results are for the presumptive identification of SARS-CoV-2 RNA  Positive results are indicative of infection with SARS-CoV-2, the virus   causing COVID-19, but do not rule out bacterial infection or co-infection   with other viruses  Laboratories within the [place] and its   territories are required to report all positive results to the appropriate   public health authorities  Negative results do not preclude SARS-CoV-2   infection and should not be used as the sole basis for treatment or other   patient management decisions  Negative results must be combined with   clinical observations, patient history, and epidemiological information  This test has not been FDA cleared or approved  This test has been authorized by FDA under an Emergency Use Authorization   (EUA)  This test is only authorized for the duration of time the   declaration that circumstances exist justifying the authorization of the   emergency use of an in vitro diagnostic tests for detection of SARS-CoV-2   virus and/or diagnosis of COVID-19 infection under section 564(b)(1) of   the Act, 21 U  S C  655EHO-7(F)(7), unless the authorization is terminated   or revoked sooner  The test has been validated but independent review by FDA   and CLIA is pending  Test performed using [employer] GeneXpert: This RT-PCR assay targets N2,   a region unique to SARS-CoV-2  A conserved region in the E-gene was chosen   for pan-Sarbecovirus detection which includes SARS-CoV-2  Component Value   SARS-CoV-2 Negative   INFLUENZA A PCR Negative   INFLUENZA B PCR Negative   RSV PCR Negative               Complications:  None    Reason for Admission:  Hyperbilirubinemia and jaundice    Hospital Course:   Sintia Contreras is a 80 y o  male patient who originally presented to the hospital on 2/8/2022 due to generalized weakness, jaundice, and edema of the bilateral lower extremities      Please see above list of diagnoses and related plan for additional information  Condition at Discharge: stable    Discharge Day Visit / Exam:   Subjective: The patient was seen and examined  No chest pain  No shortness of breath  No abdominal pain  No nausea or vomiting  Vitals: Blood Pressure: 141/79 (02/09/22 1456)  Pulse: 82 (02/09/22 1456)  Temperature: 98 3 °F (36 8 °C) (02/09/22 1456)  Temp Source: Oral (02/09/22 1456)  Respirations: 18 (02/09/22 1456)  Height: 6' 4" (193 cm) (02/08/22 1100)  Weight - Scale: 120 kg (264 lb 1 8 oz) (02/08/22 0845)  SpO2: 96 % (02/09/22 1456)  Exam:   Physical Exam   General:  NAD, follows commands  HEENT:  NC/AT, mucous membranes moist, icterus sclera bilaterally  Neck:  Supple, No JVP elevation  CV:  + S1, + S2, RRR  Pulm:  Lung fields are CTA bilaterally  Abd:  Soft, Non-tender, Moderate distension  Ext:  No clubbing/cyanosis, Edema of the bilateral lower extremities  Skin:  Jaundice-appearing  Neuro:  Awake, alert, oriented  Psych:  Normal mood and affect      Discussion with Family: Updated  (wife and son in law) at bedside  Discharge instructions/Information to patient and family:  See after visit summary for information provided to patient and family  Provisions for Follow-Up Care:  See after visit summary for information related to follow-up care and any pertinent home health orders  Disposition:  Per Gastroenterology's recommendations, the patient requires transfer to a higher level of care with the ability to complete an ERCP and possible EUS (endoscopic ultrasound)  The patient will be transferred to the service of Dr Mercedes Ruggiero [facility] Hospitalist Attending Physician) at [address] [place] [address]  Discharge Statement:  I spent 45 minutes discharging the patient  This time was spent on the day of discharge  I had direct contact with the patient on the day of discharge   Greater than 50% of the total time was spent examining patient, answering all patient questions, arranging and discussing plan of care with patient as well as directly providing post-discharge instructions  Additional time then spent on discharge activities  Discharge Medications:  See after visit summary for reconciled discharge medications provided to patient and/or family        **Please Note: This note may have been constructed using a voice recognition system**

## 2022-02-09 NOTE — ASSESSMENT & PLAN NOTE
· Continue his home dose of PO levothyroxine  · Will need a repeat TSH in 1-2 weeks with his PCP  · If the TSH level remains elevated as an outpatient, he will need an increased dose of PO levothyroxine prescribed by his PCP  Results for Elisha Giles (MRN [id number]) as of 2/9/2022 17:59   Ref   Range 2/9/2022 06:04   TSH 3RD GENERATON Latest Ref Range: 0 358 - 3 740 uIU/mL 4 931 (H)

## 2022-02-09 NOTE — ASSESSMENT & PLAN NOTE
· The patient was seen in consultation by Gastroenterology  · Per Gastroenterology's recommendations, the patient requires transfer to a higher level of care with the ability to complete an ERCP and possible EUS (endoscopic ultrasound)  The patient will be transferred [facility] at [address] [place] [address]

## 2022-02-09 NOTE — ASSESSMENT & PLAN NOTE
· Marked hyperbilirubinemia concerning for biliary obstruction versus decompensated liver cirrhosis  · The patient was seen in consultation by Gastroenterology  · Transaminitis serology work-up per Gastroenterology  · Check an acetaminophen level  · Avoid all hepatotoxic agents  · Check tumor markers including a CEA level and CA 19-9 level  · Per Gastroenterology's recommendations, the patient requires transfer to a higher level of care with the ability to complete an ERCP and possible EUS (endoscopic ultrasound)  The patient [facility] be [facility] at [address] [place] [address]  Results for Jerome Guzman (MRN [id number]) as of 2/9/2022 17:59   Ref  Range 2/8/2022 10:10 2/8/2022 11:45 2/8/2022 11:53 2/8/2022 14:40 2/9/2022 06:03 2/9/2022 06:04   TOTAL BILIRUBIN Latest Ref Range: 0 20 - 1 00 mg/dL 13 62 (H)     16 05 (H)       MRI of the abdomen/MRCP (02/09/2022): IMPRESSION:     1   Biliary ductal dilatation with abrupt cut off of the common duct at the andreas hepatis  Evaluation of the pancreas is degraded on the current study, but there is heterogeneous soft tissue density seen at the andreas hepatis on prior CT that may arise from the pancreatic head or could have primary biliary origin  Though there are numerous large gallstones, including at the gallbladder neck, these do not appear to cause extrinsic compression of the duct (i e , no suggestion of Mirizzi syndrome)  Recommend endoscopic ultrasound for further evaluation      2   Occlusion of the main portal vein, also at the andreas hepatis  Evaluation of the vessels is degraded by lack of IV contrast and patient motion      3   Ill-defined areas of hypoenhancement seen on prior CT are incompletely evaluated without contrast   This may represent confluent fibrosis    Infiltrative mass is felt to be less likely given the geographic appearance of the associated increased T2   signal      4   Cirrhosis      5  Hepatic iron overload

## 2022-02-09 NOTE — PLAN OF CARE
Problem: Potential for Falls  Goal: Patient will remain free of falls  Description: INTERVENTIONS:  - Educate patient/family on patient safety including physical limitations  - Instruct patient to call for assistance with activity   - Consult OT/PT to assist with strengthening/mobility   - Keep Call bell within reach  - Keep bed low and locked with side rails adjusted as appropriate  - Keep care items and personal belongings within reach  - Initiate and maintain comfort rounds  - Make Fall Risk Sign visible to staff  - Offer Toileting every 1-2 Hours, in advance of need  - Initiate/Maintain bed and chair alarm  - Obtain necessary fall risk management equipment: fall socks  - Apply yellow socks and bracelet for high fall risk patients  - Consider moving patient to room near nurses station  Outcome: Progressing     Problem: MOBILITY - ADULT  Goal: Maintain or return to baseline ADL function  Description: INTERVENTIONS:  -  Assess patient's ability to carry out ADLs; assess patient's baseline for ADL function and identify physical deficits which impact ability to perform ADLs (bathing, care of mouth/teeth, toileting, grooming, dressing, etc )  - Assess/evaluate cause of self-care deficits   - Assess range of motion  - Assess patient's mobility; develop plan if impaired  - Assess patient's need for assistive devices and provide as appropriate  - Encourage maximum independence but intervene and supervise when necessary  - Involve family in performance of ADLs  - Assess for home care needs following discharge   - Consider OT consult to assist with ADL evaluation and planning for discharge  - Provide patient education as appropriate  Outcome: Progressing  Goal: Maintains/Returns to pre admission functional level  Description: INTERVENTIONS:  - Perform BMAT or MOVE assessment daily    - Set and communicate daily mobility goal to care team and patient/family/caregiver     - Collaborate with rehabilitation services on mobility goals if consulted  - Perform Range of Motion 3-4 times a day  - Reposition patient every 1-2 hours    - Dangle patient 3-4 times a day  - Stand patient 3-4 times a day  - Ambulate patient 3-4 times a day  - Out of bed to chair 3-4 times a day   - Out of bed for meals 3-4 times a day  - Out of bed for toileting  - Record patient progress and toleration of activity level   Outcome: Progressing     Problem: PAIN - ADULT  Goal: Verbalizes/displays adequate comfort level or baseline comfort level  Description: Interventions:  - Encourage patient to monitor pain and request assistance  - Assess pain using appropriate pain scale  - Administer analgesics based on type and severity of pain and evaluate response  - Implement non-pharmacological measures as appropriate and evaluate response  - Consider cultural and social influences on pain and pain management  - Notify physician/advanced practitioner if interventions unsuccessful or patient reports new pain  Outcome: Progressing     Problem: INFECTION - ADULT  Goal: Absence or prevention of progression during hospitalization  Description: INTERVENTIONS:  - Assess and monitor for signs and symptoms of infection  - Monitor lab/diagnostic results  - Monitor all insertion sites, i e  indwelling lines, tubes, and drains  - Monitor endotracheal if appropriate and nasal secretions for changes in amount and color  - [place] appropriate cooling/warming therapies per order  - Administer medications as ordered  - Instruct and encourage patient and family to use good hand hygiene technique  - Identify and instruct in appropriate isolation precautions for identified infection/condition  Outcome: Progressing  Goal: Absence of fever/infection during neutropenic period  Description: INTERVENTIONS:  - Monitor WBC    Outcome: Progressing     Problem: SAFETY ADULT  Goal: Patient will remain free of falls  Description: INTERVENTIONS:  - Educate patient/family on patient safety including physical limitations  - Instruct patient to call for assistance with activity   - Consult OT/PT to assist with strengthening/mobility   - Keep Call bell within reach  - Keep bed low and locked with side rails adjusted as appropriate  - Keep care items and personal belongings within reach  - Initiate and maintain comfort rounds  - Make Fall Risk Sign visible to staff  - Offer Toileting every 1-2 Hours, in advance of need  - Initiate/Maintain bed and chair alarm  - Obtain necessary fall risk management equipment: fall socks  - Apply yellow socks and bracelet for high fall risk patients  - Consider moving patient to room near nurses station  Outcome: Progressing  Goal: Maintain or return to baseline ADL function  Description: INTERVENTIONS:  -  Assess patient's ability to carry out ADLs; assess patient's baseline for ADL function and identify physical deficits which impact ability to perform ADLs (bathing, care of mouth/teeth, toileting, grooming, dressing, etc )  - Assess/evaluate cause of self-care deficits   - Assess range of motion  - Assess patient's mobility; develop plan if impaired  - Assess patient's need for assistive devices and provide as appropriate  - Encourage maximum independence but intervene and supervise when necessary  - Involve family in performance of ADLs  - Assess for home care needs following discharge   - Consider OT consult to assist with ADL evaluation and planning for discharge  - Provide patient education as appropriate  Outcome: Progressing     Problem: DISCHARGE PLANNING  Goal: Discharge to home or other facility with appropriate resources  Description: INTERVENTIONS:  - Identify barriers to discharge w/patient and caregiver  - Arrange for needed discharge resources and transportation as appropriate  - Identify discharge learning needs (meds, wound care, etc )  - Arrange for interpretive services to assist at discharge as needed  - Refer to Case Management Department for coordinating discharge planning if the patient needs post-hospital services based on physician/advanced practitioner order or complex needs related to functional status, cognitive ability, or social support system  Outcome: Progressing     Problem: Knowledge Deficit  Goal: Patient/family/caregiver demonstrates understanding of disease process, treatment plan, medications, and discharge instructions  Description: Complete learning assessment and assess knowledge base    Interventions:  - Provide teaching at level of understanding  - Provide teaching via preferred learning methods  Outcome: Progressing     Problem: METABOLIC, FLUID AND ELECTROLYTES - ADULT  Goal: Electrolytes maintained within normal limits  Description: INTERVENTIONS:  - Monitor labs and assess patient for signs and symptoms of electrolyte imbalances  - Administer electrolyte replacement as ordered  - Monitor response to electrolyte replacements, including repeat lab results as appropriate  - Instruct patient on fluid and nutrition as appropriate  Outcome: Progressing  Goal: Fluid balance maintained  Description: INTERVENTIONS:  - Monitor labs   - Monitor I/O and WT  - Instruct patient on fluid and nutrition as appropriate  - Assess for signs & symptoms of volume excess or deficit  Outcome: Progressing  Goal: Glucose maintained within target range  Description: INTERVENTIONS:  - Monitor Blood Glucose as ordered  - Assess for signs and symptoms of hyperglycemia and hypoglycemia  - Administer ordered medications to maintain glucose within target range  - Assess nutritional intake and initiate nutrition service referral as needed  Outcome: Progressing     Problem: Prexisting or High Potential for Compromised Skin Integrity  Goal: Skin integrity is maintained or improved  Description: INTERVENTIONS:  - Identify patients at risk for skin breakdown  - Assess and monitor skin integrity  - Assess and monitor nutrition and hydration status  - Monitor labs   - Assess for incontinence   - Turn and reposition patient  - Assist with mobility/ambulation  - Relieve pressure over bony prominences  - Avoid friction and shearing  - Provide appropriate hygiene as needed including keeping skin clean and dry  - Evaluate need for skin moisturizer/barrier cream  - Collaborate with interdisciplinary team   - Patient/family teaching  - Consider wound care consult   Outcome: Progressing

## 2022-02-09 NOTE — PLAN OF CARE
Problem: Potential for Falls  Goal: Patient will remain free of falls  Description: INTERVENTIONS:  - Educate patient/family on patient safety including physical limitations  - Instruct patient to call for assistance with activity   - Consult OT/PT to assist with strengthening/mobility   - Keep Call bell within reach  - Keep bed low and locked with side rails adjusted as appropriate  - Keep care items and personal belongings within reach  - Initiate and maintain comfort rounds  - Make Fall Risk Sign visible to staff  - Offer Toileting every 1-2 Hours, in advance of need  - Initiate/Maintain bed and chair alarm  - Obtain necessary fall risk management equipment: fall socks  - Apply yellow socks and bracelet for high fall risk patients  - Consider moving patient to room near nurses station  Outcome: Progressing     Problem: MOBILITY - ADULT  Goal: Maintain or return to baseline ADL function  Description: INTERVENTIONS:  -  Assess patient's ability to carry out ADLs; assess patient's baseline for ADL function and identify physical deficits which impact ability to perform ADLs (bathing, care of mouth/teeth, toileting, grooming, dressing, etc )  - Assess/evaluate cause of self-care deficits   - Assess range of motion  - Assess patient's mobility; develop plan if impaired  - Assess patient's need for assistive devices and provide as appropriate  - Encourage maximum independence but intervene and supervise when necessary  - Involve family in performance of ADLs  - Assess for home care needs following discharge   - Consider OT consult to assist with ADL evaluation and planning for discharge  - Provide patient education as appropriate  Outcome: Progressing  Goal: Maintains/Returns to pre admission functional level  Description: INTERVENTIONS:  - Perform BMAT or MOVE assessment daily    - Set and communicate daily mobility goal to care team and patient/family/caregiver     - Collaborate with rehabilitation services on mobility goals if consulted  - Perform Range of Motion 3-4 times a day  - Reposition patient every 1-2 hours    - Dangle patient 3-4 times a day  - Stand patient 3-4 times a day  - Ambulate patient 3-4 times a day  - Out of bed to chair 3-4 times a day   - Out of bed for meals 3-4 times a day  - Out of bed for toileting  - Record patient progress and toleration of activity level   Outcome: Progressing     Problem: PAIN - ADULT  Goal: Verbalizes/displays adequate comfort level or baseline comfort level  Description: Interventions:  - Encourage patient to monitor pain and request assistance  - Assess pain using appropriate pain scale  - Administer analgesics based on type and severity of pain and evaluate response  - Implement non-pharmacological measures as appropriate and evaluate response  - Consider cultural and social influences on pain and pain management  - Notify physician/advanced practitioner if interventions unsuccessful or patient reports new pain  Outcome: Progressing     Problem: INFECTION - ADULT  Goal: Absence or prevention of progression during hospitalization  Description: INTERVENTIONS:  - Assess and monitor for signs and symptoms of infection  - Monitor lab/diagnostic results  - Monitor all insertion sites, i e  indwelling lines, tubes, and drains  - Monitor endotracheal if appropriate and nasal secretions for changes in amount and color  - [place] appropriate cooling/warming therapies per order  - Administer medications as ordered  - Instruct and encourage patient and family to use good hand hygiene technique  - Identify and instruct in appropriate isolation precautions for identified infection/condition  Outcome: Progressing  Goal: Absence of fever/infection during neutropenic period  Description: INTERVENTIONS:  - Monitor WBC    Outcome: Progressing     Problem: SAFETY ADULT  Goal: Patient will remain free of falls  Description: INTERVENTIONS:  - Educate patient/family on patient safety including physical limitations  - Instruct patient to call for assistance with activity   - Consult OT/PT to assist with strengthening/mobility   - Keep Call bell within reach  - Keep bed low and locked with side rails adjusted as appropriate  - Keep care items and personal belongings within reach  - Initiate and maintain comfort rounds  - Make Fall Risk Sign visible to staff  - Offer Toileting every 1-2 Hours, in advance of need  - Initiate/Maintain bed and chair alarm  - Obtain necessary fall risk management equipment: fall socks  - Apply yellow socks and bracelet for high fall risk patients  - Consider moving patient to room near nurses station  Outcome: Progressing  Goal: Maintain or return to baseline ADL function  Description: INTERVENTIONS:  -  Assess patient's ability to carry out ADLs; assess patient's baseline for ADL function and identify physical deficits which impact ability to perform ADLs (bathing, care of mouth/teeth, toileting, grooming, dressing, etc )  - Assess/evaluate cause of self-care deficits   - Assess range of motion  - Assess patient's mobility; develop plan if impaired  - Assess patient's need for assistive devices and provide as appropriate  - Encourage maximum independence but intervene and supervise when necessary  - Involve family in performance of ADLs  - Assess for home care needs following discharge   - Consider OT consult to assist with ADL evaluation and planning for discharge  - Provide patient education as appropriate  Outcome: Progressing     Problem: DISCHARGE PLANNING  Goal: Discharge to home or other facility with appropriate resources  Description: INTERVENTIONS:  - Identify barriers to discharge w/patient and caregiver  - Arrange for needed discharge resources and transportation as appropriate  - Identify discharge learning needs (meds, wound care, etc )  - Arrange for interpretive services to assist at discharge as needed  - Refer to Case Management Department for coordinating discharge planning if the patient needs post-hospital services based on physician/advanced practitioner order or complex needs related to functional status, cognitive ability, or social support system  Outcome: Progressing     Problem: Knowledge Deficit  Goal: Patient/family/caregiver demonstrates understanding of disease process, treatment plan, medications, and discharge instructions  Description: Complete learning assessment and assess knowledge base    Interventions:  - Provide teaching at level of understanding  - Provide teaching via preferred learning methods  Outcome: Progressing     Problem: METABOLIC, FLUID AND ELECTROLYTES - ADULT  Goal: Electrolytes maintained within normal limits  Description: INTERVENTIONS:  - Monitor labs and assess patient for signs and symptoms of electrolyte imbalances  - Administer electrolyte replacement as ordered  - Monitor response to electrolyte replacements, including repeat lab results as appropriate  - Instruct patient on fluid and nutrition as appropriate  Outcome: Progressing  Goal: Fluid balance maintained  Description: INTERVENTIONS:  - Monitor labs   - Monitor I/O and WT  - Instruct patient on fluid and nutrition as appropriate  - Assess for signs & symptoms of volume excess or deficit  Outcome: Progressing  Goal: Glucose maintained within target range  Description: INTERVENTIONS:  - Monitor Blood Glucose as ordered  - Assess for signs and symptoms of hyperglycemia and hypoglycemia  - Administer ordered medications to maintain glucose within target range  - Assess nutritional intake and initiate nutrition service referral as needed  Outcome: Progressing     Problem: Prexisting or High Potential for Compromised Skin Integrity  Goal: Skin integrity is maintained or improved  Description: INTERVENTIONS:  - Identify patients at risk for skin breakdown  - Assess and monitor skin integrity  - Assess and monitor nutrition and hydration status  - Monitor labs   - Assess for incontinence   - Turn and reposition patient  - Assist with mobility/ambulation  - Relieve pressure over bony prominences  - Avoid friction and shearing  - Provide appropriate hygiene as needed including keeping skin clean and dry  - Evaluate need for skin moisturizer/barrier cream  - Collaborate with interdisciplinary team   - Patient/family teaching  - Consider wound care consult   Outcome: Progressing

## 2022-02-09 NOTE — UTILIZATION REVIEW
Initial Clinical Review    Admission: Date/Time/Statement:   Admission Orders (From admission, onward)     Ordered        02/08/22 1151  Inpatient Admission  Once                      Orders Placed This Encounter   Procedures    Inpatient Admission     Standing Status:   Standing     Number of Occurrences:   1     Order Specific Question:   Level of Care     Answer:   Med Surg [16]     Order Specific Question:   Estimated length of stay     Answer:   More than 2 Midnights     Order Specific Question:   Certification     Answer:   I certify that inpatient services are medically necessary for this patient for a duration of greater than two midnights  See H&P and MD Progress Notes for additional information about the patient's course of treatment  ED Arrival Information     Expected Arrival Acuity    - 2/8/2022 08:33 Urgent         Means of arrival Escorted by Service Admission type    Walk-In Family Member Hospitalist Urgent         Arrival complaint    Weakness        Chief Complaint   Patient presents with    Weakness - Generalized     weakness and decreased urination for 2 weeks       Initial Presentation:     80year old male presents to ed from home for evaluation and treatment of dysuria and weakness which has worsened over a 2 week period  Clinical assessment significant for jaundice, fatigue, blle edema  UA : + nitrite, urobilinogen 2 0, A1 antitrypsin 192, T bili 13 62, elevated LFTs, ammonia 58, Hct 36 2  Imaging shows cirrhosis with varicies and portal hypertension, moderate ascites , dilated biliary ducts, possible obstructing mass / stricture  MR/MRCP recommended  Ekg shows QT lengthened, 1st degree AV block  Admit to inpatient med surg for hyperbilirubinemia, jaundice, transaminitis, hyperammonemia          Date: 2-9-22  Day 2: inpatient  Med surg   Initiate lactulose and iv multi-electrolyte 50/hr   Follow up AFT tumor marker, HIV antigens, hepatitis panel, CEA procalcitonin, Cancer antigen 19-9  MRCP  Consult gastroenterology  The patient recently presented to the office with concern for rising bilirubin in the setting of liver or biliary disease, now presenting with fatigue, weakness and jaundice and noted to have significant elevation of total and direct bilirubin, and elevated alk phos and transaminases  CT performed with c/f biliary ductal dilation and potential CBD stricture and soft tissue mass  Recommend ERCP for further evaluation  MRCP was ordered and is pending  + jaundice on exam and + some edema   He denies pain, nausea, vomiting, overt bleeding             Plan to transfer patient to [facility] for higher level of care         ED Triage Vitals   02/08/22 0845 02/08/22 0850 02/08/22 0845 02/08/22 0850 02/08/22 0850   (!) 97 1 °F (36 2 °C) 72 18 131/66 97 %      Temporal Monitor         No Pain          02/08/22 120 kg (264 lb 1 8 oz)     Additional Vital Signs:       Date/Time Temp Pulse Resp BP MAP (mmHg) SpO2 O2 Device   02/09/22 08:23:06 -- 83 -- 133/82 99 93 % --   02/09/22 07:49:04 98 1 °F (36 7 °C) 76 18 136/83 101 95 % --   02/09/22 07:48:31 98 1 °F (36 7 °C) 77 18 136/83 101 94 % --   02/09/22 02:30:22 98 3 °F (36 8 °C) 76 -- -- -- 94 % --   02/08/22 22:57:27 100 2 °F (37 9 °C) 88 18 134/83 100 96 % None (Room air)   02/08/22 19:09:11 98 2 °F (36 8 °C) 81 18 135/84 101 96 % None (Room air)   02/08/22 15:50:28 -- 73 16 133/88 103 98 % --   02/08/22 13:42:34 -- 74 18 132/90 104 99 % --   02/08/22 1315 -- -- -- -- -- 99 % None (Room air)   02/08/22 1245 -- 71 16 159/74 106 99 % None (Room air)   02/08/22 1200 -- 67 15 144/78 105 98 % None (Room air)   02/08/22 1130 -- 63 19 140/75 101 98 % None (Room air)   02/08/22 1100 -- 64 18 136/70 96 98 % None (Room air)   02/08/22 1030 -- 64 18 135/73 97 98 % None (Room air)   02/08/22 1015 -- 67 18 133/71 96 98 % None (Room air)   02/08/22 0915 -- 65 19 -- -- 100 % None (Room air)   02/08/22 0900 -- -- -- -- -- -- None (Room air)   02/08/22 0850 -- 72 18 131/66 -- 97 % None (Room air)   02/08/22 0845 97 1 °F (36 2 °C)   Abnormal  -- 18 -- -- -- --             Pertinent Labs/Diagnostic Test Results:         Collection Time Result Time Vent R Atrial R CT Int  QRSD Int  QT Int  QTC Int  P Axis QRS Axis T Wave Ax    02/08/22 08:50:57 02/09/22 09:20:48 69 69 212 102 434 465 53 -63 46         Final result                Narrative:    Sinus rhythm with 1st degree A-V block   Left axis deviation   Low voltage QRS   Abnormal ECG   When compared with ECG of 19-DEC-2019 10:16,   Incomplete right bundle branch block is no longer Present   QT has lengthened                     VAS lower limb venous duplex study, complete bilateral   Final  (02/08 1724)   RIGHT LOWER LIMB:  No evidence of acute or chronic deep vein thrombosis  No evidence of superficial thrombophlebitis noted  Doppler evaluation shows a normal response to augmentation maneuvers  Popliteal, posterior tibial and anterior tibial arterial Doppler waveforms are  triphasic  Note: There is a well defined hypoechoic non-vascularized cystic-type structure  in the groin, 5 1 cm  Iona Soulier LEFT LOWER LIMB:  No evidence of acute or chronic deep vein thrombosis  No evidence of superficial thrombophlebitis noted  Doppler evaluation shows a normal response to augmentation maneuvers  Popliteal, posterior tibial and anterior tibial arterial Doppler waveforms are triphasic  CT abdomen pelvis w contrast   Final  (02/08 1420)      1  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  Moderate volume ascites  2   Diffuse intrahepatic biliary ductal dilatation  Proximal common bile duct appears tortuous and dilated measuring 13 mm with abrupt cut off  The remainder of the CBD is not visualized and there appears to be ill-defined soft tissue in the expected    location  Findings are suspicious for obstructing mass/stricture  Recommend gastroenterology consultation  3   Limited visualization of a few possible ill-defined hypodense areas in the liver, incompletely characterized and cannot exclude metastasis  This can be evaluated with follow-up contrast-enhanced abdomen MR/MRCP  4   Left lower lobe 5 mm nodules  Recommend nonemergent dedicated chest CT  US right upper quadrant with liver dopplers   Final  (02/08 1421)      1  Cirrhosis  Increased hepatic echogenicity may be due to hepatic steatosis and/or hepatic parenchymal disease  2   Patent central hepatic vessels with normal direction of flow  Increased phasicity of the portal veins which can be seen with cirrhosis, right heart failure, or tricuspid regurgitation  3   Gallbladder not visualized  Intrahepatic biliary ductal dilatation better visualized on subsequent CT  CBD not visualized  Please refer to subsequent CT for description  4   Moderate ascites          Results from last 7 days   Lab Units 02/08/22  1153   SARS-COV-2  Negative     Results from last 7 days   Lab Units 02/09/22  0603 02/08/22  1010   WBC Thousand/uL 7 59 7 61   HEMOGLOBIN g/dL 12 4 12 4   HEMATOCRIT % 34 9* 36 2*   PLATELETS Thousands/uL 165 156   NEUTROS ABS Thousands/µL 5 25 5 11         Results from last 7 days   Lab Units 02/09/22  0604 02/08/22  1010   SODIUM mmol/L 136 138   POTASSIUM mmol/L 3 8 4 0   CHLORIDE mmol/L 106 106   CO2 mmol/L 20* 23   ANION GAP mmol/L 10 9   BUN mg/dL 22 23   CREATININE mg/dL 0 77 0 81   EGFR ml/min/1 73sq m 81 79   CALCIUM mg/dL 8 2* 8 3   MAGNESIUM mg/dL 1 9  --    PHOSPHORUS mg/dL 2 8  --      Results from last 7 days   Lab Units 02/09/22  0604 02/08/22  1010   AST U/L 156* 207*   ALT U/L 180* 216*   ALK PHOS U/L 940* 976*   TOTAL PROTEIN g/dL 6 8 6 5   ALBUMIN g/dL 2 5* 2 7*   TOTAL BILIRUBIN mg/dL 16 05* 13 62*   BILIRUBIN DIRECT mg/dL  --  10 28*   AMMONIA umol/L  --  58*         Results from last 7 days   Lab Units 02/09/22  0604 02/08/22  1010   GLUCOSE RANDOM mg/dL 105 112       Results from last 7 days   Lab Units 02/09/22  0604   CK TOTAL U/L 74             Results from last 7 days   Lab Units 02/09/22  0603 02/08/22  1010   PROTIME seconds 14 6* 14 5   INR  1 20* 1 19   PTT seconds  --  30     Results from last 7 days   Lab Units 02/09/22  0604   TSH 3RD GENERATON uIU/mL 4 931*     Results from last 7 days   Lab Units 02/09/22  0603   PROCALCITONIN ng/ml 1 22*     Results from last 7 days   Lab Units 02/09/22  0603   LACTIC ACID mmol/L 2 0             Results from last 7 days   Lab Units 02/09/22  0604   NT-PRO BNP pg/mL 242       Results from last 7 days   Lab Units 02/09/22  0603   CEA ng/mL 6 8*         Results from last 7 days   Lab Units 02/08/22  1145   CLARITY UA  Clear   COLOR UA  Jennifer   SPEC GRAV UA  1 025   PH UA  6 0   GLUCOSE UA mg/dl Negative   KETONES UA mg/dl Trace*   BLOOD UA  Negative   PROTEIN UA mg/dl Trace*   NITRITE UA  Positive*   BILIRUBIN UA  Interference- unable to analyze*   UROBILINOGEN UA E U /dl 2 0*   LEUKOCYTES UA  Negative   WBC UA /hpf 0-1   RBC UA /hpf 0-1   BACTERIA UA /hpf Occasional   EPITHELIAL CELLS WET PREP /hpf Occasional   MUCUS THREADS  Occasional*     Results from last 7 days   Lab Units 02/08/22  1153   INFLUENZA A PCR  Negative   INFLUENZA B PCR  Negative   RSV PCR  Negative             Results from last 7 days   Lab Units 02/08/22  1010   ACETAMINOPHEN LVL ug/mL <2 0*       ED Treatment:   Medication Administration from 02/08/2022 0833 to 02/08/2022 1320      History reviewed  No pertinent past medical history      Present on Admission:   Essential hypertension   Bilateral lower extremity edema   Hypothyroidism      Admitting Diagnosis:     Weakness [R53 1]  Jaundice [R17]  Elevated bilirubin [R17]    Age/Sex: 80 y o  male      Scheduled Medications:  enoxaparin, 40 mg, Subcutaneous, Q24H  lactulose, 10 g, Oral, Daily  levothyroxine, 200 mcg, Oral, Early Morning  losartan, 25 mg, Oral, Daily      Continuous IV Infusions:  multi-electrolyte, 50 mL/hr, Intravenous, Continuous      PRN Meds:       IP CONSULT TO GASTROENTEROLOGY  IP CONSULT TO GASTROENTEROLOGY    Network Utilization Review Department  ATTENTION: Please call with any questions or concerns to [phone] and carefully listen to the prompts so that you are directed to the right person  All voicemails are confidential   Myla Skipper all requests for admission clinical reviews, approved or denied determinations and any other requests to dedicated fax number below belonging to the campus where the patient is receiving treatment   List of dedicated fax numbers for the Facilities:  [address] DENIALS (Administrative/Medical Necessity) [phone]   [address] (Maternity/NICU/Pediatrics) [phone] [address]  [address] [facility] [place] [address] [id number] [place] [address] [address] [place] [address] [facility] [address] [facility] [phone]

## 2022-02-09 NOTE — ASSESSMENT & PLAN NOTE
· Will require full anticoagulation after ERCP and possible EUS is completed  · Continue lovenox 40 mg SQ every 24 hours for now as DVT prophylaxis     MRI of the abdomen/MRCP (02/09/2022):  -Occlusion of the main portal vein, also at the andreas hepatis  Evaluation of the vessels is degraded by lack of IV contrast and patient motion

## 2022-02-09 NOTE — ASSESSMENT & PLAN NOTE
· Check an iron panel  · Additional workup per Gastroenterology  · Likely will require a liver biopsy    MRI of the abdomen/MRCP (02/08/2022):  4  Cirrhosis      5  Hepatic iron overload

## 2022-02-10 NOTE — PLAN OF CARE
Problem: Potential for Falls  Goal: Patient will remain free of falls  Description: INTERVENTIONS:  - Educate patient/family on patient safety including physical limitations  - Instruct patient to call for assistance with activity   - Consult OT/PT to assist with strengthening/mobility   - Keep Call bell within reach  - Keep bed low and locked with side rails adjusted as appropriate  - Keep care items and personal belongings within reach  - Initiate and maintain comfort rounds  - Make Fall Risk Sign visible to staff  - Offer Toileting every 1-2 Hours, in advance of need  - Initiate/Maintain bed and chair alarm  - Obtain necessary fall risk management equipment: fall socks  - Apply yellow socks and bracelet for high fall risk patients  - Consider moving patient to room near nurses station  Outcome: Progressing     Problem: MOBILITY - ADULT  Goal: Maintain or return to baseline ADL function  Description: INTERVENTIONS:  -  Assess patient's ability to carry out ADLs; assess patient's baseline for ADL function and identify physical deficits which impact ability to perform ADLs (bathing, care of mouth/teeth, toileting, grooming, dressing, etc )  - Assess/evaluate cause of self-care deficits   - Assess range of motion  - Assess patient's mobility; develop plan if impaired  - Assess patient's need for assistive devices and provide as appropriate  - Encourage maximum independence but intervene and supervise when necessary  - Involve family in performance of ADLs  - Assess for home care needs following discharge   - Consider OT consult to assist with ADL evaluation and planning for discharge  - Provide patient education as appropriate  Outcome: Progressing  Goal: Maintains/Returns to pre admission functional level  Description: INTERVENTIONS:  - Perform BMAT or MOVE assessment daily    - Set and communicate daily mobility goal to care team and patient/family/caregiver     - Collaborate with rehabilitation services on mobility goals if consulted  - Perform Range of Motion 3-4 times a day  - Reposition patient every 1-2 hours    - Dangle patient 3-4 times a day  - Stand patient 3-4 times a day  - Ambulate patient 3-4 times a day  - Out of bed to chair 3-4 times a day   - Out of bed for meals 3-4 times a day  - Out of bed for toileting  - Record patient progress and toleration of activity level   Outcome: Progressing     Problem: PAIN - ADULT  Goal: Verbalizes/displays adequate comfort level or baseline comfort level  Description: Interventions:  - Encourage patient to monitor pain and request assistance  - Assess pain using appropriate pain scale  - Administer analgesics based on type and severity of pain and evaluate response  - Implement non-pharmacological measures as appropriate and evaluate response  - Consider cultural and social influences on pain and pain management  - Notify physician/advanced practitioner if interventions unsuccessful or patient reports new pain  Outcome: Progressing     Problem: INFECTION - ADULT  Goal: Absence or prevention of progression during hospitalization  Description: INTERVENTIONS:  - Assess and monitor for signs and symptoms of infection  - Monitor lab/diagnostic results  - Monitor all insertion sites, i e  indwelling lines, tubes, and drains  - Monitor endotracheal if appropriate and nasal secretions for changes in amount and color  - [place] appropriate cooling/warming therapies per order  - Administer medications as ordered  - Instruct and encourage patient and family to use good hand hygiene technique  - Identify and instruct in appropriate isolation precautions for identified infection/condition  Outcome: Progressing  Goal: Absence of fever/infection during neutropenic period  Description: INTERVENTIONS:  - Monitor WBC    Outcome: Progressing     Problem: SAFETY ADULT  Goal: Patient will remain free of falls  Description: INTERVENTIONS:  - Educate patient/family on patient safety including physical limitations  - Instruct patient to call for assistance with activity   - Consult OT/PT to assist with strengthening/mobility   - Keep Call bell within reach  - Keep bed low and locked with side rails adjusted as appropriate  - Keep care items and personal belongings within reach  - Initiate and maintain comfort rounds  - Make Fall Risk Sign visible to staff  - Offer Toileting every 1-2 Hours, in advance of need  - Initiate/Maintain bed and chair alarm  - Obtain necessary fall risk management equipment: fall socks  - Apply yellow socks and bracelet for high fall risk patients  - Consider moving patient to room near nurses station  Outcome: Progressing  Goal: Maintain or return to baseline ADL function  Description: INTERVENTIONS:  -  Assess patient's ability to carry out ADLs; assess patient's baseline for ADL function and identify physical deficits which impact ability to perform ADLs (bathing, care of mouth/teeth, toileting, grooming, dressing, etc )  - Assess/evaluate cause of self-care deficits   - Assess range of motion  - Assess patient's mobility; develop plan if impaired  - Assess patient's need for assistive devices and provide as appropriate  - Encourage maximum independence but intervene and supervise when necessary  - Involve family in performance of ADLs  - Assess for home care needs following discharge   - Consider OT consult to assist with ADL evaluation and planning for discharge  - Provide patient education as appropriate  Outcome: Progressing     Problem: DISCHARGE PLANNING  Goal: Discharge to home or other facility with appropriate resources  Description: INTERVENTIONS:  - Identify barriers to discharge w/patient and caregiver  - Arrange for needed discharge resources and transportation as appropriate  - Identify discharge learning needs (meds, wound care, etc )  - Arrange for interpretive services to assist at discharge as needed  - Refer to Case Management Department for coordinating discharge planning if the patient needs post-hospital services based on physician/advanced practitioner order or complex needs related to functional status, cognitive ability, or social support system  Outcome: Progressing     Problem: Knowledge Deficit  Goal: Patient/family/caregiver demonstrates understanding of disease process, treatment plan, medications, and discharge instructions  Description: Complete learning assessment and assess knowledge base    Interventions:  - Provide teaching at level of understanding  - Provide teaching via preferred learning methods  Outcome: Progressing     Problem: METABOLIC, FLUID AND ELECTROLYTES - ADULT  Goal: Electrolytes maintained within normal limits  Description: INTERVENTIONS:  - Monitor labs and assess patient for signs and symptoms of electrolyte imbalances  - Administer electrolyte replacement as ordered  - Monitor response to electrolyte replacements, including repeat lab results as appropriate  - Instruct patient on fluid and nutrition as appropriate  Outcome: Progressing  Goal: Fluid balance maintained  Description: INTERVENTIONS:  - Monitor labs   - Monitor I/O and WT  - Instruct patient on fluid and nutrition as appropriate  - Assess for signs & symptoms of volume excess or deficit  Outcome: Progressing  Goal: Glucose maintained within target range  Description: INTERVENTIONS:  - Monitor Blood Glucose as ordered  - Assess for signs and symptoms of hyperglycemia and hypoglycemia  - Administer ordered medications to maintain glucose within target range  - Assess nutritional intake and initiate nutrition service referral as needed  Outcome: Progressing     Problem: Prexisting or High Potential for Compromised Skin Integrity  Goal: Skin integrity is maintained or improved  Description: INTERVENTIONS:  - Identify patients at risk for skin breakdown  - Assess and monitor skin integrity  - Assess and monitor nutrition and hydration status  - Monitor labs   - Assess for incontinence   - Turn and reposition patient  - Assist with mobility/ambulation  - Relieve pressure over bony prominences  - Avoid friction and shearing  - Provide appropriate hygiene as needed including keeping skin clean and dry  - Evaluate need for skin moisturizer/barrier cream  - Collaborate with interdisciplinary team   - Patient/family teaching  - Consider wound care consult   Outcome: Progressing

## 2022-02-10 NOTE — H&P
[facility]  H&P- Shirly Snellen 1934, 80 y o  male MRN: [id number]  Unit/Bed#: [facility] 910-01 Encounter: [id number]  Primary Care Provider: Alisha Walden MD   Date and time admitted to hospital: 2/10/2022 12:54 AM    Lung nodule  Assessment & Plan   Left lower lobe 5 mm nodules  Recommend nonemergent dedicated chest CT  Abnormal urinalysis  Assessment & Plan  Follow on urine culture    Liver cirrhosis [facility])  Assessment & Plan  Patient with transaminitis and jaundice  CT scan with 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  Moderate volume ascites  GI eval  Follow on serology workup    Mass of right inguinal region  Assessment & Plan  Venous duplex of the bilateral lower extremities (02/08/2022):  Note: There is a well defined hypoechoic non-vascularized cystic-type structure  in the groin, 5 1 cm     Outpatient general surgery follow-up    Thrombosis, portal vein  Assessment & Plan  Occlusion of the main portal vein, also at the andreas hepatis on abdominal MRI  Evaluation of the vessels is degraded by lack of IV contrast and patient motion  GI evaluation    Hyperammonemia (HCC)  Assessment & Plan  · Due to liver disease  · Initiate lactulose 10 grams PO Qdaily for a goal 2-3 soft bowel movements per 24 hours      Essential hypertension  Assessment & Plan  Acceptable BP  Continue losartan    Hypothyroidism  Assessment & Plan  Continue levothyroxine    * Jaundice  Assessment & Plan  Patient presented with weakness, fatigue and jaundice  Was found to have elevated total bilirubin  Abdominal CT scan concerning for CBD obstructing mass or stricture as well as few possible ill-defined hypodense areas in the liver which cannot exclude metastases  Abdominal MRI,  there is heterogeneous soft tissue density seen at the andreas hepatis on prior CT that may arise   from the pancreatic head or could have primary biliary origin    Evaluated by GI  Patient was transferred to [facility] for ERCP    VTE Pharmacologic Prophylaxis:   Moderate Risk (Score 3-4) - Pharmacological DVT Prophylaxis Ordered: enoxaparin (Lovenox)  Code Status: Level 1 - Full Code   Discussion with family: Updated  (wife) via phone  Anticipated Length of Stay: Patient will be admitted on an inpatient basis with an anticipated length of stay of greater than 2 midnights secondary to Above  Total Time for Visit, including Counseling / Coordination of Care: 60 minutes Greater than 50% of this total time spent on direct patient counseling and coordination of care  Chief Complaint:   Jaundice  Patient was transferred to [facility] for ERCP    History of Present Illness:  Jean Marie Hoskins is a 80 y o  male with a PMH of hypertension and hypothyroidism who presents to [facility] ED with generalized weakness over the past 2 weeks  Patient was found to have jaundice, transaminitis and high ammonia level    Abdominal CT scan revealed diffuse intrahepatic biliary ductal dilatation with the appearance of an ill-defined soft tissue in the expected location of the CBD which are suspicious for obstructing mass or stricture, a few possible ill-defined hypodense areas in the liver in which metastasis cannot be excluded  Abdominal MRI concerning for heterogeneous soft tissue density seen in the andreas hepaticus with numerous large gallstones and occlusion of the main portal vein    Evaluated by GI who recommended to transfer to [facility] for ERCP  Review of Systems:  Review of Systems   Constitutional: Positive for fatigue  Negative for chills and fever  HENT: Negative for ear pain and sore throat  Eyes: Negative for pain and visual disturbance  Respiratory: Negative for cough and shortness of breath  Cardiovascular: Negative for chest pain and palpitations  Gastrointestinal: Negative for abdominal pain and vomiting     Genitourinary: Negative for dysuria and hematuria  Musculoskeletal: Negative for arthralgias and back pain  Skin: Positive for color change  Negative for rash  Neurological: Positive for dizziness  Negative for seizures and syncope  All other systems reviewed and are negative  Past Medical and Surgical History:   Past Medical History:   Diagnosis Date    Hypertension     Hypothyroidism        Past Surgical History:   Procedure Laterality Date    HERNIA REPAIR      PROSTATE SURGERY      REPLACEMENT TOTAL KNEE      last assessed: 7/11/17       Meds/Allergies:  Prior to Admission medications    Medication Sig Start Date End Date Taking?  Authorizing Provider   Diclofenac Sodium (VOLTAREN) 1 % Apply 2 g topically 4 (four) times a day  Patient not taking: Reported on 2/2/2022 4/19/21   Han Vickers MD   doxycycline hyclate (VIBRAMYCIN) 100 mg capsule Take 1 capsule (100 mg total) by mouth in the morning 1/20/22 4/20/22  Callie Arrington DO   irbesartan (AVAPRO) 300 mg tablet TAKE 1 TABLET EVERY OTHER  DAY 6/3/21   Trang Shrestha MD   levothyroxine (Synthroid) 200 mcg tablet Take 1 tablet (200 mcg total) by mouth daily in the early morning 1/23/22   Callie Liz DO   metroNIDAZOLE (METROGEL) 0 75 % gel Apply topically 2 (two) times a day 4/16/20   Emeka Vaughn MD   Multiple Vitamins-Minerals (ICAPS AREDS 2 PO) Take by mouth    Historical Provider, MD   neomycin-polymyxin-hydrocortisone (CORTISPORIN) otic solution Administer 4 drops into both ears every 8 (eight) hours for 7 days 7/15/20 7/22/20  YOLETTE Ramon   sildenafil (VIAGRA) 100 mg tablet Take 1 tablet (100 mg total) by mouth daily as needed for erectile dysfunction  Patient not taking: Reported on 2/8/2022 1/19/21   Malissa Dubose MD   tadalafil (CIALIS) 20 MG tablet Take 1 tablet (20 mg total) by mouth daily as needed for erectile dysfunction  Patient not taking: Reported on 2/8/2022 8/23/21   Trang Shrestha MD   tamsulosin (FLOMAX) 0 4 mg    Patient not taking: Reported on 2/8/2022     Historical Provider, MD     I have reviewed home medications with patient personally  Allergies: No Known Allergies    Social History:  Marital Status: /Civil Union     Substance Use History:   Social History     Substance and Sexual Activity   Alcohol Use Never     Social History     Tobacco Use   Smoking Status Never Smoker   Smokeless Tobacco Never Used     Social History     Substance and Sexual Activity   Drug Use No       Family History:  Noncontributory  Physical Exam:     Vitals:   Blood Pressure: 112/75 (02/10/22 0110)  Pulse: 74 (02/10/22 0110)  Temperature: 98 3 °F (36 8 °C) (02/10/22 0110)  SpO2: 97 % (02/10/22 0110)    Physical Exam  Vitals and nursing note reviewed  Constitutional:       Appearance: He is well-developed  He is obese  HENT:      Head: Normocephalic and atraumatic  Eyes:      General: Scleral icterus present  Conjunctiva/sclera: Conjunctivae normal    Cardiovascular:      Rate and Rhythm: Normal rate and regular rhythm  Heart sounds: No murmur heard  Pulmonary:      Effort: Pulmonary effort is normal  No respiratory distress  Breath sounds: Normal breath sounds  Abdominal:      Palpations: Abdomen is soft  Tenderness: There is no abdominal tenderness  Musculoskeletal:         General: No swelling  Normal range of motion  Cervical back: Normal range of motion and neck supple  Right lower leg: No edema  Left lower leg: No edema  Skin:     General: Skin is warm and dry  Coloration: Skin is jaundiced  Neurological:      General: No focal deficit present  Mental Status: He is alert and oriented to person, place, and time  Mental status is at baseline  Psychiatric:         Mood and Affect: Mood normal          Behavior: Behavior normal          Thought Content:  Thought content normal          Judgment: Judgment normal           Additional Data:     Lab Results:  Results from last 7 days   Lab Units 02/09/22  0603   WBC Thousand/uL 7 59   HEMOGLOBIN g/dL 12 4   HEMATOCRIT % 34 9*   PLATELETS Thousands/uL 165   NEUTROS PCT % 69   LYMPHS PCT % 15   MONOS PCT % 13*   EOS PCT % 1     Results from last 7 days   Lab Units 02/09/22  0604   SODIUM mmol/L 136   POTASSIUM mmol/L 3 8   CHLORIDE mmol/L 106   CO2 mmol/L 20*   BUN mg/dL 22   CREATININE mg/dL 0 77   ANION GAP mmol/L 10   CALCIUM mg/dL 8 2*   ALBUMIN g/dL 2 5*   TOTAL BILIRUBIN mg/dL 16 05*   ALK PHOS U/L 940*   ALT U/L 180*   AST U/L 156*   GLUCOSE RANDOM mg/dL 105     Results from last 7 days   Lab Units 02/09/22  0603   INR  1 20*         Results from last 7 days   Lab Units 02/09/22  0603   HEMOGLOBIN A1C % 5 3     Results from last 7 days   Lab Units 02/09/22  0603   LACTIC ACID mmol/L 2 0   PROCALCITONIN ng/ml 1 22*       Imaging:  Reviewed  No orders to display       EKG and Other Studies Reviewed on Admission:   · yes    ** Please Note: This note has been constructed using a voice recognition system   **

## 2022-02-10 NOTE — PHYSICAL THERAPY NOTE
Physical Therapy Evaluation     Patient's Name: Sintia Contreras    Admitting Diagnosis  Hyperbilirubinemia [E80 6]    Problem List  Patient Active Problem List   Diagnosis    Hypothyroidism    Essential hypertension    Class 1 obesity without serious comorbidity with body mass index (BMI) of 31 0 to 31 9 in adult    Impaired fasting glucose    Chronic osteomyelitis of knee (HCC)    Male erectile disorder    Exudative age-related macular degeneration, bilateral, with active choroidal neovascularization ([address] )    Asymmetrical sensorineural hearing loss    Osteoarthritis    Rosacea    Bilateral lower extremity edema    Platelets decreased ([address] )    Transaminitis    Hyperbilirubinemia    Hyperammonemia (HCC)    Elevated MCV    Jaundice    Elevated TSH    Abnormal MRI, liver    Thrombosis, portal vein    Common bile duct dilatation    Mass of right inguinal region    Diastolic dysfunction    Abnormal EKG    Liver cirrhosis (HCC)    Abnormal urinalysis    Lung nodule       Past Medical History  Past Medical History:   Diagnosis Date    Hypertension     Hypothyroidism        Past Surgical History  Past Surgical History:   Procedure Laterality Date    HERNIA REPAIR      PROSTATE SURGERY      REPLACEMENT TOTAL KNEE      last assessed: 7/11/17        02/10/22 1001   PT Last Visit   PT Visit Date 02/10/22   Note Type   Note type Evaluation   Pain Assessment   Pain Assessment Tool 0-10   Pain Score No Pain   Restrictions/Precautions   Weight Bearing Precautions Per Order No   Other Precautions Chair Alarm; Bed Alarm;Multiple lines; Fall Risk;Hard of hearing   Home Living   Type of [address] [place] [address] Two level;Bed/bath upstairs  (7 ELROY )   Bathroom Shower/Tub Tub/shower unit  (+ walk in )   Time Bach Grab bars in shower; Shower chair;Grab bars around toilet   [address] Other (Comment); Walker  (flag pole pt uses as walking stick PTA )   Prior Function Level of [place] Independent with ADLs and functional mobility   Lives With Spouse   Receives Help From Family   ADL Assistance Independent   IADLs Independent   Falls in the last 6 months 1 to 4   Vocational Retired   General   Family/Caregiver Present No   Cognition   Arousal/Participation Cooperative   Orientation Level Oriented to person;Oriented to place  (+ month, reports year as "1022")   Memory Decreased recall of precautions   Following Commands Follows one step commands with increased time or repetition   Comments Pt very pleasant and cooperative to participate in therapy session; very [place]    RLE Assessment   RLE Assessment   (functionally 3/5 )   LLE Assessment   LLE Assessment   (functionally 3/5 )   Bed Mobility   Supine to Sit 3  Moderate assistance   Additional items Assist x 1; Increased time required;Verbal cues   Sit to Supine Unable to assess   Additional Comments pt supine in bed upon arrival  Pt sitting OOB in bedside chair with alarm on and all needs within reach at the end of therapy session    Transfers   Sit to Stand 3  Moderate assistance   Additional items Assist x 2; Increased time required;Verbal cues   Stand to Sit 3  Moderate assistance   Additional items Assist x 2; Increased time required;Verbal cues   Additional Comments transfers with RW; cues for hand placement and sequencing    Ambulation/Elevation   Gait pattern Excessively slow; Short stride; Foward flexed;Decreased foot clearance; Improper Weight shift   Gait Assistance 3  Moderate assist   Additional items Assist x 2;Verbal cues; Tactile cues   Assistive Device Rolling walker   Distance 12 ft    Balance   Static Sitting Fair   Dynamic Sitting Fair -   Static Standing Poor +   Dynamic Standing Poor   Ambulatory Poor   Activity Tolerance   Activity Tolerance Patient limited by fatigue;Patient tolerated treatment well   Medical Staff Made Aware OT; co-ruperto performed this date 2* incresed medical complexity and multiple co-morbidities    Nurse Made Aware RN cleared pt to participate in therapy session    Assessment   Prognosis Good   Problem List Decreased strength;Decreased endurance; Impaired balance;Decreased mobility; Decreased safety awareness;Decreased range of motion   Assessment Pt seen for high complexity PT evaluation  Pt with active PT eval/treat and up with A orders  Pt is a 80 y o  male who [facility] with jaundice  Pt's current dx/ problem list include: hypothyroidism, HTN, hyperammonemia, thrombosis, liver cirrhosis, mass of R inguinal region, lung nodule  Comorbidities affecting pt's physical performance at time of assessment are as follows:  has a past medical history of Hypertension and Hypothyroidism  Personal factors affecting pt at time of initial examination include: steps to enter environment, limited home support, advanced age, past experience, inability to perform IADLs, inability to perform ADLs, inability to ambulate household distances, limited insight into impairments and recent fall(s)  Due to acute medical issues, ongoing medical workup for primary dx; pain, fall risk, increased reliance on more restrictive AD compared to baseline;  decreased activity tolerance compared to baseline, increased assistance needed from caregiver at current time, multiple lines, decline in overall functional mobility status; health management issues; note unstable clinical picture (high complexity)  At baseline, pt resides with spouse in 2  with 7 ELROY and was independent using walking stick prior to hospital admission  Currently, upon initial examination, pt  is requiring mod A for bed mobility skills; mod Ax2 for functional transfers and mod Ax2 for ambulation with RW   Currently, pt presents functioning below baseline and with overall mobility deficits 2* to: decreased LE strength/AROM; limited flexibility;  generalized weakness/ deconditioning; decreased endurance; decreased activity tolerance; decreased coordination; impaired balance; gait deviations; decreased safety awareness; SOB/ARAUZ; fatigue; impaired safety and judgement; limited insight into current deficits; bed/ chair alarms; multiple lines; hearing impairment as well as: weakness, decreased ROM, impaired balance, decreased endurance, gait deviations, decreased activity tolerance, decreased functional mobility tolerance and fall risk  Pt currently at increased risk for falls  At the end of evaluation, pt was left sitting OOB in bedside chair with all needs in reach  Pt would benefit from continued skilled PT services while in hospital to address remaining limitations and maximize functional potential  PT to continue to follow pt and recommends rehab upon d/c  The patient's AM-PAC Basic Mobility Inpatient Short Form Raw Score is 9  A Raw score of less than or equal to 16 suggests the patient may benefit from discharge to post-acute rehabilitation services  Please also refer to the recommendation of the Physical Therapist for safe discharge planning  Barriers to Discharge Inaccessible home environment   Goals   Patient Goals none stated    STG Expiration Date 02/24/22   Short Term Goal #1 STG 1  Pt will be able to perform bed mobility tasks with mod I in order to improve overall functional mobility and assist in safe d/c  STG 2  Pt with sit EOB for at least 25 minutes at S level level in order to strengthen abdominal musculature and assist in future transfers/ ambulation  STG 3  Pt will be able to perform functional transfer with mod I in order to improve overall functional mobility and assist in safe d/c  STG 4  Pt will be able to ambulate at least 150 feet with least restrictive device with mod I A in order to improve overall functional mobility and assist in safe d/c  STG 5  Pt will improve sitting/standing static/dynamic balance 1/2 grade in order to improve functional mobility and assist in safe d/c  STG 6   Pt will improve LE strength by 1/2 grade in order to improve functional mobility and assist in safe d/c  STG 6  Pt will be able to negotiate at least 7 stairs with least restrictive device with mod I A in order to improve overall functional mobility and assist in safe d/c     PT Treatment Day 0   Plan   Treatment/Interventions Functional transfer training;LE strengthening/ROM; Endurance training;Patient/family training;Equipment eval/education; Bed mobility;Gait training;Spoke to nursing;Spoke to case management;OT   PT Frequency 3-5x/wk   Recommendation   PT Discharge Recommendation Post acute rehabilitation services   Equipment Recommended [address] Mobility Inpatient   Turning in Bed Without Bedrails 3   Lying on Back to Sitting on Edge of Flat Bed 2   Moving Bed to Chair 1   Standing Up From Chair 1   Walk in Room 1   Climb 3-5 Stairs 1   Basic Mobility Inpatient Raw Score 9   Highest Level Of Mobility   JH-HLM Goal 3: Sit at edge of bed   JH-HLM Highest Level of Mobility 4: Move to chair/commode   JH-HLM Goal Achieved Yes   End of Consult   Patient Position at End of Consult Bedside chair;Bed/Chair alarm activated; All needs within reach           Mazin Cantu PT

## 2022-02-10 NOTE — CASE MANAGEMENT
Case Management Assessment & Discharge Planning Note    Patient name Priyank Amaro  Location [facility] 910/[facility] 833-48 MRN [id number]  : 1934 Date 2/10/2022       Current Admission Date: 2/10/2022  Current Admission Diagnosis:Jaundice   Patient Active Problem List    Diagnosis Date Noted    Elevated TSH 2022    Abnormal MRI, liver 2022    Thrombosis, portal vein 2022    Common bile duct dilatation 2022    Mass of right inguinal region     Diastolic dysfunction     Abnormal EKG 2022    Liver cirrhosis ([facility] ) 2022    Abnormal urinalysis 2022    Lung nodule 2022    Transaminitis 2022    Hyperbilirubinemia 2022    Hyperammonemia ([facility] ) 2022    Elevated MCV 2022    Jaundice 2022    Platelets decreased ([facility] ) 2022    Bilateral lower extremity edema 2021    Exudative age-related macular degeneration, bilateral, with active choroidal neovascularization ([facility] ) 07/15/2020    Asymmetrical sensorineural hearing loss 07/15/2020    Osteoarthritis 07/15/2020    Rosacea 07/15/2020    Male erectile disorder 2019    Impaired fasting glucose 2019    Class 1 obesity without serious comorbidity with body mass index (BMI) of 31 0 to 31 9 in adult 03/15/2019    Hypothyroidism 2018    Essential hypertension 2018    Chronic osteomyelitis of knee ([facility] ) 10/08/2013      LOS (days): 0  Geometric Mean LOS (GMLOS) (days):   Days to GMLOS:     OBJECTIVE:  PATIENT READMITTED TO HOSPITAL  Risk of Unplanned Readmission Score: 12         Current admission status: Inpatient       Preferred Pharmacy:   [facility] [address] [place] [address]  Phone: [phone] Fax: 21 [address]  Phone: [phone] Fax: 3 [address] [address]  Phone: [phone] Fax: [phone]    Primary Care Provider: Ancelmo Nance MD    Primary Insurance: [facility]  Secondary Insurance:     ASSESSMENT:  Active Health Care Agents    There are no active Health Care Agents on file  Patient Information  Admitted from[de-identified] Home  Mental Status: Alert  During Assessment patient was accompanied by: Not accompanied during assessment  Assessment information provided by[de-identified] Patient  Primary Caregiver: Self  Support Systems: Self,Spouse/significant other,Daughter  What city do you live in?: [facility] entry access options   Select all that apply : Stairs  Number of steps to enter home : 7  Do the steps have railings?: Yes  Type of Current Residence: 2 story home  Upon entering residence, is there a bedroom on the main floor (no further steps)?: No  A bedroom is located on the following floor levels of residence (select all that apply):: 2nd Floor  Upon entering residence, is there a bathroom on the main floor (no further steps)?: Yes  Number of steps to 2nd floor from main floor: One Flight (states around 9-11 stairs)  In the last 12 months, was there a time when you were not able to pay the mortgage or rent on time?: No  In the last 12 months, how many places have you lived?: 1  In the last 12 months, was there a time when you did not have a steady place to sleep or slept in a shelter (including now)?: No  Living Arrangements: Lives w/ Spouse/significant other    Activities of Daily Living Prior to Admission  Functional Status: Independent  Completes ADLs independently?: Yes  Ambulates independently?: Yes  Does patient use assisted devices?: Yes  Assisted Devices (DME) used: Straight Cane (patient uses a wooden flag pole as a cane to get around)  Does patient currently own DME?: Yes  What DME does the patient currently own?: Walker  Does patient have a history of Outpatient Therapy (PT/OT)?: Yes (states he knows that it was located in [facility])  Does the patient have a history of Short-Term Rehab?: No  Does patient have a history of HHC?: No  Does patient currently have [facility]?: No         Patient Information Continued  Income Source: Pension/FDC  Does patient have prescription coverage?: Yes  Within the past 12 months, you worried that your food would run out before you got the money to buy more : Never true  Within the past 12 months, the food you bought just didnt last and you didnt have money to get more : Never true  Food insecurity resource given?: N/A  Does patient receive dialysis treatments?: No  Does patient have a history of substance abuse?: No  Does patient have a history of Mental Health Diagnosis?: No         Means of Transportation  Means of Transport to Appts[de-identified] Drives Self  In the past 12 months, has lack of transportation kept you from medical appointments or from getting medications?: No  In the past 12 months, has lack of transportation kept you from meetings, work, or from getting things needed for daily living?: No  Was application for public transport provided?: N/A        DISCHARGE DETAILS:    Discharge planning discussed with[de-identified] patient and daughter Sofi Nieto  Freedom of Choice: Yes     CM contacted family/caregiver?: Yes  Were Treatment Team discharge recommendations reviewed with patient/caregiver?: Yes  Did patient/caregiver verbalize understanding of patient care needs?: Yes  Were patient/caregiver advised of the risks associated with not following Treatment Team discharge recommendations?: Yes    Contacts  Patient Contacts: Reta Lombard (daughter)  Relationship to Patient[de-identified] Family  Contact Method: Phone  Phone Number: [phone]  Reason/Outcome: Continuity of Care,Emergency Contact,Referral,Discharge Planning              Other Referral/Resources/Interventions Provided:  Interventions: Short Term Rehab  Referral Comments: recommendations at this time are for the patient to d/c to rehab before d/c to home  Treatment Team Recommendation: Short Term Rehab  Discharge Destination Plan[de-identified] Short Term Rehab           Additional Comments: patient states that his daughter is a physical therapist and before deciding on STR at time of d/c, he would like for someone to talk to her about everything first  Call placed to daughter Baldemar Ramos who confirms that she is a physical therapist at [facility]  She states that she prefers to "listen to the experts" and follow the recommendations of rehab at this time  She states she does try and encourage her father at home, however at times he does not listen to her  She would prefer STR d/c at this time  List of choices (both acute and subacute) sent to daughter for preference  She is okay with blanket referrals in the meantime  CM reviewed d/c planning process including the following: identifying help at home, patient preference for d/c planning needs, Discharge Lounge, [facility] Meds to Bed program, availability of treatment team to discuss questions or concerns patient and/or family may have regarding understanding medications and recognizing signs and symptoms once discharged  CM also encouraged patient to follow up with all recommended appointments after discharge  Patient advised of importance for patient and family to participate in managing patients medical well being  Patient/caregiver received discharge checklist  Content reviewed  Patient/caregiver encouraged to participate in discharge plan of care prior to discharge home

## 2022-02-10 NOTE — CONSULTS
Please refer to consult note from Unice Real 2/9/2022 for consult details and today's note for progress note and recommendations

## 2022-02-10 NOTE — OCCUPATIONAL THERAPY NOTE
Occupational Therapy Evaluation     Patient Name: Margie Sanabria  YPLMX'G Date: 2/10/2022  Problem List  Principal Problem:    Painless jaundice  Active Problems:    Essential hypertension    Hyperammonemia (HCC)    Thrombosis, portal vein    Mass of right inguinal region    Liver cirrhosis ([address] )    Lung nodule    Past Medical History  Past Medical History:   Diagnosis Date    Hypertension     Hypothyroidism      Past Surgical History  Past Surgical History:   Procedure Laterality Date    HERNIA REPAIR      PROSTATE SURGERY      REPLACEMENT TOTAL KNEE      last assessed: 7/11/17           02/10/22 0959   OT Last Visit   OT Visit Date 02/10/22   Note Type   Note type Evaluation   Restrictions/Precautions   Weight Bearing Precautions Per Order No   Other Precautions Chair Alarm; Bed Alarm; Fall Risk;Multiple lines;Hard of hearing   Pain Assessment   Pain Assessment Tool 0-10   Pain Score No Pain   Home Living   Type of Home House   Home Layout Two level;Bed/bath upstairs  (7STE)   Bathroom Shower/Tub Tub/shower unit  (also has walk-in shower )   Bathroom Toilet Raised   Bathroom Equipment Grab bars in shower; Shower chair;Grab bars around toilet  (pt reports wife uses SC, he does not )   [address] [facility]; Other (Comment)  (pt uses 'walking stick' he made out of an old flag pole )   Additional Comments Pt reports using long handled sponge to wash hair 2* decreased sh ROM    Prior Function   Level of [place] Independent with ADLs and functional mobility   Lives With Spouse   Receives Help From Family   ADL Assistance Independent   IADLs Independent   Falls in the last 6 months 1 to 4  (2-3 per pt report )   Vocational Retired   Lifestyle   Autonomy PTA, pt reports being I with ADLs/IADLs, walking stick for fnxl mobility, (+)     Reciprocal Relationships Wife, family   Service to Others Retired - was in the [employer] war and a     Intrinsic Gratification "I enjoy life"   Psychosocial Psychosocial (WDL) WDL   ADL   Where Assessed Edge of bed   Eating Assistance 7  Independent   Grooming Assistance 5  Supervision/Setup   UB Bathing Assistance 4  Minimal Assistance   LB Bathing Assistance 3  Moderate Assistance   UB Dressing Assistance 4  Minimal Assistance   LB Dressing Assistance 3  Moderate [address]  4  Minimal Assistance   Bed Mobility   Supine to Sit 3  Moderate assistance   Additional items Assist x 1;HOB elevated; Bedrails; Increased time required   Sit to Supine Unable to assess   Transfers   Sit to Stand 3  Moderate assistance   Additional items Assist x 2; Increased time required   Stand to Sit 3  Moderate assistance   Additional items Assist x 2; Increased time required   Additional Comments Transfers with RW    Functional Mobility   Functional Mobility 3  Moderate assistance   Additional Comments Ax1-2    Additional items Rolling walker   Balance   Static Sitting Fair   Dynamic Sitting Fair -   Static Standing Poor +   Dynamic Standing Poor   Ambulatory Poor   Activity Tolerance   Activity Tolerance Patient tolerated treatment well   Medical Staff Made Aware PT iVka   Nurse Made Aware RN clearance for session    RUE Assessment   RUE Assessment   (strength WFL, sh flex ~90*)   LUE Assessment   LUE Assessment   (strength WFL, sh flex ~90*)   Vision - Complex Assessment   Ocular Range of Motion WFL   Head Position WDL   Tracking Able to track stimulus in all quads without difficulty   Cognition   Arousal/Participation Responsive; Cooperative   Attention Attends with cues to redirect   Orientation Level Oriented to person;Oriented to place;Oriented to situation  (oriented to month )   Following Commands Follows one step commands with increased time or repetition   Comments Pt [place], very pleasant and cooperative t/o session    Assessment   Limitation Decreased ADL status; Decreased UE ROM; Decreased UE strength;Decreased Safe judgement during ADL;Decreased endurance;Decreased self-care trans;Decreased high-level ADLs   Prognosis Fair   Assessment Pt is a 80 y o  male [facility]10[facility]20[facility] w/ Painless jaundice, hyperbilirubinemia  has a past medical history of Hypertension, osteomyelitis of knee, macular degeneration, OA, B/L LE edema, and Hypothyroidism  Pt with active OT orders and up with assistance  orders  Pt seen as a co-evaluation with PT due to the patient's co-morbidities, clinically unstable presentation/clinical complexity, and present impairments  As per pt report, pta, resides with his wife in a 2STH, 7STE  Pt was I w/  ADLS and IADLS, (+) drove  Upon evaluation, pt currently requires MOD A x 1-2 with RW for transfers and mobility  Exhibits decreased strength to B/L UE's limiting performance in ADLs/transfers  Pt currently requires I eating, S grooming, MIN A UB ADLs, MOD A LB ADLs, and MIN A toileting  Pt is limited at this time 2*: endurance, activity tolerance, functional mobility, balance, functional standing tolerance, decreased I w/ ADLS/IADLS, strength and ROM  The following Occupational Performance Areas to address include: grooming, bathing/shower, toilet hygiene, dressing, health maintenance, functional mobility, community mobility and clothing management  Pt to benefit from immediate acute skilled OT to address above deficits, improve overall functional independence, maximize fnxl mobility and reduce caregiver burden  From OT standpoint, recommendation at time of d/c would be STR - pending progress  Pt was left in chair with alarm on after session with all current needs met  The patient's raw score on the AM-PAC Daily Activity inpatient short form is 17, standardized score is 37 26, less than 39 4  Patients at this level are likely to benefit from discharge to post-acute rehabilitation services  Please refer to the recommendation of the Occupational Therapist for safe discharge planning     Goals   Patient Goals To have his procedure today    LTG Time Frame 10-14   Plan   Treatment Interventions ADL retraining;Functional transfer training;UE strengthening/ROM; Endurance training;Patient/family training;Equipment evaluation/education;Cognitive reorientation; Compensatory technique education;Continued evaluation; Energy conservation; Activityengagement   Goal Expiration Date 02/24/22   OT Frequency 3-5x/wk   Recommendation   OT Discharge Recommendation Post acute rehabilitation services  (pending progress)   OT - OK to Discharge Yes  (to rehab when medically stable )   AM-PAC Daily Activity Inpatient   Lower Body Dressing 2   Bathing 2   Toileting 3   Upper Body Dressing 3   Grooming 3   Eating 4   Daily Activity Raw Score 17   Daily Activity Standardized Score (Calc for Raw Score >=11) 37 26   AM-PAC Applied Cognition Inpatient   Following a Speech/Presentation 3   Understanding Ordinary Conversation 4   Taking Medications 4   Remembering Where Things Are Placed or Put Away 4   Remembering List of 4-5 Errands 3   Taking Care of Complicated Tasks 3   Applied Cognition Raw Score 21   Applied Cognition Standardized Score 44 3      GOALS    - Pt will complete UB dressing/self care/bathing w/ mod I using adaptive device and DME as needed    - Pt will complete LB dressing/self care/bathing w/ mod I using adaptive device and DME as needed    - Pt will complete toileting w/ mod I w/ G hygiene/thoroughness using DME as needed    - Pt will improve functional transfers to Mod I on/off all surfaces using DME as needed w/ G balance/safety     - Pt will improve functional mobility during ADL/IADL/leisure tasks to Mod I using DME as needed w/ G balance/safety     - Pt will demonstrate G carryover of pt/caregiver education and training as appropriate      - Pt will demonstrate 100% carryover of energy conservation techniques t/o functional I/ADL/leisure tasks w/o cues s/p skilled education    - Pt will engage in ongoing cognitive assessment w/ G participation to assist w/ safe d/c planning/recommendations    - Pt will increase static and dynamic standing/sitting balance to F+  using AD/DME as needed to increase safety and I during fnxl transfers and ADLs    - Pt will maintain upright sitting position for at least 20 min during dynamic fnxl activity with F+  balance and endurance to improve ADL performance and independence, as well as reduce risk of falls     - Pt will participate in simulated IADL management task with DME as needed to increase independence to  w/ G safety and endurance      Rachel Anderson MS, OTR/L

## 2022-02-10 NOTE — NURSING NOTE
Pt  was discharged per stretcher and was accompanied by the transport team  Pt 's belongings were sent as well together with the patient  Report was given to Hollie Heck RN at [facility]  Spoke with Elizabeth Mccabe, pt 's wife over the phone and made aware of the transfer

## 2022-02-10 NOTE — PLAN OF CARE
Problem: OCCUPATIONAL THERAPY ADULT  Goal: Performs self-care activities at highest level of function for planned discharge setting  See evaluation for individualized goals  Description: Treatment Interventions: ADL retraining,Functional transfer training,UE strengthening/ROM,Endurance training,Patient/family training,Equipment evaluation/education,Cognitive reorientation,Compensatory technique education,Continued evaluation,Energy conservation,Activityengagement          See flowsheet documentation for full assessment, interventions and recommendations  Note: Limitation: Decreased ADL status,Decreased UE ROM,Decreased UE strength,Decreased Safe judgement during ADL,Decreased endurance,Decreased self-care trans,Decreased high-level ADLs  Prognosis: Fair  Assessment: Pt is a 80 y o  male admitted to [facility] on 2/10/2022 w/ Painless jaundice, hyperbilirubinemia  has a past medical history of Hypertension, osteomyelitis of knee, macular degeneration, OA, B/L LE edema, and Hypothyroidism  Pt with active OT orders and up with assistance  orders  Pt seen as a co-evaluation with PT due to the patient's co-morbidities, clinically unstable presentation/clinical complexity, and present impairments  As per pt report, pta, resides with his wife in a 2STH, 7STE  Pt was I w/  ADLS and IADLS, (+) drove  Upon evaluation, pt currently requires MOD A x 1-2 with RW for transfers and mobility  Exhibits decreased strength to B/L UE's limiting performance in ADLs/transfers  Pt currently requires I eating, S grooming, MIN A UB ADLs, MOD A LB ADLs, and MIN A toileting  Pt is limited at this time 2*: endurance, activity tolerance, functional mobility, balance, functional standing tolerance, decreased I w/ ADLS/IADLS, strength and ROM  The following Occupational Performance Areas to address include: grooming, bathing/shower, toilet hygiene, dressing, health maintenance, functional mobility, community mobility and clothing management   Pt to benefit from immediate acute skilled OT to address above deficits, improve overall functional independence, maximize fnxl mobility and reduce caregiver burden  From OT standpoint, recommendation at time of d/c would be STR - pending progress  Pt was left in chair with alarm on after session with all current needs met  The patient's raw score on the AM-PAC Daily Activity inpatient short form is 17, standardized score is 37 26, less than 39 4  Patients at this level are likely to benefit from discharge to post-acute rehabilitation services  Please refer to the recommendation of the Occupational Therapist for safe discharge planning       OT Discharge Recommendation: Post acute rehabilitation services (pending progress)  OT - OK to Discharge: Yes (to rehab when medically stable )

## 2022-02-10 NOTE — UTILIZATION REVIEW
Notification of Discharge   This is a Notification of Discharge from our facility [address]  Please be advised that this patient has been discharge from our facility  Below you will find the admission and discharge date and time including the patients disposition  UTILIZATION REVIEW CONTACT:  [place]  Utilization   Network Utilization Review Department  Phone: [phone] x carefully listen to the prompts  All voicemails are confidential   Email: [email or web address]     PHYSICIAN ADVISORY SERVICES:  FOR HUBF-[id number]-[id number] REVIEW - MEDICAL NECESSITY DENIAL  Phone: [phone]  Fax: [phone]  Email: [email or web address]     PRESENTATION DATE: 2/8/2022  8:41 AM  OBERVATION ADMISSION DATE:  INPATIENT ADMISSION DATE: 2/8/22 11:51 AM   DISCHARGE DATE: 2/9/2022 11:49 PM  DISPOSITION: [address] [facility]      IMPORTANT INFORMATION:  Send all requests for admission clinical reviews, approved or denied determinations and any other requests to dedicated fax number below belonging to the campus where the patient is receiving treatment   List of dedicated fax numbers:  [address] DENIALS (Administrative/Medical Necessity) [phone]   [address]  (Maternity/NICU/Pediatrics) [phone]   [facility] [phone]   [address] [facility] [phone]   [address] [place] [address] [phone]   [address] [facility] [address] [place] [address],[address]Th Floor [address] [phone]   [facility]  [phone]   [address] [facility] [address]  [address] [facility] [address] [facility] [phone]

## 2022-02-10 NOTE — PLAN OF CARE
Problem: Potential for Falls  Goal: Patient will remain free of falls  Description: INTERVENTIONS:  - Educate patient/family on patient safety including physical limitations  - Instruct patient to call for assistance with activity   - Consult OT/PT to assist with strengthening/mobility   - Keep Call bell within reach  - Keep bed low and locked with side rails adjusted as appropriate  - Keep care items and personal belongings within reach  - Initiate and maintain comfort rounds  - Make Fall Risk Sign visible to staff  - Offer Toileting every  Hours, in advance of need  - Initiate/Maintain alarm  - Obtain necessary fall risk management equipment:   - Apply yellow socks and bracelet for high fall risk patients  - Consider moving patient to room near nurses station  2/10/2022 1103 by Goldie Nageotte, RN  Outcome: Progressing  2/10/2022 1102 by Goldie Nageotte, RN  Outcome: Progressing

## 2022-02-10 NOTE — PLAN OF CARE
Problem: PHYSICAL THERAPY ADULT  Goal: Performs mobility at highest level of function for planned discharge setting  See evaluation for individualized goals  Description: Treatment/Interventions: Functional transfer training,LE strengthening/ROM,Endurance training,Patient/family training,Equipment eval/education,Bed mobility,Gait training,Spoke to nursing,Spoke to case management,OT  Equipment Recommended: Obie Castleman       See flowsheet documentation for full assessment, interventions and recommendations  2/10/2022 1215 by Kylie Jordan, PT  Note: Prognosis: Good  Problem List: Decreased strength,Decreased endurance,Impaired balance,Decreased mobility,Decreased safety awareness,Decreased range of motion  Assessment: Pt seen for high complexity PT evaluation  Pt with active PT eval/treat and up with A orders  Pt is a 80 y o  male who was admitted to [facility] on 2/10/22 with jaundice  Pt's current dx/ problem list include: hypothyroidism, HTN, hyperammonemia, thrombosis, liver cirrhosis, mass of R inguinal region, lung nodule  Comorbidities affecting pt's physical performance at time of assessment are as follows:  has a past medical history of Hypertension and Hypothyroidism  Personal factors affecting pt at time of initial examination include: steps to enter environment, limited home support, advanced age, past experience, inability to perform IADLs, inability to perform ADLs, inability to ambulate household distances, limited insight into impairments and recent fall(s)  Due to acute medical issues, ongoing medical workup for primary dx; pain, fall risk, increased reliance on more restrictive AD compared to baseline;  decreased activity tolerance compared to baseline, increased assistance needed from caregiver at current time, multiple lines, decline in overall functional mobility status; health management issues; note unstable clinical picture (high complexity)   At baseline, pt resides with spouse in [place] with 7 ELROY and was independent using walking stick prior to hospital admission  Currently, upon initial examination, pt  is requiring mod A for bed mobility skills; mod Ax2 for functional transfers and mod Ax2 for ambulation with RW  Currently, pt presents functioning below baseline and with overall mobility deficits 2* to: decreased LE strength/AROM; limited flexibility;  generalized weakness/ deconditioning; decreased endurance; decreased activity tolerance; decreased coordination; impaired balance; gait deviations; decreased safety awareness; SOB/ARAUZ; fatigue; impaired safety and judgement; limited insight into current deficits; bed/ chair alarms; multiple lines; hearing impairment as well as: weakness, decreased ROM, impaired balance, decreased endurance, gait deviations, decreased activity tolerance, decreased functional mobility tolerance and fall risk  Pt currently at increased risk for falls  At the end of evaluation, pt was left sitting OOB in bedside chair with all needs in reach  Pt would benefit from continued skilled PT services while in hospital to address remaining limitations and maximize functional potential  PT to continue to follow pt and recommends rehab upon d/c  The patient's AM-PAC Basic Mobility Inpatient Short Form Raw Score is 9  A Raw score of less than or equal to 16 suggests the patient may benefit from discharge to post-acute rehabilitation services  Please also refer to the recommendation of the Physical Therapist for safe discharge planning  Barriers to Discharge: Inaccessible home environment        PT Discharge Recommendation: Post acute rehabilitation services          See flowsheet documentation for full assessment

## 2022-02-10 NOTE — PROGRESS NOTES
[facility] [facility]  Progress Note - Braden Carpenter 1934, 80 y o  male MRN: [id number]  Unit/Bed#: [facility] 910-01 Encounter: [id number]  Primary Care Provider: Herbert Lui MD   Date and time admitted to hospital: 2/10/2022 12:54 AM    * Painless jaundice  Assessment & Plan  · Patient noted to have mild elevated bilirubin as an outpatient and was plan for outpatient MRCP  · Given increasing weakness and jaundice, presented to [address] [place] [address] ER  · Found to have bili of 16 at this time  · Planning for ERCP/EUS 2/11/22  · MRCP: Biliary ductal dilatation with abrupt cut off of the common duct at the anrdeas hepatis  Hheterogeneous soft tissue density seen at the andreas hepatis on prior CT that may arise  from the pancreatic head or could have primary biliary origin  Though there are numerous large gallstones, including at the gallbladder neck, these do not appear to cause extrinsic compression of the duct (i e , no suggestion of Mirizzi syndrome)  · GI following  · NPO at midnight  · Serial LFTs  · CA 19-9 pending  CEA 6 8      Thrombosis, portal vein  Assessment & Plan  · CT and MRCP shows 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 in the area of abnormal soft tissue  · Await ERCP/EUS tomorrow    Liver cirrhosis ([facility] [address] )  Assessment & Plan  · No known history of underlying liver disease  · Patient noted to have CT scan and MRI evidence of cirrhosis as well as ascites  · MRI is noted to show evidence of volume overload  · Obtaining iron panel  · HIV, MELODIE, ASMA, AMA, acute hepatitis panel negative  · May consider paracentesis during this admission  · varcies noted on CT abdomen    Hyperammonemia (HCC)  Assessment & Plan  · Due to liver disease  · Not encephalopathic at this time  · Monitor mental status  · Continue lactulose 10 grams PO Qdaily for a goal 2-3 soft bowel movements per 24 hours    Essential hypertension  Assessment & Plan  · Systolic blood pressure currently 110  · Continue losartan    Lung nodule  Assessment & Plan  · CT AP: Left lower lobe 5 mm nodules  Bibasilar reticular changes and/or mild atelectasis  Paraesophageal varices  · Will obtain CT of the chest to assess for underlying malignancy given the presence of painless jaundice    Mass of right inguinal region  Assessment & Plan  · Venous duplex of the bilateral lower extremities (02/08/2022) as outpatient showed 5 1 cm non vascularized cystic structure in the right groin  · Plan for patient surgery follow-up      VTE Pharmacologic Prophylaxis: VTE Score: 5 High Risk (Score >/= 5) - Pharmacological DVT Prophylaxis Ordered: enoxaparin (Lovenox)  Sequential Compression Devices Ordered  Patient Centered Rounds: I performed bedside rounds with nursing staff today  Discussions with Specialists or Other Care Team Provider: GI    Education and Discussions with Family / Patient: Updated  (daughter) via phone  wife did not answer when I called her  Time Spent for Care: 30 minutes  More than 50% of total time spent on counseling and coordination of care as described above  Current Length of Stay: 0 day(s)  Current Patient Status: Inpatient   Certification Statement: The patient will continue to require additional inpatient hospital stay due to need for ERCP/EUS  Discharge Plan: Anticipate discharge in >72 hrs to rehab facility  Code Status: Level 1 - Full Code    Subjective:   80-year-old male with past medical history of hypertension, hypothyroidism hyperlipidemia presented to the emergency department with weakness and jaundice noted to have significant hyperbilirubinemia of 16, elevated alk-phos and transaminases  As an outpatient the patient was noted to have elevated liver enzymes on January 21st and plan was to get an MRCP as an outpatient  He was noted to have worsening jaundice and weakness and therefore went to the ER    Patient was noted be transferred from [place] [address] [address] to [facility] for plan for ERCP  MRCP was performed which was noted to show a dilated common bile duct with abrupt cutoff an ill-defined soft tissue and the common bile duct  Patient reports that his abdomen has been distended for few weeks  He reports that he had a bowel movement yesterday  He feels like he needs to have another 1 today  Objective:     Vitals:   Temp (24hrs), Av °F (36 7 °C), Min:97 6 °F (36 4 °C), Max:98 3 °F (36 8 °C)    Temp:  [97 6 °F (36 4 °C)-98 3 °F (36 8 °C)] 97 6 °F (36 4 °C)  HR:  [73-74] 73  Resp:  [20] 20  BP: (112-116)/(70-75) 116/70  SpO2:  [94 %-97 %] 94 %  Body mass index is 32 26 kg/m²  Input and Output Summary (last 24 hours): Intake/Output Summary (Last 24 hours) at 2/10/2022 1229  Last data filed at 2/10/2022 0501  Gross per 24 hour   Intake --   Output 200 ml   Net -200 ml       Physical Exam:   Physical Exam  Vitals and nursing note reviewed  Constitutional:       General: He is not in acute distress  Appearance: Normal appearance  He is ill-appearing  He is not diaphoretic  HENT:      Head: Normocephalic and atraumatic  Mouth/Throat:      Mouth: Mucous membranes are dry  Eyes:      General: Scleral icterus present  Cardiovascular:      Rate and Rhythm: Normal rate and regular rhythm  Pulmonary:      Effort: Pulmonary effort is normal       Breath sounds: Normal breath sounds  No stridor  No wheezing, rhonchi or rales  Abdominal:      General: Bowel sounds are normal  There is distension  Palpations: Abdomen is soft  There is no mass  Tenderness: There is no abdominal tenderness  There is no guarding  Comments: Hypoactive BS   Musculoskeletal:      Right lower leg: No edema  Left lower leg: No edema  Skin:     General: Skin is warm and dry  Coloration: Skin is jaundiced  Neurological:      Mental Status: He is alert and oriented to person, place, and time        Cranial Nerves: No cranial nerve deficit  Psychiatric:         Mood and Affect: Mood normal          Behavior: Behavior normal           Additional Data:     Labs:  Results from last 7 days   Lab Units 02/10/22  0503   WBC Thousand/uL 7 70   HEMOGLOBIN g/dL 11 6*   HEMATOCRIT % 32 8*   PLATELETS Thousands/uL 171   NEUTROS PCT % 65   LYMPHS PCT % 16   MONOS PCT % 14*   EOS PCT % 3     Results from last 7 days   Lab Units 02/10/22  0503   SODIUM mmol/L 138   POTASSIUM mmol/L 3 8   CHLORIDE mmol/L 109*   CO2 mmol/L 21   BUN mg/dL 25   CREATININE mg/dL 0 96   ANION GAP mmol/L 8   CALCIUM mg/dL 8 6   ALBUMIN g/dL 2 3*   TOTAL BILIRUBIN mg/dL 16 34*   ALK PHOS U/L 826*   ALT U/L 143*   AST U/L 141*   GLUCOSE RANDOM mg/dL 99     Results from last 7 days   Lab Units 02/09/22  0603   INR  1 20*     Results from last 7 days   Lab Units 02/10/22  1127   POC GLUCOSE mg/dl 123     Results from last 7 days   Lab Units 02/09/22  0603   HEMOGLOBIN A1C % 5 3     Results from last 7 days   Lab Units 02/10/22  0503 02/09/22  0603   LACTIC ACID mmol/L  --  2 0   PROCALCITONIN ng/ml 0 97* 1 22*       Lines/Drains:  Invasive Devices  Report    Peripheral Intravenous Line            Peripheral IV 02/10/22 Distal;Dorsal (posterior); Right Forearm <1 day                      Imaging: Reviewed radiology reports from this admission including: abdominal/pelvic CT and MRI abdomen/MRCP    Recent Cultures (last 7 days):         Last 24 Hours Medication List:   Current Facility-Administered Medications   Medication Dose Route Frequency Provider Last Rate    enoxaparin  40 mg Subcutaneous Q24H Lavina Christopher, DO      lactulose  10 g Oral Daily Lavina Seneca, DO      levothyroxine  200 mcg Oral Early Morning Lavina Christopher, DO      losartan  25 mg Oral Daily Lavina Christopher, DO      multi-electrolyte  50 mL/hr Intravenous Continuous Lavina Seneca, DO 50 mL/hr (02/10/22 0127)        Today, Patient Was Seen By: Rosa Torre PA-C    **Please Note: This note may have been constructed using a voice recognition system  **

## 2022-02-10 NOTE — PROGRESS NOTES
Progress Note- Thang Dunn 80 y o  male MRN: [id number]    Unit/Bed#: [facility] 910-01 Encounter: [id number]      Assessment and Plan:  72-year-old male with past medical history of HTN, hypothyroidism and HLD who presented to the ED with weakness and jaundice  Noted to have significant elevation of total and direct bilirubin, and elevated alk phos and transaminases  CT performed with c/f biliary ductal dilation and potential CBD stricture and soft tissue mass  He is being transferred from [facility] for need for EUS and ERCP  Elevated liver enzymes    Patient has mainly cholestatic pattern with elevated bilirubin of 16  Patient noted to have liver enzymes on 1/21 with mildly elevated bilirubin to 1 76, was to get an MRCP as outpatient however he presented to the hospital on 02/08 with jaundice and weakness and worsening liver enzymes  CT abdomen pelvis showed diffuse intrahepatic biliary ductal dilatation, dilated CBD to 1 3 by mm with abrupt cutoff and possible ill-defined soft tissue in the CBD  These findings were redemonstrated in the MRCP  · Plan for EUS/ERCP tomorrow for evaluation of possible cholangiocarcinoma versus stricture  · CA 19-9 pending  · CEA elevated to 6 8  · Patient has nodules seen on CT imaging but limited view of lungs, he needs dedicated CT lung for evaluation of possible metastasis    New onset decompensated cirrhosis  Ascites    · Patient had imaging findings concerning for cirrhosis  He has no thrombocytopenia and INR is normal  No previous history of liver disease  Patient is only able to provide limited history, clear if he had any heavy alcohol use  · Moderate ascites on imaging  Can consider diagnostic paracentesis this admission  · Varices:   Will need outpatient EGD for screening  · MELODIE, ASMA, AMA negative  · Acute hepatitis panel negative  · HIV negative  · Hypoattenuating lesion seen on the liver but poorly visualized due to lack of IV contrast   If negative  · MRI concerning for iron overload in the liver  Will check iron panel      Disposition: GI will continue to follow     Aure Shirley MD   Gastroenterology Fellow     ______________________________________________________________________    Subjective:     Patient denies any abdominal pain    Medication Administration - last 24 hours from 02/09/2022 0758 to 02/10/2022 0758       Date/Time Order Dose Route Action Action by     02/10/2022 0504 levothyroxine tablet 200 mcg 200 mcg Oral Given Benigno Tyler RN     02/10/2022 0127 multi-electrolyte (PLASMALYTE-A/ISOLYTE-S PH 7 4) IV solution 50 mL/hr Intravenous New Bag Lea Carrillo RN          Objective:     Vitals: Blood pressure 116/70, pulse 73, temperature 97 6 °F (36 4 °C), resp  rate 20, height 6' 4" (1 93 m), SpO2 94 %  ,Body mass index is 32 26 kg/m²  Intake/Output Summary (Last 24 hours) at 2/10/2022 0758  Last data filed at 2/10/2022 0501  Gross per 24 hour   Intake --   Output 200 ml   Net -200 ml       Physical Exam:   General Appearance: Awake and alert, in no acute distress  + jaundice  Abdomen: Soft, non-tender, non-distended; bowel sounds normal; no masses or no organomegaly    Invasive Devices  Report    Peripheral Intravenous Line            Peripheral IV 02/10/22 Distal;Dorsal (posterior); Right Forearm <1 day                Lab Results:  Admission on 02/10/2022   Component Date Value    WBC 02/10/2022 7 70     RBC 02/10/2022 3 31*    Hemoglobin 02/10/2022 11 6*    Hematocrit 02/10/2022 32 8*    MCV 02/10/2022 99*    MCH 02/10/2022 35 0*    MCHC 02/10/2022 35 4     RDW 02/10/2022 18 6*    MPV 02/10/2022 12 0     Platelets 56/67/3601 171     nRBC 02/10/2022 0     Neutrophils Relative 02/10/2022 65     Immat GRANS % 02/10/2022 1     Lymphocytes Relative 02/10/2022 16     Monocytes Relative 02/10/2022 14*    Eosinophils Relative 02/10/2022 3     Basophils Relative 02/10/2022 1     Neutrophils Absolute 02/10/2022 5 11     Immature Grans Absolute 02/10/2022 0 05     Lymphocytes Absolute 02/10/2022 1 20     Monocytes Absolute 02/10/2022 1 08     Eosinophils Absolute 02/10/2022 0 22     Basophils Absolute 02/10/2022 0 04     Sodium 02/10/2022 138     Potassium 02/10/2022 3 8     Chloride 02/10/2022 109*    CO2 02/10/2022 21     ANION GAP 02/10/2022 8     BUN 02/10/2022 25     Creatinine 02/10/2022 0 96     Glucose 02/10/2022 99     Calcium 02/10/2022 8 6     Corrected Calcium 02/10/2022 10 0     AST 02/10/2022 141*    ALT 02/10/2022 143*    Alkaline Phosphatase 02/10/2022 826*    Total Protein 02/10/2022 6 0*    Albumin 02/10/2022 2 3*    Total Bilirubin 02/10/2022 16 34*    eGFR 02/10/2022 70        Imaging Studies: I have personally reviewed pertinent imaging studies

## 2022-02-11 PROBLEM — N17.9 ACUTE KIDNEY INJURY (HCC): Status: ACTIVE | Noted: 2022-01-01

## 2022-02-11 PROBLEM — K83.1 OBSTRUCTIVE JAUNDICE: Status: ACTIVE | Noted: 2022-01-01

## 2022-02-11 NOTE — CASE MANAGEMENT
Case Management Discharge Planning Note    Patient name Jean Marie Hoskins  Location [facility]10/[facility]73 MRN [id number]  : 1934 Date 2022       Current Admission Date: 2/10/2022  Current Admission Diagnosis:Painless jaundice   Patient Active Problem List    Diagnosis Date Noted    Elevated TSH 2022    Abnormal MRI, liver 2022    Thrombosis, portal vein 2022    Common bile duct dilatation 2022    Mass of right inguinal region     Diastolic dysfunction     Abnormal EKG 2022    Liver cirrhosis ([facility] [address] ) 2022    Abnormal urinalysis 2022    Lung nodule 2022    Transaminitis 2022    Hyperbilirubinemia 2022    Hyperammonemia ([address] ) 2022    Elevated MCV 2022    Painless jaundice 2022    Platelets decreased ([facility] [address] ) 2022    Bilateral lower extremity edema 2021    Exudative age-related macular degeneration, bilateral, with active choroidal neovascularization ([facility] [address] ) 07/15/2020    Asymmetrical sensorineural hearing loss 07/15/2020    Osteoarthritis 07/15/2020    Rosacea 07/15/2020    Male erectile disorder 2019    Impaired fasting glucose 2019    Class 1 obesity without serious comorbidity with body mass index (BMI) of 31 0 to 31 9 in adult 03/15/2019    Hypothyroidism 2018    Essential hypertension 2018    Chronic osteomyelitis of knee ([facility] [address] ) 10/08/2013      LOS (days): 1  Geometric Mean LOS (GMLOS) (days): 4 70  Days to GMLOS:3 2     OBJECTIVE:  Risk of Unplanned Readmission Score: 15         Current admission status: Inpatient   Preferred Pharmacy:   [facility]  [address]  Phone: [phone] Fax: [address] - [address] [place] [address]  Phone: [phone] Fax: [phone] [facility] [address]  Phone: [phone] Fax: [phone]    Primary Care Provider: Marie Tolliver MD    Primary Insurance: [facility]  Secondary Insurance:     DISCHARGE DETAILS:      Other Referral/Resources/Interventions Provided:  Referral Comments: STR at time of d/c (daughter works for "[employer]" in [address] [place] [address]) currently prefers that location         Treatment Team Recommendation: Short Term Rehab  Discharge Destination Plan[de-identified] Short Term Rehab        Additional Comments: pt to have ERCP and EUS yet   possible paracentesis this admission, CT scan done today to assess for any underlying malignancy

## 2022-02-11 NOTE — PROGRESS NOTES
[facility]  Progress Note - Gopi Ward 1934, 80 y o  male MRN: [id number]  Unit/Bed#: [facility] 910-01 Encounter: [id number]  Primary Care Provider: Teresa Poole MD   Date and time admitted to hospital: 2/10/2022 12:54 AM    * Obstructive jaundice  Assessment & Plan  Obstructive jaundice transferred for GI evaluation  For ERCP GI today  Supportive care  GI following    Acute kidney injury [facility])  Assessment & Plan  Likely prerenal  Monitor kidney function closely  Avoid nephrotoxins  Monitor postvoid residuals    Lung nodule  Assessment & Plan  · CT AP: Left lower lobe 5 mm nodules  Bibasilar reticular changes and/or mild atelectasis  Paraesophageal varices  · Outpatient follow-up    Liver cirrhosis ([facility] )  Assessment & Plan  · Likely cirrhosis of liver  · GI following    Hyperammonemia (HCC)  Assessment & Plan  · Due to liver disease  · Continue lactulose 10 grams PO Qdaily for a goal 2-3 soft bowel movements per 24 hours    Class 1 obesity without serious comorbidity with body mass index (BMI) of 31 0 to 31 9 in adult  Assessment & Plan  Therapeutic lifestyle modification    Essential hypertension  Assessment & Plan  · Monitor blood pressures   · avoid hypotension        VTE Pharmacologic Prophylaxis: VTE Score: 5 High Risk (Score >/= 5) - Pharmacological DVT Prophylaxis Ordered: enoxaparin (Lovenox)  Sequential Compression Devices Ordered  Patient Centered Rounds: I performed bedside rounds with nursing staff today  Discussions with Specialists or Other Care Team Provider:     Education and Discussions with Family / Patient: patient, updated spouse Esther Pugh   Time Spent for Care: 30 minutes  More than 50% of total time spent on counseling and coordination of care as described above      Current Length of Stay: 1 day(s)  Current Patient Status: Inpatient   Certification Statement: The patient will continue to require additional inpatient hospital stay due to as outlined  Discharge Plan: for ERCP with GI today     Code Status: Level 1 - Full Code    Subjective:     Comfortable in bed   Reports feeling okay  Encouraged incentive spirometry    Objective:     Vitals:   Temp (24hrs), Av 7 °F (37 1 °C), Min:97 9 °F (36 6 °C), Max:99 5 °F (37 5 °C)    Temp:  [97 9 °F (36 6 °C)-99 5 °F (37 5 °C)] 99 5 °F (37 5 °C)  HR:  [73-88] 84  Resp:  [18-20] 18  BP: (105-132)/(58-67) 122/66  SpO2:  [94 %-98 %] 98 %  Body mass index is 32 26 kg/m²  Input and Output Summary (last 24 hours):      Intake/Output Summary (Last 24 hours) at 2022 1729  Last data filed at 2022 1649  Gross per 24 hour   Intake 600 ml   Output 550 ml   Net 50 ml       Physical Exam:   Physical Exam     Comfortably in bed  Obese  Icterus noted  Neck supple  Lungs diminished breath sounds bilaterally  Heart sounds S1-S2 noted  Abdomen soft  Abdominal obesity noted  Awake alert obeys simple commands  No pedal edema  No rash    Additional Data:     Labs:  Results from last 7 days   Lab Units 22  0603   WBC Thousand/uL 7 43   HEMOGLOBIN g/dL 11 7*   HEMATOCRIT % 32 7*   PLATELETS Thousands/uL 171   NEUTROS PCT % 67   LYMPHS PCT % 16   MONOS PCT % 13*   EOS PCT % 2     Results from last 7 days   Lab Units 22  0603   SODIUM mmol/L 140   POTASSIUM mmol/L 3 9   CHLORIDE mmol/L 111*   CO2 mmol/L 20*   BUN mg/dL 30*   CREATININE mg/dL 1 38*   ANION GAP mmol/L 9   CALCIUM mg/dL 8 6   ALBUMIN g/dL 2 2*   TOTAL BILIRUBIN mg/dL 18 98*   ALK PHOS U/L 741*   ALT U/L 123*   AST U/L 127*   GLUCOSE RANDOM mg/dL 86     Results from last 7 days   Lab Units 22  0603   INR  1 22*     Results from last 7 days   Lab Units 02/10/22  1127   POC GLUCOSE mg/dl 123     Results from last 7 days   Lab Units 22  0603   HEMOGLOBIN A1C % 5 3     Results from last 7 days   Lab Units 02/10/22  0503 22  0603   LACTIC ACID mmol/L  --  2 0   PROCALCITONIN ng/ml 0 97* 1 22*       Lines/Drains:  Invasive Devices Report    Peripheral Intravenous Line            Peripheral IV 02/11/22 Right Forearm <1 day          Drain            External Urinary Catheter <1 day                  Imaging: Reviewed radiology reports from this admission including: chest CT scan and Abdominal MRCP,    Recent Cultures (last 7 days):   Results from last 7 days   Lab Units 02/09/22  1911   URINE CULTURE  60,000-69,000 cfu/ml        Last 24 Hours Medication List:   Current Facility-Administered Medications   Medication Dose Route Frequency Provider Last Rate    enoxaparin  40 mg Subcutaneous Q24H Violette Mace, DO      lactulose  10 g Oral Daily Violette Mace, DO      levothyroxine  200 mcg Oral Early Morning Violette Mace, DO      losartan  25 mg Oral Daily Violette Mace, DO      multi-electrolyte  50 mL/hr Intravenous Continuous Violette Mace, DO 50 mL/hr (02/10/22 0127)        Today, Patient Was Seen By: Kristel Perry MD    **Please Note: This note may have been constructed using a voice recognition system  **

## 2022-02-11 NOTE — ANESTHESIA POSTPROCEDURE EVALUATION
Post-Op Assessment Note    CV Status:  Stable  Pain Score: 0    Pain management: adequate     Mental Status:  Alert   Hydration Status:  Euvolemic   PONV Controlled:  None   Airway Patency:  Patent      Post Op Vitals Reviewed: Yes      Staff: CRNA         No complications documented      /62 (02/11/22 1652)    Temp 99 5 °F (37 5 °C) (02/11/22 1652)    Pulse 88 (02/11/22 1652)   Resp 18 (02/11/22 1652)    SpO2 98 % (02/11/22 1652)

## 2022-02-11 NOTE — INCIDENTAL FINDINGS
The following findings require follow up:  Radiographic finding   Finding: Left lower lobe pulmonary nodules identified, largest measuring up to 5 mm  Follow up required:    Based on current Fleischner Society 2017 Guidelines on incidental pulmonary nodule, optional follow-up CT at 12 months can be considered        Please notify the following clinician to assist with the follow up:   Dr Alisha Walden MD

## 2022-02-11 NOTE — ANESTHESIA PREPROCEDURE EVALUATION
Procedure:  ENDOSCOPIC ULTRASOUND (UPPER)  ERCP    Relevant Problems   CARDIO   (+) Essential hypertension   (+) Thrombosis, portal vein      ENDO   (+) Hypothyroidism      GI/HEPATIC   (+) Liver cirrhosis (HCC)   (+) Thrombosis, portal vein      HEMATOLOGY   (+) Platelets decreased (HCC)      MUSCULOSKELETAL   (+) Osteoarthritis      Other   (+) Abnormal EKG   (+) Abnormal MRI, liver   (+) Asymmetrical sensorineural hearing loss   (+) Bilateral lower extremity edema   (+) Chronic osteomyelitis of knee (HCC)   (+) Class 1 obesity without serious comorbidity with body mass index (BMI) of 31 0 to 31 9 in adult   (+) Common bile duct dilatation   (+) Diastolic dysfunction   (+) Exudative age-related macular degeneration, bilateral, with active choroidal neovascularization (HCC)   (+) Hyperammonemia (HCC)   (+) Hyperbilirubinemia   (+) Impaired fasting glucose   (+) Lung nodule   (+) Mass of right inguinal region   (+) Painless jaundice   (+) Rosacea   (+) Transaminitis      Lab Results   Component Value Date    SODIUM 140 02/11/2022    K 3 9 02/11/2022     (H) 02/11/2022    CO2 20 (L) 02/11/2022    AGAP 9 02/11/2022    BUN 30 (H) 02/11/2022    CREATININE 1 38 (H) 02/11/2022    GLUC 86 02/11/2022    GLUF 116 (H) 01/21/2022    CALCIUM 8 6 02/11/2022     (H) 02/11/2022     (H) 02/11/2022    ALKPHOS 741 (H) 02/11/2022    TP 5 9 (L) 02/11/2022    TBILI 18 98 (H) 02/11/2022    EGFR 45 02/11/2022     Lab Results   Component Value Date    WBC 7 43 02/11/2022    HGB 11 7 (L) 02/11/2022    HCT 32 7 (L) 02/11/2022    MCV 99 (H) 02/11/2022     02/11/2022 2/22/21 TTE  LEFT VENTRICLE: Size was normal  Systolic function was normal  Ejection fraction was estimated to be 60 %  Although no diagnostic regional wall motion abnormality was identified, this possibility cannot be completely excluded on the basis  of this study  Wall thickness was mildly increased   DOPPLER: Doppler parameters were consistent with abnormal left ventricular relaxation (grade 1 diastolic dysfunction)    RIGHT VENTRICLE: The size was normal  Systolic function was normal  Wall thickness was normal    LEFT ATRIUM: Size was normal    RIGHT ATRIUM: Size was normal    MITRAL VALVE: Valve structure was normal  There was normal leaflet separation  DOPPLER: The transmitral velocity was within the normal range  There was no evidence for stenosis  There was no regurgitation   AORTIC VALVE: The valve was trileaflet  Leaflets exhibited normal thickness and normal cuspal separation  DOPPLER: Transaortic velocity was within the normal range  There was no evidence for stenosis  There was no regurgitation    TRICUSPID VALVE: The valve structure was normal  There was normal leaflet separation  DOPPLER: The transtricuspid velocity was within the normal range  There was no evidence for stenosis  There was mild regurgitation    PULMONIC VALVE: Leaflets exhibited normal thickness, no calcification, and normal cuspal separation  DOPPLER: The transpulmonic velocity was within the normal range  There was mild regurgitation   PERICARDIUM: There was no pericardial effusion  The pericardium was normal in appearance   AORTA: The root exhibited normal size    SYSTEMIC VEINS: IVC: The inferior vena cava was normal in size and course  Respirophasic changes were normal        Physical Exam    Airway    Mallampati score: III  TM Distance: >3 FB  Neck ROM: full     Dental       Cardiovascular      Pulmonary      Other Findings        Anesthesia Plan  ASA Score- 3     Anesthesia Type- general with ASA Monitors  Additional Monitors:   Airway Plan: ETT  Plan Factors-Exercise tolerance (METS): <4 METS  Chart reviewed  EKG reviewed  Imaging results reviewed  Existing labs reviewed  Patient summary reviewed  Patient is not a current smoker  Patient did not smoke on day of surgery  Induction- intravenous      Postoperative Plan-   Planned trial extubation    Informed Consent- Anesthetic plan and risks discussed with patient  I personally reviewed this patient with the CRNA  Discussed and agreed on the Anesthesia Plan with the CRNA  Karrie Nissen

## 2022-02-11 NOTE — UTILIZATION REVIEW
Initial Clinical Review    Admission: Date/Time/Statement:   Admission Orders (From admission, onward)     Ordered        02/10/22 0058  Inpatient Admission  Once                      Orders Placed This Encounter   Procedures    Inpatient Admission     Standing Status:   Standing     Number of Occurrences:   1     Order Specific Question:   Level of Care     Answer:   Med Surg [16]     Order Specific Question:   Estimated length of stay     Answer:   More than 2 Midnights     Order Specific Question:   Certification     Answer:   I certify that inpatient services are medically necessary for this patient for a duration of greater than two midnights  See H&P and MD Progress Notes for additional information about the patient's course of treatment  Initial Presentation: 79 y/o male with PMHx of HTN, HLD and hypothyroidism presents to [facility] as a transfer from [address] [place] [address] ED where he initially presented with jaundice and generalized weakness x 2 wks  T bili 16, elevated alk-phos and transaminases   CT A/P revealed diffuse intrahepatic biliary ductal dilatation with the appearance of an ill-defined soft tissue in the expected location of the CBD which are suspicious for obstructing mass or stricture, a few possible ill-defined hypodense areas in the liver in which metastasis cannot be excluded  Abd MRI concerning for heterogeneous soft tissue density seen in the andreas hepaticus with numerous large gallstones and occlusion of the main portal vein  GI recommended transfer to [facility] for ERCP and continued tx  Admit inpatient to M/S unit with Jaundice -- GI consulted  GI note 2/10 -- A: elevated liver enzymes, new onset decompensated cirrhosis, ascites -- Plan for EUS/ERCP tomorrow for evaluation of possible cholangiocarcinoma vs stricture  Can consider diagnostic paracentesis this admission  Will need op EGD for screening  MELODIE, ASMA, AMA negative   Hypoattenuating lesion seen on the liver but poorly visualized d/t lack of IV contrast   If negative MRI concerning for iron overload in the liver  Check iron panel    Date: 2/11  Day 2: Pt reports feeling better this AM  Repeat LFTs tomorrow  F/u final path results  Repeat IR diagnostic paracentesis with fluid studies to determine etiology of ascites  Depending on fluid studies to determine etiology of ascites, would consider discontinuing losartan and starting diuretic regimen with furosemide and spironolactone  2 gm sodium restriction  Small esophageal varices on EGD on 2/11/22 and mild PHG; no high risk stigmata  Not a candidate for transplant d/t age, Not a candidate for transplant due to age  Vital Signs:   Date/Time Temp Pulse Resp BP MAP (mmHg) SpO2 O2 Device   02/11/22 07:38:12 97 9 °F (36 6 °C) 73 20 105/61 76 97 % --   02/10/22 22:15:33 98 8 °F (37 1 °C) -- -- 114/67 83 -- --   02/10/22 2020 -- -- -- -- -- 97 % None (Room air)   02/10/22 15:44:53 98 4 °F (36 9 °C) 80 -- 108/67 81 97 % --   02/10/22 0900 -- -- -- -- -- -- None (Room air)   02/10/22 07:22:46 97 6 °F (36 4 °C) 73 20 116/70 85 94 % --   02/10/22 0132 -- -- -- -- -- 97 % None (Room air)   02/10/22 01:10:47 98 3 °F (36 8 °C) 74 -- 112/75 87 97 % --       Pertinent Labs/Diagnostic Test Results:   EKG 2/9 -- Sinus rhythm with 1st degree A-V block  Left axis deviation  Low voltage QRS  Abnormal ECG    CT chest 2/11 -- 1  Left lower lobe pulmonary nodules identified, largest measuring up to 5 mm  Based on current Fleischner Society 2017 Guidelines on incidental pulmonary nodule, optional follow-up CT at 12 months can be considered  2   Right greater than left calcified pleural plaques indicating prior as best as exposure  3   Hepatic cirrhosis with ascites and evidence of portal hypertension       MRI abd, MRCP 2/9 --   1   Biliary ductal dilatation with abrupt cut off of the common duct at the andreas hepatis   Evaluation of the pancreas is degraded on the current study, but there is heterogeneous soft tissue density seen at the andreas hepatis on prior CT that may arise from the pancreatic head or could have primary biliary origin   Though there are numerous large gallstones, including at the gallbladder neck, these do not appear to cause extrinsic compression of the duct (i e , no suggestion of Mirizzi syndrome)    Recommend endoscopic ultrasound for further evaluation  2   Occlusion of the main portal vein, also at the andreas hepatis   Evaluation of the vessels is degraded by lack of IV contrast and patient motion  3   Ill-defined areas of hypoenhancement seen on prior CT are incompletely evaluated without contrast   This may represent confluent fibrosis   Infiltrative mass is felt to be less likely given the geographic appearance of the associated increased T2   signal    4   Cirrhosis  5   Hepatic iron overload  CT a/p 2/8 -- 1   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   Moderate volume ascites  2   Diffuse intrahepatic biliary ductal dilatation   Proximal common bile duct appears tortuous and dilated measuring 13 mm with abrupt cut off   The remainder of the CBD is not visualized and there appears to be ill-defined soft tissue in the expected location   Findings are suspicious for obstructing mass/stricture  [facility] gastroenterology consultation  3   Limited visualization of a few possible ill-defined hypodense areas in the liver, incompletely characterized and cannot exclude metastasis   This can be evaluated with follow-up contrast-enhanced abdomen MR/MRCP  4   Left lower lobe 5 mm nodules   Recommend nonemergent dedicated chest CT  US RUQ 2/8 --    Cirrhosis   Increased hepatic echogenicity may be due to hepatic steatosis and/or hepatic parenchymal disease      2   Patent central hepatic vessels with normal direction of flow   Increased phasicity of the portal veins which can be seen with cirrhosis, right heart failure, or tricuspid regurgitation      3   Gallbladder not visualized   Intrahepatic biliary ductal dilatation better visualized on subsequent CT   CBD not visualized   Please refer to subsequent CT for description  4   Moderate ascites  Results from last 7 days   Lab Units 02/08/22  1153   SARS-COV-2  Negative     Results from last 7 days   Lab Units 02/11/22  0603 02/10/22  0503 02/09/22  0603 02/08/22  1010 02/08/22  1010   WBC Thousand/uL 7 43 7 70 7 59   < > 7 61   HEMOGLOBIN g/dL 11 7* 11 6* 12 4  --  12 4   HEMATOCRIT % 32 7* 32 8* 34 9*  --  36 2*   PLATELETS Thousands/uL 171 171 165   < > 156   NEUTROS ABS Thousands/µL 5 05 5 11 5 25   < > 5 11    < > = values in this interval not displayed       Results from last 7 days   Lab Units 02/11/22  0603 02/10/22  0503 02/09/22  0604 02/08/22  1010   SODIUM mmol/L 140 138 136 138   POTASSIUM mmol/L 3 9 3 8 3 8 4 0   CHLORIDE mmol/L 111* 109* 106 106   CO2 mmol/L 20* 21 20* 23   ANION GAP mmol/L 9 8 10 9   BUN mg/dL 30* 25 22 23   CREATININE mg/dL 1 38* 0 96 0 77 0 81   EGFR ml/min/1 73sq m 45 70 81 79   CALCIUM mg/dL 8 6 8 6 8 2* 8 3   MAGNESIUM mg/dL  --  2 1 1 9  --    PHOSPHORUS mg/dL  --   --  2 8  --      Results from last 7 days   Lab Units 02/11/22  0603 02/10/22  0503 02/09/22  0604 02/08/22  1010   AST U/L 127* 141* 156* 207*   ALT U/L 123* 143* 180* 216*   ALK PHOS U/L 741* 826* 940* 976*   TOTAL PROTEIN g/dL 5 9* 6 0* 6 8 6 5   ALBUMIN g/dL 2 2* 2 3* 2 5* 2 7*   TOTAL BILIRUBIN mg/dL 18 98* 16 34* 16 05* 13 62*   BILIRUBIN DIRECT mg/dL 13 24*  --   --  10 28*   AMMONIA umol/L  --   --   --  58*     Results from last 7 days   Lab Units 02/10/22  1127   POC GLUCOSE mg/dl 123     Results from last 7 days   Lab Units 02/11/22  0603 02/10/22  0503 02/09/22  0604 02/08/22  1010   GLUCOSE RANDOM mg/dL 86 99 105 112     Results from last 7 days   Lab Units 02/09/22  0603   HEMOGLOBIN A1C % 5 3   EAG mg/dl 105     Results from last 7 days   Lab Units 02/09/22  0604   CK TOTAL U/L 74 Results from last 7 days   Lab Units 02/11/22  0603 02/09/22  0603 02/08/22  1010   PROTIME seconds 14 9* 14 6* 14 5   INR  1 22* 1 20* 1 19   PTT seconds  --   --  30     Results from last 7 days   Lab Units 02/09/22  0604   TSH 3RD GENERATON uIU/mL 4 931*     Results from last 7 days   Lab Units 02/10/22  0503 02/09/22  0603   PROCALCITONIN ng/ml 0 97* 1 22*     Results from last 7 days   Lab Units 02/09/22  0603   LACTIC ACID mmol/L 2 0     Results from last 7 days   Lab Units 02/09/22  0604   NT-PRO BNP pg/mL 242     Results from last 7 days   Lab Units 02/09/22  0603   HEP B S AG  Non-reactive   HEP C AB  Non-reactive   HEP B C IGM  Non-reactive     Results from last 7 days   Lab Units 02/09/22  0603   CEA ng/mL 6 8*     Results from last 7 days   Lab Units 02/09/22  1912 02/08/22  1145   CLARITY UA  Slightly Cloudy Clear   COLOR UA  Jennifer Jennifer   SPEC GRAV UA  1 020 1 025   PH UA  6 5 6 0   GLUCOSE UA mg/dl 100 (1/10%)* Negative   KETONES UA mg/dl Trace* Trace*   BLOOD UA  Negative Negative   PROTEIN UA mg/dl 30 (1+)* Trace*   NITRITE UA  Positive* Positive*   BILIRUBIN UA  Interference- unable to analyze* Interference- unable to analyze*   UROBILINOGEN UA E U /dl 2 0* 2 0*   LEUKOCYTES UA  Trace* Negative   WBC UA /hpf 1-2* 0-1   RBC UA /hpf 1-2* 0-1   BACTERIA UA /hpf Moderate* Occasional   EPITHELIAL CELLS WET PREP /hpf Occasional Occasional   MUCUS THREADS  Occasional* Occasional*     Results from last 7 days   Lab Units 02/08/22  1153   INFLUENZA A PCR  Negative   INFLUENZA B PCR  Negative   RSV PCR  Negative     Results from last 7 days   Lab Units 02/08/22  1010   ACETAMINOPHEN LVL ug/mL <2 0*     Results from last 7 days   Lab Units 02/09/22  1911   URINE CULTURE  Culture too young- will reincubate       Past Medical History:   Diagnosis Date    Hypertension     Hypothyroidism      Present on Admission:   Painless jaundice   Essential hypertension   Hyperammonemia (HCC)   Thrombosis, portal vein   Mass of right inguinal region      Admitting Diagnosis: Hyperbilirubinemia [E80 6]  Age/Sex: 80 y o  male  Admission Orders:  Scheduled Medications:  enoxaparin, 40 mg, Subcutaneous, Q24H  lactulose, 10 g, Oral, Daily  levothyroxine, 200 mcg, Oral, Early Morning  losartan, 25 mg, Oral, Daily    Continuous IV Infusions:  multi-electrolyte, 50 mL/hr, Intravenous, Continuous           Network Utilization Review Department  ATTENTION: Please call with any questions or concerns to [phone] and carefully listen to the prompts so that you are directed to the right person  All voicemails are confidential   Candia Siemens all requests for admission clinical reviews, approved or denied determinations and any other requests to dedicated fax number below belonging to the campus where the patient is receiving treatment   List of dedicated fax numbers for the Facilities:  [address] DENIALS (Administrative/Medical Necessity) [phone]   [address] (Maternity/NICU/Pediatrics) [phone] [address]  [address] [place] [address] [address] [place] [address] [address] [facility] [facility] [phone]

## 2022-02-12 NOTE — CASE MANAGEMENT
Case Management Discharge Planning Note    Patient name Jason Parcel  [facility] [id number]  : 1934 Date 2022       Current Admission Date: 2/10/2022  Current Admission Diagnosis:Obstructive jaundice   Patient Active Problem List    Diagnosis Date Noted    Acute kidney injury ([facility] ) 2022    Elevated TSH 2022    Abnormal MRI, liver 2022    Thrombosis, portal vein 2022    Common bile duct dilatation 2022    Mass of right inguinal region     Diastolic dysfunction 4799    Abnormal EKG 2022    Liver cirrhosis ([facility] ) 2022    Abnormal urinalysis 2022    Lung nodule 2022    Transaminitis 2022    Hyperbilirubinemia 2022    Hyperammonemia ([facility] ) 2022    Elevated MCV 2022    Obstructive jaundice 2022    [facility]    Bilateral lower extremity edema 2021    Exudative age-related macular degeneration, bilateral, with active choroidal neovascularization ([facility] ) 07/15/2020    Asymmetrical sensorineural hearing loss 07/15/2020    Osteoarthritis 07/15/2020    Rosacea 07/15/2020    Male erectile disorder 2019    Impaired fasting glucose 2019    Class 1 obesity without serious comorbidity with body mass index (BMI) of 31 0 to 31 9 in adult 03/15/2019    Hypothyroidism 2018    Essential hypertension 2018    Chronic osteomyelitis of knee ([facility] ) 10/08/2013      LOS (days): 2  Geometric Mean LOS (GMLOS) (days): 4 70  Days to GMLOS:2 3     OBJECTIVE:  Risk of Unplanned Readmission Score: 14         Current admission status: Inpatient   Preferred Pharmacy:   [facility] [address]  Phone: [phone] Fax: 21 [address] [place] [address]  Phone: [phone] Fax: 4 [facility], [address] [facility] [address] [place] [address] [place] [address]  Phone: [phone] Fax: [phone]    Primary Care Provider: Barbie Giles MD    Primary Insurance: [facility] REP  Secondary Insurance:     DISCHARGE DETAILS:    Discharge planning discussed with[de-identified] So Holguin daughter  Freedom of Choice: Yes  Comments - Freedom of Choice: Cm was notified to call daughter to discuss d/c planning  Anita asked if [facility] could be added to referral family friend had good results there  Cm discussed the possibilty of [employer] not authorizing Acute rehab due to patient not being admitted with a CVA or neurological or muscular skeletal  problem  Cm discussed subacute rehabs and emailed a list to So Holguin  Referrals sent to [facility] rehab facilites  Anita will follow up with George Blanco on Monday   PASSR FILLED OUT                               Other Referral/Resources/Interventions Provided:  Interventions: Short Term Rehab

## 2022-02-12 NOTE — PROGRESS NOTES
[facility]  Progress Note - Gopi Ward 1934, 80 y o  male MRN: [id number]  Unit/Bed#: [facility] 910-01 Encounter: [id number]  Primary Care Provider: Teresa Poole MD   Date and time admitted to hospital: 2/10/2022 12:54 AM    * Obstructive jaundice  Assessment & Plan  Obstructive jaundice  Status post ERCP with stent placement  Follow-up on biopsy studies  Monitor LFTs  GI following    Acute kidney injury [facility])  Assessment & Plan  Likely prerenal  Monitor kidney function closely  Hold losartan  Avoid nephrotoxins  Monitor postvoid residuals  If kidney function worsens will consider Nephrology evaluation    Lung nodule  Assessment & Plan  · CT AP: Left lower lobe 5 mm nodules  Bibasilar reticular changes and/or mild atelectasis  Paraesophageal varices  · Outpatient follow-up    [facility] )  Assessment & Plan  · Likely cirrhosis with ascites  · GI consult for abdominal paracentesis with IR noted  · GI following    Hyperammonemia (HCC)  Assessment & Plan  · Due to liver disease  · Continue lactulose 10 grams PO Qdaily for a goal 2-3 soft bowel movements per 24 hours    Class 1 obesity without serious comorbidity with body mass index (BMI) of 31 0 to 31 9 in adult  Assessment & Plan  Therapeutic lifestyle modification    Essential hypertension  Assessment & Plan  · Monitor blood pressures   · avoid hypotension          VTE Pharmacologic Prophylaxis: VTE Score: 5 High Risk (Score >/= 5) - Pharmacological DVT Prophylaxis Ordered: enoxaparin (Lovenox)  Sequential Compression Devices Ordered  Patient Centered Rounds: I performed bedside rounds with nursing staff today  Discussions with Specialists or Other Care Team Provider:  GI    Education and Discussions with Family / Patient: patient, updated daughter Karen Mariee in detail, questions answered  Time Spent for Care: 30 minutes   More than 50% of total time spent on counseling and coordination of care as described above  Current Length of Stay: 2 day(s)  Current Patient Status: Inpatient   Certification Statement: The patient will continue to require additional inpatient hospital stay due to As outlined  Discharge Plan: Awaiting clinical and symptomatic improvement    Code Status: Level 1 - Full Code    Subjective:       Sitting up in chair  Reports feeling better today  Reports abdominal distension and discomfort    Objective:     Vitals:   Temp (24hrs), Av 5 °F (36 9 °C), Min:97 3 °F (36 3 °C), Max:99 5 °F (37 5 °C)    Temp:  [97 3 °F (36 3 °C)-99 5 °F (37 5 °C)] 97 3 °F (36 3 °C)  HR:  [82-88] 82  Resp:  [18-20] 20  BP: (121-132)/(54-66) 121/54  SpO2:  [94 %-98 %] 98 %  Body mass index is 32 26 kg/m²  Input and Output Summary (last 24 hours): Intake/Output Summary (Last 24 hours) at 2022 1445  Last data filed at 2022 0533  Gross per 24 hour   Intake 600 ml   Output 550 ml   Net 50 ml       Physical Exam:   Physical Exam     Sitting up in chair  Icterus noted  Obese  Short thick neck  Lungs diminished breath sounds bilateral   Heart sounds S1-S2 noted distant  Abdomen soft  Distended  Nontender  Awake obeys simple commands  No rash      Additional Data:     Labs:  Results from last 7 days   Lab Units 22  0824 22  0603 22  0603   WBC Thousand/uL 8 47   < > 7 43   HEMOGLOBIN g/dL 13 1   < > 11 7*   HEMATOCRIT % 37 6   < > 32 7*   PLATELETS Thousands/uL 199   < > 171   NEUTROS PCT %  --   --  67   LYMPHS PCT %  --   --  16   MONOS PCT %  --   --  13*   EOS PCT %  --   --  2    < > = values in this interval not displayed       Results from last 7 days   Lab Units 22  0824   SODIUM mmol/L 137   POTASSIUM mmol/L 3 7   CHLORIDE mmol/L 108   CO2 mmol/L 21   BUN mg/dL 40*   CREATININE mg/dL 1 81*   ANION GAP mmol/L 8   CALCIUM mg/dL 8 6   ALBUMIN g/dL 2 4*   TOTAL BILIRUBIN mg/dL 22 50*   ALK PHOS U/L 804*   ALT U/L 120*   AST U/L 133*   GLUCOSE RANDOM mg/dL 139     Results from last 7 days   Lab Units 02/11/22  0603   INR  1 22*     Results from last 7 days   Lab Units 02/10/22  1127   POC GLUCOSE mg/dl 123     Results from last 7 days   Lab Units 02/09/22  0603   HEMOGLOBIN A1C % 5 3     Results from last 7 days   Lab Units 02/10/22  0503 02/09/22  0603   LACTIC ACID mmol/L  --  2 0   PROCALCITONIN ng/ml 0 97* 1 22*       Lines/Drains:  Invasive Devices  Report    Peripheral Intravenous Line            Peripheral IV 02/12/22 Proximal;Right;Ventral (anterior) Forearm <1 day          Drain            External Urinary Catheter 1 day                      Imaging: Reviewed radiology reports from this admission including: ERCP, EUS    Recent Cultures (last 7 days):   Results from last 7 days   Lab Units 02/09/22  1911   URINE CULTURE  60,000-69,000 cfu/ml        Last 24 Hours Medication List:   Current Facility-Administered Medications   Medication Dose Route Frequency Provider Last Rate    enoxaparin  40 mg Subcutaneous Q24H Ivjeremiah Bryant, DO      lactulose  10 g Oral Daily Ivjeremiah Bryant, DO      levothyroxine  200 mcg Oral Early Morning Ivjeremiah Bryant DO      multi-electrolyte  50 mL/hr Intravenous Continuous Ivar Manny, DO 50 mL/hr (02/12/22 0406)        Today, Patient Was Seen By: Mayela Rosado MD    **Please Note: This note may have been constructed using a voice recognition system  **

## 2022-02-12 NOTE — PROGRESS NOTES
[facility] - Progress Note  Beni Vega 80 y o  male MRN: [id number]  Unit/Bed#: [facility] 910-01 Encounter: [id number]      ASSESSMENT & PLAN:    63-year-old male with history of hypertension, hypothyroidism, and hyperlipidemia who presented with weakness and obstructive jaundice from suspected CBD stricture/mass  Patient transferred from [facility] for GI evaluation and intervention with EUS and ERCP     1  Obstructive jaundice, elevated LFTs - secondary to mass in the bile duct as seen on EUS status post FNB  Preliminary cytology consistent with markedly atypical cells  Single peripancreatic lymph node also noted  CA 19-9 markedly elevated at 28,356  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  LFTs further increased today although this could be transient secondary to procedure  Clinically appears improved  · Repeat LFTs tomorrow; if LFTs improve, can be discharged from GI standpoint  · Follow-up final pathology results  · Outpatient follow-up with Surgical Oncology and Medical Oncology pending pathology results  · Avoid unnecessary hepatotoxic agents   · Follow clinically    2  Possible new cirrhosis - imaging findings suggestive of ascites and possible new cirrhosis  Synthetic function appears to be intact given normal INR  Small esophageal varices were seen on EGD which does support presence of portal hypertension  Normal platelet count  Evidence of ascites also noted on imaging which may be malignant based on concerns above  Cause for liver disease, if present, unknown but may be secondary to malignancy versus fatty liver versus other  If this is true cirrhosis, would be considered decompensated based on presence of ascites    · MELD score 27 (driven by bilirubin and creatinine but likely inaccurate in setting of biliary obstruction)  · Ascites:  · Recommend IR diagnostic paracentesis with fluid studies to determine etiology of ascites  · Depending on fluid studies to determine etiology of ascites, would consider discontinuing losartan and starting diuretic regimen with furosemide and spironolactone  · 2 gm sodium restriction  · Optimize nutritional status  · Follow clinically  · Portal hypertension:  · Small esophageal varices on EGD on 2/11/22 and mild PHG; no high risk stigmata  · No indication for NSBB at this time  · Follow clinically  · PSE:  · No evidence of HE, asterixis  · Minimize use of narcotics, benzodiazepines, sedatives  · Can continue lactulose for now  · No indication for rifaximin  · Follow clinically  · HCC screening:  · Concern for cholangiocarcinoma as noted above with possible liver metastases on CT imaging from 2/8/22, less likely primary HCC  · AFP on 2/9/22 WNL at 1 8  · Workup as noted above  · Further screening may not be necessary in setting of suspected malignancy as noted above  · Transplant:  · Not a candidate for transplant due to age, co-morbidities, and suspected malignancy  · Outpatient follow-up in GI clinic for ongoing management of possible new decompensated cirrhosis  · Can also consider outpatient elastography if unclear whether he has true cirrhosis    GI will follow from the periphery  Please contact the GI fellow on-call with any questions or concerns  ______________________________________________________________________    SUBJECTIVE:     Patient seen and evaluated at bedside  Reports feeling well this morning  Denies any acute GI complaints including abdominal pain, nausea      Medication Administration - last 24 hours from 02/11/2022 0758 to 02/12/2022 0758       Date/Time Order Dose Route Action Action by     02/11/2022 0822 enoxaparin (LOVENOX) subcutaneous injection 40 mg 40 mg Subcutaneous Given Yasmine Florez RN     02/11/2022 7658 lactulose oral solution 10 g 10 g Oral Given Yasmine Florez RN     02/12/2022 0851 levothyroxine tablet 200 mcg 200 mcg Oral Given Jaida David RN     02/11/2022 0663 losartan (COZAAR) tablet 25 mg 25 mg Oral Given Yasmine Florez RN     02/12/2022 0406 multi-electrolyte (PLASMALYTE-A/ISOLYTE-S PH 7 4) IV solution 50 mL/hr Intravenous Restarted Jaida David RN     02/11/2022 1630 iohexol (OMNIPAQUE) 240 MG/ML solution 50 mL 5 mL Other Given by Other Rossy Driscoll          OBJECTIVE:     Objective   Blood pressure 126/62, pulse 84, temperature 98 6 °F (37 °C), resp  rate 18, height 6' 4" (1 93 m), SpO2 98 %  Body mass index is 32 26 kg/m²      Intake/Output Summary (Last 24 hours) at 2/12/2022 0758  Last data filed at 2/12/2022 0533  Gross per 24 hour   Intake 600 ml   Output 900 ml   Net -300 ml       PHYSICAL EXAM:   General Appearance: Awake and alert, in no acute distress  Abdomen: Soft, non-tender, non-distended; bowel sounds normal; no masses or no organomegaly    Invasive Devices  Report    Peripheral Intravenous Line            Peripheral IV 02/12/22 Proximal;Right;Ventral (anterior) Forearm <1 day          Drain            External Urinary Catheter 1 day                LAB RESULTS:  Admission on 02/10/2022   Component Date Value    WBC 02/10/2022 7 70     RBC 02/10/2022 3 31*    Hemoglobin 02/10/2022 11 6*    Hematocrit 02/10/2022 32 8*    MCV 02/10/2022 99*    MCH 02/10/2022 35 0*    MCHC 02/10/2022 35 4     RDW 02/10/2022 18 6*    MPV 02/10/2022 12 0     Platelets 46/75/1632 171     nRBC 02/10/2022 0     Neutrophils Relative 02/10/2022 65     Immat GRANS % 02/10/2022 1     Lymphocytes Relative 02/10/2022 16     Monocytes Relative 02/10/2022 14*    Eosinophils Relative 02/10/2022 3     Basophils Relative 02/10/2022 1     Neutrophils Absolute 02/10/2022 5 11     Immature Grans Absolute 02/10/2022 0 05     Lymphocytes Absolute 02/10/2022 1 20     Monocytes Absolute 02/10/2022 1 08     Eosinophils Absolute 02/10/2022 0 22     Basophils Absolute 02/10/2022 0 04     Sodium 02/10/2022 138     Potassium 02/10/2022 3 8     Chloride 02/10/2022 109*    CO2 02/10/2022 21     ANION GAP 02/10/2022 8     BUN 02/10/2022 25     Creatinine 02/10/2022 0 96     Glucose 02/10/2022 99     Calcium 02/10/2022 8 6     Corrected Calcium 02/10/2022 10 0     AST 02/10/2022 141*    ALT 02/10/2022 143*    Alkaline Phosphatase 02/10/2022 826*    Total Protein 02/10/2022 6 0*    Albumin 02/10/2022 2 3*    Total Bilirubin 02/10/2022 16 34*    eGFR 02/10/2022 70     Magnesium 02/10/2022 2 1     Procalcitonin 02/10/2022 0 97*    POC Glucose 02/10/2022 123     Total Bilirubin 02/11/2022 18 98*    Bilirubin, Direct 02/11/2022 13 24*    Alkaline Phosphatase 02/11/2022 741*    AST 02/11/2022 127*    ALT 02/11/2022 123*    Total Protein 02/11/2022 5 9*    Albumin 02/11/2022 2 2*    Sodium 02/11/2022 140     Potassium 02/11/2022 3 9     Chloride 02/11/2022 111*    CO2 02/11/2022 20*    ANION GAP 02/11/2022 9     BUN 02/11/2022 30*    Creatinine 02/11/2022 1 38*    Glucose 02/11/2022 86     Calcium 02/11/2022 8 6     eGFR 02/11/2022 45     WBC 02/11/2022 7 43     RBC 02/11/2022 3 30*    Hemoglobin 02/11/2022 11 7*    Hematocrit 02/11/2022 32 7*    MCV 02/11/2022 99*    MCH 02/11/2022 35 5*    MCHC 02/11/2022 35 8     RDW 02/11/2022 18 8*    MPV 02/11/2022 11 2     Platelets 82/02/0330 171     nRBC 02/11/2022 0     Neutrophils Relative 02/11/2022 67     Immat GRANS % 02/11/2022 1     Lymphocytes Relative 02/11/2022 16     Monocytes Relative 02/11/2022 13*    Eosinophils Relative 02/11/2022 2     Basophils Relative 02/11/2022 1     Neutrophils Absolute 02/11/2022 5 05     Immature Grans Absolute 02/11/2022 0 05     Lymphocytes Absolute 02/11/2022 1 17     Monocytes Absolute 02/11/2022 0 94     Eosinophils Absolute 02/11/2022 0 17     Basophils Absolute 02/11/2022 0 05     Protime 02/11/2022 14 9*    INR 02/11/2022 1 22*       RADIOLOGY RESULTS: I have personally reviewed pertinent imaging studies  FRACISCO Reynoso    Gastroenterology Fellow  [facility]  Available on [employer]  [email or web address]

## 2022-02-12 NOTE — PLAN OF CARE
Problem: MOBILITY - ADULT  Goal: Maintain or return to baseline ADL function  Description: INTERVENTIONS:  -  Assess patient's ability to carry out ADLs; assess patient's baseline for ADL function and identify physical deficits which impact ability to perform ADLs (bathing, care of mouth/teeth, toileting, grooming, dressing, etc )  - Assess/evaluate cause of self-care deficits   - Assess range of motion  - Assess patient's mobility; develop plan if impaired  - Assess patient's need for assistive devices and provide as appropriate  - Encourage maximum independence but intervene and supervise when necessary  - Involve family in performance of ADLs  - Assess for home care needs following discharge   - Consider OT consult to assist with ADL evaluation and planning for discharge  - Provide patient education as appropriate  2/11/2022 2308 by Maryan Diego RN  Outcome: Progressing  2/11/2022 2308 by Maryan Diego RN  Outcome: Progressing  Goal: Maintains/Returns to pre admission functional level  Description: INTERVENTIONS:  - Perform BMAT or MOVE assessment daily    - Set and communicate daily mobility goal to care team and patient/family/caregiver  - Collaborate with rehabilitation services on mobility goals if consulted  - Perform Range of Motion  times a day  - Reposition patient every  hours    - Dangle patient  times a day  - Stand patient times a day  - Ambulate patient  times a day  - Out of bed to chair times a day   - Out of bed for meals times a day  - Out of bed for toileting  - Record patient progress and toleration of activity level   2/11/2022 2308 by Maryan Diego RN  Outcome: Progressing  2/11/2022 2308 by Maryan Diego RN  Outcome: Progressing     Problem: Potential for Falls  Goal: Patient will remain free of falls  Description: INTERVENTIONS:  - Educate patient/family on patient safety including physical limitations  - Instruct patient to call for assistance with activity   - Consult OT/PT to assist with strengthening/mobility   - Keep Call bell within reach  - Keep bed low and locked with side rails adjusted as appropriate  - Keep care items and personal belongings within reach  - Initiate and maintain comfort rounds  - Make Fall Risk Sign visible to staff  - Offer Toileting every  Hours, in advance of need  - Initiate/Maintain alarm  - Obtain necessary fall risk management equipment:   - Apply yellow socks and bracelet for high fall risk patients  - Consider moving patient to room near nurses station  2/11/2022 2308 by Jennifer Christianson RN  Outcome: Progressing  2/11/2022 2308 by Jennifer Christianson RN  Outcome: Progressing     Problem: INFECTION - ADULT  Goal: Absence or prevention of progression during hospitalization  Description: INTERVENTIONS:  - Assess and monitor for signs and symptoms of infection  - Monitor lab/diagnostic results  - Monitor all insertion sites, i e  indwelling lines, tubes, and drains  - Monitor endotracheal if appropriate and nasal secretions for changes in amount and color  - [place] appropriate cooling/warming therapies per order  - Administer medications as ordered  - Instruct and encourage patient and family to use good hand hygiene technique  - Identify and instruct in appropriate isolation precautions for identified infection/condition  Outcome: Progressing  Goal: Absence of fever/infection during neutropenic period  Description: INTERVENTIONS:  - Monitor WBC    Outcome: Progressing

## 2022-02-13 NOTE — PROGRESS NOTES
[facility]  Progress Note - Sintia Contreras 1934, 80 y o  male MRN: [id number]  Unit/Bed#: [facility] 910-01 Encounter: [id number]  Primary Care Provider: Boston Avery MD   Date and time admitted to hospital: 2/10/2022 12:54 AM    * Obstructive jaundice  Assessment & Plan  Obstructive jaundice  Status post ERCP with stent placement  Follow-up on biopsy studies  Monitor LFTs  GI following    Acute kidney injury [facility])  Assessment & Plan  Likely multifactorial  Worsening kidney function  Retroperitoneal ultrasound no hydronephrosis  Discussed with Nephrology  Receiving IV albumin  Monitor kidney function closely  Avoid nephrotoxins        Lung nodule  Assessment & Plan  · CT AP: Left lower lobe 5 mm nodules  Bibasilar reticular changes and/or mild atelectasis  Paraesophageal varices  · Outpatient follow-up    Liver cirrhosis ([facility] [address] )  Assessment & Plan  · Likely cirrhosis with ascites  · Status post abdominal paracentesis with IR  · GI following    Hyperammonemia (HCC)  Assessment & Plan  · Due to liver disease  · Continue lactulose 10 grams PO Qdaily for a goal 2-3 soft bowel movements per 24 hours    Class 1 obesity without serious comorbidity with body mass index (BMI) of 31 0 to 31 9 in adult  Assessment & Plan  Therapeutic lifestyle modification    Essential hypertension  Assessment & Plan  · Monitor blood pressures   · avoid hypotension          VTE Pharmacologic Prophylaxis: VTE Score: 5 High Risk (Score >/= 5) - Pharmacological DVT Prophylaxis Ordered: heparin  Sequential Compression Devices Ordered  Patient Centered Rounds: I performed bedside rounds with nursing staff today  Discussions with Specialists or Other Care Team Provider:  Nephrology, IR    Education and Discussions with Family / Patient: patient, updated spouse Toby Tena - detailed update provided   Time Spent for Care: 30 minutes   More than 50% of total time spent on counseling and coordination of care as described above  Current Length of Stay: 3 day(s)  Current Patient Status: Inpatient   Certification Statement: The patient will continue to require additional inpatient hospital stay due to as outlined  Discharge Plan: awaiting clinical and symptomatic improvement     Code Status: Level 1 - Full Code    Subjective:     Comfortably in chair  Reports feeling okay   Reports abdominal distension, discomfort    Objective:     Vitals:   Temp (24hrs), Av 1 °F (36 7 °C), Min:98 °F (36 7 °C), Max:98 1 °F (36 7 °C)    Temp:  [98 °F (36 7 °C)-98 1 °F (36 7 °C)] 98 °F (36 7 °C)  HR:  [71-81] 77  Resp:  [18-20] 20  BP: (103-115)/(52-66) 113/52  SpO2:  [93 %-99 %] 97 %  Body mass index is 32 26 kg/m²  Input and Output Summary (last 24 hours): Intake/Output Summary (Last 24 hours) at 2022 1712  Last data filed at 2022 1501  Gross per 24 hour   Intake 1968 33 ml   Output 2100 ml   Net -131 67 ml       Physical Exam:   Physical Exam     Comfortably in chair  Icterus noted   Obese  Lungs diminished breath sounds   Heart sounds S1 and S2   Abdomen soft, distended, ascites noted   Awake, obeys simple commands   Pedal edema noted   No rash     Additional Data:     Labs:  Results from last 7 days   Lab Units 22  0824 22  0603 22  0603   WBC Thousand/uL 8 47   < > 7 43   HEMOGLOBIN g/dL 13 1   < > 11 7*   HEMATOCRIT % 37 6   < > 32 7*   PLATELETS Thousands/uL 199   < > 171   NEUTROS PCT %  --   --  67   LYMPHS PCT %  --   --  16   MONOS PCT %  --   --  13*   EOS PCT %  --   --  2    < > = values in this interval not displayed       Results from last 7 days   Lab Units 22  0440   SODIUM mmol/L 139   POTASSIUM mmol/L 3 5   CHLORIDE mmol/L 107   CO2 mmol/L 22   BUN mg/dL 57*   CREATININE mg/dL 2 61*   ANION GAP mmol/L 10   CALCIUM mg/dL 8 5   ALBUMIN g/dL 2 2*   TOTAL BILIRUBIN mg/dL 19 07*   ALK PHOS U/L 716*   ALT U/L 101*   AST U/L 110*   GLUCOSE RANDOM mg/dL 120     Results from last 7 days   Lab Units 02/11/22  0603   INR  1 22*     Results from last 7 days   Lab Units 02/10/22  1127   POC GLUCOSE mg/dl 123     Results from last 7 days   Lab Units 02/09/22  0603   HEMOGLOBIN A1C % 5 3     Results from last 7 days   Lab Units 02/10/22  0503 02/09/22  0603   LACTIC ACID mmol/L  --  2 0   PROCALCITONIN ng/ml 0 97* 1 22*       Lines/Drains:  Invasive Devices  Report    Peripheral Intravenous Line            Peripheral IV 02/12/22 Proximal;Right;Ventral (anterior) Forearm 1 day          Drain            External Urinary Catheter 2 days                  Imaging: Reviewed radiology reports from this admission including: ultrasound(s)    Recent Cultures (last 7 days):   Results from last 7 days   Lab Units 02/09/22  1911   URINE CULTURE  60,000-69,000 cfu/ml        Last 24 Hours Medication List:   Current Facility-Administered Medications   Medication Dose Route Frequency Provider Last Rate    albumin human  25 g Intravenous Q6H Bhavik Trammell DO      heparin (porcine)  5,000 Units Subcutaneous Q8H [address] Arianne Patrick MD      lactulose  10 g Oral Daily Hoa Silva DO      levothyroxine  200 mcg Oral Early Morning Hoa Silva DO          Today, Patient Was Seen By: Arianne Patrick MD    **Please Note: This note may have been constructed using a voice recognition system  **

## 2022-02-13 NOTE — PLAN OF CARE
Problem: MOBILITY - ADULT  Goal: Maintain or return to baseline ADL function  Description: INTERVENTIONS:  -  Assess patient's ability to carry out ADLs; assess patient's baseline for ADL function and identify physical deficits which impact ability to perform ADLs (bathing, care of mouth/teeth, toileting, grooming, dressing, etc )  - Assess/evaluate cause of self-care deficits   - Assess range of motion  - Assess patient's mobility; develop plan if impaired  - Assess patient's need for assistive devices and provide as appropriate  - Encourage maximum independence but intervene and supervise when necessary  - Involve family in performance of ADLs  - Assess for home care needs following discharge   - Consider OT consult to assist with ADL evaluation and planning for discharge  - Provide patient education as appropriate  Outcome: Progressing     Problem: MOBILITY - ADULT  Goal: Maintains/Returns to pre admission functional level  Description: INTERVENTIONS:  - Perform BMAT or MOVE assessment daily    - Set and communicate daily mobility goal to care team and patient/family/caregiver  - Collaborate with rehabilitation services on mobility goals if consulted  - Perform Range of Motion 3 times a day  - Reposition patient every 2 hours    - Dangle patient 3 times a day  - Stand patient 3 times a day  - Ambulate patient 3 times a day  - Out of bed to chair 3 times a day   - Out of bed for meals 3 times a day  - Out of bed for toileting  - Record patient progress and toleration of activity level   Outcome: Progressing

## 2022-02-13 NOTE — CONSULTS
Consultation - Nephrology   Jason Lane 80 y o  male MRN: [id number]  Unit/Bed#: [facility] 910-01 Encounter: [id number]      Assessment/Plan:  1  Acute kidney injury, etiology multifactorial, suspect possible component of contrast associated nephropathy given recent contrast exposure on 02/09 although given liver dysfunction/cirrhosis, likely component of hepatorenal syndrome  Cannot rule additional component of intra-abdominal hypertension given significant ascites  Additionally patient with impaired auto renal regulation given angiotensin receptor blocker use  2  Obstructive jaundice, status post ERCP with brushings, elevated CA 19 9, suspecting cholangiocarcinoma  3  Cirrhosis with associated ascites and small esophageal varices,, anticipating a paracentesis  4  Hypertension, labile, continue to hold angiotensin receptor blocker  5  Diastolic heart failure ejection fraction 60%, grade 1    Plan:  · Empirically treat with albumin 25 g IV q 6 hours for now  · Check urine sodium  · Agree with paracentesis, possible component of intra-abdominal hypertension  · Given worsening ascites and volume status, would discontinue IV fluids  · Consider adding midodrine/octreotide no significant improvement  · Continue to avoid angiotensin receptor blocker    History of Present Illness   Physician Requesting Consult: Vicki Arreguin MD  Reason for Consult / Principal Problem:  Acute kidney injury  HPI: Jason Lane is a 80y o  year old male who presents with obstructive jaundice  Patient is an 22-year-old male with a past medical history significant for hypertension, hypothyroidism who presents with generalized weakness over last several weeks  Patient was found to have significant jaundice with transaminitis and high ammonia level  Further evaluation showed diffuse intrahepatic ductal dilatation with an ill-defined soft tissue mass  Patient underwent ERCP with brushing and stenting    Currently are suspecting possible cholangiocarcinoma  CT scan was performed with IV contrast   Patient remained on losartan until 2/12  Worsening creatinine since 211 now currently 2 61  History obtained from chart review and the patient    Review of Systems   Constitutional: Negative for appetite change  Respiratory: Positive for shortness of breath  Negative for chest tightness  Cardiovascular: Positive for leg swelling  Negative for chest pain  Gastrointestinal: Positive for abdominal distention  Negative for abdominal pain  Genitourinary: Negative for difficulty urinating  Neurological: Negative for dizziness, syncope and light-headedness         Pertinent findings of a 10 point review of systems noted above otherwise all others negative    Historical Information   Patient Active Problem List   Diagnosis    Hypothyroidism    Essential hypertension    Class 1 obesity without serious comorbidity with body mass index (BMI) of 31 0 to 31 9 in adult    Impaired fasting glucose    Chronic osteomyelitis of knee (HCC)    Male erectile disorder    Exudative age-related macular degeneration, bilateral, with active choroidal neovascularization (HCC)    Asymmetrical sensorineural hearing loss    Osteoarthritis    Rosacea    Bilateral lower extremity edema    Platelets decreased ([address] )    Transaminitis    Hyperbilirubinemia    Hyperammonemia (HCC)    Elevated MCV    Obstructive jaundice    Elevated TSH    Abnormal MRI, liver    Thrombosis, portal vein    Common bile duct dilatation    Mass of right inguinal region    Diastolic dysfunction    Abnormal EKG    Liver cirrhosis (HCC)    Abnormal urinalysis    Lung nodule    Acute kidney injury ([address] )     Past Medical History:   Diagnosis Date    Hypertension     Hypothyroidism      Past Surgical History:   Procedure Laterality Date    HERNIA REPAIR      PROSTATE SURGERY      REPLACEMENT TOTAL KNEE      last assessed: 7/11/17     Social History Social History     Substance and Sexual Activity   Alcohol Use Never     Social History     Substance and Sexual Activity   Drug Use No     Social History     Tobacco Use   Smoking Status Never Smoker   Smokeless Tobacco Never Used     History reviewed  No pertinent family history  Meds/Allergies   current meds:   Current Facility-Administered Medications   Medication Dose Route Frequency    albumin human (FLEXBUMIN) 25 % injection 25 g  25 g Intravenous Q6H    enoxaparin (LOVENOX) subcutaneous injection 40 mg  40 mg Subcutaneous Q24H    lactulose oral solution 10 g  10 g Oral Daily    levothyroxine tablet 200 mcg  200 mcg Oral Early Morning       No Known Allergies      Objective   /54   Pulse 81   Temp 98 °F (36 7 °C)   Resp 18   Ht 6' 4" (1 93 m)   SpO2 93%   BMI 32 26 kg/m²     Intake/Output Summary (Last 24 hours) at 2/13/2022 1236  Last data filed at 2/13/2022 1106  Gross per 24 hour   Intake 1810 ml   Output 100 ml   Net 1710 ml       Current Weight:      Physical Exam  Constitutional:       Appearance: He is obese  He is not ill-appearing  HENT:      Head: Normocephalic and atraumatic  Mouth/Throat:      Mouth: Mucous membranes are moist       Pharynx: Oropharynx is clear  Cardiovascular:      Rate and Rhythm: Normal rate and regular rhythm  Pulmonary:      Effort: Pulmonary effort is normal       Breath sounds: Normal breath sounds  Abdominal:      Palpations: Abdomen is soft  There is fluid wave  Musculoskeletal:         General: No deformity  Right lower leg: Edema present  Left lower leg: Edema present  Lymphadenopathy:      Cervical: No cervical adenopathy  Skin:     General: Skin is warm and dry  Coloration: Skin is jaundiced  Findings: No rash  Neurological:      Mental Status: He is alert and oriented to person, place, and time             Lab Results:    Results from last 7 days   Lab Units 02/12/22  0824   WBC Thousand/uL 8 47 HEMOGLOBIN g/dL 13 1   HEMATOCRIT % 37 6   PLATELETS Thousands/uL 199     Results from last 7 days   Lab Units 02/13/22  0440   POTASSIUM mmol/L 3 5   CHLORIDE mmol/L 107   CO2 mmol/L 22   BUN mg/dL 57*   CREATININE mg/dL 2 61*   CALCIUM mg/dL 8 5

## 2022-02-13 NOTE — PLAN OF CARE
Problem: PHYSICAL THERAPY ADULT  Goal: Performs mobility at highest level of function for planned discharge setting  See evaluation for individualized goals  Description: Treatment/Interventions: Functional transfer training,LE strengthening/ROM,Endurance training,Patient/family training,Equipment eval/education,Bed mobility,Gait training,Spoke to nursing,Spoke to case management,OT  Equipment Recommended: Marelne Mackay       See flowsheet documentation for full assessment, interventions and recommendations  Outcome: Progressing  Note: Prognosis: Good  Problem List: Decreased strength,Decreased endurance,Impaired balance,Decreased mobility,Decreased safety awareness,Decreased range of motion  Assessment: Pt seen for PT treatment session this date  Therapy session focused on bed mobility, functional transfers, gait training, therapeutic exercise and endurance training in order to improve overall mobility and independence  Pt requires assist of 1 for all mobility performed this date  Pt with soiled bed linens- increased time required for hygiene tasks  Pt performed bed mobility tasks with mod A; increased trunk management required compared to previous session  Pt initially requiring max Ax1 to complete STS, progressed to mod Ax1 to RW; pt ambulated short distances in room with mod Ax1  Pt performed/ tolerated seated b/l LE therex top hips, knees and ankles  Pt making progress toward goals  Pt was left sitting OOB in bedside chair with alarm on at the end of PT session with all needs in reach  Pt would benefit from continued PT services while in hospital to address remaining limitations  PT to continue to follow pt and recommends rehab upon d/c  The patient's AM-PAC Basic Mobility Inpatient Short Form Raw Score is 12  A Raw score of less than or equal to 16 suggests the patient may benefit from discharge to post-acute rehabilitation services   Please also refer to the recommendation of the Physical Therapist for safe discharge planning  Barriers to Discharge: Inaccessible home environment        PT Discharge Recommendation: Post acute rehabilitation services          See flowsheet documentation for full assessment

## 2022-02-13 NOTE — SEDATION DOCUMENTATION
Paracentesis performed by Dr Skip Worthington  Patient tolerated well and vss throughout case  2000 mL of sheri fluid removed  Band-aid in place on right side of abdomen  Lab specimens sent to lab  Report called to [facility]  Patient transported to ultrasound

## 2022-02-13 NOTE — DISCHARGE INSTRUCTIONS
Abdominal Paracentesis     WHAT YOU NEED TO KNOW:   Abdominal paracentesis is a procedure to remove abnormal fluid buildup in your abdomen  Fluid builds up because of liver problems, such as swelling and scarring  Heart failure, kidney disease, a mass, or problems with your pancreas may also cause fluid buildup  DISCHARGE INSTRUCTIONS:     Follow up with your healthcare provider as directed: Write down your questions so you remember to ask them during your visits  Wound care: Remove dressing after 24 hours  Leave glue in place  Return to your normal activities    Contact Interventional Radiology at [phone] Mary[place] PATIENTS: Contact Interventional Radiology at [phone]) Max Agudelo PATIENTS: Contact Interventional Radiology at [phone]) if:  · You have a fever and your wound is red and swollen  · You have yellow, green, or bad-smelling discharge coming from your wound  · You have pain or swelling in your abdomen  · You have an upset stomach or you vomit  · You have sudden, sharp pain in your abdomen  · You urinate very little or not at all  · You feel confused and more tired than usual    · Your arm or leg feels warm, tender, and painful  It may look swollen and red  · You suddenly feel lightheaded and have trouble breathing

## 2022-02-13 NOTE — PHYSICAL THERAPY NOTE
PHYSICAL THERAPY NOTE          Patient Name: Thang Dunn  PZNLP'G Date: 2/13/2022 02/13/22 0905   PT Last Visit   PT Visit Date 02/13/22   Note Type   Note Type Treatment   Pain Assessment   Pain Assessment Tool 0-10   Pain Score No Pain  (abdominal discomfort )   Restrictions/Precautions   Weight Bearing Precautions Per Order No   Other Precautions Chair Alarm; Bed Alarm;Multiple lines; Fall Risk;Hard of hearing   General   Chart Reviewed Yes   Response to Previous Treatment Patient with no complaints from previous session  Family/Caregiver Present No   Cognition   Arousal/Participation Responsive; Cooperative   Attention Attends with cues to redirect   Orientation Level Oriented X4   Memory Decreased recall of precautions   Following Commands Follows one step commands with increased time or repetition   Comments Pt pleasant and cooperative to participate in therapy session  Pt requires cues to re-direct attention at times  Pt [place]  Bed Mobility   Supine to Sit 3  Moderate assistance   Additional items Assist x 1;HOB elevated; Bedrails; Increased time required;Verbal cues  (trunk management )   Sit to Supine Unable to assess   Additional Comments pt supine in bed upon arrival  Pt sitting OOB in bedside chair with chair alarm on with all needs within reach at the end of therapy session    Transfers   Sit to Stand 3  Moderate assistance  (initially max Ax1 progresses to mod A)   Additional items Assist x 1; Increased time required;Verbal cues   Stand to Sit 3  Moderate assistance   Additional items Assist x 1; Increased time required;Verbal cues   Additional Comments transfers with RW; cues for hand placement and sequencing  3x STS performed    Ambulation/Elevation   Gait pattern Excessively slow; Step to;Short stride; Foward flexed;Decreased foot clearance; Improper Weight shift   Gait Assistance 3  Moderate assist   Additional items Assist x 1; Tactile cues; Verbal cues   Assistive Device Rolling walker   Distance 5ft    Balance   Static Sitting Fair   Dynamic Sitting Fair -   Static Standing Poor +   Dynamic Standing Poor   Ambulatory Poor   Activity Tolerance   Activity Tolerance Patient limited by fatigue;Patient tolerated treatment well   Nurse Made Aware RN cleared pt to participate in therapy session    Exercises   Knee AROM Long Arc Quad Sitting;Bilateral;15 reps;AROM  (x2 sets )   Ankle Pumps Sitting;Bilateral;15 reps;AROM  (heel/ toe raises x2 sets )   UE Exercise Sitting;Bilateral;15 reps;AROM   Marching Sitting;Bilateral;15 reps;AROM  (x2 sets )   Assessment   Prognosis Good   Problem List Decreased strength;Decreased endurance; Impaired balance;Decreased mobility; Decreased safety awareness;Decreased range of motion   Assessment Pt seen for PT treatment session this date  Therapy session focused on bed mobility, functional transfers, gait training, therapeutic exercise and endurance training in order to improve overall mobility and independence  Pt requires assist of 1 for all mobility performed this date  Pt with soiled bed linens- increased time required for hygiene tasks  Pt performed bed mobility tasks with mod A; increased trunk management required compared to previous session  Pt initially requiring max Ax1 to complete STS, progressed to mod Ax1 to RW; pt ambulated short distances in room with mod Ax1  Pt performed/ tolerated seated b/l LE therex top hips, knees and ankles  Pt making progress toward goals  Pt was left sitting OOB in bedside chair with alarm on at the end of PT session with all needs in reach  Pt would benefit from continued PT services while in hospital to address remaining limitations  PT to continue to follow pt and recommends rehab upon d/c  The patient's AM-PAC Basic Mobility Inpatient Short Form Raw Score is 12   A Raw score of less than or equal to 16 suggests the patient may benefit from discharge to post-acute rehabilitation services  Please also refer to the recommendation of the Physical Therapist for safe discharge planning  Barriers to Discharge Inaccessible home environment   Goals   Patient Goals to go home    STG Expiration Date 02/24/22   PT Treatment Day 1   Plan   Treatment/Interventions Functional transfer training;LE strengthening/ROM; Therapeutic exercise; Endurance training;Patient/family training;Equipment eval/education; Bed mobility;Gait training;Spoke to nursing   Progress Progressing toward goals   PT Frequency 3-5x/wk   Recommendation   PT Discharge Recommendation Post acute rehabilitation services   Equipment Recommended [facility] Recommended Wheeled walker   Change/add to [employer]? No   AM-PAC Basic Mobility Inpatient   Turning in Bed Without Bedrails 3   Lying on Back to Sitting on Edge of Flat Bed 2   Moving Bed to Chair 2   Standing Up From Chair 2   Walk in Room 2   Climb 3-5 Stairs 1   Basic Mobility Inpatient Raw Score 12   Basic Mobility Standardized Score 32 23   Highest Level Of Mobility   JH-HLM Goal 4: Move to chair/commode   JH-HLM Highest Level of Mobility 5: Stand (1 or more minutes)   JH-HLM Goal Achieved Yes   Education   Education Provided Mobility training;Home exercise program;Assistive device   Patient Demonstrates acceptance/verbal understanding   End of Consult   Patient Position at End of Consult Bedside chair;Bed/Chair alarm activated; All needs within reach     Ronny Ku, PT, DPT

## 2022-02-14 NOTE — BRIEF OP NOTE (RAD/CATH)
INTERVENTIONAL RADIOLOGY PROCEDURE NOTE    Date: 2/13/2022    Procedure: paracentesis    Preoperative diagnosis:   1  Jaundice    2  Transaminitis    3  Acute kidney injury ([address] )         Postoperative diagnosis: Same  Surgeon: Florencia Pascal MD     Assistant: None  No qualified resident was available  Blood loss: 0    Specimens: multiple     Findings: RIGHT side 2 liter paracentesis    Complications: None immediate      Anesthesia: local

## 2022-02-14 NOTE — PLAN OF CARE
Problem: MOBILITY - ADULT  Goal: Maintain or return to baseline ADL function  Description: INTERVENTIONS:  -  Assess patient's ability to carry out ADLs; assess patient's baseline for ADL function and identify physical deficits which impact ability to perform ADLs (bathing, care of mouth/teeth, toileting, grooming, dressing, etc )  - Assess/evaluate cause of self-care deficits   - Assess range of motion  - Assess patient's mobility; develop plan if impaired  - Assess patient's need for assistive devices and provide as appropriate  - Encourage maximum independence but intervene and supervise when necessary  - Involve family in performance of ADLs  - Assess for home care needs following discharge   - Consider OT consult to assist with ADL evaluation and planning for discharge  - Provide patient education as appropriate  Outcome: Not Progressing  Goal: Maintains/Returns to pre admission functional level  Description: INTERVENTIONS:  - Perform BMAT or MOVE assessment daily    - Set and communicate daily mobility goal to care team and patient/family/caregiver     - Collaborate with rehabilitation services on mobility goals if consulted  - Out of bed for toileting  - Record patient progress and toleration of activity level   Outcome: Not Progressing     Problem: Potential for Falls  Goal: Patient will remain free of falls  Description: INTERVENTIONS:  - Educate patient/family on patient safety including physical limitations  - Instruct patient to call for assistance with activity   - Consult OT/PT to assist with strengthening/mobility   - Keep Call bell within reach  - Keep bed low and locked with side rails adjusted as appropriate  - Keep care items and personal belongings within reach  - Initiate and maintain comfort rounds  - Apply yellow socks and bracelet for high fall risk patients  - Consider moving patient to room near nurses station  Outcome: Progressing     Problem: INFECTION - ADULT  Goal: Absence or prevention of progression during hospitalization  Description: INTERVENTIONS:  - Assess and monitor for signs and symptoms of infection  - Monitor lab/diagnostic results  - Monitor all insertion sites, i e  indwelling lines, tubes, and drains  - Monitor endotracheal if appropriate and nasal secretions for changes in amount and color  - [place] appropriate cooling/warming therapies per order  - Administer medications as ordered  - Instruct and encourage patient and family to use good hand hygiene technique  - Identify and instruct in appropriate isolation precautions for identified infection/condition  Outcome: Progressing  Goal: Absence of fever/infection during neutropenic period  Description: INTERVENTIONS:  - Monitor WBC    Outcome: Progressing

## 2022-02-14 NOTE — OCCUPATIONAL THERAPY NOTE
Occupational Therapy Progress Note     Patient Name: Rolando WAKEFIELD Date: 2/14/2022  Problem List  Principal Problem:    Obstructive jaundice  Active Problems:    Essential hypertension    Class 1 obesity without serious comorbidity with body mass index (BMI) of 31 0 to 31 9 in adult    Hyperammonemia [facility])    Mass of right inguinal region    Liver cirrhosis ([facility] )    Lung nodule    Acute kidney injury ([facility] )          02/14/22 1358   OT Last Visit   OT Visit Date 02/14/22   Note Type   Note Type Treatment   Restrictions/Precautions   Weight Bearing Precautions Per Order No   Other Precautions Chair Alarm; Bed Alarm;Multiple lines; Fall Risk   Lifestyle   Autonomy PTA, pt reports being I with ADLs/IADLs, walking stick for fnxl mobility, (+)     Reciprocal Relationships Wife, family   Service to Others Retired - was in the [employer] war and a     Intrinsic Gratification "I enjoy life"   Pain Assessment   Pain Assessment Tool 0-10   Pain Score No Pain   ADL   Grooming Assistance 4  Minimal Assistance   Grooming Deficit Shaving; Wash/dry face   Grooming Comments MIN A for thoroughness - RN clearance for shaving    LB Dressing Assistance 2  Maximal Assistance   LB Dressing Deficit Thread RLE into pants; Thread LLE into pants;Pull up over hips   LB Dressing Comments Pt able to pull pants from knees to groin - unable to thread or pull over hips    Bed Mobility   Supine to Sit 3  Moderate assistance   Additional items Assist x 1;HOB elevated; Bedrails; Increased time required;LE management   Sit to Supine 3  Moderate assistance   Additional items Assist x 1;HOB elevated; Bedrails; Increased time required;LE management   Transfers   Sit to Stand 2  Maximal assistance   Additional items Assist x 1; Increased time required;Verbal cues   Stand to Sit 3  Moderate assistance   Additional items Assist x 1; Increased time required;Verbal cues   Additional Comments Transfers with RW    Functional Mobility   Functional Mobility 3  Moderate assistance   Additional Comments Ax1   Additional items Rolling walker   Cognition   Overall Cognitive Status WFL   Arousal/Participation Alert; Responsive; Cooperative   Attention Attends with cues to redirect   Orientation Level Oriented to person;Oriented to place;Oriented to situation   Memory Decreased short term memory   Following Commands Follows one step commands with increased time or repetition   Comments Pt very pleasant and cooperative t/o session    Activity Tolerance   Activity Tolerance Patient limited by fatigue   Medical Staff Made Aware MARTHA Morton    Assessment   Assessment Patient participated in Skilled OT session this date with interventions consisting of ADL re training with the use of correct body mechnaics, safety awareness and fall prevention techniques,  therapeutic activities to: increase activity tolerance, increase dynamic sit/ stand balance during functional activity  and increase OOB/ sitting tolerance   Upon arrival patient was found supine in bed  Pt demonstrated the following tasks: MOD A sup <> sit, MAX A STS, MOD A fnxl mobility with RW  Pt completes grooming tasks with MIN A seated at sink, MAX A for LBD  Patient continues to be functioning below baseline level, occupational performance remains limited secondary to factors listed above and increased risk for falls and injury  From OT standpoint, recommendation at time of d/c would be STR  Patient to benefit from continued Occupational Therapy treatment while in the hospital to address deficits as defined above and maximize level of functional independence with ADLs and functional mobility  Pt was left in bed with alarm on after session with all current needs met  The patient's raw score on the AM-PAC Daily Activity inpatient short form is 16, standardized score is 35 96, less than 39 4  Patients at this level are likely to benefit from discharge to post-acute rehabilitation services   Please refer to the recommendation of the Occupational Therapist for safe discharge planning  Plan   Treatment Interventions ADL retraining;Functional transfer training;UE strengthening/ROM; Endurance training;Patient/family training;Equipment evaluation/education; Compensatory technique education;Continued evaluation; Activityengagement; Energy conservation   Goal Expiration Date 02/24/22   OT Treatment Day 1   OT Frequency 3-5x/wk   Recommendation   OT Discharge Recommendation Post acute rehabilitation services   OT - OK to Discharge Yes  (to rehab when medically stable )   AM-PAC Daily Activity Inpatient   Lower Body Dressing 2   Bathing 2   Toileting 2   Upper Body Dressing 3   Grooming 3   Eating 4   Daily Activity Raw Score 16   Daily Activity Standardized Score (Calc for Raw Score >=11) 35 96   AM-PAC Applied Cognition Inpatient   Following a Speech/Presentation 3   Understanding Ordinary Conversation 4   Taking Medications 4   Remembering Where Things Are Placed or Put Away 4   Remembering List of 4-5 Errands 3   Taking Care of Complicated Tasks 3   Applied Cognition Raw Score 21   Applied Cognition Standardized Score 44 3          Deandra Clonts, MS, OTR/L

## 2022-02-14 NOTE — PLAN OF CARE
Problem: Potential for Falls  Goal: Patient will remain free of falls  Description: INTERVENTIONS:  - Educate patient/family on patient safety including physical limitations  - Instruct patient to call for assistance with activity   - Consult OT/PT to assist with strengthening/mobility   - Keep Call bell within reach  - Keep bed low and locked with side rails adjusted as appropriate  - Keep care items and personal belongings within reach  - Initiate and maintain comfort rounds  - Make Fall Risk Sign visible to staff  - Offer Toileting every 2 Hours, in advance of need  - Initiate/Maintain chair  - Obtain necessary fall risk management equipment: chair alarm  - Apply yellow socks and bracelet for high fall risk patients  - Consider moving patient to room near nurses station  Outcome: Progressing

## 2022-02-14 NOTE — PROGRESS NOTES
[facility] 50 PROGRESS NOTE   Anne Osullivan 80 y o  male MRN: [id number]  Unit/Bed#: [facility] 910-01 Encounter: [id number]  Reason for Consult:  Acute kidney injury    ASSESSMENT and PLAN:    80-year-old male with a past medical history of hypertension, hypothyroidism, who initially presents to weakness  Was found to have jaundice and transaminitis and elevated ammonia level  There is concern for cholangiocarcinoma  Patient had ERCP with brushing and stenting  1) acute kidney injury    - etiology multifactorial in the setting of contrast given recent contrast on 02/09, cirrhosis, possible hepatorenal   Also intra-abdominal hypertension given ascites was considered  Patient was also given ARB and therefore auto regulatory failure being considered  -was started on albumin 2/13; intravenous fluids were held over the weekend  -urine sodium-  -2/13-creatinine 2 6, rising    Plan  -awaiting BMP this morning  -check BMP in a m   -avoid ARB  -avoid hypotension  -avoid nephrotoxic agents  -may need to consider midodrine and octreotide if creatinine continues to rise  -currently on albumin  Okay to continue for today until repeat blood work  - follow-up final peritoneal studies  Possible infection?    Defer to primary team     2) obstructive jaundice    -status post ERCP with brushings  -elevated CA 19 9  -concern for cholangiocarcinoma    3) cirrhosis    -small esophageal varices  -paracentesis-2 L on 02/13    4) electrolytes-awaiting BMP    5) acid/base-awaiting BMP    6) lung nodule-per primary team    SUBJECTIVE / 24H INTERVAL HISTORY:    Blood pressure is 356-038 systolic  Afebrile  On room air  Patien stating he is going home repeatedly  Urine output 900 cc with 4 time not recorded      OBJECTIVE:  Current Weight:    Vitals:    02/13/22 1444 02/13/22 1534 02/13/22 2315 02/14/22 0747   BP: 115/57 113/52 108/56 107/55   Pulse: 81 77 78    Resp: 20 20 18 18   Temp:   98 2 °F (36 8 °C) 99 7 °F (37 6 °C) [facility]       SpO2: 98% 97% 97%    Height:           Intake/Output Summary (Last 24 hours) at 2/14/2022 1018  Last data filed at 2/14/2022 0801  Gross per 24 hour   Intake 1368 33 ml   Output 3425 ml   Net -2056 67 ml     General: NAD  Skin: no rash, jaundice  Eyes: icteric sclera  ENT: moist mucous membrane  Neck: supple  Chest: CTA b/l, no ronchii, no wheeze, no rubs, no rales  CVS: s1s2, no murmur, no gallop, no rub  Abdomen: soft, distended  Extremities:  2+ edema LE b/l  : no lee  Neuro: AAOX3  Psych: normal affect    Medications:    Current Facility-Administered Medications:     albumin human (FLEXBUMIN) 25 % injection 25 g, 25 g, Intravenous, Q6H, Genette Elio Trammell DO, Stopped at 02/14/22 0801    cefTRIAXone (ROCEPHIN) 2,000 mg in dextrose 5 % 50 mL IVPB, 2,000 mg, Intravenous, Q24H, Bay Martinez DO, Last Rate: 100 mL/hr at 02/13/22 2143, 2,000 mg at 02/13/22 2143    heparin (porcine) subcutaneous injection 5,000 Units, 5,000 Units, Subcutaneous, Q8H [address], Kristel Perry MD, 5,000 Units at 02/14/22 0732    lactulose oral solution 10 g, 10 g, Oral, Daily, Violette Mace, DO, 10 g at 02/14/22 0801    levothyroxine tablet 200 mcg, 200 mcg, Oral, Early Morning, Violette Mace, DO, 200 mcg at 02/14/22 0731    melatonin tablet 3 mg, 3 mg, Oral, HS, Sherolyn Duverney, PA-C, 3 mg at 02/13/22 2158    Laboratory Results:  Results from last 7 days   Lab Units 02/13/22  0440 02/12/22  0824 02/11/22  0603 02/10/22  0503 02/09/22  0604 02/09/22  0603 02/08/22  1010   WBC Thousand/uL  --  8 47 7 43 7 70  --  7 59 7 61   HEMOGLOBIN g/dL  --  13 1 11 7* 11 6*  --  12 4 12 4   HEMATOCRIT %  --  37 6 32 7* 32 8*  --  34 9* 36 2*   PLATELETS Thousands/uL  --  199 171 171  --  165 156   POTASSIUM mmol/L 3 5 3 7 3 9 3 8 3 8  --  4 0   CHLORIDE mmol/L 107 108 111* 109* 106  --  106   CO2 mmol/L 22 21 20* 21 20*  --  23   BUN mg/dL 57* 40* 30* 25 22  --  23   CREATININE mg/dL 2 61* 1 81* 1 38* 0 96 0 77  -- 0  81   CALCIUM mg/dL 8 5 8 6 8 6 8 6 8 2*  --  8 3   MAGNESIUM mg/dL  --   --   --  2 1 1 9  --   --    PHOSPHORUS mg/dL  --   --   --   --  2 8  --   --

## 2022-02-14 NOTE — PLAN OF CARE
Problem: OCCUPATIONAL THERAPY ADULT  Goal: Performs self-care activities at highest level of function for planned discharge setting  See evaluation for individualized goals  Description: Treatment Interventions: ADL retraining,Functional transfer training,UE strengthening/ROM,Endurance training,Patient/family training,Equipment evaluation/education,Cognitive reorientation,Compensatory technique education,Continued evaluation,Energy conservation,Activityengagement          See flowsheet documentation for full assessment, interventions and recommendations  Outcome: Progressing  Note: Limitation: Decreased ADL status,Decreased UE ROM,Decreased UE strength,Decreased Safe judgement during ADL,Decreased endurance,Decreased self-care trans,Decreased high-level ADLs  Prognosis: Fair  Assessment: Patient participated in Skilled OT session this date with interventions consisting of ADL re training with the use of correct body mechnaics, safety awareness and fall prevention techniques,  therapeutic activities to: increase activity tolerance, increase dynamic sit/ stand balance during functional activity  and increase OOB/ sitting tolerance   Upon arrival patient was found supine in bed  Pt demonstrated the following tasks: MOD A sup <> sit, MAX A STS, MOD A fnxl mobility with RW  Pt completes grooming tasks with MIN A seated at sink, MAX A for LBD  Patient continues to be functioning below baseline level, occupational performance remains limited secondary to factors listed above and increased risk for falls and injury  From OT standpoint, recommendation at time of d/c would be STR  Patient to benefit from continued Occupational Therapy treatment while in the hospital to address deficits as defined above and maximize level of functional independence with ADLs and functional mobility  Pt was left in bed with alarm on after session with all current needs met   The patient's raw score on the AM-PAC Daily Activity inpatient short form is 16, standardized score is 35 96, less than 39 4  Patients at this level are likely to benefit from discharge to post-acute rehabilitation services  Please refer to the recommendation of the Occupational Therapist for safe discharge planning       OT Discharge Recommendation: Post acute rehabilitation services  OT - OK to Discharge: Yes (to rehab when medically stable )

## 2022-02-14 NOTE — PROGRESS NOTES
Progress Note- Brandon Bishop 80 y o  male MRN: [id number]    Unit/Bed#: [facility] 910-01 Encounter: [id number]      Assessment and Plan:  59-year-old male with history of hypertension, hypothyroidism, and hyperlipidemia who presented with weakness and obstructive jaundice from suspected CBD stricture/mass  Patient transferred from [facility] for GI evaluation and intervention with EUS and ERCP      1  Obstructive jaundice, elevated LFTs - secondary to mass in the bile duct as seen on EUS status post FNB  Preliminary cytology consistent with markedly atypical cells  Single peripancreatic lymph node also noted  CA 19-9 markedly elevated at 28,356  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  LFTs further increased today although this could be transient secondary to procedure  · Patient has worsening total bilirubin  However, alkaline phosphatase is improved  · Follow-up final pathology results  · Will need outpatient follow-up with Surgical Oncology and Medical Oncology       2  Possible new cirrhosis - imaging findings suggestive of ascites and possible new cirrhosis  Synthetic function appears to be intact given normal INR  Small esophageal varices were seen on EGD which does support presence of portal hypertension  Normal platelet count  Evidence of ascites also noted on imaging which may be malignant based on concerns above  Cause for liver disease, if present, unknown but may be secondary to malignancy versus fatty liver versus other  If this is true cirrhosis, would be considered decompensated based on presence of ascites  MELD- Na score 29 (driven by bilirubin and creatinine but likely inaccurate in setting of biliary obstruction)    SBP    · Fluid studies with high white count a neutrophil predominant concerning for SBP    However, it could be secondary to malignancy as clinically patient not behaving like SBP  · Started on ceftriaxone 2/13 waiting further fluid analysis  · Culture and Gram stain negative, cytology pending    Ascites    · Paracentesis by IR with removal of 2 L of fluid  · High SAAG low protein consistent with portal hypertension secondary to cirrhosis  · Recommend 2 gm sodium restriction    Portal hypertension    · Small esophageal varices on EGD on 2/11/22 and mild PHG; no high risk stigmata  · No indication for NSBB at this time    PSE    § No evidence of HE, asterixis  § Minimize use of narcotics, benzodiazepines, sedatives  § Can continue lactulose for now, does not need to be on it on discharge     [facility] [address]  screening    · Concern for cholangiocarcinoma as noted above with possible liver metastases on CT imaging from 2/8/22, less likely primary [address]   · AFP on 2/9/22 WNL at 1 8  · Further screening may not be necessary in setting of suspected malignancy as noted above    Disposition:  GI will continue to follow  ______________________________________________________________________    Subjective:     Patient denies any abdominal pain    Medication Administration - last 24 hours from 02/13/2022 1544 to 02/14/2022 1544       Date/Time Order Dose Route Action Action by     02/14/2022 0801 lactulose oral solution 10 g 10 g Oral Given Williams Chung, RN     02/14/2022 0731 levothyroxine tablet 200 mcg 200 mcg Oral Given Williams Chung, RN     02/14/2022 1311 albumin human (FLEXBUMIN) 25 % injection 25 g 25 g Intravenous New Bag Kramer Juliet, RN     02/14/2022 0801 albumin human (FLEXBUMIN) 25 % injection 25 g 0 g Intravenous Stopped Williams Chung, RN     02/14/2022 0732 albumin human (FLEXBUMIN) 25 % injection 25 g 25 g Intravenous New Bag Kramer Juliet, RN     02/14/2022 0013 albumin human (FLEXBUMIN) 25 % injection 25 g 25 g Intravenous [facility]     02/13/2022 1833 albumin human (FLEXBUMIN) 25 % injection 25 g 25 g Intravenous New Bag Kat Tejeda RN     02/14/2022 1306 heparin (porcine) subcutaneous injection 5,000 Units 5,000 Units Subcutaneous Given Agnes Harris RN     02/14/2022 0732 heparin (porcine) subcutaneous injection 5,000 Units 5,000 Units Subcutaneous Given Agnes Harris RN     02/13/2022 2146 heparin (porcine) subcutaneous injection 5,000 Units 5,000 Units Subcutaneous Given Simon Bazan, [address] [place] [address]     02/13/2022 2143 cefTRIAXone (ROCEPHIN) 2,000 mg in dextrose 5 % 50 mL IVPB 2,000 mg Intravenous [facility], [address] [place] [address]     02/13/2022 2158 melatonin tablet 3 mg 3 mg Oral Given Simon Bazan RN     02/14/2022 1458 potassium chloride (K-DUR,KLOR-CON) CR tablet 40 mEq 40 mEq Oral Given Agnes Harris RN          Objective:     Vitals: Blood pressure 107/55, pulse 78, temperature 99 7 °F (37 6 °C), resp  rate 18, height 6' 4" (1 93 m), SpO2 97 %  ,Body mass index is 32 26 kg/m²  Intake/Output Summary (Last 24 hours) at 2/14/2022 1544  Last data filed at 2/14/2022 0801  Gross per 24 hour   Intake 400 ml   Output 1425 ml   Net -1025 ml       Physical Exam:   General Appearance: Awake and alert, in no acute distress   + jaundice  Abdomen: Soft, non-tender, non-distended; bowel sounds normal; no masses or no organomegaly    Invasive Devices  Report    Peripheral Intravenous Line            Peripheral IV 02/12/22 Proximal;Right;Ventral (anterior) Forearm 2 days                Lab Results:  Admission on 02/10/2022   Component Date Value    WBC 02/10/2022 7 70     RBC 02/10/2022 3 31*    Hemoglobin 02/10/2022 11 6*    Hematocrit 02/10/2022 32 8*    MCV 02/10/2022 99*    MCH 02/10/2022 35 0*    MCHC 02/10/2022 35 4     RDW 02/10/2022 18 6*    MPV 02/10/2022 12 0     Platelets 82/49/4022 171     nRBC 02/10/2022 0     Neutrophils Relative 02/10/2022 65     Immat GRANS % 02/10/2022 1     Lymphocytes Relative 02/10/2022 16     Monocytes Relative 02/10/2022 14*    Eosinophils Relative 02/10/2022 3     Basophils Relative 02/10/2022 1     Neutrophils Absolute 02/10/2022 5 11     Immature Grans Absolute 02/10/2022 0 05     Lymphocytes Absolute 02/10/2022 1 20     Monocytes Absolute 02/10/2022 1 08     Eosinophils Absolute 02/10/2022 0 22     Basophils Absolute 02/10/2022 0 04     Sodium 02/10/2022 138     Potassium 02/10/2022 3 8     Chloride 02/10/2022 109*    CO2 02/10/2022 21     ANION GAP 02/10/2022 8     BUN 02/10/2022 25     Creatinine 02/10/2022 0 96     Glucose 02/10/2022 99     Calcium 02/10/2022 8 6     Corrected Calcium 02/10/2022 10 0     AST 02/10/2022 141*    ALT 02/10/2022 143*    Alkaline Phosphatase 02/10/2022 826*    Total Protein 02/10/2022 6 0*    Albumin 02/10/2022 2 3*    Total Bilirubin 02/10/2022 16 34*    eGFR 02/10/2022 70     Magnesium 02/10/2022 2 1     Procalcitonin 02/10/2022 0 97*    POC Glucose 02/10/2022 123     Total Bilirubin 02/11/2022 18 98*    Bilirubin, Direct 02/11/2022 13 24*    Alkaline Phosphatase 02/11/2022 741*    AST 02/11/2022 127*    ALT 02/11/2022 123*    Total Protein 02/11/2022 5 9*    Albumin 02/11/2022 2 2*    Sodium 02/11/2022 140     Potassium 02/11/2022 3 9     Chloride 02/11/2022 111*    CO2 02/11/2022 20*    ANION GAP 02/11/2022 9     BUN 02/11/2022 30*    Creatinine 02/11/2022 1 38*    Glucose 02/11/2022 86     Calcium 02/11/2022 8 6     eGFR 02/11/2022 45     WBC 02/11/2022 7 43     RBC 02/11/2022 3 30*    Hemoglobin 02/11/2022 11 7*    Hematocrit 02/11/2022 32 7*    MCV 02/11/2022 99*    MCH 02/11/2022 35 5*    MCHC 02/11/2022 35 8     RDW 02/11/2022 18 8*    MPV 02/11/2022 11 2     Platelets 02/44/6031 171     nRBC 02/11/2022 0     Neutrophils Relative 02/11/2022 67     Immat GRANS % 02/11/2022 1     Lymphocytes Relative 02/11/2022 16     Monocytes Relative 02/11/2022 13*    Eosinophils Relative 02/11/2022 2     Basophils Relative 02/11/2022 1     Neutrophils Absolute 02/11/2022 5 05     Immature Grans Absolute 02/11/2022 0 05     Lymphocytes Absolute 02/11/2022 1 17     Monocytes Absolute 02/11/2022 0 94     Eosinophils Absolute 02/11/2022 0 17     Basophils Absolute 02/11/2022 0 05     Protime 02/11/2022 14 9*    INR 02/11/2022 1 22*    Sodium 02/12/2022 137     Potassium 02/12/2022 3 7     Chloride 02/12/2022 108     CO2 02/12/2022 21     ANION GAP 02/12/2022 8     BUN 02/12/2022 40*    Creatinine 02/12/2022 1 81*    Glucose 02/12/2022 139     Calcium 02/12/2022 8 6     Corrected Calcium 02/12/2022 9 9     AST 02/12/2022 133*    ALT 02/12/2022 120*    Alkaline Phosphatase 02/12/2022 804*    Total Protein 02/12/2022 6 4     Albumin 02/12/2022 2 4*    Total Bilirubin 02/12/2022 22 50*    eGFR 02/12/2022 32     WBC 02/12/2022 8 47     RBC 02/12/2022 3 71*    Hemoglobin 02/12/2022 13 1     Hematocrit 02/12/2022 37 6     MCV 02/12/2022 101*    MCH 02/12/2022 35 3*    MCHC 02/12/2022 34 8     RDW 02/12/2022 18 9*    Platelets 37/43/8981 199     MPV 02/12/2022 10 6     Iron Saturation 02/12/2022 38     TIBC 02/12/2022 200*    Iron 02/12/2022 76     Ferritin 02/12/2022 554*    Sodium 02/13/2022 139     Potassium 02/13/2022 3 5     Chloride 02/13/2022 107     CO2 02/13/2022 22     ANION GAP 02/13/2022 10     BUN 02/13/2022 57*    Creatinine 02/13/2022 2 61*    Glucose 02/13/2022 120     Calcium 02/13/2022 8 5     Corrected Calcium 02/13/2022 9 9     AST 02/13/2022 110*    ALT 02/13/2022 101*    Alkaline Phosphatase 02/13/2022 716*    Total Protein 02/13/2022 5 6*    Albumin 02/13/2022 2 2*    Total Bilirubin 02/13/2022 19 07*    eGFR 02/13/2022 21     WBC, Fluid 02/13/2022 1,871     Body Fluid Culture, Ster* 02/13/2022 No growth     Gram Stain Result 02/13/2022 1+ Polys     Gram Stain Result 02/13/2022 No bacteria seen     Protein, Fluid 02/13/2022 <2 0     Glucose, Fluid 02/13/2022 142     Albumin, Fluid 02/13/2022 <0 6     LD, Fluid 02/13/2022 112     Total Counted 02/13/2022 100     Neutrophils % (Fluid) 02/13/2022 72     Lymphs % (Fluid) 02/13/2022 3  Mesothelial % (Fluid) 02/13/2022 3     Monocytes % (Fluid) 02/13/2022 22     Sodium 02/14/2022 137     Potassium 02/14/2022 3 1*    Chloride 02/14/2022 106     CO2 02/14/2022 21     ANION GAP 02/14/2022 10     BUN 02/14/2022 62*    Creatinine 02/14/2022 2 24*    Glucose 02/14/2022 131     Calcium 02/14/2022 8 5     Corrected Calcium 02/14/2022 9 2     AST 02/14/2022 79*    ALT 02/14/2022 78     Alkaline Phosphatase 02/14/2022 566*    Total Protein 02/14/2022 5 9*    Albumin 02/14/2022 3 1*    Total Bilirubin 02/14/2022 23 90*    eGFR 02/14/2022 25        Imaging Studies: I have personally reviewed pertinent imaging studies

## 2022-02-14 NOTE — PROGRESS NOTES
[address] [facility]  Progress Note - Fish Dickson 1934, 80 y o  male MRN: [id number]  Unit/Bed#: [facility] 910-01 Encounter: [id number]  Primary Care Provider: Rj Balderrama MD   Date and time admitted to hospital: 2/10/2022 12:54 AM    * Obstructive jaundice  Assessment & Plan  Obstructive jaundice  Status post ERCP with sphincterotomy and stent placement 2/10  Follow-up on biopsy studies  Monitor LFTs  GI following    Acute kidney injury [facility])  Assessment & Plan  Likely multifactorial in the setting of contrast 2/9, possible hepatorenal, ARB use, possible intra abdominal HTN  Worsening kidney function  Retroperitoneal ultrasound no hydronephrosis  Discussed with Nephrology  Receiving IV albumin  Monitor kidney function closely  Avoid nephrotoxins  Labs pending today        Liver cirrhosis ([facility] [address] )  Assessment & Plan  · Likely cirrhosis with ascites  · Status post abdominal paracentesis with IR 2/13 for 2L  WBC noted, no bacteria on gram stain  Follow  On Ceftriaxone  · GI following    Hyperammonemia (HCC)  Assessment & Plan  · Due to liver disease  · Continue lactulose 10 grams PO Qdaily for a goal 2-3 soft bowel movements per 24 hours    Lung nodule  Assessment & Plan  · CT AP: Left lower lobe 5 mm nodules  Bibasilar reticular changes and/or mild atelectasis  Paraesophageal varices    · Outpatient follow-up    Essential hypertension  Assessment & Plan  · Monitor blood pressures   · avoid hypotension    Class 1 obesity without serious comorbidity with body mass index (BMI) of 31 0 to 31 9 in adult  Assessment & Plan  Therapeutic lifestyle modification    Mass of right inguinal region  Assessment & Plan  · Venous duplex of the bilateral lower extremities (02/08/2022) as outpatient showed 5 1 cm non vascularized cystic structure in the right groin  · Plan for outpatient surgery follow-up          VTE Pharmacologic Prophylaxis: VTE Score: 5 High Risk (Score >/= 5) - Pharmacological DVT Prophylaxis Ordered: heparin  Sequential Compression Devices Ordered  Patient Centered Rounds: I performed bedside rounds with nursing staff today  Discussions with Specialists or Other Care Team Provider: case management    Education and Discussions with Family / Patient: Updated  (wife) via phone  Time Spent for Care: 30 minutes  More than 50% of total time spent on counseling and coordination of care as described above  Current Length of Stay: 4 day(s)  Current Patient Status: Inpatient   Certification Statement: The patient will continue to require additional inpatient hospital stay due to GERSON management  Discharge Plan: Anticipate discharge in 48-72 hrs to rehab facility  Code Status: Level 1 - Full Code    Subjective:   Anxious to go home  States he did not sleep well last night and was upset with the staff  Objective:     Vitals:   Temp (24hrs), Av °F (37 2 °C), Min:98 2 °F (36 8 °C), Max:99 7 °F (37 6 °C)    Temp:  [98 2 °F (36 8 °C)-99 7 °F (37 6 °C)] 99 7 °F (37 6 °C)  HR:  [77-81] 78  Resp:  [18-20] 18  BP: (107-115)/(52-57) 107/55  SpO2:  [97 %-99 %] 97 %  Body mass index is 32 26 kg/m²  Input and Output Summary (last 24 hours): Intake/Output Summary (Last 24 hours) at 2022 1203  Last data filed at 2022 0801  Gross per 24 hour   Intake 558 33 ml   Output 3425 ml   Net -2866 67 ml       Physical Exam:   Physical Exam  Vitals and nursing note reviewed  Constitutional:       Appearance: He is well-developed  HENT:      Head: Normocephalic and atraumatic  Eyes:      General: Scleral icterus present  Conjunctiva/sclera: Conjunctivae normal    Cardiovascular:      Rate and Rhythm: Normal rate and regular rhythm  Heart sounds: No murmur heard  Pulmonary:      Effort: Pulmonary effort is normal  No respiratory distress  Breath sounds: Normal breath sounds  Abdominal:      Palpations: Abdomen is soft  Tenderness:  There is no abdominal tenderness  Comments: Firm, edema noted   Musculoskeletal:      Cervical back: Neck supple  Skin:     General: Skin is warm and dry  Coloration: Skin is jaundiced  Neurological:      Mental Status: He is alert  Additional Data:     Labs:  Results from last 7 days   Lab Units 02/12/22  0824 02/11/22  0603 02/11/22  0603   WBC Thousand/uL 8 47   < > 7 43   HEMOGLOBIN g/dL 13 1   < > 11 7*   HEMATOCRIT % 37 6   < > 32 7*   PLATELETS Thousands/uL 199   < > 171   NEUTROS PCT %  --   --  67   LYMPHS PCT %  --   --  16   MONOS PCT %  --   --  13*   EOS PCT %  --   --  2    < > = values in this interval not displayed  Results from last 7 days   Lab Units 02/13/22  0440   SODIUM mmol/L 139   POTASSIUM mmol/L 3 5   CHLORIDE mmol/L 107   CO2 mmol/L 22   BUN mg/dL 57*   CREATININE mg/dL 2 61*   ANION GAP mmol/L 10   CALCIUM mg/dL 8 5   ALBUMIN g/dL 2 2*   TOTAL BILIRUBIN mg/dL 19 07*   ALK PHOS U/L 716*   ALT U/L 101*   AST U/L 110*   GLUCOSE RANDOM mg/dL 120     Results from last 7 days   Lab Units 02/11/22  0603   INR  1 22*     Results from last 7 days   Lab Units 02/10/22  1127   POC GLUCOSE mg/dl 123     Results from last 7 days   Lab Units 02/09/22  0603   HEMOGLOBIN A1C % 5 3     Results from last 7 days   Lab Units 02/10/22  0503 02/09/22  0603   LACTIC ACID mmol/L  --  2 0   PROCALCITONIN ng/ml 0 97* 1 22*       Lines/Drains:  Invasive Devices  Report    Peripheral Intravenous Line            Peripheral IV 02/12/22 Proximal;Right;Ventral (anterior) Forearm 2 days                      Imaging: No pertinent imaging reviewed      Recent Cultures (last 7 days):   Results from last 7 days   Lab Units 02/13/22  1538 02/09/22  1911   GRAM STAIN RESULT  1+ Polys  No bacteria seen  --    URINE CULTURE   --  60,000-69,000 cfu/ml        Last 24 Hours Medication List:   Current Facility-Administered Medications   Medication Dose Route Frequency Provider Last Rate    albumin human  25 g Intravenous Q6H Marshal Trammell DO Stopped (02/14/22 0801)    cefTRIAXone  2,000 mg Intravenous Q24H Bharati Fernandez DO 2,000 mg (02/13/22 2143)    heparin (porcine)  5,000 Units Subcutaneous [facility] Devendra Persaud MD      lactulose  10 g Oral Daily Barraza Patches, DO      levothyroxine  200 mcg Oral Early Morning Barraza Patches, DO      melatonin  3 mg Oral HS Muriel Waldron PA-C          Today, Patient Was Seen By: Lesli Bautista PA-C    **Please Note: This note may have been constructed using a voice recognition system  **

## 2022-02-15 NOTE — CASE MANAGEMENT
Case Management Discharge Planning Note    Patient name Akanksha Keene  Location [facility] 967-48 MRN [id number]  : 1934 Date 2/15/2022       Current Admission Date: 2/10/2022  Current Admission Diagnosis:Obstructive jaundice   Patient Active Problem List    Diagnosis Date Noted    Acute kidney injury ([facility] ) 2022    Elevated TSH 2022    Abnormal MRI, liver 2022    Thrombosis, portal vein 2022    Common bile duct dilatation 2022    Mass of right inguinal region     Diastolic dysfunction     Abnormal EKG 2022    [facility] ) 2022    Abnormal urinalysis 2022    Lung nodule 2022    Transaminitis 2022    Hyperbilirubinemia 2022    [facility]monemia [facility] ) 2022    Elevated MCV 2022    Obstructive jaundice 2022    Platelets decreased ([facility] ) 2022    Bilateral lower extremity edema 2021    Exudative age-related macular degeneration, bilateral, with active choroidal neovascularization ([facility] ) 07/15/2020    Asymmetrical sensorineural hearing loss 07/15/2020    Osteoarthritis 07/15/2020    Rosacea 07/15/2020    Male erectile disorder 2019    Impaired fasting glucose 2019    Class 1 obesity without serious comorbidity with body mass index (BMI) of 31 0 to 31 9 in adult 03/15/2019    Hypothyroidism 2018    Essential hypertension 2018    Chronic osteomyelitis of knee ([facility] ) 10/08/2013      LOS (days): 5  Geometric Mean LOS (GMLOS) (days): 4 80  Days to GMLOS:-0 8     OBJECTIVE:  Risk of Unplanned Readmission Score: 14         Current admission status: Inpatient   Preferred Pharmacy:   Becka Martinez [address] [place] [address]  Phone: [phone] Fax: 21 [address] [facility]ER[facility] Srivastava  [address]  [address] [place] [address]  Phone: [phone] Fax: 3 [facility], [address] [facility] [address] [address] Janie Burrell [place] [address]  Phone: [phone] Fax: [phone]    Primary Care Provider: Letty Cook MD    Primary Insurance: [facility] REP  Secondary Insurance:     DISCHARGE DETAILS:            Other Referral/Resources/Interventions Provided:  Referral Comments: update provided to [facility] that d/c is not for several days yet at this time  Patient is going for a paracentesis tomorrow  Additional Comments: Per MD documentation "Discussed options going forward including rehab and possible transition to hospice  Family agreeable to change to DNR/DNI 3  They will continue to discuss [address] as a family  "

## 2022-02-15 NOTE — PROGRESS NOTES
[address] [facility] NOTE   Malinda Nesbitt 80 y o  male MRN: [id number]  Unit/Bed#: [facility] 910-01 Encounter: [id number]  Reason for Consult: GERSON    ASSESSMENT and PLAN:    42-year-old male with a past medical history of hypertension, hypothyroidism, who initially presents to weakness  Was found to have jaundice and transaminitis and elevated ammonia level  There is concern for cholangiocarcinoma  Patient had ERCP with brushing and stenting     1) acute kidney injury     - etiology multifactorial in the setting of contrast given recent contrast on 02/09, cirrhosis, possible hepatorenal, SBP  Also intra-abdominal hypertension given ascites was considered  Patient was also given ARB and therefore auto regulatory failure being considered  -was started on albumin 2/13; intravenous fluids were held over the weekend  -urine sodium-  -2/13-creatinine 2 6, rising  -2/14-creatinine 2 2 mg/dL  Potassium 3 1, being repleted  Bilirubin rising to 23  Urine sodium 27    - 2/15-labs pending  Lowering albumin  Patient clinically is not hypovolemic  Blood pressure stabilizing      Plan  - lower albumin for now  -paracentesis planning per primary and GI team if needed  -awaiting BMP this morning  -check BMP in a m   -avoid ARB  -avoid hypotension  -avoid nephrotoxic agents  - follow-up final peritoneal studies  Possible infection?    Defer to primary team      2) obstructive jaundice     -status post ERCP with brushings  -elevated CA 19 9  -concern for cholangiocarcinoma, biopsy pending     3) cirrhosis     -small esophageal varices  -paracentesis-2 L on 02/13  -body fluid culture without growth thus far      4) electrolytes-awaiting BMP     5) acid/base-awaiting BMP     6) lung nodule-per primary team    7) inguinal region cystic structure-per primary team   For outpatient follow-up pending  SUBJECTIVE / 24H INTERVAL HISTORY:    Blood pressure is 952-912 systolic  Afebrile  On room air    Patient without BPIC History and Physical     SUBJECTIVE   CHIEF COMPLAINT/REASON FOR ADMISSION  Shortness of breath    HISTORY OF PRESENT ILLNESS  This is a 32year old female, patient of Olivia Underwood MD, with past medical history of  respiratory failure requiring intubation and subsequent tracheostomy placement, asthma, subglottic stenosis, and bilateral vocal cord paralysis, recent tracheal resection and reanastamosis, L posterior cordotomy, and sy tube placement In July 2020 who presented with a chief complaint of shortness of breath. Patient was seen and examined this morning. She reports she started having shortness of breath yesterday. Patient states that she had some increased clear secretions from her tracheostomy and felt that it was clogged. She also reports having wheezing. She states that her asthma has been acting up lately. Patient was recently admitted to the hospital for pneumonitis and completed her antibiotics about 3 days ago. She denies any fever, chills, chest pain, abdominal pain, nausea, vomiting, diarrhea, urinary symptoms, bowel concerns at this time. On arrival to the emergency room patient is afebrile, heart rate 106, respiration 22, blood pressure 103/60, SPO2 98% on FiO2 of 28% 6 L with trach. Labs were largely unremarkable. Chest xray shows no acute cardiopulmonary findings. Patient received two hour long nebulizer treatments in the emergency room. Due to the persistent wheezing and shortness of breath, patient was admitted for further evaluation and treatment with pulmonary consultation.     PAST MEDICAL HISTORY  Past Medical History:   Diagnosis Date   â¢ MRSA (methicillin resistant Staphylococcus aureus)     in sputum   â¢ RAD (reactive airway disease)    â¢ Tracheostomy dependent (CMS/[facility]) 03/2020      PAST SURGICAL HISTORY  Past Surgical History:   Procedure Laterality Date   â¢ ADENOIDECTOMY     â¢ TRACHEOSTOMY TUBE PLACEMENT     â¢ TRACHEOSTOMY TUBE PLACEMENT        ALLERGIES AND DRUG REACTIONS  ALLERGIES:   Allergen Reactions   â¢ Penicillins RASH and PRURITUS      SOCIAL AND FUNCTIONAL HISTORY  Social History     Tobacco Use   â¢ Smoking status: Never Smoker   â¢ Smokeless tobacco: Never Used   Substance Use Topics   â¢ Alcohol use: Yes     Frequency: Never     Comment: socially   â¢ Drug use: Never      HOME MEDICATIONS  Medications Prior to Admission   Medication Sig Dispense Refill   â¢ sulfamethoxazole-trimethoprim (BACTRIM DS) 800-160 MG per tablet Take 1 tablet by mouth 2 times daily. 14 tablet 0   â¢ omeprazole (PriLOSEC) 40 MG capsule Take 40 mg by mouth daily. â¢ budesonide (PULMICORT) 0.5 MG/2ML nebulizer suspension Take 2 mLs by nebulization 2 times daily. 60 vial 3   â¢ loratadine (CLARITIN) 10 MG tablet Take 1 tablet by mouth daily. 30 tablet 5   â¢ triamcinolone (ARISTOCORT) 0.1 % ointment Apply twice a day as needed for UP TO 30 DAYS 453.6 g 0   â¢ mupirocin (BACTROBAN) 2 % ointment Apply topically daily as needed. â¢ LORazepam (ATIVAN) 1 MG tablet Take 1 mg by mouth 2 times daily as needed for Anxiety. â¢ montelukast (SINGULAIR) 10 MG tablet Take 1 tablet by mouth nightly. 30 tablet 3   â¢ acetaminophen (TYLENOL) 325 MG tablet Take 325 mg by mouth every 6 hours as needed. may take 2 tabs every 6 hrs as needed x 10 days     â¢ oxygen (O2) gas Inhale 3 L/min into the lungs continuous. VIA TRACH (MASK)     â¢ polyethylene glycol (MIRALAX) 17 GM/SCOOP powder Take 17 g by mouth daily as needed (constipation). REVIEW OF SYSTEMS  Review of Systems   Constitutional: Negative. HENT: Negative. Eyes: Negative. Respiratory: Positive for shortness of breath and wheezing. Negative for cough. Cardiovascular: Negative. Gastrointestinal: Negative. Endocrine: Negative. Genitourinary: Negative. Musculoskeletal: Negative. Skin: Negative. Allergic/Immunologic: Negative. Neurological: Negative. Hematological: Negative.     Psychiatric/Behavioral: complaints today  More calm today with regards to disposition planning  Urine output 1 6 L       OBJECTIVE:  Current Weight:    Vitals:    02/13/22 2315 02/14/22 0747 02/14/22 1544 02/15/22 0804   BP: 108/56 107/55 123/69 114/56   Pulse: 78   75   Resp: 18 18  18   Temp: 98 2 °F (36 8 °C) 99 7 °F (37 6 °C) (!) 97 °F (36 1 °C) (!) 96 6 °F (35 9 °C)   TempSrc:    Oral   SpO2: 97%   98%   Height:           Intake/Output Summary (Last 24 hours) at 2/15/2022 3954  Last data filed at 2/15/2022 2433  Gross per 24 hour   Intake 220 ml   Output 1075 ml   Net -855 ml     General: NAD  Skin: no rash  Eyes: anicteric sclera  ENT: moist mucous membrane  Neck: supple  Chest: CTA b/l, no ronchii, no wheeze, no rubs, no rales  CVS: s1s2, no murmur, no gallop, no rub  Abdomen: soft, slightly distended, hypoactive sounds  Extremities:  One to 2+ edema LE b/l  : no lee  Neuro: AAOX3  Psych: normal affect    Medications:    Current Facility-Administered Medications:     albumin human (FLEXBUMIN) 25 % injection 25 g, 25 g, Intravenous, Q6H, Sonam Trammell DO, Last Rate: 0 mL/hr at 02/14/22 1640, 25 g at 02/15/22 0816    cefTRIAXone (ROCEPHIN) 2,000 mg in dextrose 5 % 50 mL IVPB, 2,000 mg, Intravenous, Q24H, Sofya Alcocer DO, Last Rate: 100 mL/hr at 02/14/22 2157, 2,000 mg at 02/14/22 2157    heparin (porcine) subcutaneous injection 5,000 Units, 5,000 Units, Subcutaneous, Q8H [address], Natalee Sky MD, 5,000 Units at 02/15/22 0540    lactulose oral solution 10 g, 10 g, Oral, Daily, Marcelo Dinorah, DO, 10 g at 02/15/22 3333    levothyroxine tablet 200 mcg, 200 mcg, Oral, Early Morning, Marcelo Dinorah, DO, 200 mcg at 02/15/22 0540    melatonin tablet 3 mg, 3 mg, Oral, HS, Amber Moncada PA-C, 3 mg at 02/14/22 2024    Laboratory Results:  Results from last 7 days   Lab Units 02/14/22  1120 02/13/22  0440 02/12/22  0824 02/11/22  0603 02/10/22  0503 02/09/22  0604 02/09/22  0603 02/08/22  1010   WBC Thousand/uL  -- "Negative. OBJECTIVE   PHYSICAL EXAMINATION     height is 5' 1"" (1.549 m) and weight is 95.4 kg (210 lb 5.1 oz). Her oral temperature is 97.3 Â°F (36.3 Â°C). Her blood pressure is 98/50 and her pulse is 67. Her respiration is 17 and oxygen saturation is 97%. Physical Exam  Vitals signs and nursing note reviewed. Constitutional:       Appearance: Normal appearance. Neck:      Musculoskeletal: Neck supple. Cardiovascular:      Rate and Rhythm: Normal rate and regular rhythm. Pulses: Normal pulses. Heart sounds: Normal heart sounds. Pulmonary:      Effort: Tachypnea present. Breath sounds: Examination of the right-middle field reveals wheezing. Examination of the left-middle field reveals wheezing. Examination of the right-lower field reveals wheezing. Examination of the left-lower field reveals wheezing. Decreased breath sounds and wheezing present. Neurological:      Mental Status: She is alert.          REVIEW OF LABORATORY DATA  I have reviewed the following:    Recent Results (from the past 24 hour(s))   CBC with Automated Differential    Collection Time: 11/07/20  9:30 PM   Result Value Ref Range    WBC 8.5 4.2 - 11.0 K/mcL    RBC 4.87 4.00 - 5.20 mil/mcL    HGB 13.7 12.0 - 15.5 g/dL    HCT 41.8 36.0 - 46.5 %    MCV 85.8 78.0 - 100.0 fl    MCH 28.1 26.0 - 34.0 pg    MCHC 32.8 32.0 - 36.5 g/dL    RDW-CV 13.6 11.0 - 15.0 %     140 - 450 K/mcL    NRBC 0 0 /100 WBC    DIFF TYPE AUTOMATED DIFFERENTIAL     Neutrophil 51 %    LYMPH 36 %    MONO 7 %    EOSIN 5 %    BASO 1 %    Percent Immature Granuloctyes 0 %    Absolute Neutrophil 4.4 1.8 - 7.7 K/mcL    Absolute Lymph 3.0 1.0 - 4.8 K/mcL    Absolute Mono 0.6 0.3 - 0.9 K/mcL    Absolute Eos 0.4 0.1 - 0.5 K/mcL    Absolute Baso 0.0 0.0 - 0.3 K/mcL    Absolute Immature Granulocytes 0.0 0 - 0.2 K/mcl   Comprehensive Metabolic Panel    Collection Time: 11/07/20  9:30 PM   Result Value Ref Range    Sodium 142 135 - 145 mmol/L    " --  8 47 7 43 7 70  --  7 59 7 61   HEMOGLOBIN g/dL  --   --  13 1 11 7* 11 6*  --  12 4 12 4   HEMATOCRIT %  --   --  37 6 32 7* 32 8*  --  34 9* 36 2*   PLATELETS Thousands/uL  --   --  199 171 171  --  165 156   POTASSIUM mmol/L 3 1* 3 5 3 7 3 9 3 8 3 8  --  4 0   CHLORIDE mmol/L 106 107 108 111* 109* 106  --  106   CO2 mmol/L 21 22 21 20* 21 20*  --  23   BUN mg/dL 62* 57* 40* 30* 25 22  --  23   CREATININE mg/dL 2 24* 2 61* 1 81* 1 38* 0 96 0 77  --  0 81   CALCIUM mg/dL 8 5 8 5 8 6 8 6 8 6 8 2*  --  8 3   MAGNESIUM mg/dL  --   --   --   --  2 1 1 9  --   --    PHOSPHORUS mg/dL  --   --   --   --   --  2 8  --   -- Potassium 3.9 3.4 - 5.1 mmol/L    Chloride 112 (H) 98 - 107 mmol/L    Carbon Dioxide 25 21 - 32 mmol/L    Anion Gap 9 (L) 10 - 20 mmol/L    Glucose 84 65 - 99 mg/dL    BUN 16 6 - 20 mg/dL    Creatinine 0.67 0.51 - 0.95 mg/dL    GFR Estimate,  >90     GFR Estimate, Non African American >90     BUN/Creatinine Ratio 24 7 - 25    CALCIUM 9.0 8.4 - 10.2 mg/dL    TOTAL BILIRUBIN 0.2 0.2 - 1.0 mg/dL    AST/SGOT 15 <38 Units/L    ALT/SGPT 35 <64 Units/L    ALK PHOSPHATASE 73 45 - 117 Units/L    TOTAL PROTEIN 7.7 6.4 - 8.2 g/dL    Albumin 3.6 3.6 - 5.1 g/dL    GLOBULIN 4.1 (H) 2.0 - 4.0 g/dL    A/G Ratio, Serum 0.9 (L) 1.0 - 2.4   Lactic Acid, Venous    Collection Time: 11/07/20  9:30 PM   Result Value Ref Range    Lactic Acid Venous 0.9 0 - 2.0 mmol/L   Rapid SARS-CoV-2 by PCR    Collection Time: 11/07/20 10:05 PM    Specimen: Nasopharyngeal; Swab   Result Value Ref Range    Source, 2019 Novel Coronavirus (SARS-CoV-2) Nasopharyngeal     Rapid SARS-COV-2 by PCR NOT DETECTED NOT DETECTED    Isolation Guidelines           XR CHEST PA OR AP 1 VIEW   Final Result   1. No acute intrathoracic abnormalities identified. Electronically Signed by: George Vasques M.D. Signed on: 11/7/2020 9:42 PM            ASSESSMENT & PLAN   Acute moderate-severe persistentÂ asthmaÂ exacerbation in patient with RAD with prior tracheostomy 3/25/2020,Â recent tracheal resection and reanastamosis, L posterior cordotomy, and sy tube placementÂ 07/2020 and and MRSA colonization, ruled out Covid-19  -Currently oxygenating on 8L with 28% FiO2 via trach mask  - CXR with no acute abnormalities  - Blood cultures x2 pending  - Respiratory support with albuterol, Singulair, and Pulmicort   - Continue IV Solu medrol 60 mg TID  - Supportive care  PRN Tylenol, and bowel regimen. - Maintain contact and droplet isolation.  Encourage IS use  - Monitoring via telemetry  - Pulmonology following    BUCK- Encouraging night CPAP use  Obesity (BMI 39.74)- Diet/lifestyle modifications advised    DVT prophylaxis: Lovenox, SCD  Pulmonary: Cough and deep breathing, incentive spirometer    Code status: Full Code  Primary Care Physician: Dr. Iram Murillo    Disposition: Pending hospital course, input from specialist    [address]    I certify that hospital services are reasonable and necessary for this patient and will be appropriately provided as inpatient services in accordance with the CMS 2-midnight benchmark under 42 .3(e). This patient requires inpatient hospital services for medical treatment or medically required inpatient diagnostic study. The reason this patient requires hospital services is because of Acute asthma exacerbation in patient with RAD with prior tracheostomy 3/25/2020,Â recent tracheal resection and reanastamosis, L posterior cordotomy, and sy tube placementÂ 07/2020 and and MRSA colonization, ruled out Covid-19       This patient's length of stay is estimated to be >2 midnights.     The plans for this patient's post-hospital care are expected to be:  __ Home - no services  __ [facility]  __ [address]  _x_ To be determined  __ Other ______________    Verbal discussion with Dr. Dotty Alvarado, CNP   Best Practices Inpatient Care

## 2022-02-15 NOTE — PLAN OF CARE
Problem: MOBILITY - ADULT  Goal: Maintain or return to baseline ADL function  Description: INTERVENTIONS:  -  Assess patient's ability to carry out ADLs; assess patient's baseline for ADL function and identify physical deficits which impact ability to perform ADLs (bathing, care of mouth/teeth, toileting, grooming, dressing, etc )  - Assess/evaluate cause of self-care deficits   - Assess range of motion  - Assess patient's mobility; develop plan if impaired  - Assess patient's need for assistive devices and provide as appropriate  - Encourage maximum independence but intervene and supervise when necessary  - Involve family in performance of ADLs  - Assess for home care needs following discharge   - Consider OT consult to assist with ADL evaluation and planning for discharge  - Provide patient education as appropriate  Outcome: Progressing  Goal: Maintains/Returns to pre admission functional level  Description: INTERVENTIONS:  - Perform BMAT or MOVE assessment daily    - Set and communicate daily mobility goal to care team and patient/family/caregiver  - Collaborate with rehabilitation services on mobility goals if consulted  - Perform Range of Motion   - Reposition patient every 2 hours    - Dangle patient   - Stand patient   - Ambulate patient   - Out of bed to chair   - Out of bed for meals   - Out of bed for toileting  - Record patient progress and toleration of activity level   Outcome: Progressing     Problem: Potential for Falls  Goal: Patient will remain free of falls  Description: INTERVENTIONS:  - Educate patient/family on patient safety including physical limitations  - Instruct patient to call for assistance with activity   - Consult OT/PT to assist with strengthening/mobility   - Keep Call bell within reach  - Keep bed low and locked with side rails adjusted as appropriate  - Keep care items and personal belongings within reach  - Initiate and maintain comfort rounds  - Make Fall Risk Sign visible to staff  - Offer Toileting every 2 Hours, in advance of need  - Initiate/Maintain alarm  - Obtain necessary fall risk management equipment  - Apply yellow socks and bracelet for high fall risk patients  - Consider moving patient to room near nurses station  Outcome: Progressing     Problem: INFECTION - ADULT  Goal: Absence or prevention of progression during hospitalization  Description: INTERVENTIONS:  - Assess and monitor for signs and symptoms of infection  - Monitor lab/diagnostic results  - Monitor all insertion sites, i e  indwelling lines, tubes, and drains  - Monitor endotracheal if appropriate and nasal secretions for changes in amount and color  - [place] appropriate cooling/warming therapies per order  - Administer medications as ordered  - Instruct and encourage patient and family to use good hand hygiene technique  - Identify and instruct in appropriate isolation precautions for identified infection/condition  Outcome: Progressing  Goal: Absence of fever/infection during neutropenic period  Description: INTERVENTIONS:  - Monitor WBC    Outcome: Progressing

## 2022-02-15 NOTE — PROGRESS NOTES
Progress Note- Margie Sanabria 80 y o  male MRN: [id number]    Unit/Bed#: [facility] 910-01 Encounter: [id number]      Assessment and Plan:  59-year-old male with history of hypertension, hypothyroidism, and hyperlipidemia who presented with weakness and obstructive jaundice from suspected CBD stricture/mass   Patient transferred from [facility] for GI evaluation and intervention with EUS and ERCP      1  Obstructive jaundice, elevated LFTs - secondary to mass in the bile duct as seen on EUS status post FNB  Placed metal stent in the common bile duct  Preliminary cytology consistent with markedly atypical cells   Single peripancreatic lymph node also noted   CA 19-9 markedly elevated at 28,356  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        · Patient has worsening total bilirubin, today's labs pending  However, alkaline phosphatase is improved  · Follow-up final pathology results  · Will need outpatient follow-up with Surgical Oncology and Medical Oncology  · Discussed the findings with the patient's wife as well  Patient eager to go home         2  Possible new cirrhosis - imaging findings suggestive of ascites and possible new cirrhosis   Synthetic function appears to be intact given normal INR   Small esophageal varices were seen on EGD which does support presence of portal hypertension   Normal platelet count   Evidence of ascites also noted on imaging which may be malignant based on concerns above   Cause for liver disease, if present, unknown but may be secondary to malignancy versus fatty liver versus other   If this is true cirrhosis, would be considered decompensated based on presence of ascites      MELD- Na score 29 (driven by bilirubin and creatinine)      SBP     · Fluid studies with high white count a neutrophil predominant concerning for SBP  However, it could be secondary to malignancy as clinically patient not behaving like SBP  · Started on ceftriaxone 2/13   Day 3/5 of abx today  · Receiving albumin for GERSON · Plan for repeat paracentesis tomorrow with fluid studies   · Culture and Gram stain negative, cytology pending     Ascites     · Paracentesis by IR with removal of 2 L of fluid on 2/13  Plan for repeat paracentesis tomorrow   · High SAAG low protein consistent with portal hypertension secondary to cirrhosis  · Continue 2 gm sodium restriction      Portal hypertension     · Small esophageal varices on EGD on 2/11/22 and mild PHG; no high risk stigmata  · No indication for NSBB at this time     PSE     § No evidence of HE, asterixis  § Minimize use of narcotics, benzodiazepines, sedatives  § Can continue lactulose for now, does not need to be on it on discharge     [address]  screening     · Concern for cholangiocarcinoma as noted above with possible liver metastases on CT imaging from 2/8/22, less likely primary [address]   · AFP on 2/9/22 WNL at 1 8  · Further screening may not be necessary in setting of suspected malignancy as noted above    3  GERSON     Likely pre-renal in etiology  Has had improvemnet in Cr with albumin       · Patient being followed by nephrology   · Plan for repeat paracentesis tomorrow     Disposition:  GI will continue to follow    Discussed the plan with wife as unclear if patient has any insight into his disease process     Laurent Keller MD   Gastroenterology Fellow   ______________________________________________________________________    Subjective:    Patient denies any abdominal discomfort     Medication Administration - last 24 hours from 02/14/2022 1034 to 02/15/2022 1034       Date/Time Order Dose Route Action Action by     02/15/2022 0816 lactulose oral solution 10 g 10 g Oral Given Eugenie Toledo, MARTHA     02/15/2022 0540 levothyroxine tablet 200 mcg 200 mcg Oral Given Kat Tejeda, MARTHA     02/15/2022 0816 albumin human (FLEXBUMIN) 25 % injection 25 g 25 g Intravenous [employer], RN     02/15/2022 0107 albumin human (FLEXBUMIN) 25 % injection 25 g 25 g Intravenous Two [facility] RN     02/14/2022 2024 albumin human (FLEXBUMIN) 25 % injection 25 g 25 g Intravenous New Bag Kat Tejeda, MARTHA     02/14/2022 1640 albumin human (FLEXBUMIN) 25 % injection 25 g 0 g Intravenous Stopped Hershal Baumgarten, RN     02/14/2022 1311 albumin human (FLEXBUMIN) 25 % injection 25 g 25 g Intravenous [address] Hershal Baumgarten, RN     02/15/2022 0540 heparin (porcine) subcutaneous injection 5,000 Units 5,000 Units Subcutaneous Given aKt Tejeda RN     02/14/2022 2024 heparin (porcine) subcutaneous injection 5,000 Units 5,000 Units Subcutaneous Given Kat Tejeda RN     02/14/2022 1306 heparin (porcine) subcutaneous injection 5,000 Units 5,000 Units Subcutaneous Given Hershal Baumgarten, RN     02/14/2022 2157 cefTRIAXone (ROCEPHIN) 2,000 mg in dextrose 5 % 50 mL IVPB 2,000 mg Intravenous New Bag Kat Tejeda RN     02/14/2022 2024 melatonin tablet 3 mg 3 mg Oral Given Kat Tejeda RN     02/14/2022 1458 potassium chloride (K-DUR,KLOR-CON) CR tablet 40 mEq 40 mEq Oral Given Hershal Baumgarten, RN          Objective:     Vitals: Blood pressure 114/56, pulse 75, temperature (!) 96 6 °F (35 9 °C), temperature source Oral, resp  rate 18, height 6' 4" (1 93 m), SpO2 98 %  ,Body mass index is 32 26 kg/m²  Intake/Output Summary (Last 24 hours) at 2/15/2022 1034  Last data filed at 2/15/2022 0336  Gross per 24 hour   Intake 220 ml   Output 1075 ml   Net -855 ml       Physical Exam:   General Appearance: Awake and alert, in no acute distress   +jaundice   Abdomen: Soft, non-tender, + distended     Invasive Devices  Report    Peripheral Intravenous Line            Peripheral IV 02/12/22 Proximal;Right;Ventral (anterior) Forearm 3 days          Drain            External Urinary Catheter Medium <1 day                Lab Results:  Admission on 02/10/2022   Component Date Value    WBC 02/10/2022 7 70     RBC 02/10/2022 3 31*    Hemoglobin 02/10/2022 11 6*    Hematocrit 02/10/2022 32 8*    MCV 02/10/2022 99*    MCH 02/10/2022 35 0*    MCHC 02/10/2022 35 4     RDW 02/10/2022 18 6*    MPV 02/10/2022 12 0     Platelets 82/54/4524 171     nRBC 02/10/2022 0     Neutrophils Relative 02/10/2022 65     Immat GRANS % 02/10/2022 1     Lymphocytes Relative 02/10/2022 16     Monocytes Relative 02/10/2022 14*    Eosinophils Relative 02/10/2022 3     Basophils Relative 02/10/2022 1     Neutrophils Absolute 02/10/2022 5 11     Immature Grans Absolute 02/10/2022 0 05     Lymphocytes Absolute 02/10/2022 1 20     Monocytes Absolute 02/10/2022 1 08     Eosinophils Absolute 02/10/2022 0 22     Basophils Absolute 02/10/2022 0 04     Sodium 02/10/2022 138     Potassium 02/10/2022 3 8     Chloride 02/10/2022 109*    CO2 02/10/2022 21     ANION GAP 02/10/2022 8     BUN 02/10/2022 25     Creatinine 02/10/2022 0 96     Glucose 02/10/2022 99     Calcium 02/10/2022 8 6     Corrected Calcium 02/10/2022 10 0     AST 02/10/2022 141*    ALT 02/10/2022 143*    Alkaline Phosphatase 02/10/2022 826*    Total Protein 02/10/2022 6 0*    Albumin 02/10/2022 2 3*    Total Bilirubin 02/10/2022 16 34*    eGFR 02/10/2022 70     Magnesium 02/10/2022 2 1     Procalcitonin 02/10/2022 0 97*    POC Glucose 02/10/2022 123     Total Bilirubin 02/11/2022 18 98*    Bilirubin, Direct 02/11/2022 13 24*    Alkaline Phosphatase 02/11/2022 741*    AST 02/11/2022 127*    ALT 02/11/2022 123*    Total Protein 02/11/2022 5 9*    Albumin 02/11/2022 2 2*    Sodium 02/11/2022 140     Potassium 02/11/2022 3 9     Chloride 02/11/2022 111*    CO2 02/11/2022 20*    ANION GAP 02/11/2022 9     BUN 02/11/2022 30*    Creatinine 02/11/2022 1 38*    Glucose 02/11/2022 86     Calcium 02/11/2022 8 6     eGFR 02/11/2022 45     WBC 02/11/2022 7 43     RBC 02/11/2022 3 30*    Hemoglobin 02/11/2022 11 7*    Hematocrit 02/11/2022 32 7*    MCV 02/11/2022 99*    MCH 02/11/2022 35 5*    MCHC 02/11/2022 35 8     RDW 02/11/2022 18 8*    MPV 02/11/2022 11 2     Platelets 20/76/6633 171     nRBC 02/11/2022 0     Neutrophils Relative 02/11/2022 67     Immat GRANS % 02/11/2022 1     Lymphocytes Relative 02/11/2022 16     Monocytes Relative 02/11/2022 13*    Eosinophils Relative 02/11/2022 2     Basophils Relative 02/11/2022 1     Neutrophils Absolute 02/11/2022 5 05     Immature Grans Absolute 02/11/2022 0 05     Lymphocytes Absolute 02/11/2022 1 17     Monocytes Absolute 02/11/2022 0 94     Eosinophils Absolute 02/11/2022 0 17     Basophils Absolute 02/11/2022 0 05     Protime 02/11/2022 14 9*    INR 02/11/2022 1 22*    Sodium 02/12/2022 137     Potassium 02/12/2022 3 7     Chloride 02/12/2022 108     CO2 02/12/2022 21     ANION GAP 02/12/2022 8     BUN 02/12/2022 40*    Creatinine 02/12/2022 1 81*    Glucose 02/12/2022 139     Calcium 02/12/2022 8 6     Corrected Calcium 02/12/2022 9 9     AST 02/12/2022 133*    ALT 02/12/2022 120*    Alkaline Phosphatase 02/12/2022 804*    Total Protein 02/12/2022 6 4     Albumin 02/12/2022 2 4*    Total Bilirubin 02/12/2022 22 50*    eGFR 02/12/2022 32     WBC 02/12/2022 8 47     RBC 02/12/2022 3 71*    Hemoglobin 02/12/2022 13 1     Hematocrit 02/12/2022 37 6     MCV 02/12/2022 101*    MCH 02/12/2022 35 3*    MCHC 02/12/2022 34 8     RDW 02/12/2022 18 9*    Platelets 50/93/8085 199     MPV 02/12/2022 10 6     Iron Saturation 02/12/2022 38     TIBC 02/12/2022 200*    Iron 02/12/2022 76     Ferritin 02/12/2022 554*    Sodium 02/13/2022 139     Potassium 02/13/2022 3 5     Chloride 02/13/2022 107     CO2 02/13/2022 22     ANION GAP 02/13/2022 10     BUN 02/13/2022 57*    Creatinine 02/13/2022 2 61*    Glucose 02/13/2022 120     Calcium 02/13/2022 8 5     Corrected Calcium 02/13/2022 9 9     AST 02/13/2022 110*    ALT 02/13/2022 101*    Alkaline Phosphatase 02/13/2022 716*    Total Protein 02/13/2022 5 6*    Albumin 02/13/2022 2 2*    Total Bilirubin 02/13/2022 19 07*    eGFR 02/13/2022 21     Sodium, Ur 02/14/2022 27     WBC, Fluid 02/13/2022 1,871     Body Fluid Culture, Ster* 02/13/2022 No growth     Gram Stain Result 02/13/2022 1+ Polys     Gram Stain Result 02/13/2022 No bacteria seen     Protein, Fluid 02/13/2022 <2 0     Glucose, Fluid 02/13/2022 142     Albumin, Fluid 02/13/2022 <0 6     LD, Fluid 02/13/2022 112     Total Counted 02/13/2022 100     Neutrophils % (Fluid) 02/13/2022 72     Lymphs % (Fluid) 02/13/2022 3     Mesothelial % (Fluid) 02/13/2022 3     Monocytes % (Fluid) 02/13/2022 22     Sodium 02/14/2022 137     Potassium 02/14/2022 3 1*    Chloride 02/14/2022 106     CO2 02/14/2022 21     ANION GAP 02/14/2022 10     BUN 02/14/2022 62*    Creatinine 02/14/2022 2 24*    Glucose 02/14/2022 131     Calcium 02/14/2022 8 5     Corrected Calcium 02/14/2022 9 2     AST 02/14/2022 79*    ALT 02/14/2022 78     Alkaline Phosphatase 02/14/2022 566*    Total Protein 02/14/2022 5 9*    Albumin 02/14/2022 3 1*    Total Bilirubin 02/14/2022 23 90*    eGFR 02/14/2022 25     Sodium 02/15/2022 140     Potassium 02/15/2022 3 5     Chloride 02/15/2022 110*    CO2 02/15/2022 20*    ANION GAP 02/15/2022 10     BUN 02/15/2022 53*    Creatinine 02/15/2022 1 86*    Glucose 02/15/2022 102     Calcium 02/15/2022 8 1*    Corrected Calcium 02/15/2022 9 0     AST 02/15/2022 67*    ALT 02/15/2022 62     Alkaline Phosphatase 02/15/2022 464*    Total Protein 02/15/2022 5 6*    Albumin 02/15/2022 2 9*    Total Bilirubin 02/15/2022 24 79*    eGFR 02/15/2022 31        Imaging Studies: I have personally reviewed pertinent imaging studies

## 2022-02-15 NOTE — PROGRESS NOTES
[address] [facility]  Progress Note - Jeanetta Denver 1934, 80 y o  male MRN: [id number]  Unit/Bed#: [facility] 910-01 Encounter: [id number]  Primary Care Provider: Ramesh Gould MD   Date and time admitted to hospital: 2/10/2022 12:54 AM    * Obstructive jaundice  Assessment & Plan  Obstructive jaundice  Status post ERCP with sphincterotomy and stent placement 2/10  Follow-up on biopsy studies - high suspicion for cholangiocarcinoma  CA19 - 28,354  Monitor LFTs  GI following  Will need surg/onc follow up after biopsy results available  Started to discuss Haydee Caballero with family  Gave extensive update to wife and daughter today  Discussed options going forward including rehab and possible transition to hospice  Family agreeable to change to DNR/DNI 3  They will continue to discuss Haydee Caballero as a family  Acute kidney injury [facility])  Assessment & Plan  Likely multifactorial in the setting of contrast 2/9, possible hepatorenal, ARB use, possible intra abdominal HTN  Retroperitoneal ultrasound no hydronephrosis  Nephrology consult appreciated  Receiving IV albumin  Creatinine starting to trend down  Baseline around 0 8        Liver cirrhosis ([facility] [address] )  Assessment & Plan  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 with ascites  New onset  · Status post abdominal paracentesis with IR 2/13 for 2L  WBC noted, no bacteria on gram stain  Started on empiric Ceftriaxone for possible SBP  Day 3/5  For repeat paracentesis 2/16 to follow up fluid studies  Patient non-toxic appearing, afebrile, and without pain  Findings may be secondary to malignancy, but with high mortality rate from SBP, will treat  · GI following    Hyperammonemia (HCC)  Assessment & Plan  · Due to liver disease  · Continue lactulose 10 grams PO Qdaily for a goal 2-3 soft bowel movements per 24 hours - so not have to discharge on lactulose per GI    Lung nodule  Assessment & Plan  · CT AP: Left lower lobe 5 mm nodules    Bibasilar reticular changes and/or mild atelectasis  Paraesophageal varices  · Outpatient follow-up    Essential hypertension  Assessment & Plan  · Monitor blood pressures   · avoid hypotension    Class 1 obesity without serious comorbidity with body mass index (BMI) of 31 0 to 31 9 in adult  Assessment & Plan  Therapeutic lifestyle modification    Mass of right inguinal region  Assessment & Plan  · Venous duplex of the bilateral lower extremities (2022) as outpatient showed 5 1 cm non vascularized cystic structure in the right groin  · Plan for outpatient surgery follow-up        VTE Pharmacologic Prophylaxis: VTE Score: 5 High Risk (Score >/= 5) - Pharmacological DVT Prophylaxis Ordered: heparin  Sequential Compression Devices Ordered  Patient Centered Rounds: I performed bedside rounds with nursing staff today  Discussions with Specialists or Other Care Team Provider: case management, GI    Education and Discussions with Family / Patient: Updated  (wife and daughter) via phone  Time Spent for Care: 45 minutes  More than 50% of total time spent on counseling and coordination of care as described above  Time spent coordinating care, time spent discussing care with family    Current Length of Stay: 5 day(s)  Current Patient Status: Inpatient   Certification Statement: The patient will continue to require additional inpatient hospital stay due to repeat paracentesis  Discharge Plan: Anticipate discharge in >72 hrs to rehab facility  Code Status: Level 1 - Full Code    Subjective:   Offers no complaints  Wants to go home  Has poor insight into medical problems  Objective:     Vitals:   Temp (24hrs), Av 8 °F (36 °C), Min:96 6 °F (35 9 °C), Max:97 °F (36 1 °C)    Temp:  [96 6 °F (35 9 °C)-97 °F (36 1 °C)] 96 6 °F (35 9 °C)  HR:  [75] 75  Resp:  [18] 18  BP: (114-123)/(56-69) 114/56  SpO2:  [98 %] 98 %  Body mass index is 32 26 kg/m²  Input and Output Summary (last 24 hours):      Intake/Output Summary (Last 24 hours) at 2/15/2022 1258  Last data filed at 2/15/2022 8279  Gross per 24 hour   Intake 220 ml   Output 1075 ml   Net -855 ml       Physical Exam:   Physical Exam  Vitals and nursing note reviewed  Constitutional:       Appearance: He is well-developed  HENT:      Head: Normocephalic and atraumatic  Eyes:      General: Scleral icterus present  Conjunctiva/sclera: Conjunctivae normal    Cardiovascular:      Rate and Rhythm: Normal rate and regular rhythm  Heart sounds: No murmur heard  Comments: Bilateral lower extremity edema  Pulmonary:      Effort: Pulmonary effort is normal  No respiratory distress  Breath sounds: Normal breath sounds  Abdominal:      Palpations: Abdomen is soft  Tenderness: There is no abdominal tenderness  Comments: Abdominal wall edema   Musculoskeletal:      Cervical back: Neck supple  Skin:     General: Skin is warm and dry  Coloration: Skin is jaundiced  Neurological:      Mental Status: He is alert  Psychiatric:         Cognition and Memory: Cognition is impaired  Additional Data:     Labs:  Results from last 7 days   Lab Units 02/12/22  0824 02/11/22  0603 02/11/22  0603   WBC Thousand/uL 8 47   < > 7 43   HEMOGLOBIN g/dL 13 1   < > 11 7*   HEMATOCRIT % 37 6   < > 32 7*   PLATELETS Thousands/uL 199   < > 171   NEUTROS PCT %  --   --  67   LYMPHS PCT %  --   --  16   MONOS PCT %  --   --  13*   EOS PCT %  --   --  2    < > = values in this interval not displayed       Results from last 7 days   Lab Units 02/15/22  0649   SODIUM mmol/L 140   POTASSIUM mmol/L 3 5   CHLORIDE mmol/L 110*   CO2 mmol/L 20*   BUN mg/dL 53*   CREATININE mg/dL 1 86*   ANION GAP mmol/L 10   CALCIUM mg/dL 8 1*   ALBUMIN g/dL 2 9*   TOTAL BILIRUBIN mg/dL 24 79*   ALK PHOS U/L 464*   ALT U/L 62   AST U/L 67*   GLUCOSE RANDOM mg/dL 102     Results from last 7 days   Lab Units 02/11/22  0603   INR  1 22*     Results from last 7 days   Lab Units 02/10/22  1127   POC GLUCOSE mg/dl 123     Results from last 7 days   Lab Units 02/09/22  0603   HEMOGLOBIN A1C % 5 3     Results from last 7 days   Lab Units 02/10/22  0503 02/09/22  0603   LACTIC ACID mmol/L  --  2 0   PROCALCITONIN ng/ml 0 97* 1 22*       Lines/Drains:  Invasive Devices  Report    Peripheral Intravenous Line            Peripheral IV 02/12/22 Proximal;Right;Ventral (anterior) Forearm 3 days          Drain            External Urinary Catheter Medium <1 day                      Imaging: No pertinent imaging reviewed  Recent Cultures (last 7 days):   Results from last 7 days   Lab Units 02/13/22  1538 02/09/22  1911   GRAM STAIN RESULT  1+ Polys  No bacteria seen  --    URINE CULTURE   --  60,000-69,000 cfu/ml    BODY FLUID CULTURE, STERILE  No growth  --        Last 24 Hours Medication List:   Current Facility-Administered Medications   Medication Dose Route Frequency Provider Last Rate    albumin human  12 5 g Intravenous Q8H Delilah Dumas MD      cefTRIAXone  2,000 mg Intravenous Q24H Flonancie Kuhn DO 2,000 mg (02/14/22 2157)    heparin (porcine)  5,000 Units Subcutaneous [facility] Arlyn Koyanagi, MD      lactulose  10 g Oral Daily Amador Littlejohn DO      levothyroxine  200 mcg Oral Early Morning Amador Littlejohn DO      melatonin  3 mg Oral HS Kirstin Pimentel PA-C          Today, Patient Was Seen By: Los Collier PA-C    **Please Note: This note may have been constructed using a voice recognition system  **

## 2022-02-16 NOTE — PROGRESS NOTES
[address] [facility]  Progress Note - Jayleen Holliday 1934, 80 y o  male MRN: [id number]  Unit/Bed#: [facility] 910-01 Encounter: [id number]  Primary Care Provider: Bjorn Favre, MD   Date and time admitted to hospital: 2/10/2022 12:54 AM    Spoke with surg/onc resident  She will review with attending and leave recommendations  She spoke with the wife further and wife is requesting a family meeting tomorrow when daughter is present in the afternoon  Probably around 2pm     * Obstructive jaundice  Assessment & Plan  Obstructive jaundice  Status post ERCP with sphincterotomy and stent placement 2/10  Follow-up on biopsy studies - high suspicion for cholangiocarcinoma  CA19 - 28,354  Monitor LFTs  GI following  Biopsy showing adenocarcinoma  D/W GI, consult onc and surg/onc     Patient continues to refuse rehab and has poor insight into medical condition  Will obtain competency evaluation  Continuing to have [address] 64 discussion with family  Patient made DNR/DNI  Oncology input will be helpful  Patient will need rehab  Acute kidney injury [facility])  Assessment & Plan  Likely multifactorial in the setting of contrast 2/9, possible hepatorenal, ARB use, possible intra abdominal HTN  Retroperitoneal ultrasound no hydronephrosis  Nephrology consult appreciated  S/p IV albumin  Creatinine starting to trend down  Baseline around 0 8        Liver cirrhosis ([address] )  Assessment & Plan  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 with ascites  New onset  · Status post abdominal paracentesis with IR 2/13 for 2L  WBC noted, no bacteria on gram stain  Started on empiric Ceftriaxone for possible SBP  Day 4/5  For repeat paracentesis 2/16 to follow up fluid studies  Patient non-toxic appearing, afebrile, and without pain  Findings may be secondary to malignancy, but with high mortality rate from SBP, will treat    · GI following    Hyperammonemia (HCC)  Assessment & Plan  · Due to liver disease  · Continue lactulose 10 grams PO Qdaily for a goal 2-3 soft bowel movements per 24 hours - do not have to discharge on lactulose per GI    Lung nodule  Assessment & Plan  · CT AP: Left lower lobe 5 mm nodules  Bibasilar reticular changes and/or mild atelectasis  Paraesophageal varices  · Outpatient follow-up    Essential hypertension  Assessment & Plan  · Monitor blood pressures   · avoid hypotension    Class 1 obesity without serious comorbidity with body mass index (BMI) of 31 0 to 31 9 in adult  Assessment & Plan  Therapeutic lifestyle modification    Mass of right inguinal region  Assessment & Plan  · Venous duplex of the bilateral lower extremities (2022) as outpatient showed 5 1 cm non vascularized cystic structure in the right groin  · Plan for outpatient surgery follow-up          VTE Pharmacologic Prophylaxis: VTE Score: 5 High Risk (Score >/= 5) - Pharmacological DVT Prophylaxis Ordered: heparin  Sequential Compression Devices Ordered  Patient Centered Rounds: I performed bedside rounds with nursing staff today  Discussions with Specialists or Other Care Team Provider: case management, GI    Education and Discussions with Family / Patient: Updated  (wife and nephew) at bedside  Daughter Anita via phone    Time Spent for Care: 30 minutes  More than 50% of total time spent on counseling and coordination of care as described above  Current Length of Stay: 6 day(s)  Current Patient Status: Inpatient   Certification Statement: The patient will continue to require additional inpatient hospital stay due to see above  Discharge Plan: Anticipate discharge in 48-72 hrs to rehab facility  Code Status: Level 3 - DNAR and DNI    Subjective:   Wants to go home  Did not sleep well last night      Objective:     Vitals:   Temp (24hrs), Av 2 °F (36 2 °C), Min:96 7 °F (35 9 °C), Max:97 7 °F (36 5 °C)    Temp:  [96 7 °F (35 9 °C)-97 7 °F (36 5 °C)] 97 7 °F (36 5 °C)  HR:  [78] 78  Resp:  [18] 18  BP: (116-145)/(60-75) 116/60  SpO2:  [95 %] 95 %  Body mass index is 32 26 kg/m²  Input and Output Summary (last 24 hours): Intake/Output Summary (Last 24 hours) at 2/16/2022 1422  Last data filed at 2/16/2022 1357  Gross per 24 hour   Intake 240 ml   Output 2600 ml   Net -2360 ml       Physical Exam:   Physical Exam  Vitals and nursing note reviewed  Constitutional:       Appearance: He is well-developed  HENT:      Head: Normocephalic and atraumatic  Eyes:      General: Scleral icterus present  Conjunctiva/sclera: Conjunctivae normal    Cardiovascular:      Rate and Rhythm: Normal rate and regular rhythm  Heart sounds: No murmur heard  Comments: Bilateral lower extremity edema  Pulmonary:      Effort: Pulmonary effort is normal  No respiratory distress  Breath sounds: Normal breath sounds  Abdominal:      Palpations: Abdomen is soft  Tenderness: There is no abdominal tenderness  Comments: Abdominal wall edema   Musculoskeletal:      Cervical back: Neck supple  Skin:     General: Skin is warm and dry  Coloration: Skin is jaundiced  Neurological:      Mental Status: He is alert  Additional Data:     Labs:  Results from last 7 days   Lab Units 02/12/22  0824 02/11/22  0603 02/11/22  0603   WBC Thousand/uL 8 47   < > 7 43   HEMOGLOBIN g/dL 13 1   < > 11 7*   HEMATOCRIT % 37 6   < > 32 7*   PLATELETS Thousands/uL 199   < > 171   NEUTROS PCT %  --   --  67   LYMPHS PCT %  --   --  16   MONOS PCT %  --   --  13*   EOS PCT %  --   --  2    < > = values in this interval not displayed       Results from last 7 days   Lab Units 02/16/22  0609   SODIUM mmol/L 140   POTASSIUM mmol/L 3 2*   CHLORIDE mmol/L 110*   CO2 mmol/L 22   BUN mg/dL 45*   CREATININE mg/dL 1 40*   ANION GAP mmol/L 8   CALCIUM mg/dL 8 3   ALBUMIN g/dL 2 9*   TOTAL BILIRUBIN mg/dL 24 69*   ALK PHOS U/L 456*   ALT U/L 57   AST U/L 64*   GLUCOSE RANDOM mg/dL 111     Results from last 7 days   Lab Units 02/11/22  0603 INR  1 22*     Results from last 7 days   Lab Units 02/10/22  1127   POC GLUCOSE mg/dl 123         Results from last 7 days   Lab Units 02/10/22  0503   PROCALCITONIN ng/ml 0 97*       Lines/Drains:  Invasive Devices  Report    Peripheral Intravenous Line            Peripheral IV 02/12/22 Proximal;Right;Ventral (anterior) Forearm 4 days          Drain            External Urinary Catheter Medium 1 day                      Imaging: No pertinent imaging reviewed  Recent Cultures (last 7 days):   Results from last 7 days   Lab Units 02/13/22  1538 02/09/22  1911   GRAM STAIN RESULT  1+ Polys  No bacteria seen  --    URINE CULTURE   --  60,000-69,000 cfu/ml    BODY FLUID CULTURE, STERILE  No growth  --        Last 24 Hours Medication List:   Current Facility-Administered Medications   Medication Dose Route Frequency Provider Last Rate    albumin human  25 g Intravenous Once Kat Thayer MD      cefTRIAXone  2,000 mg Intravenous Q24H Sofya Alcocer, DO 2,000 mg (02/15/22 2100)    heparin (porcine)  5,000 Units Subcutaneous Q8H [facility] Arturo Woodson MD      lactulose  10 g Oral Daily Marcelo Dinorah, DO      levothyroxine  200 mcg Oral Early Morning Marcelo Dinorah, DO      melatonin  3 mg Oral HS Amber Moncada PA-C          Today, Patient Was Seen By: Nel Noe PA-C    **Please Note: This note may have been constructed using a voice recognition system  **

## 2022-02-16 NOTE — CONSULTS
Rash on both hand   Itching  Cortisone prn  Denies known Latex allergy or symptoms of Latex sensitivity.  Medications reviewed and updated.     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, ERCP revealing CBD stricture with brushings/bx revealing malignant adenocarcinoma in the setting of elevated CEA (6 8) and CA 19-9 (28,356)  Plan:  - Discussed with Dr Buck Nix  - Would not recommend surgical intervention at this time  - Recommend continued goals of care discussions  - Recommend Palliative consult for consideration of hospice  - Per family, tentative family meeting tomorrow at 2pm with wife and daughter   - Appreciate Medical Oncology recommendations  - Appreciate GI recommendations  - Would likely benefit from percutaneous transhepatic biliary drainage given rising tbili  - Rest of care per primary team      History of Present Illness      HPI:  Beni Vega is a 80 y o  male with a PMHx HTN and hypothyroidism who presented as a [facility] for consideration of ERCP due to symptoms of weakness/jaundice in the setting of transaminitis and CT revealing diffuse intrahepatic biliary ductal dilation, CBD dilation to 13mm w/abrupt cut off and ill defined soft tissue concerning for obstructing mass  Additional findings included 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 in area of abnormal soft tissue  MRCP on 2/9 was consistent with findings of biliary ductal dilatation with abrupt cut off of common duct at andreas hepatis  Tumor markers were sent [CEA: 6 8, AFP: 1 8, CA 19-9: 28,356]  A subsequent chest CT on 2/10 revealed left lower lobe pulmonary nodules (largest 5mm)       EUS and ERCP w/sphincterotomy with stenting was performed on 2/11 which highlighted a mass in the CBD measuring 1 9x1 6cm, as well as a 1 6cm peripancreatic lymph node  Pathology from biopsies and brushings revealed malignant adenocarcinoma  Patient was also noted on scans to have cirrhosis and subsequently required paracentesis 2/13 with 2L of bile tinted fluid aspirated with pathology revealing atypical epithelioid cells, reactive mesothelial cells (MOC-31, BerEp4 positive)  Patient was taken to IR again today for repeat paracentesis with 3 05L of clear sheri fluid drained  According to primary teams notes, patient was made DNR/DNI and state that he has poor insight into his medical condition prompting plan for competency evaluation  Patient's vitals are stable, afebrile  Abdomen is distended and tympanitic, without tenderness to palpation  LFTs 64 / 57 / 456 from 67 / 62 / 464  Tbil 24 69 from 24 79, 23 9, 19 07; Dbil 20 52  Imaging as described above  Review of Systems   Constitutional: Negative  HENT: Negative  Eyes: Negative  Respiratory: Negative  Cardiovascular: Negative  Gastrointestinal: Positive for abdominal distention  Endocrine: Negative  Genitourinary: Negative  Musculoskeletal: Positive for arthralgias (right sided, chronic)  Skin: Positive for color change (jaundice)  Allergic/Immunologic: Negative  Neurological: Negative  Hematological: Negative  Psychiatric/Behavioral: Negative  All other systems reviewed and are negative        Historical Information   Past Medical History:   Diagnosis Date    Hypertension     Hypothyroidism      Past Surgical History:   Procedure Laterality Date    HERNIA REPAIR      IR PARACENTESIS  2/13/2022    PROSTATE SURGERY      REPLACEMENT TOTAL KNEE      last assessed: 7/11/17     Social History   Social History     Substance and Sexual Activity   Alcohol Use Never     Social History     Substance and Sexual Activity   Drug Use No     Social History     Tobacco Use   Smoking Status Never Smoker   Smokeless Tobacco Never Used     History reviewed  No pertinent family history  Meds/Allergies   current meds:   Current Facility-Administered Medications   Medication Dose Route Frequency    albumin human (FLEXBUMIN) 25 % injection 25 g  25 g Intravenous Once    cefTRIAXone (ROCEPHIN) 2,000 mg in dextrose 5 % 50 mL IVPB  2,000 mg Intravenous Q24H    heparin (porcine) subcutaneous injection 5,000 Units  5,000 Units Subcutaneous Q8H [facility]    lactulose oral solution 10 g  10 g Oral Daily    levothyroxine tablet 200 mcg  200 mcg Oral Early Morning    melatonin tablet 6 mg  6 mg Oral HS    and PTA meds:   Prior to Admission Medications   Prescriptions Last Dose Informant Patient Reported? Taking?    Diclofenac Sodium (VOLTAREN) 1 %  Spouse/Significant Other No No   Sig: Apply 2 g topically 4 (four) times a day   Patient not taking: Reported on 2/2/2022    Multiple Vitamins-Minerals (ICAPS AREDS 2 PO)  Spouse/Significant Other Yes No   Sig: Take by mouth   doxycycline hyclate (VIBRAMYCIN) 100 mg capsule  Spouse/Significant Other No No   Sig: Take 1 capsule (100 mg total) by mouth in the morning   irbesartan (AVAPRO) 300 mg tablet  Spouse/Significant Other No No   Sig: TAKE 1 TABLET EVERY OTHER  DAY   levothyroxine (Synthroid) 200 mcg tablet  Spouse/Significant Other No No   Sig: Take 1 tablet (200 mcg total) by mouth daily in the early morning   metroNIDAZOLE (METROGEL) 0 75 % gel  Spouse/Significant Other No No   Sig: Apply topically 2 (two) times a day   neomycin-polymyxin-hydrocortisone (CORTISPORIN) otic solution   No No   Sig: Administer 4 drops into both ears every 8 (eight) hours for 7 days   sildenafil (VIAGRA) 100 mg tablet  Spouse/Significant Other No No   Sig: Take 1 tablet (100 mg total) by mouth daily as needed for erectile dysfunction   Patient not taking: Reported on 2/8/2022    tadalafil (CIALIS) 20 MG tablet  Spouse/Significant Other No No   Sig: Take 1 tablet (20 mg total) by mouth daily as needed for erectile dysfunction   Patient not taking: Reported on 2/8/2022    tamsulosin (FLOMAX) 0 4 mg  Spouse/Significant Other Yes No   Sig:     Patient not taking: Reported on 2/8/2022       Facility-Administered Medications: None     No Known Allergies    Objective   First Vitals:   Blood Pressure: 112/75 (02/10/22 0110)  Pulse: 74 (02/10/22 0110)  Temperature: 98 3 °F (36 8 °C) (02/10/22 0110)  Temp Source: Tympanic (02/11/22 1652)  Respirations: 20 (02/10/22 0722)  Height: 6' 4" (193 cm) (02/10/22 0616)  SpO2: 97 % (02/10/22 0110)    Current Vitals:   Blood Pressure: 116/60 (02/16/22 0639)  Pulse: 78 (02/15/22 1541)  Temperature: 97 7 °F (36 5 °C) (02/16/22 0639)  Temp Source: Oral (02/15/22 1541)  Respirations: 18 (02/15/22 1541)  Height: 6' 4" (193 cm) (02/10/22 0616)  SpO2: 95 % (02/15/22 1541)    I/O       02/14 0701  02/15 0700 02/15 0701  02/16 0700 02/16 0701  02/17 0700    P  O  120  240    I V        IV Piggyback 500      Total Intake 620  240    Urine 5734 714 1218    Other       Stool 0      Total Output 7127 397 3294    Net -980 -750 -1610           Unmeasured Stool Occurrence 1 x            Invasive Devices  Report    Peripheral Intravenous Line            Peripheral IV 02/12/22 Proximal;Right;Ventral (anterior) Forearm 4 days          Drain            External Urinary Catheter Medium 1 day                Physical Exam  Vitals reviewed  Constitutional:       General: He is not in acute distress  Appearance: Normal appearance  He is not diaphoretic  HENT:      Head: Normocephalic and atraumatic  Right Ear: External ear normal       Left Ear: External ear normal       Mouth/Throat:      Mouth: Mucous membranes are moist       Pharynx: Oropharynx is clear  Cardiovascular:      Rate and Rhythm: Normal rate  Pulmonary:      Effort: Pulmonary effort is normal  No respiratory distress  Abdominal:      General: There is distension  Tenderness:  There is no abdominal tenderness  There is no guarding or rebound  Comments: Tympanitic, nontender, no rebounding or guarding   Genitourinary:     Comments: Kennedy in place with concentrated urine in bag  Musculoskeletal:      Right lower leg: Edema present  Left lower leg: Edema present  Skin:     General: Skin is warm  Coloration: Skin is jaundiced  Neurological:      General: No focal deficit present  Cranial Nerves: No cranial nerve deficit  Psychiatric:         Mood and Affect: Mood normal          Behavior: Behavior normal          Thought Content: Thought content normal          Lab Results:   No results for input(s): WBC, HGB, PLT in the last 72 hours  Recent Labs     02/14/22  1120 02/15/22  0649 02/16/22  0609   K 3 1* 3 5 3 2*   CO2 21 20* 22   CREATININE 2 24* 1 86* 1 40*   CALCIUM 8 5 8 1* 8 3          Imaging:   US kidney and bladder   Final Result by Adam Tracy MD (02/13 1607)      No hydronephrosis  Partially imaged cirrhotic liver and ascites  Workstation performed: [id number]         IR INPATIENT Paracentesis   Final Result by Jana Simpson MD (1934)   Impression:      Ultrasound-guided paracentesis  Workstation performed: [id number]         FL ERCP biliary only   Final Result by Erik Dior DO (02/12 2152)      Fluoroscopic guidance provided for ERCP and biliary stent placement as above  Please see procedure report for further details  Workstation performed: [id number]         CT chest wo contrast   Final Result by Dandy Gunn MD (02/11 4152)   1  Left lower lobe pulmonary nodules identified, largest measuring up to 5 mm  Based on current Fleischner Society 2017 Guidelines on incidental pulmonary nodule, optional follow-up CT at 12 months can be considered  2   Right greater than left calcified pleural plaques indicating prior as best as exposure  3   Hepatic cirrhosis with ascites and evidence of portal hypertension        The study was marked in EPIC for significant notification                    Workstation performed: [id number]         IR INPATIENT Paracentesis    (Results Pending)       Merrill Carpio MD  2/16/2022 2:45 PM

## 2022-02-16 NOTE — TELEPHONE ENCOUNTER
Spoke with pts daughter, states she does not want to put pt into hospice yet, will evaluate pt and call back when needed  Offered appt declined at this time

## 2022-02-16 NOTE — CASE MANAGEMENT
Case Management Discharge Planning Note    Patient name Karthik Cruz  Location [facility] 910/[facility] 662-92 MRN [id number]  : 1934 Date 2022       Current Admission Date: 2/10/2022  Current Admission Diagnosis:Obstructive jaundice   Patient Active Problem List    Diagnosis Date Noted    Acute kidney injury ([facility] [address] ) 2022    Elevated TSH 2022    Abnormal MRI, liver 2022    Thrombosis, portal vein 2022    Common bile duct dilatation 2022    Mass of right inguinal region     Diastolic dysfunction     Abnormal EKG 2022    Liver cirrhosis ([facility] [address] ) 2022    Abnormal urinalysis 2022    Lung nodule 2022    Transaminitis 2022    Hyperbilirubinemia 2022    Hyperammonemia ([facility] [address] ) 2022    Elevated MCV 2022    Obstructive jaundice 2022    [facility]lets [facility] 2022    Bilateral lower extremity edema 2021    Exudative age-related macular degeneration, bilateral, with active choroidal neovascularization ([facility] [address] ) 07/15/2020    Asymmetrical sensorineural hearing loss 07/15/2020    Osteoarthritis 07/15/2020    Rosacea 07/15/2020    Male erectile disorder 2019    Impaired fasting glucose 2019    Class 1 obesity without serious comorbidity with body mass index (BMI) of 31 0 to 31 9 in adult 03/15/2019    Hypothyroidism 2018    Essential hypertension 2018    Chronic osteomyelitis of knee ([facility] [address] ) 10/08/2013      LOS (days): 6  Geometric Mean LOS (GMLOS) (days): 4 80  Days to GMLOS:-1 8     OBJECTIVE:  Risk of Unplanned Readmission Score: 12         Current admission status: Inpatient   Preferred Pharmacy:   [facility] [address]  Phone: [phone] Fax: 21 [address] [place] [address]  Phone: [phone] Fax: [phone] aid-[address] [address]  [address] [address]  Phone: [phone] Fax: [phone]    Primary Care Provider: Kelton Lu MD    Primary Insurance: [facility]  Secondary Insurance:     DISCHARGE DETAILS:    Discharge planning discussed with[de-identified] Uche Douglas                      Contacts  Patient Contacts: Uche Douglas  Relationship to Patient[de-identified] Family  Contact Method: In Person  Reason/Outcome: Continuity of Care,Emergency Contact,Referral,Discharge Planning              Other Referral/Resources/Interventions Provided:  Referral Comments: family does not want to discuss discharge planning with CM until they hear from someone in oncology regarding patients cancer diagnosis that was given to them today  they stated that once they have a better understanding they would be able to better decide what the next steps of action should be regarding the patient

## 2022-02-16 NOTE — PLAN OF CARE
Problem: MOBILITY - ADULT  Goal: Maintain or return to baseline ADL function  Description: INTERVENTIONS:  -  Assess patient's ability to carry out ADLs; assess patient's baseline for ADL function and identify physical deficits which impact ability to perform ADLs (bathing, care of mouth/teeth, toileting, grooming, dressing, etc )  - Assess/evaluate cause of self-care deficits   - Assess range of motion  - Assess patient's mobility; develop plan if impaired  - Assess patient's need for assistive devices and provide as appropriate  - Encourage maximum independence but intervene and supervise when necessary  - Involve family in performance of ADLs  - Assess for home care needs following discharge   - Consider OT consult to assist with ADL evaluation and planning for discharge  - Provide patient education as appropriate  Outcome: Progressing     Problem: MOBILITY - ADULT  Goal: Maintains/Returns to pre admission functional level  Description: INTERVENTIONS:  - Perform BMAT or MOVE assessment daily    - Set and communicate daily mobility goal to care team and patient/family/caregiver  - Collaborate with rehabilitation services on mobility goals if consulted  - Perform Range of Motion   - Reposition patient every 2 hours    - Dangle patient   - Stand patient   - Ambulate patient   - Out of bed to chair   - Out of bed for meals   - Out of bed for toileting  - Record patient progress and toleration of activity level   Outcome: Progressing

## 2022-02-16 NOTE — PLAN OF CARE
Problem: MOBILITY - ADULT  Goal: Maintain or return to baseline ADL function  Description: INTERVENTIONS:  -  Assess patient's ability to carry out ADLs; assess patient's baseline for ADL function and identify physical deficits which impact ability to perform ADLs (bathing, care of mouth/teeth, toileting, grooming, dressing, etc )  - Assess/evaluate cause of self-care deficits   - Assess range of motion  - Assess patient's mobility; develop plan if impaired  - Assess patient's need for assistive devices and provide as appropriate  - Encourage maximum independence but intervene and supervise when necessary  - Involve family in performance of ADLs  - Assess for home care needs following discharge   - Consider OT consult to assist with ADL evaluation and planning for discharge  - Provide patient education as appropriate  Outcome: Progressing  Goal: Maintains/Returns to pre admission functional level  Description: INTERVENTIONS:  - Perform BMAT or MOVE assessment daily    - Set and communicate daily mobility goal to care team and patient/family/caregiver  - Collaborate with rehabilitation services on mobility goals if consulted  - Perform Range of Motion   - Reposition patient every 2 hours    - Dangle patient   - Stand patient   - Ambulate patient   - Out of bed to chair   - Out of bed for meals   - Out of bed for toileting  - Record patient progress and toleration of activity level   Outcome: Progressing     Problem: Potential for Falls  Goal: Patient will remain free of falls  Description: INTERVENTIONS:  - Educate patient/family on patient safety including physical limitations  - Instruct patient to call for assistance with activity   - Consult OT/PT to assist with strengthening/mobility   - Keep Call bell within reach  - Keep bed low and locked with side rails adjusted as appropriate  - Keep care items and personal belongings within reach  - Initiate and maintain comfort rounds  - Make Fall Risk Sign visible to staff  - Offer Toileting every 2 Hours, in advance of need  - Initiate/Maintain alarm  - Obtain necessary fall risk management equipment  - Apply yellow socks and bracelet for high fall risk patients  - Consider moving patient to room near nurses station  Outcome: Progressing     Problem: INFECTION - ADULT  Goal: Absence or prevention of progression during hospitalization  Description: INTERVENTIONS:  - Assess and monitor for signs and symptoms of infection  - Monitor lab/diagnostic results  - Monitor all insertion sites, i e  indwelling lines, tubes, and drains  - Monitor endotracheal if appropriate and nasal secretions for changes in amount and color  - [place] appropriate cooling/warming therapies per order  - Administer medications as ordered  - Instruct and encourage patient and family to use good hand hygiene technique  - Identify and instruct in appropriate isolation precautions for identified infection/condition  Outcome: Progressing  Goal: Absence of fever/infection during neutropenic period  Description: INTERVENTIONS:  - Monitor WBC    Outcome: Progressing

## 2022-02-16 NOTE — CONSULTS
Medical Oncology/Hematology Consult Note  Rebecca Lazo, male, 80 y o , 1934,  PPHP 910/PPHP 910-01, [id number]     Assessment and Plan    1  Cholangiocarcinoma, Adenocarcinoma  - presented with symptoms of weakness and jaundice  - CT abdomen (02/08/22) showed diffuse intrahepatic biliary ductal dilatation along with an ill-defined soft tissue in the expected location of the CBD suggesting some obstructing mass or stricture as well as few ill-defined hypodense areas in the liver suspicious for metastasis  - MRI abdomen (02/08/2022) concerning for heterogeneous soft tissue density seen in the andreas hepaticus with numerous large gallstones and occlusion of the main portal vein  -MRCP (02/09/2022) Biliary ductal dilatation with abrupt cut off of the common duct at the andreas hepatis  - ERCP biopsy - positive for adenocarcinoma   - CA-19-9 = 28,354; CEA = 6 8     Plan:  - Given patient's age and functional status, chemotherapy will not be an option for him and prognosis is poor   -  Surgical-Oncology has evaluated the patient and will be providing their recommendations as to whether surgical removal is possible or not  Again, given the patient's age and status, this will likely not be an option either    - Patient will most likely be discharged to home hospice as he wishes to remain at home in any case  Tenckhoff catheter would be a good choice for abdominal drainage of his abdominal fluid  - Continue providing supportive care  I did review this patient with Dr Tello Jeronimo and he is in agreement with this plan  Reason for consultation: Obstructive Jaundice    History of present illness:  Rebecca Lazo is a 80 y o  male, with PMH of HTN and hypothyroidism, who presented to [facility] ED on 02/08/22 with generalized weakness and jaundice and was found to have transaminitis and high ammonia level   An abdominal CT scan revealed diffuse intrahepatic biliary ductal dilatation along with an ill-defined soft tissue in the expected location of the CBD suggesting some obstructing mass or stricture, as well as few ill-defined hypodense areas in the liver suspicious for metastasis  Abdominal MRI was also concerning for heterogeneous soft tissue density seen in the andreas hepaticus with numerous large gallstones and occlusion of the main portal vein  Patient was transferred to [facility]  Here, he underwent an MRCP that revealed an abrupt cutoff in CBD with heterogenous soft tissue density suspicious for cholangiocarcinoma and ERCP with sphincterotomy and stent placement and biopsy which revealed malignant adenocarcinoma  His CA 19-9 is markedly elevated at 28,354, and CEA is 6 8  Total bilirubin has continued to remain high at 24 69, alk phos is 456, but AST/ALT are improved  The patient in his room where he was sitting in the chair by the bed  His wife and nephew were at bedside  The patient was hemodynamically stable but appeared jaundiced and frail  He reported that he is "feeling great " However, the wife and nephew suggested that he is saying that because he just wants to get out of the hospital  His wife reported that he continues to have weakness and needs help going to the bathroom  He does report 6 pound weight loss and some appetite changes  Patient's abdomen was severely distended and he was scheduled to go down for a percutaneous drainage after this encounter  The diagnosis was explained to the family and they requested that we disclose the diagnosis and plan to him tomorrow when the wife and daughter are there  Review of Systems:   Review of Systems   Constitutional: Positive for activity change, appetite change, fatigue and unexpected weight change  Respiratory: Negative for apnea, choking, chest tightness and shortness of breath  Cardiovascular: Negative for chest pain, palpitations and leg swelling  Gastrointestinal: Positive for abdominal distention   Negative for abdominal pain, anal bleeding, constipation, diarrhea, nausea and vomiting  Neurological: Positive for weakness  Negative for dizziness, tremors, seizures, syncope, speech difficulty, light-headedness and headaches  Hematological: Negative for adenopathy  Oncology History:   Cancer Staging  No matching staging information was found for the patient  Oncology History    No history exists  Past Medical History:   Past Medical History:   Diagnosis Date    Hypertension     Hypothyroidism        Past Surgical History:   Procedure Laterality Date    HERNIA REPAIR      IR PARACENTESIS  2/13/2022    PROSTATE SURGERY      REPLACEMENT TOTAL KNEE      last assessed: 7/11/17       History reviewed  No pertinent family history  Social History     Socioeconomic History    Marital status: /Civil Union     Spouse name: None    Number of children: None    Years of education: None    Highest education level: None   Occupational History    None   Tobacco Use    Smoking status: Never Smoker    Smokeless tobacco: Never Used   Vaping Use    Vaping Use: Never used   Substance and Sexual Activity    Alcohol use: Never    Drug use: No    Sexual activity: None   Other Topics Concern    None   Social History Narrative    None     Social Determinants of Health     Financial Resource Strain: Not on file   Food Insecurity: No Food Insecurity    Worried About Running Out of Food in the Last Year: Never true    Rani of Food in the Last Year: Never true   Transportation Needs: No Transportation Needs    Lack of Transportation (Medical): No    Lack of Transportation (Non-Medical):  No   Physical Activity: Not on file   Stress: Not on file   Social Connections: Not on file   Intimate Partner Violence: Not on file   Housing Stability: Low Risk     Unable to Pay for Housing in the Last Year: No    Number of Places Lived in the Last Year: 1    Unstable Housing in the Last Year: No         Current Facility-Administered Medications:     albumin human (FLEXBUMIN) 25 % injection 25 g, 25 g, Intravenous, Once, Azra Crisostomo MD    cefTRIAXone (ROCEPHIN) 2,000 mg in dextrose 5 % 50 mL IVPB, 2,000 mg, Intravenous, Q24H, Eli Dumont DO, Last Rate: 100 mL/hr at 02/15/22 2100, 2,000 mg at 02/15/22 2100    heparin (porcine) subcutaneous injection 5,000 Units, 5,000 Units, Subcutaneous, Q8H [address], Antonieta Russ MD, 5,000 Units at 02/16/22 0536    lactulose oral solution 10 g, 10 g, Oral, Daily, Earna Shed, DO, 10 g at 02/16/22 7713    levothyroxine tablet 200 mcg, 200 mcg, Oral, Early Morning, Earna Shed, DO, 200 mcg at 02/16/22 0536    melatonin tablet 6 mg, 6 mg, Oral, HS, Trena Emerson PA-C    Medications Prior to Admission   Medication    Diclofenac Sodium (VOLTAREN) 1 %    doxycycline hyclate (VIBRAMYCIN) 100 mg capsule    irbesartan (AVAPRO) 300 mg tablet    levothyroxine (Synthroid) 200 mcg tablet    metroNIDAZOLE (METROGEL) 0 75 % gel    Multiple Vitamins-Minerals (ICAPS AREDS 2 PO)    neomycin-polymyxin-hydrocortisone (CORTISPORIN) otic solution    sildenafil (VIAGRA) 100 mg tablet    tadalafil (CIALIS) 20 MG tablet    tamsulosin (FLOMAX) 0 4 mg       No Known Allergies      Physical Exam:     /60   Pulse 78   Temp 97 7 °F (36 5 °C)   Resp 18   Ht 6' 4" (1 93 m)   SpO2 95%   BMI 32 26 kg/m²     Physical Exam  Eyes:      General: Scleral icterus present  Extraocular Movements: Extraocular movements intact  Pupils: Pupils are equal, round, and reactive to light  Cardiovascular:      Rate and Rhythm: Normal rate and regular rhythm  Pulses: Normal pulses  Heart sounds: Normal heart sounds  Pulmonary:      Effort: Pulmonary effort is normal       Breath sounds: Normal breath sounds  Abdominal:      General: There is distension  Palpations: There is no mass  Tenderness: There is no abdominal tenderness   There is no guarding or rebound  Hernia: No hernia is present  Musculoskeletal:      Comments: Strength 4/5 in both lower extremities    Skin:     Coloration: Skin is jaundiced  Neurological:      Mental Status: He is alert and oriented to person, place, and time           Recent Results (from the past 48 hour(s))   Sodium, urine, random    Collection Time: 02/14/22  2:59 PM   Result Value Ref Range    Sodium, Ur 27    Comprehensive metabolic panel    Collection Time: 02/15/22  6:49 AM   Result Value Ref Range    Sodium 140 136 - 145 mmol/L    Potassium 3 5 3 5 - 5 3 mmol/L    Chloride 110 (H) 100 - 108 mmol/L    CO2 20 (L) 21 - 32 mmol/L    ANION GAP 10 4 - 13 mmol/L    BUN 53 (H) 5 - 25 mg/dL    Creatinine 1 86 (H) 0 60 - 1 30 mg/dL    Glucose 102 65 - 140 mg/dL    Calcium 8 1 (L) 8 3 - 10 1 mg/dL    Corrected Calcium 9 0 8 3 - 10 1 mg/dL    AST 67 (H) 5 - 45 U/L    ALT 62 12 - 78 U/L    Alkaline Phosphatase 464 (H) 46 - 116 U/L    Total Protein 5 6 (L) 6 4 - 8 2 g/dL    Albumin 2 9 (L) 3 5 - 5 0 g/dL    Total Bilirubin 24 79 (H) 0 20 - 1 00 mg/dL    eGFR 31 ml/min/1 73sq m   Comprehensive metabolic panel    Collection Time: 02/16/22  6:09 AM   Result Value Ref Range    Sodium 140 136 - 145 mmol/L    Potassium 3 2 (L) 3 5 - 5 3 mmol/L    Chloride 110 (H) 100 - 108 mmol/L    CO2 22 21 - 32 mmol/L    ANION GAP 8 4 - 13 mmol/L    BUN 45 (H) 5 - 25 mg/dL    Creatinine 1 40 (H) 0 60 - 1 30 mg/dL    Glucose 111 65 - 140 mg/dL    Calcium 8 3 8 3 - 10 1 mg/dL    Corrected Calcium 9 2 8 3 - 10 1 mg/dL    AST 64 (H) 5 - 45 U/L    ALT 57 12 - 78 U/L    Alkaline Phosphatase 456 (H) 46 - 116 U/L    Total Protein 5 5 (L) 6 4 - 8 2 g/dL    Albumin 2 9 (L) 3 5 - 5 0 g/dL    Total Bilirubin 24 69 (H) 0 20 - 1 00 mg/dL    eGFR 44 ml/min/1 73sq m       ERCP    Result Date: 2/11/2022  Narrative: [address] [place] [address] [phone] DATE OF SERVICE: 2/11/22 PHYSICIAN(S): Anastasiia Shelton MD Proceduralist INDICATION: Jaundice POST-OP DIAGNOSIS: See the impression below  PREPROCEDURE: Informed consent was obtained for the procedure, including sedation  Risks of perforation, hemorrhage, adverse drug reaction and aspiration were discussed  The patient was placed in the left lateral decubitus position  Patient was explained about the risks and benefits of the procedure  Risks including but not limited to bleeding, infection, and perforation were explained in detail  Also explained about less than 100% sensitivity with the exam and other alternatives  DETAILS OF PROCEDURE: Patient was taken to the procedure room where a time out was performed to confirm correct patient and correct procedure  The patient underwent general anesthesia, which was administered by an anesthesia professional  The patient's blood pressure, heart rate, level of consciousness, respirations and oxygen were monitored throughout the procedure  Clinical intention was achieved  The patient experienced no blood loss  The procedure was not difficult  The patient tolerated the procedure well  ANESTHESIA INFORMATION: ASA: III Anesthesia Type: General MEDICATIONS: iohexol (OMNIPAQUE) 240 MG/ML solution 50 mL 5 mL (Totals for administrations occurring from 1519 to 1649 on 02/11/22) FINDINGS: Deeply cannulated the common bile duct after 1 attempt using a traction sphincterotome  Used 260 cm x 0 035 in straight guidewire  Cannulation was not difficult  Injected contrast Generalized dilation in the proximal common bile duct to a maximal diameter of 12 mm  There was a 2 cm stricture in the distal common bile duct  Performed medium major papilla sphincterotomy using a sphincterotome  No bleeding at the procedure site  Performed 10 brushings in the distal common bile duct  Brushings were sent for cytology  Placed metal, covered stent with length of 6 cm and diameter of 8 mm in the common bile duct   SPECIMENS: ID Type Source Tests Collected by Time Destination 1 : CBD mass FNA Common Bile Duct NON-GYNECOLOGIC CYTOLOGY, FINE NEEDLE ASPIRATION Albert La MD 2/11/2022  3:50 PM  2 :  Brushing Brushing, common bile duct NON-GYNECOLOGIC CYTOLOGY Albert La MD 2/11/2022  4:36 PM       Impression: ERCP revealing distal common bile duct stricture s/p sphincterotomy, brushings, placement of 8 mm x 60 mm common bile duct stent  RECOMMENDATION: Return to hospital resendez for ongoing care Follow-up results from CBD brushing  Albert La MD     CT chest wo contrast    Result Date: 2/11/2022  Narrative: CT CHEST WITHOUT IV CONTRAST INDICATION:   Lung nodule, < 6mm, high cancer risk malginancy  COMPARISON:  CT AP dated 2/8/2022 TECHNIQUE: CT examination of the chest was performed without intravenous contrast   Axial, sagittal, and coronal 2D reformatted images were created from the source data and submitted for interpretation  Radiation dose length product (DLP) for this visit:  738 mGy-cm   This examination, like all CT scans performed in the [facility], was performed utilizing techniques to minimize radiation dose exposure, including the use of iterative reconstruction and automated exposure control  FINDINGS: LUNGS:  Left basilar 5 mm nodule on 3/97  Right basilar subpleural 5 mm nodule on 3/94  Left basilar 3 mm subpleural nodule on 3/91  No additional nodules  No consolidation or endobronchial lesion  PLEURA:  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  HEART/GREAT VESSELS: Heart is unremarkable for patient's age  No thoracic aortic aneurysm  MEDIASTINUM AND SARAH:  Unremarkable  CHEST WALL AND LOWER NECK:   Unremarkable  VISUALIZED STRUCTURES IN THE UPPER ABDOMEN:  Cirrhotic liver with perihepatic ascites noted  Perisplenic and paraesophageal varices noted indicating portal hypertension  OSSEOUS STRUCTURES:  Advanced degenerative changes noted throughout both shoulders    No acute osseous findings  Impression: 1  Left lower lobe pulmonary nodules identified, largest measuring up to 5 mm  Based on current Fleischner Society 2017 Guidelines on incidental pulmonary nodule, optional follow-up CT at 12 months can be considered  2   Right greater than left calcified pleural plaques indicating prior as best as exposure  3   Hepatic cirrhosis with ascites and evidence of portal hypertension  The study was marked in EPIC for significant notification  Workstation performed: [id number]     IR INPATIENT Paracentesis    Result Date: 2/13/2022  Narrative: Ultrasound-guided paracentesis Clinical History: Ascites, renal failure, jaundice Procedure: After explaining the risks and benefits of the procedure to the patient, informed consent was obtained  Ultrasound used to localize the right lower quadrant ascites  The overlying skin was prepped and draped in usual sterile fashion and local anesthesia was obtained with the 1% lidocaine solution  A 5 [employer] needle was advanced until fluid was aspirated under live ultrasound guidance  Approximately 2000 cc' s of bile tinted fluid was aspirated  Specimens: Multiple The patient tolerated the procedure well without immediate complication  Impression: Impression: Ultrasound-guided paracentesis  Workstation performed: [id number]     MRI abdomen wo contrast and mrcp    Result Date: 2/9/2022  Narrative: MRI OF THE ABDOMEN WITHOUT CONTRAST WITH MRCP INDICATION:  Abdominal pain and jaundice  Hyperbilirubinemia  COMPARISON:  CT of the abdomen/pelvis dated 2/8/2022  TECHNIQUE:  MRI of the abdomen was performed without the administration of contrast using the following  using sequences: Axial T1 weighted in/out of phase images, multiplanar T2 weighted images, diffusion weighted and fat suppressed T1 weighted images  3D MRCP images were obtained with radial thick slabs and projections  3D rendering was performed from the acquisition scanner   FINDINGS: LOWER CHEST: Thin atelectasis or scarring at the lung bases  LIVER: Areas of geographic hypoattenuation in the liver on recent CT have mild associated increased T2 signal, but are otherwise poorly evaluated given lack of IV contrast   Hepatic contours concavity associated with the area  Diffusely decreased T2 parenchymal signal in the liver with gain of signal on opposed phased imaging suggesting iron overload  No evidence for steatosis  Lobular contour of the liver related to cirrhosis  Main portal vein appears occluded fiesta sequence  Perigastric and perisplenic varices are noted  Intrahepatic IVC appears patent though hepatic veins are poorly evaluated  BILE DUCTS:  Dilation of the intrahepatic and common ducts  The luminal irregularity of the lower common duct with abrupt cut off at the andreas hepatis  There is a 16 mm gallstone at the gallbladder duct near the area of truncation, though not appearing  to directly impinge on the duct (5/22)  No low signal stones within the duct, itself  Soft tissue stranding around the duct seen on prior CT is poorly evaluated due to patient motion and ascites fluid  GALLBLADDER:  Gallbladder is filled with gallstones ranging in size from a few millimeters to 16 mm  Wall thickness is poorly evaluated  PANCREAS:  Poorly evaluated on T1-weighted sequences due to patient motion  Poorly evaluated on T2 sequences due to motion and ascites  No obvious pancreatic masses though there was ill-defined soft tissue density around the head on recent CT  No pancreatic ductal dilatation  ADRENAL GLANDS:  Normal  SPLEEN:  Congestive splenomegaly measuring 14 4 cm in long axis  KIDNEYS/PROXIMAL URETERS:  Tiny benign cysts in each kidney  No suspicious renal masses  No perinephric collections  Renal collecting systems are decompressed  BOWEL:  No dilated loops of bowel  PERITONEAL CAVITY/RETROPERITONEUM:  Mild to moderate ascites  No evidence encapsulated collections   LYMPH NODES:  No abdominal lymphadenopathy though sensitivity is limited by ascites  VASCULAR STRUCTURES:  Main portal vein occlusion  Vessels around the pancreatic head are poorly evaluated, though review of prior CT shows hazy density around the celiac axis and SMA  Significance is uncertain given concomitant ascites  ABDOMINAL WALL:  Mild to moderate dependent subcutaneous edema  OSSEOUS STRUCTURES:  No suspicious osseous lesion  Impression: 1  Biliary ductal dilatation with abrupt cut off of the common duct at the andreas hepatis  Evaluation of the pancreas is degraded on the current study, but there is heterogeneous soft tissue density seen at the andreas hepatis on prior CT that may arise from the pancreatic head or could have primary biliary origin  Though there are numerous large gallstones, including at the gallbladder neck, these do not appear to cause extrinsic compression of the duct (i e , no suggestion of Mirizzi syndrome)  Recommend endoscopic ultrasound for further evaluation  2   Occlusion of the main portal vein, also at the andreas hepatis  Evaluation of the vessels is degraded by lack of IV contrast and patient motion  3   Ill-defined areas of hypoenhancement seen on prior CT are incompletely evaluated without contrast   This may represent confluent fibrosis  Infiltrative mass is felt to be less likely given the geographic appearance of the associated increased T2 signal  4   Cirrhosis  5   Hepatic iron overload  The study was marked in EPIC for significant notification  Workstation performed: [id number]     Endoscopic ultrasonography, GI (Upper)    Result Date: 2/11/2022  Narrative: [facility] [address] [place] [address] [phone] DATE OF SERVICE: 2/11/22 PHYSICIAN(S): Gio Pascal MD Proceduralist INDICATION: Jaundice POST-OP DIAGNOSIS: See the impression below  PREPROCEDURE: Informed consent was obtained for the procedure, including sedation    Risks of perforation, hemorrhage, adverse drug reaction and aspiration were discussed  The patient was placed in the left lateral decubitus position  Patient was explained about the risks and benefits of the procedure  Risks including but not limited to bleeding, infection, and perforation were explained in detail  Also explained about less than 100% sensitivity with the exam and other alternatives  DETAILS OF PROCEDURE: Patient was taken to the procedure room where a time out was performed to confirm correct patient and correct procedure  The patient underwent monitored anesthesia care, which was administered by an anesthesia professional  The patient's blood pressure, heart rate, oxygen, respirations and level of consciousness were monitored throughout the procedure  The patient experienced no blood loss  The procedure was not difficult  The patient tolerated the procedure well  There were no apparent complications  ANESTHESIA INFORMATION: ASA: III Anesthesia Type: General MEDICATIONS: No administrations occurring from 1519 to 1647 on 02/11/22 FINDINGS: EGD Small varices with no bleeding (no red jf sign) in the esophagus Mild hemorrhagic mucosa in the stomach  Consistent with portal hypertensive gastropathy  Edematous and erythematous mucosa in the duodenum EUS Normal celiac takeoff  Left kidney appeared normal   Mild heterogenicity within the pancreatic parenchyma around the pancreatic duct in the body of the pancreas  Normal spleen  Beaded and dilated pancreatic duct changes, measuring 6 mm at the head and 4 mm at the body of the pancreas Single peripancreatic node with well-defined and smooth margins, measuring 16 x 9 mm Irregular and hypoechoic mass measuring 19 mm x 16 mm with irregular margins in the common bile duct; 5 fine needle biopsy passes were taken with a 25 gauge needle guided by Doppler and performed cytology analysis   Onsite cytologist was present and cytology results were preliminarily atypical SPECIMENS: ID Type Source Tests Collected by Time Destination 1 : CBD mass FNA Common Bile Duct NON-GYNECOLOGIC CYTOLOGY, FINE NEEDLE ASPIRATION Jason Cm MD 2/11/2022  3:50 PM  2 :  Brushing Brushing, common bile duct NON-GYNECOLOGIC CYTOLOGY Jason mC MD 2/11/2022  4:36 PM       Impression: EGD: Small varices in the distal esophagus Mild portal hypertensive gastropathy Mild erythema in the duodenum  EUS Mass in the common bile duct measuring 1 9 x 1 6 cm s/p core biopsy x 5 passes  Preliminary cytology consistent with markedly atypical cells  Single peripancreatic lymph node measuring 1 6 cm  Ectatic changes of pancreatic duct  RECOMMENDATION: Proceed with ERCP Follow-up cytology results   Jason Cm MD     [place] ERCP biliary only    Result Date: 2/12/2022  Narrative: ERCP INDICATION:  CBD dilatation with abrupt cut off at the andreas hepatis  COMPARISON:  MRI/MRCP 2/9/2022 IMAGES:  7 FLUOROSCOPY TIME:   1 minute 42 seconds CONTRAST: 5 mL of iohexol (OMNIPAQUE) FINDINGS: Fluoroscopic guidance was provided for performance of ERCP  BILIARY: Dilated proximal common bile duct is identified with abrupt transition corresponding to recent imaging findings  Stricture is seen at the transition  No obvious irregular shouldering  Subsequent images demonstrate placement of biliary stent  Osseous and soft tissue detail limited by technique  Impression: Fluoroscopic guidance provided for ERCP and biliary stent placement as above  Please see procedure report for further details  Workstation performed: [id number]     US kidney and bladder    Result Date: 2/13/2022  Narrative: RENAL ULTRASOUND INDICATION:   [place]  COMPARISON: No prior renal ultrasound available for comparison    Correlation with right upper quadrant ultrasound February 8, 2022, and CT abdomen and pelvis February 8, 2022 TECHNIQUE:   Ultrasound of the retroperitoneum was performed with a curvilinear transducer utilizing volumetric sweeps and still imaging techniques  FINDINGS: KIDNEYS: Symmetric and normal size  Right kidney:  12 1 x 6 1 x 6 5 cm  Volume 252 2 mL Left kidney:  11 8 x 7 7 x 6 1 cm  Volume 290 7 mL Right kidney Normal echogenicity and contour  No mass is identified  No hydronephrosis  No shadowing calculi  No perinephric fluid collections  Left kidney Normal echogenicity and contour  No mass is identified  Simple exophytic midpole cyst measures 0 9 x 0 9 x 1 1 cm  No hydronephrosis  No shadowing calculi  No perinephric fluid collections  URETERS: Nonvisualized  BLADDER: Normally distended  No focal thickening or mass lesions  Left ureteral jet identified  Right ureteral jet not identified  OTHER: Ascites  Partially imaged cirrhotic liver  Impression: No hydronephrosis  Partially imaged cirrhotic liver and ascites  Workstation performed: [id number]     VAS lower limb venous duplex study, complete bilateral    Result Date: 2/8/2022  Narrative:  [facility] REPORT [facility]INICAL: Indications: Patient presents with bilateral lower extremity thigh pain s/p workout x 1 month ago  CONCLUSION:  Impression: RIGHT LOWER LIMB: No evidence of acute or chronic deep vein thrombosis  No evidence of superficial thrombophlebitis noted  Doppler evaluation shows a normal response to augmentation maneuvers  Popliteal, posterior tibial and anterior tibial arterial Doppler waveforms are triphasic  Note: There is a well defined hypoechoic non-vascularized cystic-type structure in the groin, 5 1 cm  Vicky Josie LEFT LOWER LIMB: No evidence of acute or chronic deep vein thrombosis  No evidence of superficial thrombophlebitis noted  Doppler evaluation shows a normal response to augmentation maneuvers  Popliteal, posterior tibial and anterior tibial arterial Doppler waveforms are triphasic  Technical findings were faxed to chart    SIGNATURE: Electronically Signed by: Randolph Young on 2022-02-08 05:24:03 PM    CT abdomen pelvis w contrast    Addendum Date: 2/9/2022 Addendum:   Additionally there is 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 in the area of abnormal soft tissue  Hazy density around the celiac artery and SMA of uncertain significance given the presence of ascites and can be reassessed on follow-up  Result Date: 2/9/2022  Narrative: CT ABDOMEN AND PELVIS WITH IV CONTRAST INDICATION:   hyperbilirubinemia, transaminitis  COMPARISON:  Ultrasound abdomen 1/24/2022 TECHNIQUE:  CT examination of the abdomen and pelvis was performed  Axial, sagittal, and coronal 2D reformatted images were created from the source data and submitted for interpretation  Radiation dose length product (DLP) for this visit:  2499 34 mGy-cm   This examination, like all CT scans performed in the [facility], was performed utilizing techniques to minimize radiation dose exposure, including the use of iterative reconstruction and automated exposure control  IV Contrast:  100 mL of iohexol (OMNIPAQUE) Enteric contrast was not administered  FINDINGS: ABDOMEN The images are degraded due to beam hardening artifact from position of the patient's arms  LOWER CHEST:  Left lower lobe 5 mm nodules (series 2 image 4)  Bibasilar reticular changes and/or mild atelectasis  Paraesophageal varices  LIVER/BILIARY TREE:  Nodular cirrhotic liver morphology  Limited visualization of a few possible ill-defined hypodense areas in the liver, for example in the caudate (series 2 image 19) as well as in the right lobe on series 2 image 19, incompletely characterized and cannot exclude metastasis  This can be evaluated with follow-up MR abdomen  There is diffuse intrahepatic biliary ductal dilatation  Proximal common bile duct appears tortuous and dilated measuring 13 mm (series 601 image 76) with abrupt cut off  The remainder of the CBD is not clearly visualized and there appears to be ill-defined soft tissue in the expected location (series 2 image 26)    Findings are suspicious for obstructing mass/stricture  Recommend gastroenterology consultation  GALLBLADDER:  Distended  No calcified gallstones  No gross wall thickening  SPLEEN:  Unremarkable  PANCREAS:  Area of the pancreatic neck region appears inseparable from ill-defined soft tissue described above  No significant pancreatic ductal dilatation is seen  Attention on follow-up MR  ADRENAL GLANDS:  Unremarkable  KIDNEYS/URETERS:  Unremarkable  No hydronephrosis  STOMACH AND BOWEL:  Colonic diverticulosis without findings of acute diverticulitis  APPENDIX:  Normal  ABDOMINOPELVIC CAVITY:  Moderate ascites  No pneumoperitoneum  No lymphadenopathy  VESSELS:  Small gastrohepatic ligament, perisplenic and paraesophageal varices in keeping with portal hypertension  PELVIS REPRODUCTIVE ORGANS:  Unremarkable for patient's age  URINARY BLADDER:  Unremarkable  ABDOMINAL WALL/INGUINAL REGIONS:  Ovoid fluid density noted in right anterior proximal thigh subcutaneous fat (series 2 image 93) measuring 3 1 x 2 5 cm, nonspecific  OSSEOUS STRUCTURES:  No acute fracture or destructive osseous lesion  Degenerative changes of the spine and left hip  Multilevel bridging ossifications in the spine suggesting diffuse idiopathic skeletal hyperostosis (DISH)  Impression: 1  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  Moderate volume ascites  2   Diffuse intrahepatic biliary ductal dilatation  Proximal common bile duct appears tortuous and dilated measuring 13 mm with abrupt cut off  The remainder of the CBD is not visualized and there appears to be ill-defined soft tissue in the expected location  Findings are suspicious for obstructing mass/stricture  Recommend gastroenterology consultation  3   Limited visualization of a few possible ill-defined hypodense areas in the liver, incompletely characterized and cannot exclude metastasis  This can be evaluated with follow-up contrast-enhanced abdomen MR/MRCP   4   Left lower lobe 5 mm nodules  Recommend nonemergent dedicated chest CT  I personally discussed this study with Meghan Avila on 2/8/2022 at 2:10 PM  Workstation performed: [id number]     US right upper quadrant with liver dopplers    Result Date: 2/8/2022  Narrative: RIGHT UPPER QUADRANT ULTRASOUND WITH LIVER DOPPLER INDICATION:     rising bilirubin  COMPARISON:  Ultrasound abdomen 6/24/2022 TECHNIQUE:   Real-time ultrasound of the right upper quadrant was performed with a curvilinear transducer with both volumetric sweeps and still imaging techniques  FINDINGS: PANCREAS:  Top normal pancreatic duct measuring 3 mm  AORTA AND IVC:  Visualized portions are normal for patient age  LIVER: Size:  Within normal range  The liver measures 10 6 cm in the midclavicular line  Contour:  Surface contour is nodular  Parenchyma:  Increased hepatic echogenicity may be due to hepatic steatosis and/or hepatic parenchymal disease  No suspicious hepatic mass identified  LIVER DOPPLER: The main portal vein and primary branch segments are patent and hepatopetal   Increased phasicity of the portal veins which can be seen with cirrhosis, right heart failure, or tricuspid regurgitation  Hepatic veins are patent  Spectral waveforms within normal limits  Main hepatic artery appears normal size, patent with normal spectral waveform  BILIARY: Gallbladder not visualized  Intrahepatic biliary ductal dilatation better visualized on subsequent CT  CBD not visualized  KIDNEY: Right kidney measures 10 6 cm  Within normal limits  ASCITES:   Moderate ascites     Impression: 1  Cirrhosis  Increased hepatic echogenicity may be due to hepatic steatosis and/or hepatic parenchymal disease  2   Patent central hepatic vessels with normal direction of flow  Increased phasicity of the portal veins which can be seen with cirrhosis, right heart failure, or tricuspid regurgitation  3   Gallbladder not visualized    Intrahepatic biliary ductal dilatation better visualized on subsequent CT  CBD not visualized  Please refer to subsequent CT for description  4   Moderate ascites  Workstation performed: [id number]     US right upper quadrant with liver dopplers    Result Date: 1/24/2022  Narrative: RIGHT UPPER QUADRANT ULTRASOUND WITH LIVER DOPPLER INDICATION:     R53 1: Weakness R26 89: Other abnormalities of gait and mobility R79 89: Other specified abnormal findings of blood chemistry  COMPARISON:  None  TECHNIQUE:   Real-time ultrasound of the right upper quadrant was performed with a curvilinear transducer with both volumetric sweeps and still imaging techniques  FINDINGS: PANCREAS:  Visualized portions of the pancreas are within normal limits  AORTA AND IVC:  Visualized portions are normal for patient age  LIVER: Size:  Within normal range  The liver measures 10 9 cm in the midclavicular line  Contour:  Questionable subtle surface nodularity  Parenchyma:  Echogenicity and echotexture are within normal limits  No evidence of suspicious mass  LIVER DOPPLER: The main portal vein and primary branch segments are patent and hepatopetal with normal spectral waveform  Hepatic veins are patent  Spectral waveforms within normal limits  Main hepatic artery appears normal size, patent with normal spectral waveform  BILIARY: Gallbladder not visualized  No intrahepatic biliary dilatation  CBD measures 5 mm  No choledocholithiasis  KIDNEY: Right kidney measures 10 7 x 7 5 x 5 2 cm  Within normal limits  ASCITES:   Small to moderate volume right upper quadrant ascites  Impression: Questionable subtle liver surface nodularity with an otherwise normal-appearing liver, suggesting the possibility of early cirrhosis  Patent hepatic vasculature with normal direction of flow  Small to moderate volume right upper quadrant ascites  Workstation performed: [id number]       Labs and pertinent reports reviewed        This note has been generated by voice recognition software system  Therefore, there may be spelling, grammar, and or syntax errors  Please contact if questions arise

## 2022-02-16 NOTE — PROGRESS NOTES
[address] [facility] NOTE   Fish Dickson 80 y o  male MRN: [id number]  Unit/Bed#: [facility] 910-01 Encounter: [id number]  Reason for Consult: GERSON    ASSESSMENT and PLAN:    59-year-old male with a past medical history of hypertension, hypothyroidism, who initially presents to weakness   Was found to have jaundice and transaminitis and elevated ammonia level   There is concern for cholangiocarcinoma   Patient had ERCP with brushing and stenting     1) acute kidney injury     - etiology multifactorial in the setting of contrast given recent contrast on 02/09, cirrhosis, possible hepatorenal, SBP   Also intra-abdominal hypertension given ascites was considered   Patient was also given ARB and therefore auto regulatory failure being considered  -was started on albumin 2/13; intravenous fluids were held over the weekend  -urine sodium-  -2/13-creatinine 2 6, rising  -2/14-creatinine 2 2 mg/dL  Potassium 3 1, being repleted  Bilirubin rising to 23  Urine sodium 27    - 2/15-labs pending  Lowering albumin  Patient clinically is not hypovolemic  Blood pressure stabilizing   -2/16-lower albumin but given paracentesis planned, will dose albumin twice today  Creatinine improving 1 4 mg/dL     Plan  -patient received albumin this morning  Give another dose this evening    Dose further albumin based on paracentesis and protocol   -paracentesis being plan today per GI team  -BMP in a m   -agree with potassium repletion  -check magnesium in a m   -will consider starting diuretics in 24-48 hours  -avoid ARB  -avoid hypotension  -avoid nephrotoxic agents     2) obstructive jaundice     -status post ERCP with brushings  -elevated CA 19 9  -concern for cholangiocarcinoma, biopsy pending  -also concern for SBP     3) cirrhosis     -small esophageal varices  -paracentesis-2 L on 02/13  -body fluid culture without growth thus far   -there is concern for SBP     4) electrolytes-hypokalemia-being repleted, BMP in a m     5) acid/base-bicarbonate stable     6) lung nodule-per primary team     7) inguinal region cystic structure-per primary team   For outpatient follow-up pending        SUBJECTIVE / 24H INTERVAL HISTORY:    Patient denies complaints  No pain  No shortness of breath      OBJECTIVE:  Current Weight:    Vitals:    02/15/22 0804 02/15/22 1541 02/15/22 2057 02/16/22 0639   BP: 114/56 137/70 145/75 116/60   Pulse: 75 78     Resp: 18 18     Temp: (!) 96 6 °F (35 9 °C) (!) 96 7 °F (35 9 °C) (!) 97 3 °F (36 3 °C) 97 7 °F (36 5 °C)   TempSrc: Oral Oral     SpO2: 98% 95%     Height:           Intake/Output Summary (Last 24 hours) at 2/16/2022 0944  Last data filed at 2/16/2022 0900  Gross per 24 hour   Intake 240 ml   Output 750 ml   Net -510 ml     General: NAD  Skin: no rash  Eyes: anicteric sclera  ENT: moist mucous membrane  Neck: supple  Chest: CTA b/l, no ronchii, no wheeze, no rubs, no rales  CVS: s1s2, no murmur, no gallop, no rub  Abdomen: soft, distended, nl sounds  Extremities:  2+ edema LE b/l  : no lee  Neuro: AAOX3  Psych: normal affect    Medications:    Current Facility-Administered Medications:     cefTRIAXone (ROCEPHIN) 2,000 mg in dextrose 5 % 50 mL IVPB, 2,000 mg, Intravenous, Q24H, Amanda Cheek, DO, Last Rate: 100 mL/hr at 02/15/22 2100, 2,000 mg at 02/15/22 2100    heparin (porcine) subcutaneous injection 5,000 Units, 5,000 Units, Subcutaneous, Q8H [address], Jessica Quispe MD, 5,000 Units at 02/16/22 0536    lactulose oral solution 10 g, 10 g, Oral, Daily, Luis Armando Gaby, DO, 10 g at 02/16/22 8897    levothyroxine tablet 200 mcg, 200 mcg, Oral, Early Morning, Luis Armando Gaby, DO, 200 mcg at 02/16/22 0536    melatonin tablet 3 mg, 3 mg, Oral, HS, Rossy Neal PA-C, 3 mg at 02/15/22 2100    Laboratory Results:  Results from last 7 days   Lab Units 02/16/22  0609 02/15/22  0649 02/14/22  1120 02/13/22  0440 02/12/22  0824 02/11/22  0603 02/10/22  0503   WBC Thousand/uL  --   --   --   -- 8  47 7 43 7 70   HEMOGLOBIN g/dL  --   --   --   --  13 1 11 7* 11 6*   HEMATOCRIT %  --   --   --   --  37 6 32 7* 32 8*   PLATELETS Thousands/uL  --   --   --   --  199 171 171   POTASSIUM mmol/L 3 2* 3 5 3 1* 3 5 3 7 3 9 3 8   CHLORIDE mmol/L 110* 110* 106 107 108 111* 109*   CO2 mmol/L 22 20* 21 22 21 20* 21   BUN mg/dL 45* 53* 62* 57* 40* 30* 25   CREATININE mg/dL 1 40* 1 86* 2 24* 2 61* 1 81* 1 38* 0 96   CALCIUM mg/dL 8 3 8 1* 8 5 8 5 8 6 8 6 8 6   MAGNESIUM mg/dL  --   --   --   --   --   --  2 1

## 2022-02-16 NOTE — PLAN OF CARE
Problem: OCCUPATIONAL THERAPY ADULT  Goal: Performs self-care activities at highest level of function for planned discharge setting  See evaluation for individualized goals  Description: Treatment Interventions: ADL retraining,Functional transfer training,UE strengthening/ROM,Endurance training,Patient/family training,Equipment evaluation/education,Cognitive reorientation,Compensatory technique education,Continued evaluation,Energy conservation,Activityengagement          See flowsheet documentation for full assessment, interventions and recommendations  Outcome: Progressing  Note: Limitation: Decreased ADL status,Decreased UE ROM,Decreased UE strength,Decreased Safe judgement during ADL,Decreased endurance,Decreased self-care trans,Decreased high-level ADLs  Prognosis: Fair  Assessment: Patient participated in Skilled OT session this date with interventions consisting of ADL re training with the use of correct body mechnaics, Energy Conservation techniques, safety awareness and fall prevention techniques,  therapeutic activities to: increase activity tolerance, increase dynamic sit/ stand balance during functional activity  and increase OOB/ sitting tolerance   Upon arrival patient was found supine in bed  Pt demonstrated the following tasks: MIN A sup to sit, STS, fnxl mobility with RW  Pt performed ADLs with S for grooming and MIN A for UB/LB ADLs  Patient continues to be functioning below baseline level, occupational performance remains limited secondary to factors listed above and increased risk for falls and injury  From OT standpoint, recommendation at time of d/c would be home with skilled therapy and increased social support  Patient to benefit from continued Occupational Therapy treatment while in the hospital to address deficits as defined above and maximize level of functional independence with ADLs and functional mobility  Pt was left after session with all current needs met   The patient's raw score on the AM-PAC Daily Activity inpatient short form is 19, standardized score is 40 22, greater than 39 4  Patients at this level are likely to benefit from discharge to home  Please refer to the recommendation of the Occupational Therapist for safe discharge planning       OT Discharge Recommendation: Post acute rehabilitation services  OT - OK to Discharge: Yes (when medically stable )

## 2022-02-16 NOTE — PROGRESS NOTES
Surgical Oncology Service:  NAME: Malinda Nesbitt AGE: 80 y o  SEX: male MRN: [id number]  Unit/Bed#:  Encounter: [id number]    A/P:     Malinda Nesbitt is a 80 y o  male with a PMHx HTN and hypothyroidism who presents with cholangiocarcinoma, with occlusion of the main portal vein in the setting of elevated CEA (6 8) and CA 19-9 (28,356), s/p paracentesis x 2 (2/13 &2/16) and s/p ERCP w/sphincterotomy and CBD stent on 2/10      AVSS  Uo: 2 45L  Stool x 1  Tbil yesterday 24 6, Dbil 20 5  S/p paracentesis yesterday with 3L removed      Plan:  - No surgical intervention at this time  - Recommend continued goals of care discussions  - Recommend Palliative consult for consideration of hospice  - Per family, tentative family meeting tomorrow at 2pm with wife and daughter   - Appreciate Medical Oncology recommendations  - Appreciate GI recommendations  - Would likely benefit from percutaneous transhepatic biliary drainage given rising tbili  - Rest of care per primary team      SUBJECTIVE:    No acute overnight events  Patient states he has been having trouble sleeping  Otherwise no acute complaints  OBJECTIVE:    Vitals:    02/16/22 2134   BP: 105/53   Pulse:    Resp:    Temp:    SpO2:        Visit Vitals  /53   Pulse 79   Temp 97 7 °F (36 5 °C)   Resp 18   Ht 6' 4" (1 93 m)   SpO2 96%   BMI 32 26 kg/m²   Smoking Status Never Smoker   BSA 2 5 m²        Current Vitals:   Blood Pressure: 105/53 (02/16/22 2134)  Pulse: 79 (02/16/22 1742)  Temperature: 97 7 °F (36 5 °C) (02/16/22 0639)  Temp Source: Oral (02/15/22 1541)  Respirations: 18 (02/16/22 1659)  Height: 6' 4" (193 cm) (02/10/22 0616)  SpO2: 96 % (02/16/22 2000)    I/O       02/14 0701  02/15 0700 02/15 0701  02/16 0700 02/16 0701  02/17 0700    P  O  120  240    I V        IV Piggyback 500      Total Intake 620  240    Urine 3322 563 9726    Other   3050    Stool 0      Total Output 9802 079 7529    Net -980 -750 -4660           Unmeasured Stool Occurrence 1 x              Intake/Output Summary (Last 24 hours) at 2/17/2022 0559  Last data filed at 2/16/2022 4147  Gross per 24 hour   Intake 240 ml   Output 5500 ml   Net -5260 ml           Meds/Allergies   current meds:   Current Facility-Administered Medications   Medication Dose Route Frequency    cefTRIAXone (ROCEPHIN) 2,000 mg in dextrose 5 % 50 mL IVPB  2,000 mg Intravenous Q24H    heparin (porcine) subcutaneous injection 5,000 Units  5,000 Units Subcutaneous Q8H [facility]    lactulose oral solution 10 g  10 g Oral Daily    levothyroxine tablet 200 mcg  200 mcg Oral Early Morning    melatonin tablet 6 mg  6 mg Oral HS    and PTA meds:   Prior to Admission Medications   Prescriptions Last Dose Informant Patient Reported? Taking?    Diclofenac Sodium (VOLTAREN) 1 %  Spouse/Significant Other No No   Sig: Apply 2 g topically 4 (four) times a day   Patient not taking: Reported on 2/2/2022    Multiple Vitamins-Minerals (ICAPS AREDS 2 PO)  Spouse/Significant Other Yes No   Sig: Take by mouth   doxycycline hyclate (VIBRAMYCIN) 100 mg capsule  Spouse/Significant Other No No   Sig: Take 1 capsule (100 mg total) by mouth in the morning   irbesartan (AVAPRO) 300 mg tablet  Spouse/Significant Other No No   Sig: TAKE 1 TABLET EVERY OTHER  DAY   levothyroxine (Synthroid) 200 mcg tablet  Spouse/Significant Other No No   Sig: Take 1 tablet (200 mcg total) by mouth daily in the early morning   metroNIDAZOLE (METROGEL) 0 75 % gel  Spouse/Significant Other No No   Sig: Apply topically 2 (two) times a day   neomycin-polymyxin-hydrocortisone (CORTISPORIN) otic solution   No No   Sig: Administer 4 drops into both ears every 8 (eight) hours for 7 days   sildenafil (VIAGRA) 100 mg tablet  Spouse/Significant Other No No   Sig: Take 1 tablet (100 mg total) by mouth daily as needed for erectile dysfunction   Patient not taking: Reported on 2/8/2022    tadalafil (CIALIS) 20 MG tablet  Spouse/Significant Other No No   Sig: Take 1 tablet (20 mg total) by mouth daily as needed for erectile dysfunction   Patient not taking: Reported on 2/8/2022    tamsulosin (FLOMAX) 0 4 mg  Spouse/Significant Other Yes No   Sig:     Patient not taking: Reported on 2/8/2022       Facility-Administered Medications: None     No Known Allergies         Physical Exam:  Gen: No acute distress  CV: Warm, regular rate  Pulm: Normal work of breathing, no respiratory distress  Abd: Soft, distended, nontender  Derm: Jaundiced  Neuro: Moving all extremities      Labs:    Lab Results   Component Value Date    HGB 13 1 02/12/2022    HCT 37 6 02/12/2022     (H) 02/12/2022     02/12/2022    AST 64 (H) 02/16/2022    ALT 57 02/16/2022    ALKPHOS 456 (H) 02/16/2022    TBILI 24 69 (H) 02/16/2022            Results from last 7 days   Lab Units 02/12/22  0824 02/11/22  0603   WBC Thousand/uL 8 47 7 43   HEMOGLOBIN g/dL 13 1 11 7*   HEMATOCRIT % 37 6 32 7*   PLATELETS Thousands/uL 199 171     Results from last 7 days   Lab Units 02/16/22  0609 02/15/22  0649 02/14/22  1120   POTASSIUM mmol/L 3 2* 3 5 3 1*   CHLORIDE mmol/L 110* 110* 106   CO2 mmol/L 22 20* 21   BUN mg/dL 45* 53* 62*   CREATININE mg/dL 1 40* 1 86* 2 24*   CALCIUM mg/dL 8 3 8 1* 8 5     Results from last 7 days   Lab Units 02/11/22  0603   INR  1 22*           Results Reviewed     None          Imaging:    ERCP    Result Date: 2/11/2022  Impression: ERCP revealing distal common bile duct stricture s/p sphincterotomy, brushings, placement of 8 mm x 60 mm common bile duct stent  RECOMMENDATION: Return to hospital resendez for ongoing care Follow-up results from CBD brushing  Misha Vergara MD     CT chest wo contrast    Result Date: 2/11/2022  Impression: 1  Left lower lobe pulmonary nodules identified, largest measuring up to 5 mm  Based on current Fleischner Society 2017 Guidelines on incidental pulmonary nodule, optional follow-up CT at 12 months can be considered   2   Right greater than left calcified pleural plaques indicating prior as best as exposure  3   Hepatic cirrhosis with ascites and evidence of portal hypertension  The study was marked in EPIC for significant notification  Workstation performed: [id number]     IR INPATIENT Paracentesis    Result Date: 2/13/2022  Impression: Impression: Ultrasound-guided paracentesis  Workstation performed: [id number]     Endoscopic ultrasonography, GI (Upper)    Result Date: 2/11/2022  Impression: EGD: Small varices in the distal esophagus Mild portal hypertensive gastropathy Mild erythema in the duodenum  EUS Mass in the common bile duct measuring 1 9 x 1 6 cm s/p core biopsy x 5 passes  Preliminary cytology consistent with markedly atypical cells  Single peripancreatic lymph node measuring 1 6 cm  Ectatic changes of pancreatic duct  RECOMMENDATION: Proceed with ERCP Follow-up cytology results   Allyn Luong MD     FL ERCP biliary only    Result Date: 2/12/2022  Impression: Fluoroscopic guidance provided for ERCP and biliary stent placement as above  Please see procedure report for further details  Workstation performed: [id number]     US kidney and bladder    Result Date: 2/13/2022  Impression: No hydronephrosis  Partially imaged cirrhotic liver and ascites  Workstation performed: [id number]         US kidney and bladder   Final Result by Renae Guerra MD (02/13 1607)      No hydronephrosis  Partially imaged cirrhotic liver and ascites  Workstation performed: [id number]         IR INPATIENT Paracentesis   Final Result by Corina Berg MD (1934)   Impression:      Ultrasound-guided paracentesis  Workstation performed: [id number]         [place] ERCP biliary only   Final Result by Army Sicard, DO (02/12 8734)      Fluoroscopic guidance provided for ERCP and biliary stent placement as above  Please see procedure report for further details           Workstation performed: [id number]         CT chest wo contrast   Final Result by Jeni Barkley MD (02/11 3987)   1  Left lower lobe pulmonary nodules identified, largest measuring up to 5 mm  Based on current Fleischner Society 2017 Guidelines on incidental pulmonary nodule, optional follow-up CT at 12 months can be considered  2   Right greater than left calcified pleural plaques indicating prior as best as exposure  3   Hepatic cirrhosis with ascites and evidence of portal hypertension  The study was marked in EPIC for significant notification  Workstation performed: [id number]         IR INPATIENT Paracentesis    (Results Pending)    No Chest XR results available for this patient         Dimas Singleton MD   PGY1 General Surgery

## 2022-02-17 NOTE — PLAN OF CARE
Problem: INFECTION - ADULT  Goal: Absence or prevention of progression during hospitalization  Description: INTERVENTIONS:  - Assess and monitor for signs and symptoms of infection  - Monitor lab/diagnostic results  - Monitor all insertion sites, i e  indwelling lines, tubes, and drains  - Monitor endotracheal if appropriate and nasal secretions for changes in amount and color  - [place] appropriate cooling/warming therapies per order  - Administer medications as ordered  - Instruct and encourage patient and family to use good hand hygiene technique  - Identify and instruct in appropriate isolation precautions for identified infection/condition  2/17/2022 1835 by Liz Bauer RN  Outcome: Progressing  2/17/2022 1835 by Liz Bauer RN  Outcome: Progressing  2/17/2022 1835 by Liz Bauer RN  Outcome: Progressing  Goal: Absence of fever/infection during neutropenic period  Description: INTERVENTIONS:  - Monitor WBC    2/17/2022 1835 by Liz Bauer RN  Outcome: Progressing  2/17/2022 1835 by Liz Bauer RN  Outcome: Progressing  2/17/2022 1835 by Liz Bauer RN  Outcome: Progressing     Problem: Prexisting or High Potential for Compromised Skin Integrity  Goal: Skin integrity is maintained or improved  Description: INTERVENTIONS:  - Identify patients at risk for skin breakdown  - Assess and monitor skin integrity  - Assess and monitor nutrition and hydration status  - Monitor labs   - Assess for incontinence   - Turn and reposition patient  - Assist with mobility/ambulation  - Relieve pressure over bony prominences  - Avoid friction and shearing  - Provide appropriate hygiene as needed including keeping skin clean and dry  - Evaluate need for skin moisturizer/barrier cream  - Collaborate with interdisciplinary team   - Patient/family teaching  - Consider wound care consult   2/17/2022 1835 by Liz Bauer RN  Outcome: Progressing  2/17/2022 1835 by Liz Bauer RN  Outcome: Progressing  2/17/2022 1835 by Sundeep Meneses RN  Outcome: Progressing

## 2022-02-17 NOTE — PLAN OF CARE
Problem: PHYSICAL THERAPY ADULT  Goal: Performs mobility at highest level of function for planned discharge setting  See evaluation for individualized goals  Description: Treatment/Interventions: Functional transfer training,LE strengthening/ROM,Endurance training,Patient/family training,Equipment eval/education,Bed mobility,Gait training,Spoke to nursing,Spoke to case management,OT  Equipment Recommended: Chema Clemons       See flowsheet documentation for full assessment, interventions and recommendations  Outcome: Progressing  Note: Prognosis: Good  Problem List: Decreased strength,Decreased endurance,Impaired balance,Decreased mobility,Decreased safety awareness,Decreased range of motion  Assessment: Pt seen for PT treatment session this date  Therapy session focused on bed mobility, functional transfers, gait training, and endurance training in order to improve overall mobility and independence  Pt requires assist of 1 for all mobility performed this date; close chair follow for ambulation required for increased safety  Pt requires min A to complete bed mobility tasks  Increased difficulty with trunk management this date  Pt performed multiple STS from EOB/bedside chair with mod Ax1; cues for hand placement on arm rests required  Pt ambulated increased distances this date with RW; mild L sided lean noted with ambulation cues for upright posture required  Pt making steady progress toward goals  Pt was left sitting OOB in bedside chair with chair alarm on at the end of PT session with all needs in reach  Pt would benefit from continued PT services while in hospital to address remaining limitations  PT to continue to follow pt and recommends rehab upon d/c  The patient's AM-PAC Basic Mobility Inpatient Short Form Raw Score is 14  A Raw score of less than or equal to 16 suggests the patient may benefit from discharge to post-acute rehabilitation services   Please also refer to the recommendation of the Physical Therapist for safe discharge planning  Barriers to Discharge: Inaccessible home environment        PT Discharge Recommendation: Post acute rehabilitation services          See flowsheet documentation for full assessment

## 2022-02-17 NOTE — CONSULTS
Consultation - Neuropsychology/Psychology Richard Hartman 80 y o  male MRN: [id number]  [facility] Encounter: [id number]        Reason for Consultation:  Arianne Norman is a 80y o  year old male who was referred for a Neuropsychological exam to assess cognitive functioning and comment on capacity to make informed medical decisions  History of Present Illness   Admitted with obstructive juandice; weakness, fatigue      Physician Requesting Consult: Yeni Field DO    PROBLEM LIST:  Patient Active Problem List   Diagnosis    Hypothyroidism    Essential hypertension    Class 1 obesity without serious comorbidity with body mass index (BMI) of 31 0 to 31 9 in adult    Impaired fasting glucose    Chronic osteomyelitis of knee (HCC)    Male erectile disorder    Exudative age-related macular degeneration, bilateral, with active choroidal neovascularization (HCC)    Asymmetrical sensorineural hearing loss    Osteoarthritis    Rosacea    Bilateral lower extremity edema    Platelets decreased ([address] )    Transaminitis    Hyperbilirubinemia    Hyperammonemia (HCC)    Elevated MCV    Obstructive jaundice    Elevated TSH    Abnormal MRI, liver    Thrombosis, portal vein    Common bile duct dilatation    Mass of right inguinal region    Diastolic dysfunction    Abnormal EKG    Liver cirrhosis (HCC)    Abnormal urinalysis    Lung nodule    Acute kidney injury ([address] )         Historical Information   Past Medical History:   Diagnosis Date    Hypertension     Hypothyroidism      Past Surgical History:   Procedure Laterality Date    HERNIA REPAIR      IR PARACENTESIS  2/13/2022    IR PARACENTESIS  2/16/2022    PROSTATE SURGERY      REPLACEMENT TOTAL KNEE      last assessed: 7/11/17     Social History   Social History     Substance and Sexual Activity   Alcohol Use Never     Social History     Substance and Sexual Activity   Drug Use No     Social History     Tobacco Use   Smoking Status Never Smoker   Smokeless Tobacco Never Used     Family History: History reviewed  No pertinent family history  Meds/Allergies   current meds:   Current Facility-Administered Medications   Medication Dose Route Frequency    cefTRIAXone (ROCEPHIN) 2,000 mg in dextrose 5 % 50 mL IVPB  2,000 mg Intravenous Q24H    heparin (porcine) subcutaneous injection 5,000 Units  5,000 Units Subcutaneous Q8H [address]    lactulose oral solution 10 g  10 g Oral Daily    levothyroxine tablet 200 mcg  200 mcg Oral Early Morning    melatonin tablet 6 mg  6 mg Oral HS       No Known Allergies      Family and Social Support:   Discharge planning discussed with[de-identified] Anita (daughter)  Freedom of Choice: Yes  IMM Given (Date):: 02/16/22  IMM Given to[de-identified] Family      Behavioral Observations: Alert, oriented x 3, cooperative; affect appropriate to content and patient denied depressed mood and anxiety; patient is reportedly very concerned about his medical condition and states he wants to live as long as he can; thoughts generally appeared logical and coherent;    Cognitive Examination    General Cognitive Functioning MMSE = Average 27/28; Attention/Concentration Auditory Selective Attention = Average; Auditory Vigilance = Within Normal Limits; Information Processing Speed = Within Normal Limits    Frontal Systems/Executive Functioning Mental Flexibility/Cognitive Control = Impaired; Working Memory = Impaired Abstract Reasoning = Within Normal Limits; Generative Ability = Impaired, Commonsense Reasoning and Judgement = Within Normal Limits    Language Functioning Confrontation naming = Average, Phonemic Fluency = Impaired; Semantic Retrieval = Average; Comprehension of Complex Ideational Material = Average; Praxis = Within Normal Limits; Repetition = Within Normal Limits; Basic Reading = Within Normal Limits; Following Commands = Impaired    Memory Functioning Narrative Recall - Short Delay = Borderline;  Long Delay Narrative Recall = Low Average;  Visual Recognition = Average    Visuo-Spatial Abilities Not Assessed    Functional Knowledge  Health & Safety Knowledge = Average;     Summary/Impression:  Results of Neuropsychological Exam revealed deficits in working memory, generative ability and mental flexibility; notably, patient demonstrated adequate abstract reasoning ability, commonsense reasoning and judgment, orientation, recall and comprehension  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

## 2022-02-17 NOTE — PHYSICAL THERAPY NOTE
PHYSICAL THERAPY NOTE          Patient Name: Boaz BERNALOAG'H Date: 2/17/2022 02/17/22 3126   PT Last Visit   PT Visit Date 02/17/22   Note Type   Note Type Treatment   Pain Assessment   Pain Assessment Tool 0-10   Pain Score No Pain   Restrictions/Precautions   Weight Bearing Precautions Per Order No   Other Precautions Chair Alarm; Bed Alarm;Multiple lines; Fall Risk   General   Chart Reviewed Yes   Response to Previous Treatment Patient with no complaints from previous session  Family/Caregiver Present No   Cognition   Overall Cognitive Status WFL   Arousal/Participation Responsive; Cooperative   Attention Attends with cues to redirect   Orientation Level Oriented X4   Memory Decreased short term memory   Following Commands Follows one step commands with increased time or repetition   Comments Pt pleasant and cooperative to participate in therapy session    Bed Mobility   Supine to Sit 4  Minimal assistance   Additional items Assist x 1;HOB elevated; Bedrails; Increased time required;Verbal cues  (+ trunk management )   Sit to Supine Unable to assess   Additional Comments pt supine in bed upon arrival  Pt sitting OOB in bedside chair with alarm on and all needs within reach at the end of therapy session    Transfers   Sit to Stand 3  Moderate assistance   Additional items Assist x 1; Increased time required;Verbal cues   Stand to Sit 3  Moderate assistance   Additional items Assist x 1; Increased time required;Verbal cues   Toilet transfer 4  Minimal assistance   Additional items Assist x 1; Increased time required;Standard toilet  (grabbars )   Additional Comments transfers with RW; cues for hand placement and sequencing  Pt performed 3x STS throughout therapy session    Ambulation/Elevation   Gait pattern Excessively slow; Short stride; Foward flexed;Decreased foot clearance; Improper Weight shift  (L sided lean )   Gait Assistance 4  Minimal assist   Additional items Assist x 1;Verbal cues; Tactile cues   Assistive Device Rolling walker   Distance 2 x 15ft; 50ft   (+ close chair follow for increased safety )   Balance   Static Sitting Fair   Dynamic Sitting Fair -   Static Standing Poor +   Dynamic Standing Poor +   Ambulatory Poor +   Activity Tolerance   Activity Tolerance Patient tolerated treatment well   Medical Staff Made Aware restorative    Nurse Made Aware RN cleared pt to participate in therapy session    Assessment   Prognosis Good   Problem List Decreased strength;Decreased endurance; Impaired balance;Decreased mobility; Decreased safety awareness;Decreased range of motion   Assessment Pt seen for PT treatment session this date  Therapy session focused on bed mobility, functional transfers, gait training, and endurance training in order to improve overall mobility and independence  Pt requires assist of 1 for all mobility performed this date; close chair follow for ambulation required for increased safety  Pt requires min A to complete bed mobility tasks  Increased difficulty with trunk management this date  Pt performed multiple STS from EOB/bedside chair with mod Ax1; cues for hand placement on arm rests required  Pt ambulated increased distances this date with RW; mild L sided lean noted with ambulation cues for upright posture required  Pt making steady progress toward goals  Pt was left sitting OOB in bedside chair with chair alarm on at the end of PT session with all needs in reach  Pt would benefit from continued PT services while in hospital to address remaining limitations  PT to continue to follow pt and recommends rehab upon d/c  The patient's AM-PAC Basic Mobility Inpatient Short Form Raw Score is 14  A Raw score of less than or equal to 16 suggests the patient may benefit from discharge to post-acute rehabilitation services   Please also refer to the recommendation of the Physical Therapist for safe discharge planning  Barriers to Discharge Inaccessible home environment   Goals   Patient Goals to go home    STG Expiration Date 02/24/22   PT Treatment Day 2   Plan   Treatment/Interventions Functional transfer training;LE strengthening/ROM; Endurance training;Patient/family training;Equipment eval/education; Bed mobility;Gait training;Spoke to nursing;Spoke to case management   Progress Progressing toward goals   PT Frequency 3-5x/wk   Recommendation   PT Discharge Recommendation Post acute rehabilitation services   Equipment Recommended [facility] Recommended Wheeled walker   Change/add to Multistat? No   AM-PAC Basic Mobility Inpatient   Turning in Bed Without Bedrails 3   Lying on Back to Sitting on Edge of Flat Bed 3   Moving Bed to Chair 2   Standing Up From Chair 2   Walk in Room 3   Climb 3-5 Stairs 1   Basic Mobility Inpatient Raw Score 14   Basic Mobility Standardized Score 35 55   Highest Level Of Mobility   -HL Goal 4: Move to chair/commode   JH-HLM Highest Level of Mobility 5: Stand (1 or more minutes)   JH-HLM Goal Achieved Yes   Education   Education Provided Mobility training;Assistive device   Patient Demonstrates acceptance/verbal understanding   End of Consult   Patient Position at End of Consult Bedside chair;Bed/Chair alarm activated; All needs within reach     Vickie Homans, PT, DPT

## 2022-02-17 NOTE — PROGRESS NOTES
[address] [facility] NOTE   Beni Vega 80 y o  male MRN: [id number]  Unit/Bed#: [facility] 910-01 Encounter: [id number]  Reason for Consult: GERSON    ASSESSMENT and PLAN:    66-year-old male with a past medical history of hypertension, hypothyroidism, who initially presents to weakness   Was found to have jaundice and transaminitis and elevated ammonia level   There is concern for cholangiocarcinoma   Patient had ERCP with brushing and stenting     1) acute kidney injury     - etiology multifactorial in the setting of contrast given recent contrast on 02/09, cirrhosis, possible hepatorenal, SBP   Also intra-abdominal hypertension given ascites was considered   Patient was also given ARB and therefore auto regulatory failure being considered  -was started on albumin 2/13; intravenous fluids were held over the weekend  -urine sodium-  -2/13-creatinine 2 6, rising  -2/14-creatinine 2 2 mg/dL   Potassium 3 1, being repleted   Bilirubin rising to 23  Urine sodium 27    - 2/15-labs pending   Lowering albumin   Patient clinically is not hypovolemic   Blood pressure stabilizing   -2/16-lower albumin but given paracentesis planned, will dose albumin twice today  Creatinine improving 1 4 mg/dL  Paracentesis completed for 3 L    -2/17-paracentesis completed for 3 L  Creatinine improved to point      Plan  -followup final paracentesis fluid cultures  Appears to still have elevated cell count   -give albumin 1 time today-BMP in a m   -consider starting diuretics tomorrow  -BMP in a m   -rising bilirubin-per primary, consulting teams  -avoid ARB  -avoid hypotension  -avoid nephrotoxic agents     2) obstructive jaundice     -status post ERCP with brushings  -elevated CA 19 9  -concern for cholangiocarcinoma, biopsy adenocarcinoma  -also concern for SBP  -surgical Oncology team also on board, GI team on board, Oncology team on board  -not recommended for surgical intervention at this time    -recommending goals of care discussions  Family meeting on/17 is planned     3) cirrhosis     -small esophageal varices  -paracentesis-2 L on 02/13  -paracentesis 2/17 for 3 L    -body fluid culture without growth thus far   -there is concern for SBP     4) electrolytes-hypokalemia-improved patient     5) acid/base-bicarbonate stable     6) lung nodule-per primary team     7) inguinal region cystic structure-per primary team   For outpatient follow-up pending        SUBJECTIVE / 24H INTERVAL HISTORY:    Blood pressure is 642-095 systolic  Afebrile  On room air  Patient denies complaints  Paracentesis completed for 3 L per urine output 2 4 L       OBJECTIVE:  Current Weight:    Vitals:    02/16/22 1742 02/16/22 2000 02/16/22 2134 02/17/22 0729   BP: 127/61  105/53 123/62   Pulse: 79   78   Resp:       Temp:    98 8 °F (37 1 °C)   TempSrc:       SpO2: 99% 96%  96%   Height:           Intake/Output Summary (Last 24 hours) at 2/17/2022 8247  Last data filed at 2/16/2022 4382  Gross per 24 hour   Intake 240 ml   Output 5500 ml   Net -5260 ml     General: NAD  Skin: no rash  Eyes: anicteric sclera  ENT:  Dry mucous membrane  Neck: supple  Chest: CTA b/l, no ronchii, no wheeze, no rubs, no rales  CVS: s1s2, no murmur, no gallop, no rub  Abdomen: soft, distended at flanks  Extremities:  1+ edema LE b/l  : no lee  Neuro: AAOX3  Psych: normal affect    Medications:    Current Facility-Administered Medications:     cefTRIAXone (ROCEPHIN) 2,000 mg in dextrose 5 % 50 mL IVPB, 2,000 mg, Intravenous, Q24H, Marcie Hubbard DO, Last Rate: 100 mL/hr at 02/16/22 2302, 2,000 mg at 02/16/22 2302    heparin (porcine) subcutaneous injection 5,000 Units, 5,000 Units, Subcutaneous, Q8H [address], Frederic Farfan MD, 5,000 Units at 02/17/22 0636    lactulose oral solution 10 g, 10 g, Oral, Daily, Tosofia Valles DO, 10 g at 02/16/22 0833    levothyroxine tablet 200 mcg, 200 mcg, Oral, Early Morning, Florina Valles DO, 200 mcg at 02/17/22 0634    melatonin tablet 6 mg, 6 mg, Oral, HS, Estephanie Fritz PA-C, 6 mg at 02/16/22 6202    Laboratory Results:  Results from last 7 days   Lab Units 02/17/22  0519 02/16/22  0609 02/15/22  0649 02/14/22  1120 02/13/22  0440 02/12/22  0824 02/11/22  0603   WBC Thousand/uL 6 83  --   --   --   --  8 47 7 43   HEMOGLOBIN g/dL 10 6*  --   --   --   --  13 1 11 7*   HEMATOCRIT % 29 7*  --   --   --   --  37 6 32 7*   PLATELETS Thousands/uL 158  --   --   --   --  199 171   POTASSIUM mmol/L 3 5 3 2* 3 5 3 1* 3 5 3 7 3 9   CHLORIDE mmol/L 114* 110* 110* 106 107 108 111*   CO2 mmol/L 21 22 20* 21 22 21 20*   BUN mg/dL 36* 45* 53* 62* 57* 40* 30*   CREATININE mg/dL 0 85 1 40* 1 86* 2 24* 2 61* 1 81* 1 38*   CALCIUM mg/dL 8 1* 8 3 8 1* 8 5 8 5 8 6 8 6   MAGNESIUM mg/dL 2 1  --   --   --   --   --   --

## 2022-02-17 NOTE — CASE MANAGEMENT
Case Management Discharge Planning Note    Patient name Beni Veag  Location [facility] 907-95 MRN [id number]  : 1934 Date 2022       Current Admission Date: 2/10/2022  Current Admission Diagnosis:Obstructive jaundice   Patient Active Problem List    Diagnosis Date Noted    Acute kidney injury ([facility] [address] ) 2022    Elevated TSH 2022    Abnormal MRI, liver 2022    Thrombosis, portal vein 2022    Common bile duct dilatation 2022    Mass of right inguinal region     Diastolic dysfunction 15/75/5638    Abnormal EKG 2022    Liver cirrhosis ([facility] [address] ) 2022    Abnormal urinalysis 2022    Lung nodule 2022    Transaminitis 2022    Hyperbilirubinemia 2022    [facility]    Elevated MCV 2022    Obstructive jaundice 2022    Platelets decreased ([facility] ) 2022    Bilateral lower extremity edema 2021    Exudative age-related macular degeneration, bilateral, with active choroidal neovascularization ([facility] ) 07/15/2020    Asymmetrical sensorineural hearing loss 07/15/2020    Osteoarthritis 07/15/2020    Rosacea 07/15/2020    Male erectile disorder 2019    Impaired fasting glucose 2019    Class 1 obesity without serious comorbidity with body mass index (BMI) of 31 0 to 31 9 in adult 03/15/2019    Hypothyroidism 2018    Essential hypertension 2018    Chronic osteomyelitis of knee ([facility] ) 10/08/2013      LOS (days): 7  Geometric Mean LOS (GMLOS) (days): 4 80  Days to GMLOS:-2 6     OBJECTIVE:  Risk of Unplanned Readmission Score: 13         Current admission status: Inpatient   Preferred Pharmacy:   [facility]  [address]  Phone: [phone] Fax: [address] [facility] - [address]  [address] [place] [address]  Phone: [phone] Fax: [address] [place] [address] [address] [place] [address]  Phone: [phone] Fax: [phone]    Primary Care Provider: Padmini Sapp MD    Primary Insurance: Geralynn Sandifer REP  Secondary Insurance:     DISCHARGE DETAILS:    Discharge planning discussed with[de-identified] Yonathan Chavez (daughter)  Freedom of Choice: Yes  Comments - Freedom of Choice: email received from patients dtr stating that the patient is a [place] and wanted to know what facilities the patient would be able to go to based on that information  CM contacted family/caregiver?: Yes  Were Treatment Team discharge recommendations reviewed with patient/caregiver?: Yes  Did patient/caregiver verbalize understanding of patient care needs?: Yes  Were patient/caregiver advised of the risks associated with not following Treatment Team discharge recommendations?: Yes    Contacts  Patient Contacts: Yonathan Chavez (daughter)  Relationship to Patient[de-identified] Family  Contact Method: Phone  Phone Number: [phone]  Reason/Outcome: Continuity of Care,Emergency Contact,Referral,Discharge Planning              Other Referral/Resources/Interventions Provided:  Interventions: Short Term Rehab  Referral Comments: family inquiring about facilities that [address] [facility] [address] - called and spoke with Isaac To? Willi? from [address] Liaison who was able to confirm that the patient was 100% service connected (states through [address]) Case is currently being reviewed - he stated that a Robin should be calling today with determination  Anita (dtr) aware that we could likely provide further update this afternoon pending that a call is received  Treatment Team Recommendation: Short Term Rehab       UPDATE: call received from [address] Phone: [phone], ext  [phone]   She has confirmed that the patient is 100% service connected and does meet eligibility for VA benefits  She stated that the case was currently with the MD at the [place] for review, but should not be a problem  She was able to provide facilities in [facility] are: [facility] in [place], [address] in [place], [facility] in [facility], [facility] and [place] in [place] which would be the closest facilities to the patients home  Referrals have been sent to all four locations to inquire about acceptance and available beds at this time  Will continue to await determination

## 2022-02-17 NOTE — PROGRESS NOTES
Medical Oncology/Hematology Progress Note  Rolando Members, male, 80 y o , 1934,  [facility], [phone]     Assessment and Plan    1  Cholangiocarcinoma, advanced  2  Cirrhosis  3  Ascites  4  Hyperbilirubinemia   -  bx 2/11 with adenocarcinoma of biliary duct; ascites fluid with atypical cells  - Pt drained 2/11 and 2/16 (3L out)  - Had family meeting; reviewed pathology, staging and prognosis; reviewed that he has liver damage, and is not a candidate for chemotherapy given his bilirubin >25, and his poor PS  Reviewed that systemic therapy is not an option and he is not a surgical candidate either; therefore, recommended supportive/palliative care  He would like to do all he can to extend his life  - reviewed it would be reasonable to continue antibiotics in the interim  - recommend reimaging of abdomen to evaluate if stent replacement or biliary drain would be a viable option  - we reviewed it would be reasonable to put in a percutaneous abdominal drain so that he can drain his abdomen as needed; this will likely be necessary given his accumulation of ascites (3L in 5 days)  - family would like to work on placement at [place] for therapy/getting stronger before coming home  No hospice yet  Outpatient follow up plan: no oncology follow up as no treatment options      Communication with patient/family:  I did update the patient, as well as his wife Clinton Lo) and daughter Kavon Roth)     Communication with team:  Did communicate with Sg Isbell, primary team  I also spoke with [facility]  I did review this patient with Dr Jamison Reyes and they are in agreement with this plan  Cal Walden, KELLEY-BC  Hematology/Oncology Nurse Practitioner        Subjective:  No pain  Feels better after being drained yesterday; only issue is he feels he fills up quickly when eating or drinking, this is better today as well   Denies any fevers, chills, nausea, vomiting, headaches, abdominal pain, chest pain, shortness of breath, cough, or genitourinary complaints  Review of Systems   A complete review of systems is negative other than that noted in the HPI  Objective:     Medication Administration - last 24 hours from 02/16/2022 1513 to 02/17/2022 1513       Date/Time Order Dose Route Action Action by     02/17/2022 1058 lactulose oral solution 10 g 10 g Oral Given Priscilla Hand, RN     02/17/2022 9621 levothyroxine tablet 200 mcg 200 mcg Oral Given Los Saliva, RN     02/17/2022 0636 heparin (porcine) subcutaneous injection 5,000 Units 5,000 Units Subcutaneous Given Los Saliva, RN     02/16/2022 2255 heparin (porcine) subcutaneous injection 5,000 Units 5,000 Units Subcutaneous Given Los Saliva, RN     02/16/2022 2302 cefTRIAXone (ROCEPHIN) 2,000 mg in dextrose 5 % 50 mL IVPB 2,000 mg Intravenous New Bag Los Saliva, RN     02/16/2022 1846 albumin human (FLEXBUMIN) 25 % injection 25 g 25 g Intravenous Coca-Cola Dilts, RN     02/16/2022 2255 melatonin tablet 6 mg 6 mg Oral Given Los Saliva, RN     02/17/2022 1058 albumin human (FLEXBUMIN) 25 % injection 25 g 25 g Intravenous New Bag Lea Sparks, MARTHA          /62   Pulse 78   Temp 98 8 °F (37 1 °C)   Resp 18   Ht 6' 4" (1 93 m)   SpO2 96%   BMI 32 26 kg/m²       Physical Exam  General: Appearance: alert, cooperative, no distress  Jaundiced  HEENT: Normocephalic, atraumatic  Scleral icterus  Lungs: CTAB, Respirations unlabored  Cardiac: RRR  Abdomen:  Slightly distended  Extremities:  No edema, cyanosis, clubbing  Skin: Skin color, turgor are normal  No rashes  Jaundiced  Lymphatics: no palpable adenopathy  Neurologic: Alert and oriented X 3  No focal neuro deficits       Recent Results (from the past 48 hour(s))   Comprehensive metabolic panel    Collection Time: 02/16/22  6:09 AM   Result Value Ref Range    Sodium 140 136 - 145 mmol/L    Potassium 3 2 (L) 3 5 - 5 3 mmol/L    Chloride 110 (H) 100 - 108 mmol/L    CO2 22 21 - 32 mmol/L ANION GAP 8 4 - 13 mmol/L    BUN 45 (H) 5 - 25 mg/dL    Creatinine 1 40 (H) 0 60 - 1 30 mg/dL    Glucose 111 65 - 140 mg/dL    Calcium 8 3 8 3 - 10 1 mg/dL    Corrected Calcium 9 2 8 3 - 10 1 mg/dL    AST 64 (H) 5 - 45 U/L    ALT 57 12 - 78 U/L    Alkaline Phosphatase 456 (H) 46 - 116 U/L    Total Protein 5 5 (L) 6 4 - 8 2 g/dL    Albumin 2 9 (L) 3 5 - 5 0 g/dL    Total Bilirubin 24 69 (H) 0 20 - 1 00 mg/dL    eGFR 44 ml/min/1 73sq m   Bilirubin, direct    Collection Time: 02/16/22  6:09 AM   Result Value Ref Range    Bilirubin, Direct 20 52 (H) 0 00 - 0 20 mg/dL   Body fluid white cell count with differential    Collection Time: 02/16/22  5:10 PM   Result Value Ref Range    Site Paracentesis     Color, Fluid Yellow Clear, Colorless,Yellow    Clarity, Fluid Cloudy (A) Clear    WBC, Fluid 1,179 /ul   Body fluid culture and Gram stain    Collection Time: 02/16/22  5:10 PM    Specimen: Paracentesis;  Body Fluid   Result Value Ref Range    Body Fluid Culture, Sterile No growth     Gram Stain Result Rare Polys     Gram Stain Result No organisms seen    Body Fluid Diff    Collection Time: 02/16/22  5:10 PM   Result Value Ref Range    Total Counted 100     Neutrophils % (Fluid) 34 %    Lymphs % (Fluid) 17 %    Monocytes % (Fluid) 49 %   Comprehensive metabolic panel    Collection Time: 02/17/22  5:19 AM   Result Value Ref Range    Sodium 143 136 - 145 mmol/L    Potassium 3 5 3 5 - 5 3 mmol/L    Chloride 114 (H) 100 - 108 mmol/L    CO2 21 21 - 32 mmol/L    ANION GAP 8 4 - 13 mmol/L    BUN 36 (H) 5 - 25 mg/dL    Creatinine 0 85 0 60 - 1 30 mg/dL    Glucose 117 65 - 140 mg/dL    Calcium 8 1 (L) 8 3 - 10 1 mg/dL    Corrected Calcium 9 1 8 3 - 10 1 mg/dL    AST 63 (H) 5 - 45 U/L    ALT 48 12 - 78 U/L    Alkaline Phosphatase 404 (H) 46 - 116 U/L    Total Protein 5 4 (L) 6 4 - 8 2 g/dL    Albumin 2 8 (L) 3 5 - 5 0 g/dL    Total Bilirubin 25 15 (H) 0 20 - 1 00 mg/dL    eGFR 78 ml/min/1 73sq m   CBC    Collection Time: 02/17/22 5:19 AM   Result Value Ref Range    WBC 6 83 4 31 - 10 16 Thousand/uL    RBC 2 99 (L) 3 88 - 5 62 Million/uL    Hemoglobin 10 6 (L) 12 0 - 17 0 g/dL    Hematocrit 29 7 (L) 36 5 - 49 3 %    MCV 99 (H) 82 - 98 fL    MCH 35 5 (H) 26 8 - 34 3 pg    MCHC 35 7 31 4 - 37 4 g/dL    RDW 19 9 (H) 11 6 - 15 1 %    Platelets 366 095 - 821 Thousands/uL    MPV 10 5 8 9 - 12 7 fL   Magnesium    Collection Time: 02/17/22  5:19 AM   Result Value Ref Range    Magnesium 2 1 1 6 - 2 6 mg/dL       ERCP    Result Date: 2/11/2022  Narrative: [facility] [address] [place] [address] [phone] DATE OF SERVICE: 2/11/22 PHYSICIAN(S): Jason Cm MD Proceduralist INDICATION: Jaundice POST-OP DIAGNOSIS: See the impression below  PREPROCEDURE: Informed consent was obtained for the procedure, including sedation  Risks of perforation, hemorrhage, adverse drug reaction and aspiration were discussed  The patient was placed in the left lateral decubitus position  Patient was explained about the risks and benefits of the procedure  Risks including but not limited to bleeding, infection, and perforation were explained in detail  Also explained about less than 100% sensitivity with the exam and other alternatives  DETAILS OF PROCEDURE: Patient was taken to the procedure room where a time out was performed to confirm correct patient and correct procedure  The patient underwent general anesthesia, which was administered by an anesthesia professional  The patient's blood pressure, heart rate, level of consciousness, respirations and oxygen were monitored throughout the procedure  Clinical intention was achieved  The patient experienced no blood loss  The procedure was not difficult  The patient tolerated the procedure well   ANESTHESIA INFORMATION: ASA: III Anesthesia Type: General MEDICATIONS: iohexol (OMNIPAQUE) 240 MG/ML solution 50 mL 5 mL (Totals for administrations occurring from 1519 to 1649 on 02/11/22) FINDINGS: Deeply cannulated the common bile duct after 1 attempt using a traction sphincterotome  Used 260 cm x 0 035 in straight guidewire  Cannulation was not difficult  Injected contrast Generalized dilation in the proximal common bile duct to a maximal diameter of 12 mm  There was a 2 cm stricture in the distal common bile duct  Performed medium major papilla sphincterotomy using a sphincterotome  No bleeding at the procedure site  Performed 10 brushings in the distal common bile duct  Brushings were sent for cytology  Placed metal, covered stent with length of 6 cm and diameter of 8 mm in the common bile duct  SPECIMENS: ID Type Source Tests Collected by Time Destination 1 : CBD mass FNA Common Bile Duct NON-GYNECOLOGIC CYTOLOGY, FINE NEEDLE ASPIRATION Pankaj Wise MD 2/11/2022  3:50 PM  2 :  Brushing Brushing, common bile duct NON-GYNECOLOGIC CYTOLOGY Pankaj Wise MD 2/11/2022  4:36 PM       Impression: ERCP revealing distal common bile duct stricture s/p sphincterotomy, brushings, placement of 8 mm x 60 mm common bile duct stent  RECOMMENDATION: Return to hospital resendez for ongoing care Follow-up results from CBD brushing  Pankaj Wise MD     CT chest wo contrast    Result Date: 2/11/2022  Narrative: CT CHEST WITHOUT IV CONTRAST INDICATION:   Lung nodule, < 6mm, high cancer risk malginancy  COMPARISON:  CT AP dated 2/8/2022 TECHNIQUE: CT examination of the chest was performed without intravenous contrast   Axial, sagittal, and coronal 2D reformatted images were created from the source data and submitted for interpretation  Radiation dose length product (DLP) for this visit:  738 mGy-cm   This examination, like all CT scans performed in the 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  HEART/GREAT VESSELS: Heart is unremarkable for patient's age  No thoracic aortic aneurysm  MEDIASTINUM AND SARAH:  Unremarkable  CHEST WALL AND LOWER NECK:   Unremarkable  VISUALIZED STRUCTURES IN THE UPPER ABDOMEN:  Cirrhotic liver with perihepatic ascites noted  Perisplenic and paraesophageal varices noted indicating portal hypertension  OSSEOUS STRUCTURES:  Advanced degenerative changes noted throughout both shoulders  No acute osseous findings  Impression: 1  Left lower lobe pulmonary nodules identified, largest measuring up to 5 mm  Based on current Fleischner Society 2017 Guidelines on incidental pulmonary nodule, optional follow-up CT at 12 months can be considered  2   Right greater than left calcified pleural plaques indicating prior as best as exposure  3   Hepatic cirrhosis with ascites and evidence of portal hypertension  The study was marked in EPIC for significant notification  Workstation performed: [id number]     IR INPATIENT Paracentesis    Result Date: 2/17/2022  Narrative: Ultrasound-guided paracentesis Clinical History: Recurrent Ascites Procedure: After explaining the risks and benefits of the procedure to the patient, informed consent was obtained  Ultrasound used to localize the right lower quadrant ascites  The overlying skin was prepped and draped in usual sterile fashion and local anesthesia was obtained with the 1% lidocaine solution  A 5 Western Cherri Yueh needle was advanced until fluid was aspirated  Approximately 3050 cc' s of clear, yellow fluid was aspirated  The fluid was sent for the requested laboratory tests  The patient tolerated the procedure well and left the department in stable condition       Impression: Impression: Successful ultrasound-guided paracentesis  Workstation performed: [id number]     IR INPATIENT Paracentesis    Result Date: 2/13/2022  Narrative: Ultrasound-guided paracentesis Clinical History: Ascites, renal failure, jaundice Procedure: After explaining the risks and benefits of the procedure to the patient, informed consent was obtained  Ultrasound used to localize the right lower quadrant ascites  The overlying skin was prepped and draped in usual sterile fashion and local anesthesia was obtained with the 1% lidocaine solution  A 5 Western Cherri Yueh needle was advanced until fluid was aspirated under live ultrasound guidance  Approximately 2000 cc' s of bile tinted fluid was aspirated  Specimens: Multiple The patient tolerated the procedure well without immediate complication  Impression: Impression: Ultrasound-guided paracentesis  Workstation performed: [id number]     MRI abdomen wo contrast and mrcp    Result Date: 2/9/2022  Narrative: MRI OF THE ABDOMEN WITHOUT CONTRAST WITH MRCP INDICATION:  Abdominal pain and jaundice  Hyperbilirubinemia  COMPARISON:  CT of the abdomen/pelvis dated 2/8/2022  TECHNIQUE:  MRI of the abdomen was performed without the administration of contrast using the following  using sequences: Axial T1 weighted in/out of phase images, multiplanar T2 weighted images, diffusion weighted and fat suppressed T1 weighted images  3D MRCP images were obtained with radial thick slabs and projections  3D rendering was performed from the acquisition scanner  FINDINGS: LOWER CHEST:   Thin atelectasis or scarring at the lung bases  LIVER: Areas of geographic hypoattenuation in the liver on recent CT have mild associated increased T2 signal, but are otherwise poorly evaluated given lack of IV contrast   Hepatic contours concavity associated with the area  Diffusely decreased T2 parenchymal signal in the liver with gain of signal on opposed phased imaging suggesting iron overload  No evidence for steatosis   Lobular contour of the liver related to cirrhosis  Main portal vein appears occluded fiesta sequence  Perigastric and perisplenic varices are noted  Intrahepatic IVC appears patent though hepatic veins are poorly evaluated  BILE DUCTS:  Dilation of the intrahepatic and common ducts  The luminal irregularity of the lower common duct with abrupt cut off at the andreas hepatis  There is a 16 mm gallstone at the gallbladder duct near the area of truncation, though not appearing  to directly impinge on the duct (5/22)  No low signal stones within the duct, itself  Soft tissue stranding around the duct seen on prior CT is poorly evaluated due to patient motion and ascites fluid  GALLBLADDER:  Gallbladder is filled with gallstones ranging in size from a few millimeters to 16 mm  Wall thickness is poorly evaluated  PANCREAS:  Poorly evaluated on T1-weighted sequences due to patient motion  Poorly evaluated on T2 sequences due to motion and ascites  No obvious pancreatic masses though there was ill-defined soft tissue density around the head on recent CT  No pancreatic ductal dilatation  ADRENAL GLANDS:  Normal  SPLEEN:  Congestive splenomegaly measuring 14 4 cm in long axis  KIDNEYS/PROXIMAL URETERS:  Tiny benign cysts in each kidney  No suspicious renal masses  No perinephric collections  Renal collecting systems are decompressed  BOWEL:  No dilated loops of bowel  PERITONEAL CAVITY/RETROPERITONEUM:  Mild to moderate ascites  No evidence encapsulated collections  LYMPH NODES:  No abdominal lymphadenopathy though sensitivity is limited by ascites  VASCULAR STRUCTURES:  Main portal vein occlusion  Vessels around the pancreatic head are poorly evaluated, though review of prior CT shows hazy density around the celiac axis and SMA  Significance is uncertain given concomitant ascites  ABDOMINAL WALL:  Mild to moderate dependent subcutaneous edema  OSSEOUS STRUCTURES:  No suspicious osseous lesion  Impression: 1    Biliary ductal dilatation with abrupt cut off of the common duct at the andreas hepatis  Evaluation of the pancreas is degraded on the current study, but there is heterogeneous soft tissue density seen at the andreas hepatis on prior CT that may arise from the pancreatic head or could have primary biliary origin  Though there are numerous large gallstones, including at the gallbladder neck, these do not appear to cause extrinsic compression of the duct (i e , no suggestion of Mirizzi syndrome)  Recommend endoscopic ultrasound for further evaluation  2   Occlusion of the main portal vein, also at the andreas hepatis  Evaluation of the vessels is degraded by lack of IV contrast and patient motion  3   Ill-defined areas of hypoenhancement seen on prior CT are incompletely evaluated without contrast   This may represent confluent fibrosis  Infiltrative mass is felt to be less likely given the geographic appearance of the associated increased T2 signal  4   Cirrhosis  5   Hepatic iron overload  The study was marked in EPIC for significant notification  Workstation performed: [id number]     Endoscopic ultrasonography, GI (Upper)    Result Date: 2/11/2022  Narrative: [address] [place] [address] [phone] DATE OF SERVICE: 2/11/22 PHYSICIAN(S): Bandar Howard MD Proceduralist INDICATION: Jaundice POST-OP DIAGNOSIS: See the impression below  PREPROCEDURE: Informed consent was obtained for the procedure, including sedation  Risks of perforation, hemorrhage, adverse drug reaction and aspiration were discussed  The patient was placed in the left lateral decubitus position  Patient was explained about the risks and benefits of the procedure  Risks including but not limited to bleeding, infection, and perforation were explained in detail  Also explained about less than 100% sensitivity with the exam and other alternatives   DETAILS OF PROCEDURE: Patient was taken to the procedure room where a time out was performed to confirm correct patient and correct procedure  The patient underwent monitored anesthesia care, which was administered by an anesthesia professional  The patient's blood pressure, heart rate, oxygen, respirations and level of consciousness were monitored throughout the procedure  The patient experienced no blood loss  The procedure was not difficult  The patient tolerated the procedure well  There were no apparent complications  ANESTHESIA INFORMATION: ASA: III Anesthesia Type: General MEDICATIONS: No administrations occurring from 1519 to 1647 on 02/11/22 FINDINGS: EGD Small varices with no bleeding (no red jf sign) in the esophagus Mild hemorrhagic mucosa in the stomach  Consistent with portal hypertensive gastropathy  Edematous and erythematous mucosa in the duodenum EUS Normal celiac takeoff  Left kidney appeared normal   Mild heterogenicity within the pancreatic parenchyma around the pancreatic duct in the body of the pancreas  Normal spleen  Beaded and dilated pancreatic duct changes, measuring 6 mm at the head and 4 mm at the body of the pancreas Single peripancreatic node with well-defined and smooth margins, measuring 16 x 9 mm Irregular and hypoechoic mass measuring 19 mm x 16 mm with irregular margins in the common bile duct; 5 fine needle biopsy passes were taken with a 25 gauge needle guided by Doppler and performed cytology analysis  Onsite cytologist was present and cytology results were preliminarily atypical SPECIMENS: ID Type Source Tests Collected by Time Destination 1 : CBD mass FNA Common Bile Duct NON-GYNECOLOGIC CYTOLOGY, FINE NEEDLE ASPIRATION Ludwig Santos MD 2/11/2022  3:50 PM  2 :  Brushing Brushing, common bile duct NON-GYNECOLOGIC CYTOLOGY Ludwig Santos MD 2/11/2022  4:36 PM       Impression: EGD: Small varices in the distal esophagus Mild portal hypertensive gastropathy Mild erythema in the duodenum   EUS Mass in the common bile duct measuring 1 9 x 1 6 cm s/p core biopsy x 5 passes  Preliminary cytology consistent with markedly atypical cells  Single peripancreatic lymph node measuring 1 6 cm  Ectatic changes of pancreatic duct  RECOMMENDATION: Proceed with ERCP Follow-up cytology results   Anastasiia Shelton MD     FL ERCP biliary only    Result Date: 2/12/2022  Narrative: ERCP INDICATION:  CBD dilatation with abrupt cut off at the andreas hepatis  COMPARISON:  MRI/MRCP 2/9/2022 IMAGES:  7 FLUOROSCOPY TIME:   1 minute 42 seconds CONTRAST: 5 mL of iohexol (OMNIPAQUE) FINDINGS: Fluoroscopic guidance was provided for performance of ERCP  BILIARY: Dilated proximal common bile duct is identified with abrupt transition corresponding to recent imaging findings  Stricture is seen at the transition  No obvious irregular shouldering  Subsequent images demonstrate placement of biliary stent  Osseous and soft tissue detail limited by technique  Impression: Fluoroscopic guidance provided for ERCP and biliary stent placement as above  Please see procedure report for further details  Workstation performed: [id number]     US kidney and bladder    Result Date: 2/13/2022  Narrative: RENAL ULTRASOUND INDICATION:   [place]  COMPARISON: No prior renal ultrasound available for comparison  Correlation with right upper quadrant ultrasound February 8, 2022, and CT abdomen and pelvis February 8, 2022 TECHNIQUE:   Ultrasound of the retroperitoneum was performed with a curvilinear transducer utilizing volumetric sweeps and still imaging techniques  FINDINGS: KIDNEYS: Symmetric and normal size  Right kidney:  12 1 x 6 1 x 6 5 cm  Volume 252 2 mL Left kidney:  11 8 x 7 7 x 6 1 cm  Volume 290 7 mL Right kidney Normal echogenicity and contour  No mass is identified  No hydronephrosis  No shadowing calculi  No perinephric fluid collections  Left kidney Normal echogenicity and contour  No mass is identified  Simple exophytic midpole cyst measures 0 9 x 0 9 x 1 1 cm  No hydronephrosis   No shadowing calculi  No perinephric fluid collections  URETERS: Nonvisualized  BLADDER: Normally distended  No focal thickening or mass lesions  Left ureteral jet identified  Right ureteral jet not identified  OTHER: Ascites  Partially imaged cirrhotic liver  Impression: No hydronephrosis  Partially imaged cirrhotic liver and ascites  Workstation performed: [id number]     VAS lower limb venous duplex study, complete bilateral    Result Date: 2/8/2022  Narrative:  [facility] REPORT CLINICAL: Indications: Patient presents with bilateral lower extremity thigh pain s/p workout x 1 month ago  CONCLUSION:  Impression: RIGHT LOWER LIMB: No evidence of acute or chronic deep vein thrombosis  No evidence of superficial thrombophlebitis noted  Doppler evaluation shows a normal response to augmentation maneuvers  Popliteal, posterior tibial and anterior tibial arterial Doppler waveforms are triphasic  Note: There is a well defined hypoechoic non-vascularized cystic-type structure in the groin, 5 1 cm  [place] LEFT LOWER LIMB: No evidence of acute or chronic deep vein thrombosis  No evidence of superficial thrombophlebitis noted  Doppler evaluation shows a normal response to augmentation maneuvers  Popliteal, posterior tibial and anterior tibial arterial Doppler waveforms are triphasic  Technical findings were faxed to chart  SIGNATURE: Electronically Signed by: Rob Frias on 2022-02-08 05:24:03 PM    CT abdomen pelvis w contrast    Addendum Date: 2/9/2022 Addendum:   Additionally there is 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 in the area of abnormal soft tissue  Hazy density around the celiac artery and SMA of uncertain significance given the presence of ascites and can be reassessed on follow-up  Result Date: 2/9/2022  Narrative: CT ABDOMEN AND PELVIS WITH IV CONTRAST INDICATION:   hyperbilirubinemia, transaminitis   COMPARISON:  Ultrasound abdomen 1/24/2022 TECHNIQUE:  CT examination of the abdomen and pelvis was performed  Axial, sagittal, and coronal 2D reformatted images were created from the source data and submitted for interpretation  Radiation dose length product (DLP) for this visit:  2499 34 mGy-cm   This examination, like all CT scans performed in the [facility], was performed utilizing techniques to minimize radiation dose exposure, including the use of iterative reconstruction and automated exposure control  IV Contrast:  100 mL of iohexol (OMNIPAQUE) Enteric contrast was not administered  FINDINGS: ABDOMEN The images are degraded due to beam hardening artifact from position of the patient's arms  LOWER CHEST:  Left lower lobe 5 mm nodules (series 2 image 4)  Bibasilar reticular changes and/or mild atelectasis  Paraesophageal varices  LIVER/BILIARY TREE:  Nodular cirrhotic liver morphology  Limited visualization of a few possible ill-defined hypodense areas in the liver, for example in the caudate (series 2 image 19) as well as in the right lobe on series 2 image 19, incompletely characterized and cannot exclude metastasis  This can be evaluated with follow-up MR abdomen  There is diffuse intrahepatic biliary ductal dilatation  Proximal common bile duct appears tortuous and dilated measuring 13 mm (series 601 image 76) with abrupt cut off  The remainder of the CBD is not clearly visualized and there appears to be ill-defined soft tissue in the expected location (series 2 image 26)  Findings are suspicious for obstructing mass/stricture  Recommend gastroenterology consultation  GALLBLADDER:  Distended  No calcified gallstones  No gross wall thickening  SPLEEN:  Unremarkable  PANCREAS:  Area of the pancreatic neck region appears inseparable from ill-defined soft tissue described above  No significant pancreatic ductal dilatation is seen  Attention on follow-up MR  ADRENAL GLANDS:  Unremarkable  KIDNEYS/URETERS:  Unremarkable  No hydronephrosis      STOMACH AND BOWEL:  Colonic diverticulosis without findings of acute diverticulitis  APPENDIX:  Normal  ABDOMINOPELVIC CAVITY:  Moderate ascites  No pneumoperitoneum  No lymphadenopathy  VESSELS:  Small gastrohepatic ligament, perisplenic and paraesophageal varices in keeping with portal hypertension  PELVIS REPRODUCTIVE ORGANS:  Unremarkable for patient's age  URINARY BLADDER:  Unremarkable  ABDOMINAL WALL/INGUINAL REGIONS:  Ovoid fluid density noted in right anterior proximal thigh subcutaneous fat (series 2 image 93) measuring 3 1 x 2 5 cm, nonspecific  OSSEOUS STRUCTURES:  No acute fracture or destructive osseous lesion  Degenerative changes of the spine and left hip  Multilevel bridging ossifications in the spine suggesting diffuse idiopathic skeletal hyperostosis (DISH)  Impression: 1  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  Moderate volume ascites  2   Diffuse intrahepatic biliary ductal dilatation  Proximal common bile duct appears tortuous and dilated measuring 13 mm with abrupt cut off  The remainder of the CBD is not visualized and there appears to be ill-defined soft tissue in the expected location  Findings are suspicious for obstructing mass/stricture  Recommend gastroenterology consultation  3   Limited visualization of a few possible ill-defined hypodense areas in the liver, incompletely characterized and cannot exclude metastasis  This can be evaluated with follow-up contrast-enhanced abdomen MR/MRCP  4   Left lower lobe 5 mm nodules  Recommend nonemergent dedicated chest CT  I personally discussed this study with Vaishnavi Senior on 2/8/2022 at 2:10 PM  Workstation performed: [id number]     US right upper quadrant with liver dopplers    Result Date: 2/8/2022  Narrative: RIGHT UPPER QUADRANT ULTRASOUND WITH LIVER DOPPLER INDICATION:     rising bilirubin   COMPARISON:  Ultrasound abdomen 6/24/2022 TECHNIQUE:   Real-time ultrasound of the right upper quadrant was performed with a curvilinear transducer with both volumetric sweeps and still imaging techniques  FINDINGS: PANCREAS:  Top normal pancreatic duct measuring 3 mm  AORTA AND IVC:  Visualized portions are normal for patient age  LIVER: Size:  Within normal range  The liver measures 10 6 cm in the midclavicular line  Contour:  Surface contour is nodular  Parenchyma:  Increased hepatic echogenicity may be due to hepatic steatosis and/or hepatic parenchymal disease  No suspicious hepatic mass identified  LIVER DOPPLER: The main portal vein and primary branch segments are patent and hepatopetal   Increased phasicity of the portal veins which can be seen with cirrhosis, right heart failure, or tricuspid regurgitation  Hepatic veins are patent  Spectral waveforms within normal limits  Main hepatic artery appears normal size, patent with normal spectral waveform  BILIARY: Gallbladder not visualized  Intrahepatic biliary ductal dilatation better visualized on subsequent CT  CBD not visualized  KIDNEY: Right kidney measures 10 6 cm  Within normal limits  ASCITES:   Moderate ascites     Impression: 1  Cirrhosis  Increased hepatic echogenicity may be due to hepatic steatosis and/or hepatic parenchymal disease  2   Patent central hepatic vessels with normal direction of flow  Increased phasicity of the portal veins which can be seen with cirrhosis, right heart failure, or tricuspid regurgitation  3   Gallbladder not visualized  Intrahepatic biliary ductal dilatation better visualized on subsequent CT  CBD not visualized  Please refer to subsequent CT for description  4   Moderate ascites  Workstation performed: [id number]     US right upper quadrant with liver dopplers    Result Date: 1/24/2022  Narrative: RIGHT UPPER QUADRANT ULTRASOUND WITH LIVER DOPPLER INDICATION:     R53 1: Weakness R26 89: Other abnormalities of gait and mobility R79 89: Other specified abnormal findings of blood chemistry  COMPARISON:  None  TECHNIQUE:   Real-time ultrasound of the right upper quadrant was performed with a curvilinear transducer with both volumetric sweeps and still imaging techniques  FINDINGS: PANCREAS:  Visualized portions of the pancreas are within normal limits  AORTA AND IVC:  Visualized portions are normal for patient age  LIVER: Size:  Within normal range  The liver measures 10 9 cm in the midclavicular line  Contour:  Questionable subtle surface nodularity  Parenchyma:  Echogenicity and echotexture are within normal limits  No evidence of suspicious mass  LIVER DOPPLER: The main portal vein and primary branch segments are patent and hepatopetal with normal spectral waveform  Hepatic veins are patent  Spectral waveforms within normal limits  Main hepatic artery appears normal size, patent with normal spectral waveform  BILIARY: Gallbladder not visualized  No intrahepatic biliary dilatation  CBD measures 5 mm  No choledocholithiasis  KIDNEY: Right kidney measures 10 7 x 7 5 x 5 2 cm  Within normal limits  ASCITES:   Small to moderate volume right upper quadrant ascites  Impression: Questionable subtle liver surface nodularity with an otherwise normal-appearing liver, suggesting the possibility of early cirrhosis  Patent hepatic vasculature with normal direction of flow  Small to moderate volume right upper quadrant ascites  Workstation performed: [id number]       I have personally reviewed labs, imaging studies, and pertinent reports  This note has been generated by voice recognition software system  Therefore, there may be spelling, grammar, and or syntax errors  Please contact if questions arise

## 2022-02-17 NOTE — BRIEF OP NOTE (RAD/CATH)
INTERVENTIONAL RADIOLOGY PROCEDURE NOTE    Date: 2/17/2022    Procedure: IR PARACENTESIS     Preoperative diagnosis:   1  Jaundice    2  Transaminitis    3  Acute kidney injury ([address] )    4  Obstructive jaundice         Postoperative diagnosis: Same  Surgeon: Angelito Hillman MD     Assistant: None  No qualified resident was available  Blood loss: None    Specimens: Ascites    Findings: 3050 ml ascites removed via the RLQ    Complications: None immediate      Anesthesia: local

## 2022-02-17 NOTE — PROGRESS NOTES
[facility]  Progress Note - Rebecca Lazo 1934, 80 y o  male MRN: [id number]  Unit/Bed#: [facility] 910-01 Encounter: [id number]  Primary Care Provider: Marilyn Nguyen MD   Date and time admitted to hospital: 2/10/2022 12:54 AM    Acute kidney injury [facility])  Assessment & Plan  Likely multifactorial in the setting of contrast 2/9, possible hepatorenal, ARB use, possible intra abdominal HTN  Retroperitoneal ultrasound no hydronephrosis  Nephrology consult appreciated  S/p IV albumin  Creatinine starting to trend down  Baseline around 0 8        Lung nodule  Assessment & Plan  · CT AP: Left lower lobe 5 mm nodules  Bibasilar reticular changes and/or mild atelectasis  Paraesophageal varices  · Outpatient follow-up    Liver cirrhosis ([facility] [address] )  Assessment & Plan  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 with ascites  New onset  · Status post abdominal paracentesis with IR 2/13 for 2L  WBC noted, no bacteria on gram stain  Started on empiric Ceftriaxone for possible SBP  Day 4/5  For repeat paracentesis 2/16 to follow up fluid studies  Patient non-toxic appearing, afebrile, and without pain  Findings may be secondary to malignancy, but with high mortality rate from SBP, will treat    · GI following    -reviewed ascitic fluid studies still has elevated PMNs above 400, will continue to treat and so under to 50  -plan to treat with ceftriaxone for 7 days, then decrease to prophylaxis dosing, discharge on lifetime Cipro or Bactrim    Mass of right inguinal region  Assessment & Plan  · Venous duplex of the bilateral lower extremities (02/08/2022) as outpatient showed 5 1 cm non vascularized cystic structure in the right groin  · Plan for outpatient surgery follow-up    Hyperammonemia ([facility] [address] )  Assessment & Plan  · Due to liver disease  · Continue lactulose 10 grams PO Qdaily for a goal 2-3 soft bowel movements per 24 hours - do not have to discharge on lactulose per GI    Class 1 obesity without serious comorbidity with body mass index (BMI) of 31 0 to 31 9 in adult  Assessment & Plan  Therapeutic lifestyle modification    Essential hypertension  Assessment & Plan  · Monitor blood pressures   · avoid hypotension    * Obstructive jaundice  Assessment & Plan  Obstructive jaundice  Status post ERCP with sphincterotomy and stent placement 2/10  Follow-up on biopsy studies - high suspicion for cholangiocarcinoma  CA19 - 28,354  Monitor LFTs  GI following  Biopsy showing adenocarcinoma  D/W GI, consult onc and surg/onc         2/17/22:  Held a discussion with myself and Hematology-Oncology, patient and family are now agreeable to rehab placement at the [facility] off on hospice for now  -will obtain MRI MRCP with contrast, kidney function is fine, either way new gadolinium does not pose a significant risk of an as after as it used to in the past, due to using a different agent  MRI will help delineate whether he truly has metastasis, and also assessed position/placement of stent          VTE Pharmacologic Prophylaxis: VTE Score: 5 High Risk (Score >/= 5) - Pharmacological DVT Prophylaxis Ordered: heparin  Sequential Compression Devices Ordered  Patient Centered Rounds: I performed bedside rounds with nursing staff today  Discussions with Specialists or Other Care Team Provider: n/a    Education and Discussions with Family / Patient: Updated  (daughter) at bedside  Time Spent for Care: 30 minutes  More than 50% of total time spent on counseling and coordination of care as described above  Current Length of Stay: 7 day(s)  Current Patient Status: Inpatient   Certification Statement: The patient will continue to require additional inpatient hospital stay due [facility]  Discharge Plan: Anticipate discharge in 48-72 hrs to rehab facility      Code Status: Level 3 - DNAR and DNI    Subjective:   Patient denies any acute complaint, denies abdominal pain denies pruritus/agent is denies headache denies dizziness denies chest pain palpitations shortness of breath    Objective:     Vitals:   Temp (24hrs), Av 9 °F (37 2 °C), Min:98 8 °F (37 1 °C), Max:98 9 °F (37 2 °C)    Temp:  [98 8 °F (37 1 °C)-98 9 °F (37 2 °C)] 98 9 °F (37 2 °C)  HR:  [78-80] 78  Resp:  [18] 18  BP: (105-141)/(53-69) 131/69  SpO2:  [96 %-99 %] 96 %  Body mass index is 32 26 kg/m²  Input and Output Summary (last 24 hours): Intake/Output Summary (Last 24 hours) at 2022 1650  Last data filed at 2022 1300  Gross per 24 hour   Intake 360 ml   Output 3650 ml   Net -3290 ml       Physical Exam:   Physical Exam  Vitals and nursing note reviewed  Constitutional:       General: He is not in acute distress  Appearance: He is well-developed  He is ill-appearing  He is not toxic-appearing or diaphoretic  Comments: Chronically ill-appearing   HENT:      Head: Normocephalic and atraumatic  Eyes:      General: Scleral icterus present  Conjunctiva/sclera: Conjunctivae normal    Cardiovascular:      Rate and Rhythm: Normal rate and regular rhythm  Heart sounds: No murmur heard  No friction rub  No gallop  Pulmonary:      Effort: Pulmonary effort is normal  No respiratory distress  Breath sounds: Normal breath sounds  No stridor  No wheezing, rhonchi or rales  Chest:      Chest wall: No tenderness  Abdominal:      General: There is no distension  Palpations: Abdomen is soft  There is no mass  Tenderness: There is no abdominal tenderness  There is no guarding or rebound  Hernia: No hernia is present  Musculoskeletal:         General: No swelling, tenderness, deformity or signs of injury  Cervical back: Neck supple  Right lower leg: No edema  Left lower leg: No edema  Skin:     General: Skin is warm and dry  Coloration: Skin is not jaundiced or pale  Findings: No bruising, erythema, lesion or rash     Neurological:      Mental Status: He is alert  Additional Data:     Labs:  Results from last 7 days   Lab Units 02/17/22  0519 02/12/22  0824 02/11/22  0603   WBC Thousand/uL 6 83   < > 7 43   HEMOGLOBIN g/dL 10 6*   < > 11 7*   HEMATOCRIT % 29 7*   < > 32 7*   PLATELETS Thousands/uL 158   < > 171   NEUTROS PCT %  --   --  67   LYMPHS PCT %  --   --  16   MONOS PCT %  --   --  13*   EOS PCT %  --   --  2    < > = values in this interval not displayed  Results from last 7 days   Lab Units 02/17/22  0519   SODIUM mmol/L 143   POTASSIUM mmol/L 3 5   CHLORIDE mmol/L 114*   CO2 mmol/L 21   BUN mg/dL 36*   CREATININE mg/dL 0 85   ANION GAP mmol/L 8   CALCIUM mg/dL 8 1*   ALBUMIN g/dL 2 8*   TOTAL BILIRUBIN mg/dL 25 15*   ALK PHOS U/L 404*   ALT U/L 48   AST U/L 63*   GLUCOSE RANDOM mg/dL 117     Results from last 7 days   Lab Units 02/11/22  0603   INR  1 22*                   Lines/Drains:  Invasive Devices  Report    Peripheral Intravenous Line            Peripheral IV 02/17/22 Left;Ventral (anterior) Forearm <1 day          Drain            External Urinary Catheter Medium 2 days                      Imaging: No pertinent imaging reviewed      Recent Cultures (last 7 days):   Results from last 7 days   Lab Units 02/16/22  1710 02/13/22  1538   GRAM STAIN RESULT  Rare Polys  No organisms seen 1+ Polys  No bacteria seen   BODY FLUID CULTURE, STERILE  No growth No growth       Last 24 Hours Medication List:   Current Facility-Administered Medications   Medication Dose Route Frequency Provider Last Rate    cefTRIAXone  2,000 mg Intravenous Q24H Jordno Woody DO 2,000 mg (02/16/22 2302)    heparin (porcine)  5,000 Units Subcutaneous Q8H 900 Arturo Woodson MD      lactulose  10 g Oral Daily Ivar Fareed, DO      levothyroxine  200 mcg Oral Early Morning Ivjeremiah Fossr DO      melatonin  6 mg Oral HS Nicola Langley PA-C          Today, Patient Was Seen By: Maurizio De La Garza DO    **Please Note: This note may have been constructed using a voice recognition system  **

## 2022-02-17 NOTE — PROGRESS NOTES
Progress Note- Norma Telles 80 y o  male MRN: [id number]    Unit/Bed#: [facility] 910-01 Encounter: [id number]      Assessment and Plan:  28-year-old male with history of hypertension, hypothyroidism, and hyperlipidemia who presented with weakness and obstructive jaundice from suspected CBD stricture/mass   Patient transferred from [facility] for GI evaluation and intervention with EUS and ERCP      1  Obstructive jaundice, elevated LFTs - secondary to cholangiocarcinoma    Placed metal stent in the CBD  Biopsies confirming cholangiocarcinoma  CA 19-9 markedly elevated at 28,356       · Patient has worsening total bilirubin  However, alkaline phosphatase continues to improve  His worsening bilirubin could be related to decompensated cirrhosis  · Evaluated by Surgical Oncology and recommend hospice   · Surgical oncology discussed percutaneous transhepatic biliary drainage  Unclear if this would be beneficial as he had a CBD stent placed and there has been improvement in alkaline phosphatase  We can consider repeat imaging to evaluate for stent location  However, while awaiting final goals of care discussions amongst family and discussion with Dr Vera Michael would hold off any further imaging  · Patient fluid studies on 2/13 with WBC 1870 predominantly neutrophilic, PMN 1904  Has been treated for SBP  Cytology showing atypical cells which could be due malignancy, repeat fluid studies showing WBC 1180      2  Decompensated new cirrhosis - imaging findings suggestive of ascites and possible new cirrhosis   Synthetic function appears to be intact given normal INR   Small esophageal varices were seen on EGD which does support presence of portal hypertension   Normal platelet count   Evidence of ascites also noted on imaging which may be malignant based on concerns above   Cause for liver disease, if present, unknown but may be secondary to malignancy versus fatty liver versus      Autoimmune workup negative Hepatitis panel negative   MELD- Na score 21      SBP  Malignant ascites     · Started on ceftriaxone 2/13  Day 5 of ceftriaxone today, can switch to prophylaxis tomorrow   · Receiving albumin on Day 1 and Day 3   · Fluid studies 2/13 WBC 1870 predominantly neutrophilic, PMN 2789  2/16 repeat fluid studies showing WBC 1180    · Culture and Gram stain negative, cytology with atypical cells    · Paracentesis by IR with removal of 2 L of fluid on 2/13 and 3L 2/16  · High SAAG low protein consistent with portal hypertension secondary to cirrhosis  · Continue 2 gm sodium restriction      Portal hypertension     · Small esophageal varices on EGD on 2/11/22 and mild PHG; no high risk stigmata  · No indication for NSBB at this time     PSE     § No evidence of HE, asterixis  § Minimize use of narcotics, benzodiazepines, sedatives  § Can continue lactulose for now      HCC screening     · Concern for cholangiocarcinoma as noted above with possible liver metastases on CT imaging from 2/8/22, less likely primary HCC  · AFP on 2/9/22 WNL at 1 8  · Further screening may not be necessary in setting of suspected malignancy as noted above     3  GERSON - resolved      Patient to be discussed with Dr Segundo Carrasquillo MD   Gastroenterology Fellow     ______________________________________________________________________    Subjective:     Patient eager to leave   Denies any abdominal discomfort    Medication Administration - last 24 hours from 02/16/2022 0937 to 02/17/2022 7121       Date/Time Order Dose Route Action Action by     02/17/2022 0634 levothyroxine tablet 200 mcg 200 mcg Oral Given Santo Clarke RN     02/17/2022 0636 heparin (porcine) subcutaneous injection 5,000 Units 5,000 Units Subcutaneous Given Santo Clarke RN     02/16/2022 2255 heparin (porcine) subcutaneous injection 5,000 Units 5,000 Units Subcutaneous Given Santo Clarke RN     02/16/2022 1352 heparin (porcine) subcutaneous injection 5,000 Units 5,000 Units Subcutaneous Not Given Cameron Marlow, MARTHA     02/16/2022 2302 cefTRIAXone (ROCEPHIN) 2,000 mg in dextrose 5 % 50 mL IVPB 2,000 mg Intravenous New Bag Julia Chichi, RN     02/16/2022 1846 albumin human (FLEXBUMIN) 25 % injection 25 g 25 g Intravenous [employer], RN     02/16/2022 2255 melatonin tablet 6 mg 6 mg Oral Given Lea Carrillo RN          Objective:     Vitals: Blood pressure 123/62, pulse 78, temperature 98 8 °F (37 1 °C), resp  rate 18, height 6' 4" (1 93 m), SpO2 96 %  ,Body mass index is 32 26 kg/m²  Intake/Output Summary (Last 24 hours) at 2/17/2022 9940  Last data filed at 2/16/2022 5885  Gross per 24 hour   Intake --   Output 5500 ml   Net -5500 ml       Physical Exam:   General Appearance: Awake and alert, in no acute distress  Abdomen: Soft, non-tender, non-distended; bowel sounds normal; no masses or no organomegaly    Invasive Devices  Report    Peripheral Intravenous Line            Peripheral IV 02/17/22 Left;Ventral (anterior) Forearm <1 day          Drain            External Urinary Catheter Medium 2 days                Lab Results:  No results displayed because visit has over 200 results  Imaging Studies: I have personally reviewed pertinent imaging studies

## 2022-02-18 NOTE — PLAN OF CARE
Problem: OCCUPATIONAL THERAPY ADULT  Goal: Performs self-care activities at highest level of function for planned discharge setting  See evaluation for individualized goals  Description: Treatment Interventions: ADL retraining,Functional transfer training,UE strengthening/ROM,Endurance training,Patient/family training,Equipment evaluation/education,Cognitive reorientation,Compensatory technique education,Continued evaluation,Energy conservation,Activityengagement          See flowsheet documentation for full assessment, interventions and recommendations  Outcome: Progressing  Note: Limitation: Decreased ADL status,Decreased UE ROM,Decreased UE strength,Decreased Safe judgement during ADL,Decreased endurance,Decreased self-care trans,Decreased high-level ADLs  Prognosis: Fair  Assessment: Pt was seen on 2/18/2022 for skilled OT session  OT session was focused on ADLS, functional transfers, functional mobility, and endurance  Pt was agreeable to OT session  Pt is making gains in ADLs, transfers, mobility, and endurance Pt was able to demonstrate grooming, UB dressing, and LB dressing with min A  Pt was able to demonstrate transfers and functional mobility with min A w/ RW  Pt is limited 2* endurance, activity tolerance, functional mobility, functional standing tolerance and decreased I w/ ADLS/IADLS  The following Occupational Performance Areas to address include: eating, grooming, bathing/shower, toilet hygiene, dressing, health maintenance and functional mobility  Pt will continue to benefit from skilled OT services 3-5x a wk to address goals  OT recommendations for d/c include post acute rehab  The patient's raw score on the AM-PAC Daily Activity inpatient short form is 16, standardized score is 35 96, less than 39 4  Patients at this level are likely to benefit from discharge to post-acute rehabilitation services  Please refer to the recommendation of the Occupational Therapist for safe discharge planning  OT Discharge Recommendation: Post acute rehabilitation services  OT - OK to Discharge: Yes (when medically appropriate)

## 2022-02-18 NOTE — PROGRESS NOTES
[facility]  Progress Note - Renetta Huffman 1934, 80 y o  male MRN: [id number]  Unit/Bed#: [facility] 910-01 Encounter: [id number]  Primary Care Provider: Kamini Eaton MD   Date and time admitted to hospital: 2/10/2022 12:54 AM    Acute kidney injury [facility])  Assessment & Plan  Likely multifactorial in the setting of contrast 2/9, possible hepatorenal, ARB use, possible intra abdominal HTN  Retroperitoneal ultrasound no hydronephrosis  Nephrology consult appreciated  S/p IV albumin  Creatinine starting to trend down  Baseline around 0 8        Lung nodule  Assessment & Plan  · CT AP: Left lower lobe 5 mm nodules  Bibasilar reticular changes and/or mild atelectasis  Paraesophageal varices  · Outpatient follow-up    Liver cirrhosis ([facility] [address] )  Assessment & Plan  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 with ascites  New onset  · Status post abdominal paracentesis with IR 2/13 for 2L  WBC noted, no bacteria on gram stain  Started on empiric Ceftriaxone for possible SBP  Day 4/5  For repeat paracentesis 2/16 to follow up fluid studies  Patient non-toxic appearing, afebrile, and without pain  Findings may be secondary to malignancy, but with high mortality rate from SBP, will treat    · GI following    -reviewed ascitic fluid studies still has elevated PMNs above 400, will continue to treat and so under to 250  -plan to treat with ceftriaxone for 7 days, then decrease to prophylaxis dosing, discharge on lifetime Cipro or Bactrim    Mass of right inguinal region  Assessment & Plan  · Venous duplex of the bilateral lower extremities (02/08/2022) as outpatient showed 5 1 cm non vascularized cystic structure in the right groin  · Plan for outpatient surgery follow-up    Hyperammonemia ([facility] [address] )  Assessment & Plan  · Due to liver disease  · Continue lactulose 10 grams PO Qdaily for a goal 2-3 soft bowel movements per 24 hours - do not have to discharge on lactulose per GI    Class 1 obesity without serious comorbidity with body mass index (BMI) of 31 0 to 31 9 in adult  Assessment & Plan  Therapeutic lifestyle modification    Essential hypertension  Assessment & Plan  · Monitor blood pressures   · avoid hypotension    * Obstructive jaundice  Assessment & Plan  Obstructive jaundice  Status post ERCP with sphincterotomy and stent placement 2/10  Follow-up on biopsy studies - high suspicion for cholangiocarcinoma  CA19 - 28,354  Monitor LFTs  GI following  Biopsy showing adenocarcinoma  D/W GI, consult onc and surg/onc         2/17/22:  Held a discussion with myself and Hematology-Oncology, patient and family are now agreeable to rehab placement at the [facility] off on hospice for now  -will obtain MRI MRCP with contrast, kidney function is fine, either way new gadolinium does not pose a significant risk of an as after as it used to in the past, due to using a different agent  MRI will help delineate whether he truly has metastasis, and also assessed position/placement of stent      2/18/22: Pending xray for stent placement, mri pending for liver mets  -bilirubin trending up  -continue tx ceftriaxone and confirm further response with repeat thoracentesis fluid studies          VTE Pharmacologic Prophylaxis: VTE Score: 5 Moderate Risk (Score 3-4) - Pharmacological DVT Prophylaxis Ordered: heparin  Patient Centered Rounds: I performed bedside rounds with nursing staff today  Discussions with Specialists or Other Care Team Provider: n/a    Education and Discussions with Family / Patient: Updated  (wife) via phone  Time Spent for Care: 30 minutes  More than 50% of total time spent on counseling and coordination of care as described above      Current Length of Stay: 8 day(s)  Current Patient Status: Inpatient   Certification Statement: The patient will continue to require additional inpatient hospital stay due to pending mri, placement  Discharge Plan: Anticipate discharge in 48-72 hrs to rehab facility  Code Status: Level 3 - DNAR and DNI    Subjective:   Patient reports trouble sleeping overnight, denies any abdominal pain, nausea, vomiting, pruritus  Denies fever or chills as well  Objective:     Vitals:   Temp (24hrs), Av 1 °F (37 3 °C), Min:98 6 °F (37 °C), Max:99 8 °F (37 7 °C)    Temp:  [98 6 °F (37 °C)-99 8 °F (37 7 °C)] 98 6 °F (37 °C)  HR:  [77] 77  BP: (110-131)/(51-69) 116/54  SpO2:  [95 %] 95 %  Body mass index is 32 26 kg/m²  Input and Output Summary (last 24 hours): Intake/Output Summary (Last 24 hours) at 2022 1100  Last data filed at 2022 0850  Gross per 24 hour   Intake 480 ml   Output 175 ml   Net 305 ml       Physical Exam:   Physical Exam  Vitals and nursing note reviewed  Constitutional:       General: He is not in acute distress  Appearance: He is well-developed  He is ill-appearing  He is not toxic-appearing or diaphoretic  HENT:      Head: Normocephalic and atraumatic  Eyes:      General: Scleral icterus present  Conjunctiva/sclera: Conjunctivae normal    Cardiovascular:      Rate and Rhythm: Normal rate and regular rhythm  Heart sounds: No murmur heard  No friction rub  Gallop present  Pulmonary:      Effort: Pulmonary effort is normal  No respiratory distress  Breath sounds: Normal breath sounds  No stridor  No wheezing, rhonchi or rales  Chest:      Chest wall: No tenderness  Abdominal:      General: There is distension  Palpations: Abdomen is soft  There is no mass  Tenderness: There is no abdominal tenderness  There is no guarding or rebound  Hernia: No hernia is present  Musculoskeletal:      Cervical back: Neck supple  Right lower leg: Edema present  Left lower leg: Edema present  Skin:     General: Skin is warm and dry  Coloration: Skin is jaundiced  Skin is not pale  Findings: No bruising, erythema, lesion or rash  Neurological:      Mental Status: He is alert  Additional Data:     Labs:  Results from last 7 days   Lab Units 02/18/22  0459   WBC Thousand/uL 7 58   HEMOGLOBIN g/dL 10 5*   HEMATOCRIT % 29 9*   PLATELETS Thousands/uL 166   LYMPHO PCT % 11*   MONO PCT % 11   EOS PCT % 3     Results from last 7 days   Lab Units 02/18/22  0459   SODIUM mmol/L 139   POTASSIUM mmol/L 3 9   CHLORIDE mmol/L 112*   CO2 mmol/L 19*   BUN mg/dL 29*   CREATININE mg/dL 0 69   ANION GAP mmol/L 8   CALCIUM mg/dL 8 8   ALBUMIN g/dL 2 8*   TOTAL BILIRUBIN mg/dL 28 48*   ALK PHOS U/L 402*   ALT U/L 48   AST U/L 70*   GLUCOSE RANDOM mg/dL 111     Results from last 7 days   Lab Units 02/18/22  0459   INR  1 33*                   Lines/Drains:  Invasive Devices  Report    Peripheral Intravenous Line            Peripheral IV 02/17/22 Left;Ventral (anterior) Forearm 1 day          Drain            External Urinary Catheter Medium 3 days                      Imaging: No pertinent imaging reviewed  Recent Cultures (last 7 days):   Results from last 7 days   Lab Units 02/16/22  1710 02/13/22  1538   GRAM STAIN RESULT  Rare Polys  No organisms seen 1+ Polys  No bacteria seen   BODY FLUID CULTURE, STERILE  No growth No growth       Last 24 Hours Medication List:   Current Facility-Administered Medications   Medication Dose Route Frequency Provider Last Rate    cefTRIAXone  2,000 mg Intravenous Q24H Marlin Shook DO 2,000 mg (02/17/22 2126)    heparin (porcine)  5,000 Units Subcutaneous [facility] Hu Valladares MD      lactulose  10 g Oral Daily Sagrario Liming, DO      levothyroxine  200 mcg Oral Early Morning Sagrario Liming, DO      melatonin  6 mg Oral HS Estephanie Fritz PA-C      sodium bicarbonate  650 mg Oral BID after meals Carlo Jaramillo MD          Today, Patient Was Seen By: Rell Vieyra DO    **Please Note: This note may have been constructed using a voice recognition system  **

## 2022-02-18 NOTE — PROGRESS NOTES
Progress Note- Priyank Amaro 80 y o  male MRN: [id number]    Unit/Bed#: [facility] 910-01 Encounter: [id number]      Assessment and Plan:  26-year-old male with history of hypertension, hypothyroidism, and hyperlipidemia who presented with weakness and obstructive jaundice from suspected CBD stricture/mass  Underwent ERCP/EUS on  2/11 which showed CBD mass s/p stent placement  1  Obstructive jaundice, elevated liver enzymes - secondary to cholangiocarcinoma and decompensated cirrhosis      Placed metal stent in the CBD on 2/11 with no significant improvement in bilirubin    Biopsies confirming cholangiocarcinoma and ascitic fluid with atypical cells       · Patient has continued worsening of total bilirubin  However, alkaline phosphatase continues to improve  His worsening bilirubin could be related to decompensated cirrhosis  We are getting abdominal X ray to evaluate for any migration of his stent  MRI ordered by primary team to evaluate for liver mets  · Evaluated by Surgical Oncology and recommend hospice   · Surgical oncology discussed percutaneous transhepatic biliary drainage  Unclear if this would be beneficial as he had a CBD stent placed and there has been improvement in alkaline phosphatase  We are gettinng repeat imaging to evaluate for stent location and MRI ordered to see if any area of obstruction present which is not being drained by the stent  · Patient fluid studies on 2/13 with WBC 1870 predominantly neutrophilic, PMN 2055  Cytology showing atypical cells which could be due malignancy, repeat fluid studies after 4 days of abx showing WBC 1180  - still concerning for SBP   Continue antibiotics and get repeat ascitic fluid studies (fluid count and cytology) on day 7 which would be tomorrow if IR available to do the studies or can get it on Monday   · Percutaneous insertion of a tunneled Tenckhoff catheter for the palliative drainage of malignant ascites only if patient considering hospice and SBP has been completely treated      2  Decompensated new cirrhosis - imaging findings suggestive of ascites and possible new cirrhosis   Synthetic function appears to be intact given normal INR   Small esophageal varices were seen on EGD which does support presence of portal hypertension   Normal platelet count   Evidence of ascites also noted on imaging which may be malignant based on concerns above   Cause for liver disease, if present, unknown but may be secondary to malignancy versus fatty liver versus      Autoimmune workup negative   Hepatitis panel negative   MELD- Na score 22, INR 1 3     SBP  Malignant ascites     · Started on ceftriaxone 2/13  Day 6 of ceftriaxone today, get repeat studies as discussed above   · Received albumin on Day 1 and Day 3   · Fluid studies 2/13 WBC 1870 predominantly neutrophilic, PMN 4548  2/16 repeat fluid studies showing WBC 1180   Repeat fluid studies before stopping antibiotics   · Culture and Gram stain negative, cytology with atypical cells    · Paracentesis by IR with removal of 2 L of fluid on 2/13 and 3L 2/16   Palliative Tenckhoff catheter only if patient considering hospice   · High SAAG low protein consistent with portal hypertension secondary to cirrhosis  · Continue 2 gm sodium restriction       Patient to be discussed with Dr Afua Newman MD   Gastroenterology [facility] 2    ______________________________________________________________________    Subjective:    Patient denies any abdominal discomfort     Medication Administration - last 24 hours from 02/17/2022 0848 to 02/18/2022 0848       Date/Time Order Dose Route Action Action by     02/17/2022 1058 lactulose oral solution 10 g 10 g Oral Given Priscilla Hand RN     02/18/2022 0501 levothyroxine tablet 200 mcg 200 mcg Oral Given Renaot Ignacio RN     02/18/2022 0501 heparin (porcine) subcutaneous injection 5,000 Units 5,000 Units Subcutaneous Given Renato Ignacio RN 02/17/2022 2126 heparin (porcine) subcutaneous injection 5,000 Units 5,000 Units Subcutaneous Given Constantine Bernard, MARTHA     02/17/2022 1515 heparin (porcine) subcutaneous injection 5,000 Units 5,000 Units Subcutaneous Given Anel Mendes RN     02/17/2022 2126 cefTRIAXone (ROCEPHIN) 2,000 mg in dextrose 5 % 50 mL IVPB 2,000 mg Intravenous 400 [facility]     02/17/2022 2126 melatonin tablet 6 mg 6 mg Oral Given Constantine Bernard, MARTHA     02/17/2022 1058 albumin human (FLEXBUMIN) 25 % injection 25 g 25 g Intravenous New Bag Lea Sparks, MARTHA          Objective:     Vitals: Blood pressure 116/54, pulse 77, temperature 98 6 °F (37 °C), resp  rate 18, height 6' 4" (1 93 m), SpO2 95 %  ,Body mass index is 32 26 kg/m²  Intake/Output Summary (Last 24 hours) at 2/18/2022 0848  Last data filed at 2/18/2022 0320  Gross per 24 hour   Intake 360 ml   Output 175 ml   Net 185 ml       Physical Exam:   General Appearance: Awake and alert, in no acute distress  + jaundice   Abdomen: Distended abdomen     Invasive Devices  Report    Peripheral Intravenous Line            Peripheral IV 02/17/22 Left;Ventral (anterior) Forearm 1 day          Drain            External Urinary Catheter Medium 3 days                Lab Results:  No results displayed because visit has over 200 results  Imaging Studies: I have personally reviewed pertinent imaging studies

## 2022-02-18 NOTE — CASE MANAGEMENT
Case Management Discharge Planning Note    Patient name Braden Carpenter  Location [facility] 910/[facility] 756-13 MRN [id number]  : 1934 Date 2022       Current Admission Date: 2/10/2022  Current Admission Diagnosis:Obstructive jaundice   Patient Active Problem List    Diagnosis Date Noted    Acute kidney injury ([facility] [address] ) 2022    Elevated TSH 2022    Abnormal MRI, liver 2022    Thrombosis, portal vein 2022    Common bile duct dilatation 2022    Mass of right inguinal region     Diastolic dysfunction     Abnormal EKG 2022    Liver cirrhosis ([facility] [address] ) 2022    Abnormal urinalysis 2022    Lung nodule 2022    Transaminitis 2022    Hyperbilirubinemia 2022    Hyperammonemia ([facility] [address] ) 2022    Elevated MCV 2022    Obstructive jaundice 2022    [facility] 20[facility]    Bilateral lower extremity edema 2021    Exudative age-related macular degeneration, bilateral, with active choroidal neovascularization ([facility] ) 07/15/2020    Asymmetrical sensorineural hearing loss 07/15/2020    Osteoarthritis 07/15/2020    Rosacea 07/15/2020    Male erectile disorder 2019    Impaired fasting glucose 2019    Class 1 obesity without serious comorbidity with body mass index (BMI) of 31 0 to 31 9 in adult 03/15/2019    Hypothyroidism 2018    Essential hypertension 2018    Chronic osteomyelitis of knee ([facility] [address] ) 10/08/2013      LOS (days): 8  Geometric Mean LOS (GMLOS) (days): 4 80  Days to GMLOS:-3 6     OBJECTIVE:  Risk of Unplanned Readmission Score: 14         Current admission status: Inpatient   Preferred Pharmacy:   [facility] [address]  [address] [place] [address]  Phone: [phone] Fax: [address] [facility] S[facility]ast[facility] - [address]  [address] [facility] [address]  [address] [place] [address]  Phone: [phone] Fax: [address] [place] [address] 68  [address] [address]  Phone: [phone] Fax: [phone]    Primary Care Provider: Herbert Lui MD    Primary Insurance: [facility]  Secondary Insurance:     DISCHARGE DETAILS:    Discharge planning discussed with[de-identified] Demetrius Lopez (spouse)  Freedom of Choice: Yes     CM contacted family/caregiver?: Yes  Were Treatment Team discharge recommendations reviewed with patient/caregiver?: Yes  Did patient/caregiver verbalize understanding of patient care needs?: Yes  Were patient/caregiver advised of the risks associated with not following Treatment Team discharge recommendations?: Yes    Contacts  Patient Contacts: Demetrius Lopez (spouse) and Darling Isbell (son in law on speaker phone)  Relationship to Patient[de-identified] Family  Contact Method: Phone  Phone Number: [phone]  Reason/Outcome: Continuity of Care,Emergency Contact,Referral,Discharge Planning              Other Referral/Resources/Interventions Provided:  Referral Comments: spoke with spouse Demetrius Lopez and dtr Anita yesterday - family would like [facility] specific facility if possible  Called adn dspoke with Saw Shrestha from [facility] who stated that the pt does not meet criteria as those facilities are acute rehab facilities  The pt however can admit to a [facility] contracted SNF  Family opted for [facility] out of the facilities that were provided with goal to d/c to facility on Monday  Spoke with Saw Shrestha from [facility] this am who stated that the patient was approved and she would call [facility] to provide confirmation of authorization as they do not actually provide an "authorization number" since they are not an insurance company  Saw Shrestha will be out until next Wednesday and provided additional contacts (nurse Carlitos Hampton at  and nurse Rebecca Medina at  phone number [phone])           Treatment Team Recommendation: Short Term Rehab           Additional Comments: family was contacted this am - per Isabelle Pineda pt is 100% service connnected which would mean that room/board, DME, meds, and some authorized transport would be fully covered  They also stated that there is a [place] [address] facility and in hospice unit in [facility] for [facility] if the pt/family would eventually decide on that  Spouse made aware

## 2022-02-18 NOTE — PROGRESS NOTES
Surgical Oncology Service:  NAME: Aileen Rodriguez AGE: 80 y o  SEX: male MRN: [id number]  Unit/Bed#:  Encounter: [id number]    A/P:     Aileen Rodriguez is a 80 y o  male with a PMHx HTN and hypothyroidism who presents with cholangiocarcinoma, with occlusion of the main portal vein in the setting of elevated CEA (6 8) and CA 19-9 (28,356), s/p paracentesis x 2 (2/13 &2/16) and s/p ERCP w/sphincterotomy and CBD stent on 2/10    LFTs yesterday 63/48/404   tbil yesterday 25 15 from 24 69    Plan:  - No surgical intervention at this time  - Recommend continued goals of care discussions  - Recommend Palliative consult   - Consider Geriatric consult  - Appreciate Medical Oncology recommendations  - Appreciate GI recommendations  - Recommend IR consult for percutaneous transhepatic biliary drainage   - Rest of care per primary team      SUBJECTIVE:    Patient seen and examined bedside  No acute overnight events  Spoke to me about his family meeting and plans for [facility]  States difficulty sleeping, would benefit from geriatric and/or palliative consults  Some anxiety      OBJECTIVE:    Vitals:    02/17/22 2223   BP: 110/51   Pulse:    Resp:    Temp: 99 8 °F (37 7 °C)   SpO2:        Visit Vitals  /51   Pulse 78   Temp 99 8 °F (37 7 °C)   Resp 18   Ht 6' 4" (1 93 m)   SpO2 96%   BMI 32 26 kg/m²   Smoking Status Never Smoker   BSA 2 5 m²        Current Vitals:   Blood Pressure: 110/51 (02/17/22 2223)  Pulse: 78 (02/17/22 0729)  Temperature: 99 8 °F (37 7 °C) (02/17/22 2223)  Temp Source: Oral (02/15/22 1541)  Respirations: 18 (02/16/22 1659)  Height: 6' 4" (193 cm) (02/10/22 0616)  SpO2: 96 % (02/17/22 0729)    I/O       02/14 0701  02/15 0700 02/15 0701  02/16 0700 02/16 0701 02/17 0700    P  O  120  240    I V        IV Piggyback 500      Total Intake 620  240    Urine 8296 844 2307    Other   3050    Stool 0      Total Output 3861 730 3911    Net -980 -536 -3460           Unmeasured Stool Occurrence 1 x Intake/Output Summary (Last 24 hours) at 2/18/2022 9519  Last data filed at 2/18/2022 0320  Gross per 24 hour   Intake 360 ml   Output 175 ml   Net 185 ml           Meds/Allergies   current meds:   Current Facility-Administered Medications   Medication Dose Route Frequency    cefTRIAXone (ROCEPHIN) 2,000 mg in dextrose 5 % 50 mL IVPB  2,000 mg Intravenous Q24H    heparin (porcine) subcutaneous injection 5,000 Units  5,000 Units Subcutaneous Q8H [facility]    lactulose oral solution 10 g  10 g Oral Daily    levothyroxine tablet 200 mcg  200 mcg Oral Early Morning    melatonin tablet 6 mg  6 mg Oral HS    and PTA meds:   Prior to Admission Medications   Prescriptions Last Dose Informant Patient Reported? Taking?    Diclofenac Sodium (VOLTAREN) 1 %  Spouse/Significant Other No No   Sig: Apply 2 g topically 4 (four) times a day   Patient not taking: Reported on 2/2/2022    Multiple Vitamins-Minerals (ICAPS AREDS 2 PO)  Spouse/Significant Other Yes No   Sig: Take by mouth   doxycycline hyclate (VIBRAMYCIN) 100 mg capsule  Spouse/Significant Other No No   Sig: Take 1 capsule (100 mg total) by mouth in the morning   irbesartan (AVAPRO) 300 mg tablet  Spouse/Significant Other No No   Sig: TAKE 1 TABLET EVERY OTHER  DAY   levothyroxine (Synthroid) 200 mcg tablet  Spouse/Significant Other No No   Sig: Take 1 tablet (200 mcg total) by mouth daily in the early morning   metroNIDAZOLE (METROGEL) 0 75 % gel  Spouse/Significant Other No No   Sig: Apply topically 2 (two) times a day   neomycin-polymyxin-hydrocortisone (CORTISPORIN) otic solution   No No   Sig: Administer 4 drops into both ears every 8 (eight) hours for 7 days   sildenafil (VIAGRA) 100 mg tablet  Spouse/Significant Other No No   Sig: Take 1 tablet (100 mg total) by mouth daily as needed for erectile dysfunction   Patient not taking: Reported on 2/8/2022    tadalafil (CIALIS) 20 MG tablet  Spouse/Significant Other No No   Sig: Take 1 tablet (20 mg total) by mouth daily as needed for erectile dysfunction   Patient not taking: Reported on 2/8/2022    tamsulosin (FLOMAX) 0 4 mg  Spouse/Significant Other Yes No   Sig:     Patient not taking: Reported on 2/8/2022       Facility-Administered Medications: None     No Known Allergies         Physical Exam:  Gen: No acute distress, responsive and cooperative  CV: Warm, regular rate  Pulm: Normal work of breathing, no respiratory distress  Abd: Soft, distended, nontender to palpation  Skin: Jaundiced throughout  Neuro: Moving all extremities      Labs:    Lab Results   Component Value Date    HGB 10 6 (L) 02/17/2022    HCT 29 7 (L) 02/17/2022    MCV 99 (H) 02/17/2022     02/17/2022    AST 63 (H) 02/17/2022    ALT 48 02/17/2022    ALKPHOS 404 (H) 02/17/2022    TBILI 25 15 (H) 02/17/2022            Results from last 7 days   Lab Units 02/17/22  0519 02/12/22  0824   WBC Thousand/uL 6 83 8 47   HEMOGLOBIN g/dL 10 6* 13 1   HEMATOCRIT % 29 7* 37 6   PLATELETS Thousands/uL 158 199     Results from last 7 days   Lab Units 02/17/22  0519 02/16/22  0609 02/15/22  0649   POTASSIUM mmol/L 3 5 3 2* 3 5   CHLORIDE mmol/L 114* 110* 110*   CO2 mmol/L 21 22 20*   BUN mg/dL 36* 45* 53*   CREATININE mg/dL 0 85 1 40* 1 86*   CALCIUM mg/dL 8 1* 8 3 8 1*     Results from last 7 days   Lab Units 02/18/22  0459   INR  1 33*           Results Reviewed     None          Imaging:    ERCP    Result Date: 2/11/2022  Impression: ERCP revealing distal common bile duct stricture s/p sphincterotomy, brushings, placement of 8 mm x 60 mm common bile duct stent  RECOMMENDATION: Return to hospital resendez for ongoing care Follow-up results from CBD brushing  Dorota Liriano MD     CT chest wo contrast    Result Date: 2/11/2022  Impression: 1  Left lower lobe pulmonary nodules identified, largest measuring up to 5 mm  Based on current Fleischner Society 2017 Guidelines on incidental pulmonary nodule, optional follow-up CT at 12 months can be considered   2  Right greater than left calcified pleural plaques indicating prior as best as exposure  3   Hepatic cirrhosis with ascites and evidence of portal hypertension  The study was marked in EPIC for significant notification  Workstation performed: [id number]     IR INPATIENT Paracentesis    Result Date: 2/13/2022  Impression: Impression: Ultrasound-guided paracentesis  Workstation performed: [id number]     Endoscopic ultrasonography, GI (Upper)    Result Date: 2/11/2022  Impression: EGD: Small varices in the distal esophagus Mild portal hypertensive gastropathy Mild erythema in the duodenum  EUS Mass in the common bile duct measuring 1 9 x 1 6 cm s/p core biopsy x 5 passes  Preliminary cytology consistent with markedly atypical cells  Single peripancreatic lymph node measuring 1 6 cm  Ectatic changes of pancreatic duct  RECOMMENDATION: Proceed with ERCP Follow-up cytology results   Melinda Eugene MD     [place] ERCP biliary only    Result Date: 2/12/2022  Impression: Fluoroscopic guidance provided for ERCP and biliary stent placement as above  Please see procedure report for further details  Workstation performed: [id number]     US kidney and bladder    Result Date: 2/13/2022  Impression: No hydronephrosis  Partially imaged cirrhotic liver and ascites  Workstation performed: [id number]         IR INPATIENT Paracentesis   Final Result by Thania Urban MD (02/17 7661)   Impression:      Successful ultrasound-guided paracentesis  Workstation performed: [id number]         US kidney and bladder   Final Result by Navdeep Montana MD (02/13 0047)      No hydronephrosis  Partially imaged cirrhotic liver and ascites  Workstation performed: [id number]         IR INPATIENT Paracentesis   Final Result by Bridget Crowley MD (1934)   Impression:      Ultrasound-guided paracentesis              Workstation performed: [id number]         [place] ERCP biliary only   Final Result by Rosa Isela Nicole DO (02/12 1150)      Fluoroscopic guidance provided for ERCP and biliary stent placement as above  Please see procedure report for further details  Workstation performed: [id number]         CT chest wo contrast   Final Result by Eddie Justice MD (02/11 5913)   1  Left lower lobe pulmonary nodules identified, largest measuring up to 5 mm  Based on current Fleischner Society 2017 Guidelines on incidental pulmonary nodule, optional follow-up CT at 12 months can be considered  2   Right greater than left calcified pleural plaques indicating prior as best as exposure  3   Hepatic cirrhosis with ascites and evidence of portal hypertension  The study was marked in EPIC for significant notification  Workstation performed: [id number]         MRI inpatient order    (Results Pending)    No Chest XR results available for this patient         Matt Belcher MD   PGY1 General Surgery

## 2022-02-18 NOTE — PROGRESS NOTES
[address] [facility] NOTE   Akanksha Keene 80 y o  male MRN: [id number]  Unit/Bed#: [facility] 910-01 Encounter: [id number]  Reason for Consult: GERSON    ASSESSMENT and PLAN:    68-year-old male with a past medical history of hypertension, hypothyroidism, who initially presents to weakness   Was found to have jaundice and transaminitis and elevated ammonia level  Brice Leaf is concern for cholangiocarcinoma and patient underwent biopsy which shows adenocarcinoma on ERCP  Stent was also placed  Bilirubin worsening  Awaiting MRI 2/18     1) acute kidney injury     - etiology multifactorial in the setting of contrast given recent contrast on 02/09, cirrhosis, possible hepatorenal, SBP   Also intra-abdominal hypertension given ascites was considered   Patient was also given ARB and therefore auto regulatory failure being considered  -was started on albumin 2/13; intravenous fluids were held over the weekend  -urine sodium-  -2/13-creatinine 2 6, rising  -2/14-creatinine 2 2 mg/dL   Potassium 3 1, being repleted   Bilirubin rising to 23  Urine sodium 27    - 2/15-labs pending   Lowering albumin   Patient clinically is not hypovolemic   Blood pressure stabilizing   -2/16-lower albumin but given paracentesis planned, will dose albumin twice today   Creatinine improving 1 4 mg/dL  Paracentesis completed for 3 L    -2/17-paracentesis completed for 3 L  Creatinine improved to 0 85  -2/18-creatinine improving to 0 7 mg/dL  Giving bicarbonate tablets      Plan    -hold on starting diuretics for now  Patient does not urgently required  Monitor for diuretic need  Eventually may benefit    -BMP in a m   -rising bilirubin-per primary, consulting teams--> awaiting MRI  -avoid ARB  -avoid hypotension  -avoid nephrotoxic agents  -give bicarbonate tablets today and tomorrow  -if serum bicarbonate decreases further, check lactic acid and check ABG      2) obstructive jaundice     -status post ERCP with brushings  -elevated CA 19 9  -concern for cholangiocarcinoma, biopsy adenocarcinoma  -also concern for SBP  -surgical Oncology team also on board, GI team on board, Oncology team on board  -not recommended for surgical intervention at this time  recommending goals of care discussions  Per review of primary team note and family meeting discussion with primary team, no hospice for now  Awaiting rehab placement   - GI team recommending potential evaluation for stent migration with x-ray of the abdomen  -primary team will obtain MRI for further delineation     3) cirrhosis     -small esophageal varices  -paracentesis-2 L on 02/13  -paracentesis 2/17 for 3 L    -body fluid culture without growth thus far   -there is concern for SBP     4) electrolytes-hypokalemia-improved patient     5) acid/base-bicarbonate slightly lower  Anion gap not significantly elevated even when corrected for albumin     -give bicarbonate tablets today and tomorrow  -check ABG if bicarbonate continues to decrease and check lactic acid  -patient does not have significant loose stools     6) lung nodule-per primary team     7) inguinal region cystic structure-per primary team   For outpatient follow-up pending           SUBJECTIVE / 24H INTERVAL HISTORY:    Blood pressure is 329-599 systolic  Afebrile  On room air  Urine output 175 with 2 time not recorded      OBJECTIVE:  Current Weight:    Vitals:    02/17/22 0729 02/17/22 1556 02/17/22 2223 02/18/22 0756   BP: 123/62 131/69 110/51 116/54   Pulse: 78   77   Resp:       Temp: 98 8 °F (37 1 °C) 98 9 °F (37 2 °C) 99 8 °F (37 7 °C) 98 6 °F (37 °C)   TempSrc:       SpO2: 96%   95%   Height:           Intake/Output Summary (Last 24 hours) at 2/18/2022 0935  Last data filed at 2/18/2022 0850  Gross per 24 hour   Intake 480 ml   Output 175 ml   Net 305 ml     General: NAD  Skin: no rash  Eyes: icteric sclera  ENT: moist mucous membrane  Neck: supple  Chest: CTA b/l, no ronchii, no wheeze, no rubs, no rales but diminished intake bases  CVS: s1s2, no murmur, no gallop, no rub  Abdomen:  Slightly distended    Extremities:  1+ edema LE b/l, not worsening, slightly improved  :  Positive lee with dark urine  Neuro: AAOX3  Psych: normal affect    Medications:    Current Facility-Administered Medications:     cefTRIAXone (ROCEPHIN) 2,000 mg in dextrose 5 % 50 mL IVPB, 2,000 mg, Intravenous, Q24H, Theresa Cohen DO, Last Rate: 100 mL/hr at 02/17/22 2126, 2,000 mg at 02/17/22 2126    heparin (porcine) subcutaneous injection 5,000 Units, 5,000 Units, Subcutaneous, Q8H [facility], Irene Carlton MD, 5,000 Units at 02/18/22 0501    lactulose oral solution 10 g, 10 g, Oral, Daily, Emilio Thalia DO, 10 g at 02/17/22 1058    levothyroxine tablet 200 mcg, 200 mcg, Oral, Early Morning, Emilio Hails, DO, 200 mcg at 02/18/22 0501    melatonin tablet 6 mg, 6 mg, Oral, HS, Deacon Siegel PA-C, 6 mg at 02/17/22 2126    Laboratory Results:  Results from last 7 days   Lab Units 02/18/22  0459 02/17/22  0519 02/16/22  0609 02/15/22  0649 02/14/22  1120 02/13/22  0440 02/12/22  0824   WBC Thousand/uL 7 58 6 83  --   --   --   --  8 47   HEMOGLOBIN g/dL 10 5* 10 6*  --   --   --   --  13 1   HEMATOCRIT % 29 9* 29 7*  --   --   --   --  37 6   PLATELETS Thousands/uL 166 158  --   --   --   --  199   POTASSIUM mmol/L 3 9 3 5 3 2* 3 5 3 1* 3 5 3 7   CHLORIDE mmol/L 112* 114* 110* 110* 106 107 108   CO2 mmol/L 19* 21 22 20* 21 22 21   BUN mg/dL 29* 36* 45* 53* 62* 57* 40*   CREATININE mg/dL 0 69 0 85 1 40* 1 86* 2 24* 2 61* 1 81*   CALCIUM mg/dL 8 8 8 1* 8 3 8 1* 8 5 8 5 8 6   MAGNESIUM mg/dL  --  2 1  --   --   --   --   --

## 2022-02-18 NOTE — OCCUPATIONAL THERAPY NOTE
Occupational Therapy Progress Note     Patient Name: Shirly Snellen  [id number] Date: 2/18/2022  Problem List  Principal Problem:    Obstructive jaundice  Active Problems:    Essential hypertension    Class 1 obesity without serious comorbidity with body mass index (BMI) of 31 0 to 31 9 in adult    Hyperammonemia [facility])    Mass of right inguinal region    Liver cirrhosis ([facility] [address] )    Lung nodule    Acute kidney injury ([facility] [address] )          02/18/22 1051   OT Last Visit   OT Visit Date 02/18/22   Note Type   Note Type Treatment   Restrictions/Precautions   Weight Bearing Precautions Per Order No   Other Precautions Chair Alarm; Bed Alarm; Fall Risk   Lifestyle   Autonomy PTA, pt reports being I with ADLs/IADLs, walking stick for fnxl mobility, (+)     Reciprocal Relationships Wife, family   Service to Others Retired - was in the [employer] and a     Intrinsic Gratification "I enjoy life"   Pain Assessment   Pain Assessment Tool 0-10   Pain Score No Pain   ADL   Where Assessed Chair  (& sitting at sink)   Grooming Assistance 4  Minimal Assistance   Grooming Deficit Setup;Verbal cueing;Supervision/safety; Increased time to complete;Shaving   Grooming Comments RN clearance for shaving, min A for back of head and thoroughness   UB Dressing Assistance 4  Minimal Assistance   UB Dressing Deficit Pull over head; Increased time to complete   LB Dressing Assistance 4  Minimal Assistance   LB Dressing Deficit Setup; Increased time to complete; Don/doff R sock; Don/doff L sock; Don/doff R shoe   Transfers   Sit to Stand 4  Minimal assistance   Additional items Assist x 1; Increased time required   Stand to Sit 4  Minimal assistance   Additional items Assist x 1; Increased time required   Additional Comments transfers with RW  Pt was left in chair at the end of session with all neceesary items within reach and chair alarm on  Functional Mobility   Functional Mobility 4  Minimal assistance   Additional Comments Ax1   Pt was able to demonstrate short distance household mobility into the bathroom to shave seated in chair  Additional items Rolling walker   Cognition   Overall Cognitive Status WFL   Arousal/Participation Responsive; Cooperative   Attention Attends with cues to redirect   Orientation Level Oriented to place;Oriented to person;Oriented to situation  (generally to time)   Memory Decreased short term memory   Following Commands Follows one step commands with increased time or repetition   Comments Pt was pleasant and cooperative t/o session  Activity Tolerance   Activity Tolerance Patient limited by fatigue   Medical Staff Made Aware RN was made aware  Assessment   Assessment Pt was seen on 2/18/2022 for skilled OT session  OT session was focused on ADLS, functional transfers, functional mobility, and endurance  Pt was agreeable to OT session  Pt is making gains in ADLs, transfers, mobility, and endurance Pt was able to demonstrate grooming, UB dressing, and LB dressing with min A  Pt was able to demonstrate transfers and functional mobility with min A w/ RW  Pt is limited 2* endurance, activity tolerance, functional mobility, functional standing tolerance and decreased I w/ ADLS/IADLS  The following Occupational Performance Areas to address include: eating, grooming, bathing/shower, toilet hygiene, dressing, health maintenance and functional mobility  Pt will continue to benefit from skilled OT services 3-5x a wk to address goals  OT recommendations for d/c include post acute rehab  The patient's raw score on the AM-PAC Daily Activity inpatient short form is 16, standardized score is 35 96, less than 39 4  Patients at this level are likely to benefit from discharge to post-acute rehabilitation services  Please refer to the recommendation of the Occupational Therapist for safe discharge planning  Plan   Treatment Interventions ADL retraining;Functional transfer training; Endurance training; Compensatory technique education;Continued evaluation; Energy conservation; Activityengagement   Goal Expiration Date 02/24/22   OT Treatment Day 3   OT Frequency 3-5x/wk   Recommendation   OT Discharge Recommendation Post acute rehabilitation services   OT - OK to Discharge Yes  (when medically appropriate)   AM-PAC Daily Activity Inpatient   Lower Body Dressing 2   Bathing 2   Toileting 2   Upper Body Dressing 3   Grooming 3   Eating 4   Daily Activity Raw Score 16   Daily Activity Standardized Score (Calc for Raw Score >=11) 35 96   AM-PAC Applied Cognition Inpatient   Following a Speech/Presentation 3   Understanding Ordinary Conversation 4   Taking Medications 4   Remembering Where Things Are Placed or Put Away 3   Remembering List of 4-5 Errands 3   Taking Care of Complicated Tasks 3   Applied Cognition Raw Score 20   Applied Cognition Standardized Score 41 76   Modified [place] Scale   Modified [place] Scale 4       DENIS Phillip

## 2022-02-18 NOTE — PLAN OF CARE
Problem: MOBILITY - ADULT  Goal: Maintain or return to baseline ADL function  Description: INTERVENTIONS:  -  Assess patient's ability to carry out ADLs; assess patient's baseline for ADL function and identify physical deficits which impact ability to perform ADLs (bathing, care of mouth/teeth, toileting, grooming, dressing, etc )  - Assess/evaluate cause of self-care deficits   - Assess range of motion  - Assess patient's mobility; develop plan if impaired  - Assess patient's need for assistive devices and provide as appropriate  - Encourage maximum independence but intervene and supervise when necessary  - Involve family in performance of ADLs  - Assess for home care needs following discharge   - Consider OT consult to assist with ADL evaluation and planning for discharge  - Provide patient education as appropriate  Outcome: Progressing  Goal: Maintains/Returns to pre admission functional level  Description: INTERVENTIONS:  - Perform BMAT or MOVE assessment daily    - Set and communicate daily mobility goal to care team and patient/family/caregiver  - Collaborate with rehabilitation services on mobility goals if consulted  - Perform Range of Motion   - Reposition patient every 2 hours    - Dangle patient   - Stand patient   - Ambulate patient   - Out of bed to chair   - Out of bed for meals   - Out of bed for toileting  - Record patient progress and toleration of activity level   Outcome: Progressing     Problem: Potential for Falls  Goal: Patient will remain free of falls  Description: INTERVENTIONS:  - Educate patient/family on patient safety including physical limitations  - Instruct patient to call for assistance with activity   - Consult OT/PT to assist with strengthening/mobility   - Keep Call bell within reach  - Keep bed low and locked with side rails adjusted as appropriate  - Keep care items and personal belongings within reach  - Initiate and maintain comfort rounds  - Make Fall Risk Sign visible to staff  - Offer Toileting every 2 Hours, in advance of need  - Initiate/Maintain alarm  - Obtain necessary fall risk management equipment  - Apply yellow socks and bracelet for high fall risk patients  - Consider moving patient to room near nurses station  Outcome: Progressing

## 2022-02-19 NOTE — PLAN OF CARE
Problem: Potential for Falls  Goal: Patient will remain free of falls  Description: INTERVENTIONS:  - Educate patient/family on patient safety including physical limitations  - Instruct patient to call for assistance with activity   - Consult OT/PT to assist with strengthening/mobility   - Keep Call bell within reach  - Keep bed low and locked with side rails adjusted as appropriate  - Keep care items and personal belongings within reach  - Initiate and maintain comfort rounds  - Make Fall Risk Sign visible to staff  - Offer Toileting every 2 Hours, in advance of need  - Initiate/Maintain alarm  - Obtain necessary fall risk management equipment  - Apply yellow socks and bracelet for high fall risk patients  - Consider moving patient to room near nurses station  Outcome: Progressing     Problem: INFECTION - ADULT  Goal: Absence or prevention of progression during hospitalization  Description: INTERVENTIONS:  - Assess and monitor for signs and symptoms of infection  - Monitor lab/diagnostic results  - Monitor all insertion sites, i e  indwelling lines, tubes, and drains  - Monitor endotracheal if appropriate and nasal secretions for changes in amount and color  - [place] appropriate cooling/warming therapies per order  - Administer medications as ordered  - Instruct and encourage patient and family to use good hand hygiene technique  - Identify and instruct in appropriate isolation precautions for identified infection/condition  Outcome: Progressing  Goal: Absence of fever/infection during neutropenic period  Description: INTERVENTIONS:  - Monitor WBC    Outcome: Progressing

## 2022-02-19 NOTE — PROGRESS NOTES
[address] [facility]  Progress Note - Boaz Heading 1934, 80 y o  male MRN: [id number]  Unit/Bed#: [facility] 910-01 Encounter: [id number]  Primary Care Provider: Brijesh Reinoso MD   Date and time admitted to hospital: 2/10/2022 12:54 AM    Acute kidney injury [facility])  Assessment & Plan  Likely multifactorial in the setting of contrast 2/9, possible hepatorenal, ARB use, possible intra abdominal HTN  Retroperitoneal ultrasound no hydronephrosis  Nephrology consult appreciated  S/p IV albumin  Creatinine 0 7 this morning  Baseline around 0 8   -acute kidney injury resolved        Lung nodule  Assessment & Plan  · CT AP: Left lower lobe 5 mm nodules  Bibasilar reticular changes and/or mild atelectasis  Paraesophageal varices  · Outpatient follow-up    Liver cirrhosis ([facility] [place] [address] )  Assessment & Plan  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 with ascites  New onset  · Status post abdominal paracentesis with IR 2/13 for 2L  WBC noted, no bacteria on gram stain  Started on empiric Ceftriaxone for possible SBP  Day 4/5  For repeat paracentesis 2/16 to follow up fluid studies  Patient non-toxic appearing, afebrile, and without pain  Findings may be secondary to malignancy, but with high mortality rate from SBP, will treat    · GI following    -reviewed ascitic fluid studies still has elevated PMNs above 400, will continue to treat and so under to 250  -plan to treat with ceftriaxone for 7 days, then decrease to prophylaxis dosing, discharge on lifetime Cipro or Bactrim    Mass of right inguinal region  Assessment & Plan  · Venous duplex of the bilateral lower extremities (02/08/2022) as outpatient showed 5 1 cm non vascularized cystic structure in the right groin  · Plan for outpatient surgery follow-up    Hyperammonemia ([facility] [place] [address] )  Assessment & Plan  · Due to liver disease  · Continue lactulose 10 grams PO Qdaily for a goal 2-3 soft bowel movements per 24 hours - do not have to discharge on lactulose per GI    Class 1 obesity without serious comorbidity with body mass index (BMI) of 31 0 to 31 9 in adult  Assessment & Plan  Therapeutic lifestyle modification    Essential hypertension  Assessment & Plan  · Monitor blood pressures   · avoid hypotension    * Obstructive jaundice  Assessment & Plan  Obstructive jaundice  Status post ERCP with sphincterotomy and stent placement 2/10  Follow-up on biopsy studies - high suspicion for cholangiocarcinoma  CA19 - 28,354  Monitor LFTs  GI following  Biopsy showing adenocarcinoma  D/W GI, consult onc and surg/onc         2/17/22:  Held a discussion with myself and Hematology-Oncology, patient and family are now agreeable to rehab placement at the [facility] off on hospice for now  -will obtain MRI MRCP with contrast, kidney function is fine, either way new gadolinium does not pose a significant risk of an as after as it used to in the past, due to using a different agent  MRI will help delineate whether he truly has metastasis, and also assessed position/placement of stent      2/18/22: Pending xray for stent placement, mri pending for liver mets  -bilirubin trending up  -continue tx ceftriaxone and confirm further response with repeat thoracentesis fluid studies      2/19/22:  Reviewed 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          VTE Pharmacologic Prophylaxis: VTE Score: 5 High Risk (Score >/= 5) - Pharmacological DVT Prophylaxis Ordered: heparin  Sequential Compression Devices Ordered  Patient Centered Rounds: I performed bedside rounds with nursing staff today  Discussions with Specialists or Other Care Team Provider: n/a    Education and Discussions with Family / Patient: Updated  (daughter) via phone      Time Spent for Care: 30 minutes  More than 50% of total time spent on counseling and coordination of care as described above  Current Length of Stay: 9 day(s)  Current Patient Status: Inpatient   Certification Statement: The patient will continue to require additional inpatient hospital stay due to Further treatment for SBP, hyperbilirubinemia, GI evaluation potential years he  Discharge Plan: Anticipate discharge in 48-72 hrs to rehab facility  Code Status: Level 3 - DNAR and DNI    Subjective:   Patient denies acute complaints, reports he slept well last night, denies pruritus abdominal pain fever chills nausea vomiting  Objective:     Vitals:   Temp (24hrs), Av 7 °F (37 6 °C), Min:99 6 °F (37 6 °C), Max:99 7 °F (37 6 °C)    Temp:  [99 6 °F (37 6 °C)-99 7 °F (37 6 °C)] 99 7 °F (37 6 °C)  HR:  [79] 79  Resp:  [20] 20  BP: (118-120)/(56-58) 118/58  SpO2:  [93 %-97 %] 97 %  Body mass index is 32 2 kg/m²  Input and Output Summary (last 24 hours): Intake/Output Summary (Last 24 hours) at 2022 1213  Last data filed at 2022 3786  Gross per 24 hour   Intake 600 ml   Output 1000 ml   Net -400 ml       Physical Exam:   Physical Exam  Vitals and nursing note reviewed  Constitutional:       General: He is not in acute distress  Appearance: He is well-developed  He is not ill-appearing, toxic-appearing or diaphoretic  HENT:      Head: Normocephalic and atraumatic  Eyes:      General: Scleral icterus present  Conjunctiva/sclera: Conjunctivae normal    Cardiovascular:      Rate and Rhythm: Normal rate and regular rhythm  Heart sounds: No murmur heard  No friction rub  No gallop  Pulmonary:      Effort: Pulmonary effort is normal  No respiratory distress  Breath sounds: Normal breath sounds  No stridor  No wheezing, rhonchi or rales  Chest:      Chest wall: No tenderness  Abdominal:      General: There is distension  Palpations: Abdomen is soft  There is no mass  Tenderness: There is no abdominal tenderness  There is no guarding or rebound  Hernia: No hernia is present  Musculoskeletal:         General: No swelling, tenderness, deformity or signs of injury  Cervical back: Neck supple  Right lower leg: No edema  Left lower leg: No edema  Skin:     General: Skin is warm and dry  Coloration: Skin is jaundiced  Skin is not pale  Findings: No bruising, erythema, lesion or rash  Neurological:      Mental Status: He is alert and oriented to person, place, and time  Additional Data:     Labs:  Results from last 7 days   Lab Units 02/18/22  0459   WBC Thousand/uL 7 58   HEMOGLOBIN g/dL 10 5*   HEMATOCRIT % 29 9*   PLATELETS Thousands/uL 166   LYMPHO PCT % 11*   MONO PCT % 11   EOS PCT % 3     Results from last 7 days   Lab Units 02/19/22  0508   SODIUM mmol/L 140   POTASSIUM mmol/L 3 4*   CHLORIDE mmol/L 109*   CO2 mmol/L 22   BUN mg/dL 28*   CREATININE mg/dL 0 70   ANION GAP mmol/L 9   CALCIUM mg/dL 8 6   ALBUMIN g/dL 2 6*   TOTAL BILIRUBIN mg/dL 30 37*   ALK PHOS U/L 394*   ALT U/L 47   AST U/L 66*   GLUCOSE RANDOM mg/dL 118     Results from last 7 days   Lab Units 02/18/22  0459   INR  1 33*                   Lines/Drains:  Invasive Devices  Report    Peripheral Intravenous Line            Peripheral IV 02/19/22 Left;Ventral (anterior) Forearm <1 day          Drain            External Urinary Catheter Medium 4 days                      Imaging: No pertinent imaging reviewed      Recent Cultures (last 7 days):   Results from last 7 days   Lab Units 02/16/22  1710 02/13/22  1538   GRAM STAIN RESULT  Rare Polys  No organisms seen 1+ Polys  No bacteria seen   BODY FLUID CULTURE, STERILE  No growth No growth       Last 24 Hours Medication List:   Current Facility-Administered Medications   Medication Dose Route Frequency Provider Last Rate    cefTRIAXone  2,000 mg Intravenous Q24H Talia Pouch, DO 2,000 mg (02/18/22 2152)    heparin (porcine)  5,000 Units Subcutaneous [facility] Maco Montano MD      lactulose  10 g Oral Daily Sammie Rodriguez,       levothyroxine  200 mcg Oral Early Morning Sammie Rodriguez,       melatonin  6 mg Oral HS Sammye Collet, PA-C      potassium chloride  40 mEq Oral Once Reji Banai, DO      sodium bicarbonate  650 mg Oral BID after meals Ina Yousif MD          Today, Patient Was Seen By: Norma Akers DO    **Please Note: This note may have been constructed using a voice recognition system  **

## 2022-02-19 NOTE — PROGRESS NOTES
Surgical Oncology Service:  NAME: Priyank Amaro AGE: 80 y o  SEX: male MRN: [id number]  Unit/Bed#:  Encounter: [id number]    A/P:     Priyank Amaro is a 80 y o  male with a PMHx HTN and hypothyroidism who presents with cholangiocarcinoma, with occlusion of the main portal vein in the setting of elevated CEA (6 8) and CA 19-9 (28,356), s/p paracentesis x 2 (2/13 &2/16) and s/p ERCP w/sphincterotomy and CBD stent on 2/10    Tbili 24>>25>>28>> pending  >>404>>402>> pending    Plan:  · No surgical intervention at this time  · Per GI: Tentative ERCP vs  PTC to address persistent hyperbilirubinemia and biliary ductal dilation demonstrated on MRCP yesterday  May also require repeat paracentesis +/- Ten[place]off if evidence of malignant cytology  · Recommend continued goals of care discussions - currently deferring transition to hospice  · Appreciate Medical Oncology recommendations  · Rest of care per primary team      SUBJECTIVE:    No acute events  No new complaints  OBJECTIVE:    Vitals:    02/18/22 2218   BP: 120/56   Pulse: 79   Resp: 20   Temp: 99 6 °F (37 6 °C)   SpO2: 93%       Visit Vitals  /56   Pulse 79   Temp 99 6 °F (37 6 °C)   Resp 20   Ht 6' 4" (1 93 m)   Wt 120 kg (264 lb 8 8 oz) Comment: weight from 2/8/22 with [place] [facility] [place]; no additional weights since   SpO2 93%   BMI 32 20 kg/m²   Smoking Status Never Smoker   BSA 2 5 m²        Current Vitals:   Blood Pressure: 120/56 (02/18/22 2218)  Pulse: 79 (02/18/22 2218)  Temperature: 99 6 °F (37 6 °C) (02/18/22 2218)  Temp Source: Oral (02/15/22 1541)  Respirations: 20 (02/18/22 2218)  Height: 6' 4" (193 cm) (02/10/22 4265)  Weight - Scale: 120 kg (264 lb 8 8 oz) (weight from 2/8/22 with [place] [facility] [place]; no additional weights since) (02/18/22 1527)  SpO2: 93 % (02/18/22 2218)    I/O       02/17 0701 02/18 0700 02/18 0701 02/19 0700 02/19 0701 02/20 0700    P  O  360 720     Total Intake(mL/kg) 616 596 (6)     Urine (mL/kg/hr) 175 1000 (0 3)     Other       Stool  0     Total Output 175 1000     Net +185 -280            Unmeasured Urine Occurrence 2 x 1 x     Unmeasured Stool Occurrence  2 x             Intake/Output Summary (Last 24 hours) at 2/19/2022 0744  Last data filed at 2/19/2022 0343  Gross per 24 hour   Intake 720 ml   Output 1000 ml   Net -280 ml           Meds/Allergies   current meds:   Current Facility-Administered Medications   Medication Dose Route Frequency    cefTRIAXone (ROCEPHIN) 2,000 mg in dextrose 5 % 50 mL IVPB  2,000 mg Intravenous Q24H    heparin (porcine) subcutaneous injection 5,000 Units  5,000 Units Subcutaneous Q8H [address]    lactulose oral solution 10 g  10 g Oral Daily    levothyroxine tablet 200 mcg  200 mcg Oral Early Morning    melatonin tablet 6 mg  6 mg Oral HS    sodium bicarbonate tablet 650 mg  650 mg Oral BID after meals    and PTA meds:   Prior to Admission Medications   Prescriptions Last Dose Informant Patient Reported? Taking?    Diclofenac Sodium (VOLTAREN) 1 %  Spouse/Significant Other No No   Sig: Apply 2 g topically 4 (four) times a day   Patient not taking: Reported on 2/2/2022    Multiple Vitamins-Minerals (ICAPS AREDS 2 PO)  Spouse/Significant Other Yes No   Sig: Take by mouth   doxycycline hyclate (VIBRAMYCIN) 100 mg capsule  Spouse/Significant Other No No   Sig: Take 1 capsule (100 mg total) by mouth in the morning   irbesartan (AVAPRO) 300 mg tablet  Spouse/Significant Other No No   Sig: TAKE 1 TABLET EVERY OTHER  DAY   levothyroxine (Synthroid) 200 mcg tablet  Spouse/Significant Other No No   Sig: Take 1 tablet (200 mcg total) by mouth daily in the early morning   metroNIDAZOLE (METROGEL) 0 75 % gel  Spouse/Significant Other No No   Sig: Apply topically 2 (two) times a day   neomycin-polymyxin-hydrocortisone (CORTISPORIN) otic solution   No No   Sig: Administer 4 drops into both ears every 8 (eight) hours for 7 days   sildenafil (VIAGRA) 100 mg tablet Spouse/Significant Other No No   Sig: Take 1 tablet (100 mg total) by mouth daily as needed for erectile dysfunction   Patient not taking: Reported on 2/8/2022    tadalafil (CIALIS) 20 MG tablet  Spouse/Significant Other No No   Sig: Take 1 tablet (20 mg total) by mouth daily as needed for erectile dysfunction   Patient not taking: Reported on 2/8/2022    tamsulosin (FLOMAX) 0 4 mg  Spouse/Significant Other Yes No   Sig:     Patient not taking: Reported on 2/8/2022       Facility-Administered Medications: None     No Known Allergies         Physical Exam:  GEN: jaundiced  HEENT: scleral icterus  CV: warm/well perfused  Lung: normal effort  Ab: Soft, NT/ND  Extrem: No CCE  Neuro: A+Ox3, motor and sensation grossly intact      Labs:    Lab Results   Component Value Date    HGB 10 5 (L) 02/18/2022    HCT 29 9 (L) 02/18/2022     (H) 02/18/2022     02/18/2022    AST 70 (H) 02/18/2022    ALT 48 02/18/2022    ALKPHOS 402 (H) 02/18/2022    TBILI 28 48 (H) 02/18/2022            Results from last 7 days   Lab Units 02/18/22  0459 02/17/22  0519 02/12/22  0824   WBC Thousand/uL 7 58 6 83 8 47   HEMOGLOBIN g/dL 10 5* 10 6* 13 1   HEMATOCRIT % 29 9* 29 7* 37 6   PLATELETS Thousands/uL 166 158 199     Results from last 7 days   Lab Units 02/18/22  0459 02/17/22  0519 02/16/22  0609   POTASSIUM mmol/L 3 9 3 5 3 2*   CHLORIDE mmol/L 112* 114* 110*   CO2 mmol/L 19* 21 22   BUN mg/dL 29* 36* 45*   CREATININE mg/dL 0 69 0 85 1 40*   CALCIUM mg/dL 8 8 8 1* 8 3     Results from last 7 days   Lab Units 02/18/22  0459   INR  1 33*           Results Reviewed     None          Imaging:      MRI abdomen w wo contrast and mrcp   Final Result by Judy Weiner MD (02/18 1631)      Abnormal central hepatic signal with associated diffuse biliary wall enhancement suspicious for intrahepatic extension of this patient's known CBD cholangiocarcinoma   Margins of the mass are not well delineated; suspected dimensions of 8 9 x 5 2 cm on    #14/45  Peripheral intrahepatic biliary dilation with narrowing of the biliary tree through the suspected area of central hepatic mass  Portal vein is partially obscured by motion artifact on the delayed postcontrast images  It is likely severely narrowed through the andreas hepatis by surrounding mass  Perigastric varices are present  The study was marked in [facility] for immediate notification  Workstation performed: [id number]         XR abdomen 1 view kub   Final Result by Fanta Ingram MD (02/18 1004)      Biliary stent projects over the right upper quadrant  Workstation performed: [id number]         IR INPATIENT Paracentesis   Final Result by Marcelle Urban MD (02/17 0083)   Impression:      Successful ultrasound-guided paracentesis  Workstation performed: [id number]         US kidney and bladder   Final Result by Cole Barrios MD (02/13 1607)      No hydronephrosis  Partially imaged cirrhotic liver and ascites  Workstation performed: [id number]         IR INPATIENT Paracentesis   Final Result by Giovanni Scheuermann, MD (1934)   Impression:      Ultrasound-guided paracentesis  Workstation performed: [id number]         FL ERCP biliary only   Final Result by Dunia Sheldon DO (02/12 5504)      Fluoroscopic guidance provided for ERCP and biliary stent placement as above  Please see procedure report for further details  Workstation performed: [id number]         CT chest wo contrast   Final Result by Ema Wright MD (02/11 0603)   1  Left lower lobe pulmonary nodules identified, largest measuring up to 5 mm  Based on current Fleischner Society 2017 Guidelines on incidental pulmonary nodule, optional follow-up CT at 12 months can be considered  2   Right greater than left calcified pleural plaques indicating prior as best as exposure     3   Hepatic cirrhosis with ascites and evidence of portal hypertension  The study was marked in EPIC for significant notification                    Workstation performed: [id number]               Victor Manuel Potter MD

## 2022-02-19 NOTE — QUICK NOTE
Spoke with IR Dr Jerrod Cavazos about possible PTC  Patient does appear to have multiple areas of intrahepatic dilation though these areas dont appear to communicate so he would require multiple drains (one to the left liver and possible two to the right liver)  Complicating this would be his ascites on the right side which would increase the complication rate of these drains  Given the above, I think giving patients age it would likely be better to re-attempt ERCP at this time   Will make patient NPO on Monday and tentatively plan for repeat ERCP with stent adjustment and/or exchange early next week depending on GI lab avalaibility

## 2022-02-20 NOTE — PLAN OF CARE
Problem: MOBILITY - ADULT  Goal: Maintain or return to baseline ADL function  Description: INTERVENTIONS:  -  Assess patient's ability to carry out ADLs; assess patient's baseline for ADL function and identify physical deficits which impact ability to perform ADLs (bathing, care of mouth/teeth, toileting, grooming, dressing, etc )  - Assess/evaluate cause of self-care deficits   - Assess range of motion  - Assess patient's mobility; develop plan if impaired  - Assess patient's need for assistive devices and provide as appropriate  - Encourage maximum independence but intervene and supervise when necessary  - Involve family in performance of ADLs  - Assess for home care needs following discharge   - Consider OT consult to assist with ADL evaluation and planning for discharge  - Provide patient education as appropriate  Outcome: Progressing     Problem: Potential for Falls  Goal: Patient will remain free of falls  Description: INTERVENTIONS:  - Educate patient/family on patient safety including physical limitations  - Instruct patient to call for assistance with activity   - Consult OT/PT to assist with strengthening/mobility   - Keep Call bell within reach  - Keep bed low and locked with side rails adjusted as appropriate  - Keep care items and personal belongings within reach  - Initiate and maintain comfort rounds  - Make Fall Risk Sign visible to staff  - Offer Toileting every 2 Hours, in advance of need  - Initiate/Maintain alarm  - Obtain necessary fall risk management equipment  - Apply yellow socks and bracelet for high fall risk patients  - Consider moving patient to room near nurses station  Outcome: Progressing

## 2022-02-20 NOTE — PROGRESS NOTES
[address] [facility]  Progress Note - Misa Lombardo 1934, 80 y o  male MRN: [id number]  Unit/Bed#: [facility] 910-01 Encounter: [id number]  Primary Care Provider: Ortega Rivera MD   Date and time admitted to hospital: 2/10/2022 12:54 AM    Acute kidney injury [facility])  Assessment & Plan  Likely multifactorial in the setting of contrast 2/9, possible hepatorenal, ARB use, possible intra abdominal HTN  Retroperitoneal ultrasound no hydronephrosis  Nephrology consult appreciated  S/p IV albumin      -GERSON resolved        Lung nodule  Assessment & Plan  · CT AP: Left lower lobe 5 mm nodules  Bibasilar reticular changes and/or mild atelectasis  Paraesophageal varices  · Outpatient follow-up    Liver cirrhosis ([facility] [address] )  Assessment & Plan  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 with ascites  New onset  · Status post abdominal paracentesis with IR 2/13 for 2L  WBC noted, no bacteria on gram stain  Started on empiric Ceftriaxone for possible SBP  Day 4/5  For repeat paracentesis 2/16 to follow up fluid studies  Patient non-toxic appearing, afebrile, and without pain  Findings may be secondary to malignancy, but with high mortality rate from SBP, will treat    · GI following    -reviewed ascitic fluid studies still has elevated PMNs above 400, will continue to treat and so under to 250  -plan to treat with ceftriaxone until resolution of SBP on fluid studies, discharge on lifetime Cipro or Bactrim    Mass of right inguinal region  Assessment & Plan  · Venous duplex of the bilateral lower extremities (02/08/2022) as outpatient showed 5 1 cm non vascularized cystic structure in the right groin  · Plan for outpatient surgery follow-up    Hyperammonemia ([facility] [address] )  Assessment & Plan  · Due to liver disease  · Continue lactulose 10 grams PO Qdaily for a goal 2-3 soft bowel movements per 24 hours - do not have to discharge on lactulose per GI    Class 1 obesity without serious comorbidity with body mass index (BMI) of 31 0 to 31 9 in adult  Assessment & Plan  Therapeutic lifestyle modification    Essential hypertension  Assessment & Plan  · Monitor blood pressures   · avoid hypotension  · Blood pressure well controlled off all antihypertensive agents  · Give 1 time Lasix dose today for lower extremity edema, will hold dose for tomorrow as patient is pending a repeat paracentesis, will likely require albumin as I suspect 4-5 liter drainage  * Obstructive jaundice  Assessment & Plan  Obstructive jaundice  Status post ERCP with sphincterotomy and stent placement 2/10  Follow-up on biopsy studies - high suspicion for cholangiocarcinoma  CA19 - 28,354  Monitor LFTs  GI following  Biopsy showing adenocarcinoma  D/W GI, consult onc and surg/onc         2/17/22:  Held a discussion with myself and Hematology-Oncology, patient and family are now agreeable to rehab placement at the [facility] off on hospice for now  -will obtain MRI MRCP with contrast, kidney function is fine, either way new gadolinium does not pose a significant risk of an as after as it used to in the past, due to using a different agent    MRI will help delineate whether he truly has metastasis, and also assessed position/placement of stent      2/18/22: Pending xray for stent placement, mri pending for liver mets  -bilirubin trending up  -continue tx ceftriaxone and confirm further response with repeat thoracentesis fluid studies      2/19/22:  Reviewed 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          VTE Pharmacologic Prophylaxis: VTE Score: 5 High Risk (Score >/= 5) - Pharmacological DVT Prophylaxis Ordered: heparin  Sequential Compression Devices Ordered  Patient Centered Rounds: I performed bedside rounds with nursing staff today  Discussions with Specialists or Other Care Team Provider: GI    Education and Discussions with Family / Patient: Updated  (wife and daughter) at bedside  Time Spent for Care: 30 minutes  More than 50% of total time spent on counseling and coordination of care as described above  Current Length of Stay: 10 day(s)  Current Patient Status: Inpatient   Certification Statement: The patient will continue to require additional inpatient hospital stay due to Pending ERCP, pending repeat paracentesis, continuing treatment for SBP, [facility]   Discharge Plan: Anticipate discharge in 48-72 hrs to rehab facility  Code Status: Level 3 - DNAR and DNI    Subjective:   Patient denies any overnight events or any acute complaints this morning, denies any abdominal pain pruritus chest pain shortness of breath  Did have some difficulty sleeping last night    Objective:     Vitals:   Temp (24hrs), Av 7 °F (37 1 °C), Min:98 5 °F (36 9 °C), Max:98 8 °F (37 1 °C)    Temp:  [98 5 °F (36 9 °C)-98 8 °F (37 1 °C)] 98 5 °F (36 9 °C)  HR:  [76-85] 76  Resp:  [16-22] 22  BP: (124-154)/(68-88) 154/79  Body mass index is 32 2 kg/m²  Input and Output Summary (last 24 hours): Intake/Output Summary (Last 24 hours) at 2022 1648  Last data filed at 2022 1303  Gross per 24 hour   Intake 170 ml   Output 850 ml   Net -680 ml       Physical Exam:   Physical Exam  Vitals and nursing note reviewed  Constitutional:       General: He is not in acute distress  Appearance: He is well-developed  He is not ill-appearing, toxic-appearing or diaphoretic     HENT:      Head: Normocephalic and atraumatic  Eyes:      General: Scleral icterus present  Conjunctiva/sclera: Conjunctivae normal    Cardiovascular:      Rate and Rhythm: Normal rate and regular rhythm  Heart sounds: No murmur heard  No friction rub  No gallop  Pulmonary:      Effort: Pulmonary effort is normal  No respiratory distress  Breath sounds: No stridor  No wheezing, rhonchi or rales  Chest:      Chest wall: No tenderness  Abdominal:      General: There is distension  Palpations: Abdomen is soft  There is no mass  Tenderness: There is no abdominal tenderness  There is no guarding or rebound  Hernia: No hernia is present  Musculoskeletal:         General: No swelling, tenderness, deformity or signs of injury  Cervical back: Neck supple  Right lower leg: Edema present  Left lower leg: Edema present  Comments: Plus two pitting edema   Skin:     General: Skin is warm and dry  Coloration: Skin is jaundiced  Skin is not pale  Findings: No bruising, erythema, lesion or rash  Neurological:      Mental Status: He is alert            Additional Data:     Labs:  Results from last 7 days   Lab Units 02/18/22  0459   WBC Thousand/uL 7 58   HEMOGLOBIN g/dL 10 5*   HEMATOCRIT % 29 9*   PLATELETS Thousands/uL 166   LYMPHO PCT % 11*   MONO PCT % 11   EOS PCT % 3     Results from last 7 days   Lab Units 02/20/22  1102   SODIUM mmol/L 138   POTASSIUM mmol/L 3 8   CHLORIDE mmol/L 109*   CO2 mmol/L 21   BUN mg/dL 32*   CREATININE mg/dL 0 85   ANION GAP mmol/L 8   CALCIUM mg/dL 9 1   ALBUMIN g/dL 2 8*   TOTAL BILIRUBIN mg/dL 33 82*   ALK PHOS U/L 448*   ALT U/L 50   AST U/L 69*   GLUCOSE RANDOM mg/dL 134     Results from last 7 days   Lab Units 02/18/22  0459   INR  1 33*                   Lines/Drains:  Invasive Devices  Report    Peripheral Intravenous Line            Peripheral IV 02/19/22 Left;Ventral (anterior) Forearm 1 day          Drain            External Urinary Catheter Medium 5 days                      Imaging: No pertinent imaging reviewed  Recent Cultures (last 7 days):   Results from last 7 days   Lab Units 02/16/22  1710   GRAM STAIN RESULT  Rare Polys  No organisms seen   BODY FLUID CULTURE, STERILE  No growth       Last 24 Hours Medication List:   Current Facility-Administered Medications   Medication Dose Route Frequency Provider Last Rate    cefTRIAXone  2,000 mg Intravenous Q24H Kaylan White DO Stopped (02/19/22 2228)    heparin (porcine)  5,000 Units Subcutaneous [facility] Patricia Chaudhry MD      lactulose  10 g Oral Daily Peggslinda White DO      levothyroxine  200 mcg Oral Early Morning Peggs DO Christopher      melatonin  6 mg Oral HS Estephanie Fritz PA-C      mirtazapine  7 5 mg Oral HS Reji Stratton DO          Today, Patient Was Seen By: Demarco Johns DO    **Please Note: This note may have been constructed using a voice recognition system  **

## 2022-02-20 NOTE — PROGRESS NOTES
NEPHROLOGY PROGRESS NOTE   Janice Edmar 80 y o  male MRN: [id number]  Unit/Bed#: [facility] 910-01 Encounter: [id number]        ASSESSMENT & PLAN    68-year-old male with a past medical history of hypertension, hyper disease with weakness, jaundice, diagnosed with adenocarcinoma complicated by acute kidney injury and volume overload with recurrent ascites    1  GERSON  -this has resolved  -creatinine is stable at baseline    2  Volume overload  -overall requiring frequent paracenteses unsure if diuretics will help decrease rate as he is requiring every 3 days discussed with Dr Sg Pena  -considering palliative drain placement for ascites  -furosemide 40 mg IV given today    3  Cirrhosis with ascites and concern for SBP  -on ceftriaxone    4  Metabolic acidosis  -now on sodium bicarbonate    5  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  -no surgical intervention  -potentially ERCP  -surgical Oncology, GI, medical oncology follow-up      SUBJECTIVE:    Patient was seen this morning   Feels like his abdomen is more distended with swelling his legs    12 point review of systems was otherwise negative besides what is mentioned above    Medications:    Current Facility-Administered Medications:     cefTRIAXone (ROCEPHIN) 2,000 mg in dextrose 5 % 50 mL IVPB, 2,000 mg, Intravenous, Q24H, Mark Díaz DO, Stopped at 02/19/22 2228    furosemide (LASIX) injection 40 mg, 40 mg, Intravenous, Daily, Reji Banjacinda, DO    heparin (porcine) subcutaneous injection 5,000 Units, 5,000 Units, Subcutaneous, Q8H [address], Rehana Mares MD, 5,000 Units at 02/20/22 0553    lactulose oral solution 10 g, 10 g, Oral, Daily, Verlie Lis, DO, 10 g at 02/20/22 1039    levothyroxine tablet 200 mcg, 200 mcg, Oral, Early Morning, Verlie Lis, DO, 200 mcg at 02/20/22 0553    melatonin tablet 6 mg, 6 mg, Oral, HS, Estephanie Fritz PA-C, 6 mg at 02/19/22 2158    OBJECTIVE:    Vitals:    02/19/22 0800 02/19/22 1557 02/19/22 2303 02/20/22 0900   BP: 118/58 119/72 135/88 124/68   Pulse: 79  85 76   Resp: 20 16 22   Temp: 99 7 °F (37 6 °C) 99 9 °F (37 7 °C) 98 8 °F (37 1 °C)    TempSrc:   Oral    SpO2: 97%      Weight:       Height:            Temp:  [98 8 °F (37 1 °C)-99 9 °F (37 7 °C)] 98 8 °F (37 1 °C)  HR:  [76-85] 76  Resp:  [16-22] 22  BP: (119-135)/(68-88) 124/68     Body mass index is 32 2 kg/m²  Weight (last 2 days)     Date/Time Weight    02/18/22 1527 120 (264 55)     Comments:   Weight: weight from 2/8/22 with stretcher scale at [facility]; no additional weights since at 02/18/22 1527         I/O last 3 completed shifts: In: 770 [P O :720; IV Piggyback:50]  Out: 1700 [Urine:1700]    No intake/output data recorded        Physical exam:    General: no acute distress, cooperative  Eyes: conjunctivae pink, anicteric sclerae  ENT: lips and mucous membranes moist, no exudates, normal external ears  Neck: ROM intact, no JVD  Chest: No respiratory distress, no accessory muscle use  CVS: normal rate, non pericardial friction rub  Abdomen:  Distended abdomen  Extremities:  2+ edema  Skin:  Jaundice  Neuro: awake, alert, oriented, grossly intact  Psych:  Pleasant affect   External urinary catheter shows dark concentrated urine    Lab Results:   Results from last 7 days   Lab Units 02/19/22  0508 02/18/22  0459 02/17/22  0519 02/16/22  0609 02/15/22  0649   WBC Thousand/uL  --  7 58 6 83  --   --    HEMOGLOBIN g/dL  --  10 5* 10 6*  --   --    HEMATOCRIT %  --  29 9* 29 7*  --   --    PLATELETS Thousands/uL  --  166 158  --   --    POTASSIUM mmol/L 3 4* 3 9 3 5 3 2* 3 5   CHLORIDE mmol/L 109* 112* 114* 110* 110*   CO2 mmol/L 22 19* 21 22 20*   BUN mg/dL 28* 29* 36* 45* 53*   CREATININE mg/dL 0 70 0 69 0 85 1 40* 1 86*   CALCIUM mg/dL 8 6 8 8 8 1* 8 3 8 1*   MAGNESIUM mg/dL  --   --  2 1  --   --    ALK PHOS U/L 394* 402* 404* 456* 464*   ALT U/L 47 48 48 57 62   AST U/L 66* 70* 63* 64* 67*         Portions of the record may have been created with voice recognition software  Occasional wrong word or "sound a like" substitutions may have occurred due to the inherent limitations of voice recognition software  Read the chart carefully and recognize, using context, where substitutions have occurred  If you have any questions, please contact the dictating provider

## 2022-02-21 NOTE — NURSING NOTE
All belongings taken to MICU - glasses, haring aids,  and cell phone, as well as IV ABX, walking device and blankets/clothing

## 2022-02-21 NOTE — H&P
H&P Exam - Critical Care   Brooke Tran 80 y o  male MRN: [id number]  Unit/Bed#: [facility] 910-01 Encounter: [id number]      -------------------------------------------------------------------------------------------------------------  Chief Complaint: Obstructive jaundice     History of Present Illness   HX and PE limited by: patient intubated     Brooke Tran is a 80 y o  male who presents with new onset liver cirrhosis and ascites, and adenocarcinoma of the CBD  He presented to the [facility] on 2/10 with generalized weakness, and was found to have jaundice, transaminitis, and high ammonia level, per chart review  Abdominal CT showed diffuse intrahepatic biliary dilation, and ill-defined soft tissue concerning for obstructing mass or stricture  Abdominal MRI showed soft tissue density in the andreas hepaticus with numerous large gallstones and occlusion of the main portal vein  He was transferred to [facility] for ERCP  ERCP done on 2/10, and stent placed in the common bile duct  Cytologic samples from common bile duct showed malignant adenocarcinoma  MRI from 2/18 concerning for spread of cholangiocarcinoma to the liver  Had paracentesis on 2/13 and 2/16  was treated with cerftriaxone for SBP for 7 days  Repeat ERCP done on 2/21, replaced stent placed in common bile duct  Found extensive bleeding, which was cauterized  Patient was intubated to protect airway  Was transferred to MICU  History obtained from chart review  -------------------------------------------------------------------------------------------------------------  Assessment and Plan:    Neuro:    Diagnosis: Hyperammonemia  · Due to liver disease  · Continue lactulose 10 grams PO Qdaily for a goal 2-3 soft bowel movements per 24 hours - do not have to discharge on lactulose per GI   Sedation: propofol ggt      CV:    Pressure support: levophed       Pulm:  · Diagnosis: Lung Nodule  · CT AP: Left lower lobe 5 mm nodules   Bibasilar reticular changes and/or mild atelectasis   Paraesophageal varices  · Outpatient follow-up   Intubated  · Get CXR to confirm placement of chest tube    GI:    Diagnosis: Obstructive Jaundice   · Obstructive jaundice Status post ERCP with sphincterotomy and stent placement 2/10, stent replacement today found significant internal bleeding, which was cauterized  · Pt was intubated for airway protection following ERCP today   · Biopsy from initial ERCP on 2/10 showing adenocarcinoma  · GI, onc and surg/onc consulted, following   · Per chart review patient and family are now agreeable to rehab placement at the [facility] off on hospice for now   Diagnosis: Liver cirrhosis  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 with ascites  New onset   Diagnosis: SBP  · Status post abdominal paracentesis with IR 2/13 for 2L  WBC noted, no bacteria on gram stain  Started on empiric Ceftriaxone for possible SBP      · Per chart review, plan to treat with ceftriaxone until resolution of SBP on fluid studies, discharge on lifetime Cipro or Bactrim  · Day 9 of ceftriaxone, continue      :    Diagnosis: GERSON  · Resolved   · Likely multifactorial in the setting of contrast 2/9, possible hepatorenal, ARB use, possible intra abdominal HTN  · Retroperitoneal ultrasound no hydronephrosis  · Nephrology consult appreciated  · S/p IV albumin    Diagnosis: Mass in inguinal region   · Venous duplex of the bilateral lower extremities (02/08/2022) as outpatient showed 5 1 cm non vascularized cystic structure in the right groin  · Plan for outpatient surgery follow-up     F/E/N:    Plan: NPO   Monitor CMP, replete as needed     Heme/Onc:    Diagnosis: 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o Plan: per oncology, no oncology follow up as there are no treatment options     Endo:    Diagnosis: Hypothyroidism  o Plan: continue home levothyroxine      ID:    Diagnosis: SBP  o Plan: continue ceftriaxone           Disposition: discuss comfort care/home hospice with family  Code Status: Level 3 - DNAR and DNI  --------------------------------------------------------------------------------------------------------------  Review of Systems   Unable to perform ROS: Intubated       Review of systems was unable to be performed secondary to patient intubated     Physical Exam  Constitutional:       Appearance: He is obese  Interventions: He is sedated and intubated  HENT:      Head: Normocephalic and atraumatic  Cardiovascular:      Rate and Rhythm: Normal rate and regular rhythm  Heart sounds: Normal heart sounds  No murmur heard  Pulmonary:      Effort: Pulmonary effort is normal  He is intubated  Breath sounds: Normal breath sounds  No wheezing, rhonchi or rales  Abdominal:      General: There is distension  Palpations: There is fluid wave  Skin:     Coloration: Skin is jaundiced        --------------------------------------------------------------------------------------------------------------  Vitals:   Vitals:    02/20/22 0900 02/20/22 1541 02/20/22 2257 02/21/22 1319   BP: 124/68 154/79 133/63 121/62   Pulse: 76  80 75   Resp: 22  18 16   Temp:  98 5 °F (36 9 °C) 98 2 °F (36 8 °C) (!) 97 3 °F (36 3 °C)   TempSrc:    Tympanic   SpO2:   97% 98%   Weight:       Height:         Temp  Min: 96 6 °F (35 9 °C)  Max: 99 9 °F (37 7 °C)  IBW (Ideal Body Weight): 86 8 kg  Height: 6' 4" (193 cm)  Body mass index is 32 2 kg/m²        Laboratory and Diagnostics:  Results from last 7 days   Lab Units 02/21/22  0000 02/18/22  0459 02/17/22  0519   WBC Thousand/uL 8 91 7 58 6 83   HEMOGLOBIN g/dL 10 2* 10 5* 10 6*   HEMATOCRIT % 28 2* 29 9* 29 7*   PLATELETS Thousands/uL 154 166 158   BANDS PCT % 2  --   --    MONO PCT % 6 11  --      Results from last 7 days   Lab Units 02/21/22  0659 02/20/22  1102 02/19/22  0508 02/18/22  0459 02/17/22  0519 02/16/22  0609 02/15/22  0649   SODIUM mmol/L 138 138 140 139 143 140 140   POTASSIUM mmol/L 3 3* 3 8 3 4* 3 9 3 5 3 2* 3  5   CHLORIDE mmol/L 108 109* 109* 112* 114* 110* 110*   CO2 mmol/L 21 21 22 19* 21 22 20*   ANION GAP mmol/L 9 8 9 8 8 8 10   BUN mg/dL 34* 32* 28* 29* 36* 45* 53*   CREATININE mg/dL 1 06 0 85 0 70 0 69 0 85 1 40* 1 86*   CALCIUM mg/dL 8 4 9 1 8 6 8 8 8 1* 8 3 8 1*   GLUCOSE RANDOM mg/dL 140 134 118 111 117 111 102   ALT U/L 43 50 47 48 48 57 62   AST U/L 57* 69* 66* 70* 63* 64* 67*   ALK PHOS U/L 383* 448* 394* 402* 404* 456* 464*   ALBUMIN g/dL 2 4* 2 8* 2 6* 2 8* 2 8* 2 9* 2 9*   TOTAL BILIRUBIN mg/dL 30 38* 33 82* 30 37* 28 48* 25 15* 24 69* 24 79*     Results from last 7 days   Lab Units 02/17/22  0519   MAGNESIUM mg/dL 2 1      Results from last 7 days   Lab Units 02/21/22  0608 02/18/22  0459   INR  1 33* 1 33*              ABG:    VBG:          Micro:  Results from last 7 days   Lab Units 02/16/22  1710   GRAM STAIN RESULT  Rare Polys  No organisms seen   BODY FLUID CULTURE, STERILE  No growth       EKG:   Imaging: I have personally reviewed pertinent reports  Historical Information   Past Medical History:   Diagnosis Date    Hypertension     Hypothyroidism      Past Surgical History:   Procedure Laterality Date    HERNIA REPAIR      IR PARACENTESIS  2/13/2022    IR PARACENTESIS  2/16/2022    PROSTATE SURGERY      REPLACEMENT TOTAL KNEE      last assessed: 7/11/17     Social History   Social History     Substance and Sexual Activity   Alcohol Use Never     Social History     Substance and Sexual Activity   Drug Use No     Social History     Tobacco Use   Smoking Status Never Smoker   Smokeless Tobacco Never Used       History reviewed  No pertinent family history      Medications:  Current Facility-Administered Medications   Medication Dose Route Frequency    cefTRIAXone (ROCEPHIN) 2,000 mg in dextrose 5 % 50 mL IVPB  2,000 mg Intravenous Q24H    lactulose oral solution 10 g  10 g Oral Daily    levothyroxine tablet 200 mcg  200 mcg Oral Early Morning    melatonin tablet 6 mg  6 mg Oral HS  mirtazapine (REMERON) tablet 7 5 mg  7 5 mg Oral HS    pantoprazole (PROTONIX) injection 40 mg  40 mg Intravenous Q12H [facility]    potassium chloride (K-DUR,KLOR-CON) CR tablet 40 mEq  40 mEq Oral Once     Home medications:  Prior to Admission Medications   Prescriptions Last Dose Informant Patient Reported? Taking?    Diclofenac Sodium (VOLTAREN) 1 %  Spouse/Significant Other No No   Sig: Apply 2 g topically 4 (four) times a day   Patient not taking: Reported on 2/2/2022    Multiple Vitamins-Minerals (ICAPS AREDS 2 PO)  Spouse/Significant Other Yes No   Sig: Take by mouth   doxycycline hyclate (VIBRAMYCIN) 100 mg capsule  Spouse/Significant Other No No   Sig: Take 1 capsule (100 mg total) by mouth in the morning   irbesartan (AVAPRO) 300 mg tablet  Spouse/Significant Other No No   Sig: TAKE 1 TABLET EVERY OTHER  DAY   levothyroxine (Synthroid) 200 mcg tablet  Spouse/Significant Other No No   Sig: Take 1 tablet (200 mcg total) by mouth daily in the early morning   metroNIDAZOLE (METROGEL) 0 75 % gel  Spouse/Significant Other No No   Sig: Apply topically 2 (two) times a day   neomycin-polymyxin-hydrocortisone (CORTISPORIN) otic solution   No No   Sig: Administer 4 drops into both ears every 8 (eight) hours for 7 days   sildenafil (VIAGRA) 100 mg tablet  Spouse/Significant Other No No   Sig: Take 1 tablet (100 mg total) by mouth daily as needed for erectile dysfunction   Patient not taking: Reported on 2/8/2022    tadalafil (CIALIS) 20 MG tablet  Spouse/Significant Other No No   Sig: Take 1 tablet (20 mg total) by mouth daily as needed for erectile dysfunction   Patient not taking: Reported on 2/8/2022    tamsulosin (FLOMAX) 0 4 mg  Spouse/Significant Other Yes No   Sig:     Patient not taking: Reported on 2/8/2022       Facility-Administered Medications: None     Allergies:  No Known Allergies  ------------------------------------------------------------------------------------------------------------  Advance Directive and Living Will: Yes    Power of :    POLST:    ------------------------------------------------------------------------------------------------------------  Anticipated Length of Stay is > 2 midnights    Care Time Delivered: 2001 Doctors  DO        Portions of the record may have been created with voice recognition software  Occasional wrong word or "sound a like" substitutions may have occurred due to the inherent limitations of voice recognition software    Read the chart carefully and recognize, using context, where substitutions have occurred

## 2022-02-21 NOTE — ANESTHESIA PREPROCEDURE EVALUATION
Procedure:  ERCP    Relevant Problems   CARDIO   (+) Essential hypertension   (+) Thrombosis, portal vein      ENDO   (+) Hypothyroidism      GI/HEPATIC   (+) Liver cirrhosis (HCC)   (+) Thrombosis, portal vein      /RENAL   (+) Acute kidney injury ([facility] [address] )      HEMATOLOGY   (+) Platelets decreased (HCC)      MUSCULOSKELETAL   (+) Osteoarthritis      Other   (+) Chronic osteomyelitis of knee ([facility] [address] )      Patient diagnosed with obstructive jaundice s/p ERCP with sphincterotomy and stent placement on 2/10/22; biopsy showing adenocarcinoma  Getting paracentesis for ascites and being treated with IV ceftriaxone for SBP  # GERSON  # Liver cirrhosis   # Obstructive jaundice       TTE 2/22/21:   LEFT VENTRICLE:  Systolic function was normal  Ejection fraction was estimated to be 60 %  Although no diagnostic regional wall motion abnormality was identified, this possibility cannot be completely excluded on the basis of this study  Wall thickness was mildly increased  Doppler parameters were consistent with abnormal left ventricular relaxation (grade 1 diastolic dysfunction)  Labs 2/21/22:   Na 138, K 3 8, Cr 1 06, Hgb 10 2, Plt 154, INR 1 33    Physical Exam    Airway    Mallampati score: III  TM Distance: >3 FB  Neck ROM: limited     Dental   Comment: Partial dentures removed; No loose teeth per patient ,     Cardiovascular  Cardiovascular exam normal    Pulmonary  Pulmonary exam normal     Other Findings        Anesthesia Plan  ASA Score- 3     Anesthesia Type- general with ASA Monitors  Additional Monitors:   Airway Plan: ETT  Plan Factors-Exercise tolerance (METS): <4 METS  Chart reviewed  EKG reviewed  Existing labs reviewed  Patient summary reviewed  Patient is not a current smoker  Obstructive sleep apnea risk education given perioperatively  Induction- intravenous and rapid sequence induction  Postoperative Plan- Plan for postoperative opioid use   Planned trial extubation    Informed Consent- Anesthetic plan and risks discussed with patient  I personally reviewed this patient with the CRNA  Discussed and agreed on the Anesthesia Plan with the CRNA  Dylan Hoover

## 2022-02-21 NOTE — ANESTHESIA POSTPROCEDURE EVALUATION
Post-Op Assessment Note    CV Status:  Stable  Pain Score: 0    Pain management: adequate     Mental Status:  Unresponsive   Hydration Status:  Stable   PONV Controlled:  None   Airway Patency:  Patent  Airway: intubated      Post Op Vitals Reviewed: Yes      Staff: Anesthesiologist, CRNA         No complications documented      BP   94/48   Temp  98 8   Pulse  73   Resp   15   SpO2   99 vent settings per MICU

## 2022-02-21 NOTE — PLAN OF CARE
Problem: PHYSICAL THERAPY ADULT  Goal: Performs mobility at highest level of function for planned discharge setting  See evaluation for individualized goals  Description: Treatment/Interventions: Functional transfer training,LE strengthening/ROM,Endurance training,Patient/family training,Equipment eval/education,Bed mobility,Gait training,Spoke to nursing,Spoke to case management,OT  Equipment Recommended: Ally Martines       See flowsheet documentation for full assessment, interventions and recommendations  Outcome: Progressing  Note: Prognosis: Good  Problem List: Decreased strength,Decreased endurance,Impaired balance,Decreased mobility,Decreased safety awareness,Decreased range of motion  Assessment: Pt seen for PT treatment session this date  Therapy session focused on functional transfer training, ambulation training, and endurance training in order to improve overall mobility and independence  Pt requires assist of 1 for all mobility performed this date  Pt continues to require min A for functional transfer; cues for hand placement and sequencing  Pt ambulating short household distances with RW and min A; cues for upright posture and RW management required  Pt continues to be limited by increased fatigue; deferring further mobilization at this time  Pt making progress toward goals  Pt was left sitting in bedside chair with alarm on at the end of PT session with all needs in reach  Pt would benefit from continued PT services while in hospital to address remaining limitations  PT to continue to follow pt and recommends rehab upon d/c  The patient's AM-PAC Basic Mobility Inpatient Short Form Raw Score is 16  A Raw score of less than or equal to 16 suggests the patient may benefit from discharge to post-acute rehabilitation services  Please also refer to the recommendation of the Physical Therapist for safe discharge planning    Barriers to Discharge: Inaccessible home environment        PT Discharge Recommendation: Post acute rehabilitation services          See flowsheet documentation for full assessment

## 2022-02-21 NOTE — PROGRESS NOTES
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  At this point there is really no acute renal issue renal function is normal   Certainly he will be at risk for changes in renal function with changes in effective volume given his underlying liver disease newly diagnosed malignancy  2  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and suspected cirrhotic liver disease  Reviewing the chart the patient underwent ERCP with sphincterotomy and stent placement earlier in hospitalization  We will sign off please call if we can be of further assistance  SUBJECTIVE:  Review of Systems   Constitutional: Positive for malaise/fatigue  Negative for chills, diaphoresis, fever and night sweats  HENT: Negative  Eyes: Negative  Cardiovascular: Positive for leg swelling  Negative for chest pain, dyspnea on exertion, orthopnea and palpitations  Respiratory: Negative for cough, shortness of breath, sputum production and wheezing  Gastrointestinal: Positive for bloating  Negative for abdominal pain, diarrhea, nausea and vomiting  Genitourinary: Negative for dysuria, flank pain, hematuria and incomplete emptying  Neurological: Positive for weakness  Negative for dizziness, focal weakness and headaches  Psychiatric/Behavioral: Negative for altered mental status, depression, hallucinations and hypervigilance         OBJECTIVE:  Current Weight: Weight - Scale: 120 kg (264 lb 8 8 oz) (weight from 2/8/22 with stretcher scale at [address]; no additional weights since)  Vitals:Temp (24hrs), Av 4 °F (36 9 °C), Min:98 2 °F (36 8 °C), Max:98 5 °F (36 9 °C)  Current: Temperature: 98 2 °F (36 8 °C)   Blood pressure 133/63, pulse 80, temperature 98 2 °F (36 8 °C), resp  rate 18, height 6' 4" (1 93 m), weight 120 kg (264 lb 8 8 oz), SpO2 97 %  , Body mass index is 32 2 kg/m²  Intake/Output Summary (Last 24 hours) at 2022 0842  Last data filed at 2022 1303  Gross per 24 hour   Intake --   Output 350 ml   Net -350 ml       Physical Exam: /63   Pulse 80   Temp 98 2 °F (36 8 °C)   Resp 18   Ht 6' 4" (1 93 m)   Wt 120 kg (264 lb 8 8 oz) Comment: weight from 22 with stretcher scale at [address]; no additional weights since  SpO2 97%   BMI 32 20 kg/m²   Physical Exam  Constitutional:       General: He is not in acute distress  Appearance: He is ill-appearing  He is not toxic-appearing or diaphoretic  HENT:      Head: Normocephalic and atraumatic  Mouth/Throat:      Mouth: Mucous membranes are dry  Eyes:      General: Scleral icterus present  Extraocular Movements: Extraocular movements intact  Cardiovascular:      Rate and Rhythm: Normal rate and regular rhythm  Heart sounds: No friction rub  No gallop  Comments: Positive edema  Pulmonary:      Effort: Pulmonary effort is normal  No respiratory distress  Breath sounds: Normal breath sounds  No wheezing, rhonchi or rales  Abdominal:      General: Bowel sounds are normal  There is distension  Palpations: Abdomen is soft  Tenderness: There is no abdominal tenderness  There is no rebound  Comments: Positive ascites  Musculoskeletal:      Cervical back: Normal range of motion and neck supple  Neurological:      General: No focal deficit present  Mental Status: He is alert and oriented to person, place, and time  Mental status is at baseline     Psychiatric:         Mood and Affect: Mood normal  Behavior: Behavior normal          Thought Content:  Thought content normal          Judgment: Judgment normal          Medications:    Current Facility-Administered Medications:     cefTRIAXone (ROCEPHIN) 2,000 mg in dextrose 5 % 50 mL IVPB, 2,000 mg, Intravenous, Q24H, Bharati Fernandez DO, Last Rate: 100 mL/hr at 02/20/22 2242, 2,000 mg at 02/20/22 2242    heparin (porcine) subcutaneous injection 5,000 Units, 5,000 Units, Subcutaneous, Q8H [address], Devendra Persaud MD, 5,000 Units at 02/20/22 2242    lactulose oral solution 10 g, 10 g, Oral, Daily, Barraza Patches, DO, 10 g at 02/20/22 1039    levothyroxine tablet 200 mcg, 200 mcg, Oral, Early Morning, Barraza Patches, DO, 200 mcg at 02/21/22 0510    melatonin tablet 6 mg, 6 mg, Oral, HS, Estephanie Fritz PA-C, 6 mg at 02/20/22 2243    mirtazapine (REMERON) tablet 7 5 mg, 7 5 mg, Oral, HS, Reji Banai, DO, 7 5 mg at 02/20/22 2243    Laboratory Results:  Lab Results   Component Value Date    WBC 8 91 02/21/2022    HGB 10 2 (L) 02/21/2022    HCT 28 2 (L) 02/21/2022    MCV 99 (H) 02/21/2022     02/21/2022     Lab Results   Component Value Date    SODIUM 138 02/21/2022    K 3 3 (L) 02/21/2022     02/21/2022    CO2 21 02/21/2022    BUN 34 (H) 02/21/2022    CREATININE 1 06 02/21/2022    GLUC 140 02/21/2022    CALCIUM 8 4 02/21/2022     Lab Results   Component Value Date    CALCIUM 8 4 02/21/2022    PHOS 2 8 02/09/2022     No results found for: LABPROT

## 2022-02-21 NOTE — PLAN OF CARE
Problem: MOBILITY - ADULT  Goal: Maintain or return to baseline ADL function  Description: INTERVENTIONS:  -  Assess patient's ability to carry out ADLs; assess patient's baseline for ADL function and identify physical deficits which impact ability to perform ADLs (bathing, care of mouth/teeth, toileting, grooming, dressing, etc )  - Assess/evaluate cause of self-care deficits   - Assess range of motion  - Assess patient's mobility; develop plan if impaired  - Assess patient's need for assistive devices and provide as appropriate  - Encourage maximum independence but intervene and supervise when necessary  - Involve family in performance of ADLs  - Assess for home care needs following discharge   - Consider OT consult to assist with ADL evaluation and planning for discharge  - Provide patient education as appropriate  Outcome: Progressing  Goal: Maintains/Returns to pre admission functional level  Description: INTERVENTIONS:  - Perform BMAT or MOVE assessment daily    - Set and communicate daily mobility goal to care team and patient/family/caregiver  - Collaborate with rehabilitation services on mobility goals if consulted  - Perform Range of Motion   - Reposition patient every 2 hours    - Dangle patient   - Stand patient   - Ambulate patient   - Out of bed to chair   - Out of bed for meals   - Out of bed for toileting  - Record patient progress and toleration of activity level   Outcome: Progressing     Problem: Potential for Falls  Goal: Patient will remain free of falls  Description: INTERVENTIONS:  - Educate patient/family on patient safety including physical limitations  - Instruct patient to call for assistance with activity   - Consult OT/PT to assist with strengthening/mobility   - Keep Call bell within reach  - Keep bed low and locked with side rails adjusted as appropriate  - Keep care items and personal belongings within reach  - Initiate and maintain comfort rounds  - Make Fall Risk Sign visible to staff  - Offer Toileting every 2 Hours, in advance of need  - Initiate/Maintain alarm  - Obtain necessary fall risk management equipment  - Apply yellow socks and bracelet for high fall risk patients  - Consider moving patient to room near nurses station  Outcome: Progressing     Problem: INFECTION - ADULT  Goal: Absence or prevention of progression during hospitalization  Description: INTERVENTIONS:  - Assess and monitor for signs and symptoms of infection  - Monitor lab/diagnostic results  - Monitor all insertion sites, i e  indwelling lines, tubes, and drains  - Monitor endotracheal if appropriate and nasal secretions for changes in amount and color  - Evans appropriate cooling/warming therapies per order  - Administer medications as ordered  - Instruct and encourage patient and family to use good hand hygiene technique  - Identify and instruct in appropriate isolation precautions for identified infection/condition  Outcome: Progressing  Goal: Absence of fever/infection during neutropenic period  Description: INTERVENTIONS:  - Monitor WBC    Outcome: Progressing     Problem: Prexisting or High Potential for Compromised Skin Integrity  Goal: Skin integrity is maintained or improved  Description: INTERVENTIONS:  - Identify patients at risk for skin breakdown  - Assess and monitor skin integrity  - Assess and monitor nutrition and hydration status  - Monitor labs   - Assess for incontinence   - Turn and reposition patient  - Assist with mobility/ambulation  - Relieve pressure over bony prominences  - Avoid friction and shearing  - Provide appropriate hygiene as needed including keeping skin clean and dry  - Evaluate need for skin moisturizer/barrier cream  - Collaborate with interdisciplinary team   - Patient/family teaching  - Consider wound care consult   Outcome: Progressing

## 2022-02-21 NOTE — RESPIRATORY THERAPY NOTE
RT Ventilator Management Note  Anne Osullivan 80 y o  male MRN: [id number]  Unit/Bed#: MICU 08 Encounter: [id number]      Daily Screen    No data found in the last 10 encounters  Physical Exam:   Assessment Type: (P) Assess only  General Appearance: (P) Sedated  Respiratory Pattern: (P) Assisted  Chest Assessment: (P) Chest expansion symmetrical  O2 Device: (P) vent      Resp Comments: (P) received pt from GI lab  and placed on vent on as settings   will continuen to monitor

## 2022-02-21 NOTE — PHYSICAL THERAPY NOTE
PHYSICAL THERAPY NOTE          Patient Name: Kelsi Artkellie  [id number] Date: 2/21/2022 02/21/22 1121   PT Last Visit   PT Visit Date 02/21/22   Note Type   Note Type Treatment   Pain Assessment   Pain Assessment Tool 0-10   Pain Score No Pain   Restrictions/Precautions   Weight Bearing Precautions Per Order No   Other Precautions Chair Alarm; Bed Alarm; Fall Risk   General   Chart Reviewed Yes   Response to Previous Treatment Patient with no complaints from previous session  Family/Caregiver Present No   Cognition   Overall Cognitive Status WFL   Arousal/Participation Responsive; Cooperative   Attention Attends with cues to redirect   Orientation Level Oriented X4   Memory Decreased short term memory   Following Commands Follows one step commands with increased time or repetition   Comments pt pleasant and cooperative to participate in therapy session, limited 2* increased fatigue    Bed Mobility   Supine to Sit Unable to assess   Sit to Supine Unable to assess   Additional Comments pt found sitting on toliet upon arrival  Pt returned to sitting OOB in bedside chair with chair alarm on and all needs within reach at the end of therapy session    Transfers   Sit to Stand 4  Minimal assistance   Additional items Assist x 1; Increased time required;Verbal cues   Stand to Sit 4  Minimal assistance   Additional items Assist x 1; Increased time required;Verbal cues   Toilet transfer 4  Minimal assistance   Additional items Assist x 1; Increased time required;Standard toilet   Additional Comments transfers with RW; cues for hand placement and sequencing    Ambulation/Elevation   Gait pattern Excessively slow; Short stride; Foward flexed;Decreased foot clearance; Improper Weight shift   Gait Assistance 4  Minimal assist   Additional items Assist x 1;Verbal cues; Tactile cues   Assistive Device Rolling walker   Distance 12 ft limited 2* c/o fatigue    Balance   Static Sitting Fair   Dynamic Sitting Fair -   Static Standing Fair -   Dynamic Standing Poor +   Ambulatory Poor +   Activity Tolerance   Activity Tolerance Patient limited by fatigue   Nurse Made Aware RN cleared pt to participate in therapy session    Assessment   Prognosis Good   Problem List Decreased strength;Decreased endurance; Impaired balance;Decreased mobility; Decreased safety awareness;Decreased range of motion   Assessment Pt seen for PT treatment session this date  Therapy session focused on functional transfer training, ambulation training, and endurance training in order to improve overall mobility and independence  Pt requires assist of 1 for all mobility performed this date  Pt continues to require min A for functional transfer; cues for hand placement and sequencing  Pt ambulating short household distances with RW and min A; cues for upright posture and RW management required  Pt continues to be limited by increased fatigue; deferring further mobilization at this time  Pt making progress toward goals  Pt was left sitting in bedside chair with alarm on at the end of PT session with all needs in reach  Pt would benefit from continued PT services while in hospital to address remaining limitations  PT to continue to follow pt and recommends rehab upon d/c  The patient's AM-PAC Basic Mobility Inpatient Short Form Raw Score is 16  A Raw score of less than or equal to 16 suggests the patient may benefit from discharge to post-acute rehabilitation services  Please also refer to the recommendation of the Physical Therapist for safe discharge planning  Barriers to Discharge Inaccessible home environment   Goals   Patient Goals to take a nap    STG Expiration Date 03/07/22  (extend POC, goals remain appropriate )   PT Treatment Day 3   Plan   Treatment/Interventions Functional transfer training;LE strengthening/ROM; Endurance training;Patient/family training;Equipment eval/education;Gait training;Spoke to nursing;Spoke to case management   Progress Progressing toward goals   PT Frequency 3-5x/wk   Recommendation   PT Discharge Recommendation Post acute rehabilitation services   Equipment Recommended [facility] Recommended Wheeled walker   Change/add to [employer]? No   AM-PAC Basic Mobility Inpatient   Turning in Bed Without Bedrails 3   Lying on Back to Sitting on Edge of Flat Bed 3   Moving Bed to Chair 3   Standing Up From Chair 3   Walk in Room 3   Climb 3-5 Stairs 1   Basic Mobility Inpatient Raw Score 16   Basic Mobility Standardized Score 38 32   Highest Level Of Mobility   JH-HLM Goal 5: Stand one or more mins   JH-HLM Highest Level of Mobility 6: Walk 10 steps or more   JH-HLM Goal Achieved Yes   Education   Education Provided Mobility training;Assistive device   Patient Demonstrates acceptance/verbal understanding   End of Consult   Patient Position at End of Consult Bedside chair;Bed/Chair alarm activated; All needs within reach     Brien Gracia, PT, DPT

## 2022-02-21 NOTE — CASE MANAGEMENT
Case Management Discharge Planning Note    Patient name Cristela Mackay  Location [facility] 910/[facility] 989-12 MRN [id number]  : 1934 Date 2022       Current Admission Date: 2/10/2022  Current Admission Diagnosis:Obstructive jaundice   Patient Active Problem List    Diagnosis Date Noted    Acute kidney injury ([facility] ) 2022    Elevated TSH 2022    Abnormal MRI, liver 2022    Thrombosis, portal vein 2022    Common bile duct dilatation 2022    Mass of right inguinal region     Diastolic dysfunction     Abnormal EKG 2022    Liver cirrhosis ([facility] ) 2022    Abnormal urinalysis 2022    Lung nodule 2022    Transaminitis 2022    Hyperbilirubinemia 2022    Hyperammonemia ([facility] ) 2022    Elevated MCV 2022    Obstructive jaundice 2022    Platelets decreased ([facility] ) 2022    Bilateral lower extremity edema 2021    Exudative age-related macular degeneration, bilateral, with active choroidal neovascularization ([facility] ) 07/15/2020    Asymmetrical sensorineural hearing loss 07/15/2020    Osteoarthritis 07/15/2020    Rosacea 07/15/2020    Male erectile disorder 2019    Impaired fasting glucose 2019    Class 1 obesity without serious comorbidity with body mass index (BMI) of 31 0 to 31 9 in adult 03/15/2019    Hypothyroidism 2018    Essential hypertension 2018    Chronic osteomyelitis of knee ([facility] ) 10/08/2013      LOS (days): 11  Geometric Mean LOS (GMLOS) (days): 4 80  Days to GMLOS:-6 6     OBJECTIVE:  Risk of Unplanned Readmission Score: 15         Current admission status: Inpatient   Preferred Pharmacy:   [facility] [address]  [address] [place] [address]  Phone: [phone] Fax: [address] DERECK Srivastava - 1833  [address] [facility] 53  [address] [facility]  Phone: [phone] Fax: [address] [place] [address], [address] [place] [address] Missy Martinez [address]  Phone: [phone] Fax: [phone]    Primary Care Provider: Boston Avery MD    Primary Insurance: [facility]  Secondary Insurance:     DISCHARGE DETAILS:    Discharge planning discussed with[de-identified] Ned Louis (daughter)  Freedom of Choice: Yes     CM contacted family/caregiver?: Yes  Were Treatment Team discharge recommendations reviewed with patient/caregiver?: Yes  Did patient/caregiver verbalize understanding of patient care needs?: Yes  Were patient/caregiver advised of the risks associated with not following Treatment Team discharge recommendations?: Yes    Contacts  Patient Contacts: Ned Louis (daughter)  Relationship to Patient[de-identified] Family  Contact Method: Phone  Phone Number: [phone]  Reason/Outcome: Continuity of Care,Emergency Contact,Referral,Discharge Planning              Other Referral/Resources/Interventions Provided:  Referral Comments: email received from daughter with additonal questions regarding options at discharge  1 ) Family understands that STR is covered under the [facility] at [facility], however is inquiring if hospice would be covered at 100% as well at [facility] if they ultimately needed to go that route  2 ) Are there any "at home" hospice coverages? 3 ) What would the criteria be for an inpatient hospice unit? They are wondering if it is different for a [facility] facility as opposed to another hospice unit  4 ) If the patient is able to go home on hospice, typically hospice goes out to the home 2-3 times per week  Family is wondering if because he is 100% service connected if there are additional visits or coverage/help that is an option for them, or does it continue at 2-3 times per week at home? - call placed to Dorina at the [facility] however she is out until Wednesday - family made aware that unfortunately I cannot confirm answers until then   They are still okay with plan for discharge at this time however are looking into situations that they may be presented with in the future  Treatment Team Recommendation: Short Term Rehab  Discharge Destination Plan[de-identified] Short Term Rehab     Additional Comments: patient is going to ERCP today as well as a paracentesis  Patient will ultimately need a Tenkoff drain as well  Spoke with [employer] - confirmed that they will be able to care for this at time of discharge  Patient will need COVID test ordered 48 hours prior to discharge

## 2022-02-21 NOTE — PROGRESS NOTES
[address] [facility]  Progress Note - Shirly Snellen 1934, 80 y o  male MRN: [id number]  Unit/Bed#: [facility] 910-01 Encounter: [id number]  Primary Care Provider: Alisha Walden MD   Date and time admitted to hospital: 2/10/2022 12:54 AM    * Obstructive jaundice  Assessment & Plan  · Obstructive jaundice Status post ERCP with sphincterotomy and stent placement 2/10  · Biopsy showing adenocarcinoma  · consulted GI, onc and surg/onc   · 2/17/22:  Held a discussion with Previous provide  and Hematology-Oncology, patient and family are now agreeable to rehab placement at the [facility] off on hospice for now  · continue tx ceftriaxone for SBP and confirm further response with repeat thoracentesis fluid studies  ·  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  · ERCP pending today  · Repeat Paracentesis today, Once SBP resolves, will need Tenckoff catheter    Acute kidney injury [facility])  Assessment & Plan  Likely multifactorial in the setting of contrast 2/9, possible hepatorenal, ARB use, possible intra abdominal HTN  Retroperitoneal ultrasound no hydronephrosis  Nephrology consult appreciated  S/p IV albumin      -GERSON resolved        Lung nodule  Assessment & Plan  · CT AP: Left lower lobe 5 mm nodules  Bibasilar reticular changes and/or mild atelectasis  Paraesophageal varices  · Outpatient follow-up    Liver cirrhosis ([facility] [address] )  Assessment & Plan  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 with ascites  New onset  · Status post abdominal paracentesis with IR 2/13 for 2L  WBC noted, no bacteria on gram stain  Started on empiric Ceftriaxone for possible SBP  · s/p paracentesis 2/16 to follow up fluid studies  · Patient non-toxic appearing, afebrile, and without pain  Findings may be secondary to malignancy, but with high mortality rate from SBP, will treat    · GI following    - ascitic fluid studies still has elevated PMNs above 400, will continue to treat and so under to 250  -plan to treat with ceftriaxone until resolution of SBP on fluid studies, discharge on lifetime Cipro or Bactrim  -Repeat paracentesis today    Mass of right inguinal region  Assessment & Plan  · Venous duplex of the bilateral lower extremities (2022) as outpatient showed 5 1 cm non vascularized cystic structure in the right groin  · Plan for outpatient surgery follow-up    Hyperammonemia ([address] )  Assessment & Plan  · Due to liver disease  · Continue lactulose 10 grams PO Qdaily for a goal 2-3 soft bowel movements per 24 hours - do not have to discharge on lactulose per GI    Class 1 obesity without serious comorbidity with body mass index (BMI) of 31 0 to 31 9 in adult  Assessment & Plan  Therapeutic lifestyle modification    Essential hypertension  Assessment & Plan  · Monitor blood pressures   · avoid hypotension  · Blood pressure well controlled off all antihypertensive agents  · recieved 1 time Lasix dose yesterday for lower extremity edema, will hold dose for today as patient is pending a repeat paracentesis, will likely require albumin as I suspect 4-5 liter drainage  VTE Pharmacologic Prophylaxis:   Pharmacologic: Heparin  Mechanical VTE Prophylaxis in Place: Yes    Patient Centered Rounds: I have performed bedside rounds with nursing staff today      Current Length of Stay: 11 day(s)    Current Patient Status: Inpatient   Certification Statement: The patient will continue to require additional inpatient hospital stay due to pending ERCP , iv abx    Discharge Plan: pending progress    Code Status: Level 3 - DNAR and DNI    Discussed and updated daughter Wilder Head over the phone    Subjective:   No overnight events  Patient is comfortable  Abdomen is distended  No dyspnea/chest pain  Has hearing difficulty as he does not have hearing aids with him    Objective:     Vitals:   Temp (24hrs), Av 4 °F (36 9 °C), Min:98 2 °F (36 8 °C), Max:98 5 °F (36 9 °C)    Temp:  [98 2 °F (36 8 °C)-98 5 °F (36 9 °C)] 98 2 °F (36 8 °C)  HR:  [80] 80  Resp:  [18] 18  BP: (133-154)/(63-79) 133/63  SpO2:  [97 %] 97 %  Body mass index is 32 2 kg/m²  Input and Output Summary (last 24 hours): Intake/Output Summary (Last 24 hours) at 2/21/2022 0566  Last data filed at 2/20/2022 1303  Gross per 24 hour   Intake --   Output 350 ml   Net -350 ml       Physical Exam:     Physical Exam  Vitals and nursing note reviewed  Constitutional:       Appearance: He is well-developed  HENT:      Head: Normocephalic and atraumatic  Eyes:      General: Scleral icterus present  Cardiovascular:      Rate and Rhythm: Normal rate and regular rhythm  Heart sounds: No murmur heard  Pulmonary:      Effort: Pulmonary effort is normal  No respiratory distress  Breath sounds: Normal breath sounds  Abdominal:      General: There is distension  Palpations: Abdomen is soft  Tenderness: There is no abdominal tenderness  Musculoskeletal:      Cervical back: Neck supple  Skin:     General: Skin is warm and dry  Coloration: Skin is jaundiced  Neurological:      Mental Status: He is alert              Labs:    Results from last 7 days   Lab Units 02/21/22  0000   WBC Thousand/uL 8 91   HEMOGLOBIN g/dL 10 2*   HEMATOCRIT % 28 2*   PLATELETS Thousands/uL 154   BANDS PCT % 2   LYMPHO PCT % 12*   MONO PCT % 6   EOS PCT % 1     Results from last 7 days   Lab Units 02/21/22  0659   SODIUM mmol/L 138   POTASSIUM mmol/L 3 3*   CHLORIDE mmol/L 108   CO2 mmol/L 21   BUN mg/dL 34*   CREATININE mg/dL 1 06   ANION GAP mmol/L 9   CALCIUM mg/dL 8 4   ALBUMIN g/dL 2 4*   TOTAL BILIRUBIN mg/dL 30 38*   ALK PHOS U/L 383*   ALT U/L 43   AST U/L 57*   GLUCOSE RANDOM mg/dL 140     Results from last 7 days   Lab Units 02/21/22  0608   INR  1 33*                          Recent Cultures (last 7 days):     Results from last 7 days   Lab Units 02/16/22  1710   GRAM STAIN RESULT  Rare Polys  No organisms seen   BODY FLUID CULTURE, STERILE  No growth       Last 24 Hours Medication List:   Current Facility-Administered Medications   Medication Dose Route Frequency Provider Last Rate    cefTRIAXone  2,000 mg Intravenous Q24H Randolm Nitza, DO 2,000 mg (02/20/22 2242)    heparin (porcine)  5,000 Units Subcutaneous [facility] Fabiola King MD      lactulose  10 g Oral Daily Raúl Poplin, DO      levothyroxine  200 mcg Oral Early Morning Raúl Poplin, DO      melatonin  6 mg Oral HS Estephanie Fritz PA-C      mirtazapine  7 5 mg Oral HS Reji Stratton DO      potassium chloride  40 mEq Oral Once Lashonda Watson MD          Today, Patient Was Seen By: Lashonda Watson MD    ** Please Note: Dictation voice to text software may have been used in the creation of this document   **

## 2022-02-22 NOTE — QUICK NOTE
I called and spoke with patient's daughter, Francine Haines, and updated her on his current condition  I answered all questions and addressed concerns  Explained that the plan is to do a repeat paracentesis today and try to wean him off of the ventilator  Explained the risks/benefits of paracentesis  Obtained verbal consent for paracentesis from patient's daughter

## 2022-02-22 NOTE — PROCEDURES
Paracentesis    Date/Time: 2/22/2022 4:31 PM  Performed by: Dinah Parsons DO  Authorized by: Dinah Parsons DO     Patient location:  ICU  Other Assisting Provider: Yes (comment) (Dr Connie Michele)    Consent:     Consent obtained:  Verbal    Consent given by: daughter  Risks discussed:  Bleeding, bowel perforation, infection and pain  Universal protocol:     Procedure explained and questions answered to patient or proxy's satisfaction: yes      Relevant documents present and verified: yes      Test results available and properly labeled: yes      Radiology Images displayed and confirmed  If images not available, report reviewed: yes      Required blood products, implants, devices and special equipment available: yes      Site/side marked: yes      Immediately prior to procedure a time out was called: yes      Patient identity confirmed:  Arm band  Pre-procedure details:     Initial or Subsequent Exam:  Subsequent    Procedure purpose:  Diagnostic    Indications: suspected peritonitis      Preparation: Patient was prepped and draped in usual sterile fashion    Anesthesia (see MAR for exact dosages): Anesthesia method:  Local infiltration    Local anesthetic:  Lidocaine 1% w/o epi  Procedure details:     Equipment: Paracentesis kit used      Ultrasound guidance: yes      Ultrasound image availability:  Not saved    Sterile ultrasound techniques: Sterile gel and sterile probe covers were used      Approach:  Percutaneous    Puncture site:  R lower quadrant    Fluid appearance:  Jennifer and clear    Dressing:  4x4 sterile gauze and adhesive bandage  Post-procedure details:     Patient tolerance of procedure: Tolerated well, no immediate complications  Post-procedure medication (see MAR for dosing):      Albumin replacement: Yes

## 2022-02-22 NOTE — PROGRESS NOTES
Spiritual Care Progress Note    2022  Patient: Kelsi Artist : 1934  Admission Date & Time: 2/10/2022 0054  MRN: [id number] CSN: [id number]         attempted introductory visit with patient at bedside  Patient intubated and resting comfortably   provided silent supportive presence and interfaith blessing  Spiritual Care will remain available          22 1400   Clinical Encounter Type   Visited With Patient not available   Routine Visit Introduction

## 2022-02-22 NOTE — CASE MANAGEMENT
Case Management Discharge Planning Note    Patient name Beni Vega  Location MICU 08/MICU 58 MRN [id number]  : 1934 Date 2022       Current Admission Date: 2/10/2022  Current Admission Diagnosis:Obstructive jaundice   Patient Active Problem List    Diagnosis Date Noted    Acute kidney injury ([facility] ) 2022    Elevated TSH 2022    Abnormal MRI, liver 2022    Thrombosis, portal vein 2022    Common bile duct dilatation 2022    Mass of right inguinal region     Diastolic dysfunction     Abnormal EKG 2022    Liver cirrhosis ([place] [address] ) 2022    Abnormal urinalysis 2022    Lung nodule 2022    Transaminitis 2022    Hyperbilirubinemia 2022    Hyperammonemia ([place] [address] ) 2022    Elevated MCV 2022    Obstructive jaundice 2022    Platelets decreased ([place] [address] ) 2022    Bilateral lower extremity edema 2021    Exudative age-related macular degeneration, bilateral, with active choroidal neovascularization ([place] [address] ) 07/15/2020    Asymmetrical sensorineural hearing loss 07/15/2020    Osteoarthritis 07/15/2020    Rosacea 07/15/2020    Male erectile disorder 2019    Impaired fasting glucose 2019    Class 1 obesity without serious comorbidity with body mass index (BMI) of 31 0 to 31 9 in adult 03/15/2019    Hypothyroidism 2018    Essential hypertension 2018    Chronic osteomyelitis of knee ([facility] ) 10/08/2013      LOS (days): 12  Geometric Mean LOS (GMLOS) (days): 4 80  Days to GMLOS:-7 6     OBJECTIVE:  Risk of Unplanned Readmission Score: 19         Current admission status: Inpatient   Preferred Pharmacy:   [facility] Blake 112  [address]  Phone: [phone] Fax: 21 [address] DERECK Srivastava  [address]  [address] [facility] 53  [address] [place] [address]  Phone: [phone] Fax: [address] [place], [place] [address]Kristy [address]  Phone: [phone] Fax: [phone]    Primary Care Provider: Karly Lima MD    Primary Insurance: [facility]  Secondary Insurance:     DISCHARGE DETAILS:    Discharge planning discussed with[de-identified] Anita (benjaminther)        CM contacted family/caregiver?: Yes  Were Treatment Team discharge recommendations reviewed with patient/caregiver?: Yes  Did patient/caregiver verbalize understanding of patient care needs?: Yes  Were patient/caregiver advised of the risks associated with not following Treatment Team discharge recommendations?: Yes    Contacts  Patient Contacts: Ray Cornelius (daughter)  Relationship to Patient[de-identified] Family  Contact Method: Phone  Phone Number: [phone]  Reason/Outcome: Continuity of [facility]              Other Referral/Resources/Interventions Provided:  Referral Comments: call received from [facility] from the Elaine Zavala - she provided some clarification on hospice services at a facility -stated that if the patient were to go to [facility] on hospice - room and board would be covered by the [place] and then they would coordinate and work with Medicare  Per [facility] the patients family should not have any copays if they decided to go this route  Updated Anita with information  - messaged current CM for case as well to notify her

## 2022-02-22 NOTE — RESPIRATORY THERAPY NOTE
RT Ventilator Management Note  Boaz Walton 80 y o  male MRN: [id number]  Unit/Bed#: MICU 08 Encounter: [id number]      Daily Screen       2/22/2022  8207             Patient safety screen outcome[de-identified] Passed (P)     Spont breathing trial % for 30 min: Yes (P)     Spont breathing trial outcome[de-identified] Passed (P)     RSBI: 45 (P)             Physical Exam:   Assessment Type: (P) Assess only  General Appearance: (P) Drowsy,Sedated  Respiratory Pattern: (P) Assisted  Chest Assessment: (P) Chest expansion symmetrical  Bilateral Breath Sounds: (P) Diminished  O2 Device: (P) vent      Resp Comments: (P) pt placed on psv per protocol   will continue to [facility] for possible further weaning and/or extubation

## 2022-02-22 NOTE — OCCUPATIONAL THERAPY NOTE
OT CANCEL NOTE    OT orders received  Chart reviewed  Pt is currently intubated/sedated and not appropriate to engage in skilled OT services at this time  Will hold initial OT evaluation  Will continue to follow pt on caseload and see pt when medically stable and as clinically appropriate  02/22/22 0815   OT Last Visit   OT Visit Date 02/22/22   Note Type   Note type Evaluation; Cancelled Session   Cancel Reasons Intubated/sedated       Stephan Crawford MS, OTR/L

## 2022-02-22 NOTE — PROGRESS NOTES
Progress Note - Critical Care   Mirna Osullivan 80 y o  male MRN: [id number]  Unit/Bed#: [place] [address] Encounter: [id number]    SUBJECTIVE:  Pt examined at bedside this AM  Is sedated and intubated  HPI/24HR Event:  No acute events overnight      Medications: on levophed and propofol drips       ROS  Review of Systems   Unable to perform ROS: Intubated         Invasive lines and devices: Invasive Devices  Report    Peripheral Intravenous Line            Peripheral IV 22 Left;Ventral (anterior) Forearm 3 days    Peripheral IV 22 1 day    Peripheral IV 22 Right Hand 1 day    Peripheral IV 22 Left Hand <1 day          Drain            NG/OG/Enteral Tube Orogastric 18 Fr Right mouth <1 day    Urethral Catheter Temperature probe 16 Fr  <1 day          Airway            ETT  Cuffed;Oral;Straight; Inflated 8 mm <1 day                   Physical exam  General: Intubated and sedated  Resting comfortably in bed in no acute distress  Neuro: does not open eyes to voice or respond to commands   HENT: Normocephalic and atraumatic, PERRL  Heart: RRR, pulses intact  Lungs: Bilateral breath sounds present  Abdomen: distended, fluid wave present  Bowel sounds present, soft  Skin:  Jaundice   Warm, dry skin/Incision site clean dry and intact    Vitals  Temperature:   Temp (24hrs), Av 3 °F (36 3 °C), Min:96 8 °F (36 °C), Max:98 °F (36 7 °C)    Current: Temperature: 98 °F (36 7 °C)    Vitals:    22 0400 22 0500 22 0600 22 0700   BP: (!) 91/46 135/59 140/57 131/56   BP Location:       Pulse: 64 64 72 74   Resp:    Temp: 98 °F (36 7 °C)      TempSrc: Oral      SpO2: 98% 100% 100% 99%   Weight:   114 kg (252 lb 3 3 oz)    Height:                 Labs:   Results from last 7 days   Lab Units 22  0559 22  1856 22  0000 22  0459 22  0459   WBC Thousand/uL 11 58* 13 30* 8 91   < > 7 58   HEMOGLOBIN g/dL 9 6* 10 1* 10 2*   < > 10 5*   HEMATOCRIT % 26 7* 27 4* 28 2*   < > 29 9*   PLATELETS Thousands/uL 164 198 154   < > 166   MONO PCT %  --   --  6  --  11    < > = values in this interval not displayed  Results from last 7 days   Lab Units 02/21/22  0659 02/20/22  1102 02/19/22  0508   SODIUM mmol/L 138 138 140   POTASSIUM mmol/L 3 3* 3 8 3 4*   CHLORIDE mmol/L 108 109* 109*   CO2 mmol/L 21 21 22   BUN mg/dL 34* 32* 28*   CREATININE mg/dL 1 06 0 85 0 70   CALCIUM mg/dL 8 4 9 1 8 6   ALK PHOS U/L 383* 448* 394*   ALT U/L 43 50 47   AST U/L 57* 69* 66*     Results from last 7 days   Lab Units 02/17/22  0519   MAGNESIUM mg/dL 2 1          Results from last 7 days   Lab Units 02/21/22  1856 02/21/22  0608 02/18/22  0459   INR  1 45* 1 33* 1 33*     Results from last 7 days   Lab Units 02/22/22  0559   LACTIC ACID mmol/L 1 9       Other Labs    Intake and Outputs:  I/O       02/20 0701  02/21 0700 02/21 0701  02/22 0700 02/22 0701  02/23 0700    P  O        I V  (mL/kg)  1335 2 (11 7)     NG/GT  140     IV Piggyback  1550     Total Intake(mL/kg)  3025 2 (26 5)     Urine (mL/kg/hr) 350 (0 1) 635 (0 2)     Emesis/NG output  0     Stool 0      Total Output 350 635     Net -350 +2390 2            Unmeasured Stool Occurrence 1 x            Imaging Studies      Assessment & Plan:   Neuro:   · Diagnosis: Hyperammonemia  · Due to liver disease  · Continue lactulose 10 grams PO Qdaily for a goal 2-3 soft bowel movements per 24 hours - do not have to discharge on lactulose per GI  · Sedation: propofol ggt     CV:   · Pressure support: levophed         Pulm:  · Diagnosis: Lung Nodule  · CT AP: Left lower lobe 5 mm nodules   Bibasilar reticular changes and/or mild atelectasis   Paraesophageal varices    · Outpatient follow-up  · Intubated  · Get CXR to confirm placement of chest tube  · Plan to wean propofol and attempt spontaneous breathing trial today for extubation      GI:   · Diagnosis: Obstructive Jaundice   · Obstructive jaundice Status post ERCP with sphincterotomy and stent placement 2/10, stent replacement today found significant internal bleeding, which was cauterized  · Pt was intubated for airway protection following ERCP yesterday  · Biopsy from initial ERCP on 2/10 showing adenocarcinoma  · GI, onc and surg/onc consulted, following   · Per chart review patient and family are now agreeable to rehab placement at the [address] [place], [place] [address]y 20 off on hospice for now  · Diagnosis: Liver cirrhosis  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 with ascites  New onset  · Diagnosis: SBP  · Status post abdominal paracentesis with IR 2/13 for 2L  WBC noted, no bacteria on gram stain   Started on empiric Ceftriaxone for possible SBP     · Per chart review, plan to treat with ceftriaxone until resolution of SBP on fluid studies, discharge on lifetime Cipro or Bactrim  · Day 9 of ceftriaxone, continue       :   · Diagnosis: GERSON  · Resolved   · Likely multifactorial in the setting of contrast 2/9, possible hepatorenal, ARB use, possible intra abdominal HTN  · Retroperitoneal ultrasound no hydronephrosis  · Nephrology consult appreciated  · S/p IV albumin   · Diagnosis: Mass in inguinal region   · Venous duplex of the bilateral lower extremities (02/08/2022) as outpatient showed 5 1 cm non vascularized cystic structure in the right groin  · Plan for outpatient surgery follow-up    F/E/N:   Plan: NPO  Monitor CMP, replete as needed      Heme/Onc:   · Diagnosis: 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· Plan: per oncology, no oncology follow up as there are no treatment options      Endo:   · Diagnosis: Hypothyroidism  · Plan: continue home levothyroxine 200mcg    ID:   · Diagnosis: SBP  · Plan: continue ceftriaxone (day 10)  · Repeat paracentesis today      Disposition: discuss comfort care/home hospice with family        Non-Invasive/Invasive Ventilation Settings:  Respiratory  Report   Lab Data (Last 4 hours)      02/22 0436            pH, Arterial       7 393             pCO2, Arterial       34 9             pO2, Arterial       187 9 HCO3, Arterial       20 8             Base Excess, Arterial       -3 5                  O2/Vent Data       02/22 0321   Most Recent         Vent Mode AC/VC        Resp Rate (BPM) (BPM) 16        Vt (mL) (mL) 500        FIO2 (%) (%) 50        PEEP (cmH2O) (cmH2O) 6        MV 9 4                  Lab Results   Component Value Date    PHART 7 393 02/22/2022    [id number] 34 9 (L) 02/22/2022    PO2ART 187 9 (H) 02/22/2022    KZH3BEU 20 8 (L) 02/22/2022    BEART -3 5 02/22/2022    SOURCE Radial, Left 02/22/2022     SpO2: SpO2: 99 %, SpO2 Device: O2 Device: Ventilator    Imaging:   XR abdomen 1 view kub   Final Result by Beatriz Joseph MD (02/22 2697)      Likely mild ileus  Workstation performed: [id number]         FL ERCP biliary only   Final Result by Venus Ruiz MD (02/21 2048)      Status post stent removal   Mild biliary ductal dilatation  8 cm x 8 mm stent placed  Workstation performed: [id number]         MRI abdomen w wo contrast and mrcp   Final Result by Judy Weiner MD (02/18 1391)      Abnormal central hepatic signal with associated diffuse biliary wall enhancement suspicious for intrahepatic extension of this patient's known CBD cholangiocarcinoma  Margins of the mass are not well delineated; suspected dimensions of 8 9 x 5 2 cm on    #14/45  Peripheral intrahepatic biliary dilation with narrowing of the biliary tree through the suspected area of central hepatic mass  Portal vein is partially obscured by motion artifact on the delayed postcontrast images  It is likely severely narrowed through the andreas hepatis by surrounding mass  Perigastric varices are present  The study was marked in [facility] for immediate notification  Workstation performed: [id number]         XR abdomen 1 view kub   Final Result by Judy Weiner MD (02/18 1004)      Biliary stent projects over the right upper quadrant  Workstation performed: [id number]         IR INPATIENT Paracentesis   Final Result by Jason Velez MD (02/17 4327)   Impression:      Successful ultrasound-guided paracentesis  Workstation performed: [id number]         US kidney and bladder   Final Result by Joao Eldridge MD (02/13 1607)      No hydronephrosis  Partially imaged cirrhotic liver and ascites  Workstation performed: [id number]         IR INPATIENT Paracentesis   Final Result by Raji Aleman MD (1934)   Impression:      Ultrasound-guided paracentesis  Workstation performed: [id number]         FL ERCP biliary only   Final Result by Rocio Espino DO (02/12 0723)      Fluoroscopic guidance provided for ERCP and biliary stent placement as above  Please see procedure report for further details  Workstation performed: [id number]         CT chest wo contrast   Final Result by Aftab Velasco MD (02/11 5598)   1  Left lower lobe pulmonary nodules identified, largest measuring up to 5 mm  Based on current Fleischner Society 2017 Guidelines on incidental pulmonary nodule, optional follow-up CT at 12 months can be considered  2   Right greater than left calcified pleural plaques indicating prior as best as exposure  3   Hepatic cirrhosis with ascites and evidence of portal hypertension  The study was marked in EPIC for significant notification  Workstation performed: [id number]         IR INPATIENT Paracentesis    (Results Pending)   XR chest portable ICU    (Results Pending)     I have personally reviewed pertinent reports        Micro:  Lab Results   Component Value Date    URINECX 60,000-69,000 cfu/ml  02/09/2022         Allergies: No Known Allergies      Medications:   Scheduled Meds:  Current Facility-Administered Medications   Medication Dose Route Frequency Provider Last Rate    cefTRIAXone  2,000 mg Intravenous Q24H Liliane Mccann DO Stopped (02/22/22 0001)   Magnolia Ramirez chlorhexidine  15 mL Mouth/Throat Q12H [address] [place] Santanaumble, DO      fentanyl citrate (PF)  50 mcg Intravenous Q2H PRN [place] Draft, DO      lactulose  10 g Oral Daily [place], [place]      levothyroxine  200 mcg Oral Early Morning [place], [place]      melatonin  6 mg Oral HS [place]      mirtazapine  7 5 mg Oral HS [place], [place]      norepinephrine  1-30 mcg/min Intravenous Titrated [place], DO 4 mcg/min (02/22/22 0600)    pantoprazole  40 mg Intravenous Q12H [address] [place], DO      propofol  5-50 mcg/kg/min Intravenous Titrated [place], DO 45 mcg/kg/min (02/22/22 7381)     Continuous Infusions:norepinephrine, 1-30 mcg/min, Last Rate: 4 mcg/min (02/22/22 0600)  propofol, 5-50 mcg/kg/min, Last Rate: 45 mcg/kg/min (02/22/22 0628)      PRN Meds:  fentanyl citrate (PF), 50 mcg, Q2H PRN         Code Status: Level 3 - DNAR and DNI     Portions of the record may have been created with voice recognition software  Occasional wrong word or "sound a like" substitutions may have occurred due to the inherent limitations of voice recognition software  Read the chart carefully and recognize, using context, where substitutions have occurred       DO Vanita  Family Medicine Resident, PGY1  7:24 AM

## 2022-02-22 NOTE — RESPIRATORY THERAPY NOTE
RT Ventilator Management Note  Mirna Osullivan 80 y o  male MRN: [id number]  Unit/Bed#: [facility] 08 Encounter: [id number]      Daily Screen       2/22/2022  0745             Patient safety screen outcome[de-identified] Passed    Spont breathing trial % for 30 min: Yes    Spont breathing trial outcome[de-identified] Passed    RSBI: 45            Physical Exam:   Assessment Type: (P) Assess only  General Appearance: (P) Sedated  Respiratory Pattern: (P) Assisted  Chest Assessment: (P) Chest expansion symmetrical  Bilateral Breath Sounds: (P) Diminished  O2 Device: (P) vent      Resp Comments: (P) pt remains on psv  pressure support decreased from 8 to 7

## 2022-02-22 NOTE — PROGRESS NOTES
[facility] - Progress Note  Priyank Amaro 80 y o  male MRN: [id number]  Unit/Bed#: [facility] 08 Encounter: [id number]      ASSESSMENT & PLAN:    57-year-old male with history of hypertension, hypothyroidism, and hyperlipidemia who presented with weakness and obstructive jaundice from suspected CBD stricture/mass  Patient transferred from [facility] for GI evaluation and intervention with EUS and ERCP      1  Cholangiocarcinoma, obstructive jaundice, elevated LFTs - secondary to mass in the bile duct as seen on EUS status post FNB  Biopsies confirm cholangiocarcinoma  CA 19-9 markedly elevated at 28,356  LFTs with gradual improvement but with increasing bilirubin likely secondary to small duct metastatic lesions  Slight increase in transaminases likely secondary procedure possibly with acute decompensation of cirrhosis, DILI, hypoperfusion, or other  Status post repeat ERCP on 02/23 with removal of existing stent and exchange with uncovered biliary stent into the right intrahepatic duct  Please see ERCP report for details  · Follow LFTs  · If bilirubin continues to increase, can consider IR percutaneous drain placement but overall prognosis is guarded in setting of advanced malignancy; would recommend continued GOC discussion and consideration for hospice as patient is not candidate for surgery and not a candidate for chemotherapy with current hyperbilirubinemia  · Follow-up oncology, palliative recommendations  · Follow clinically with serial abdominal exams  · Avoid unnecessary hepatotoxic agents  · Supportive care per primary team     2  Decompensated cirrhosis - imaging findings suggestive of ascites and new cirrhosis  Complicated by ascites/SBP and small esophageal varices were seen on EGD which does support presence of portal hypertension  No varices seen on repeat EGD yesterday  Normal platelet count   Cause for liver disease, if present, unknown but may be secondary to malignancy versus fatty liver versus possibly chronic underlying PSC although uncertain vs other  · MELD score 26 (driven by bilirubin and creatinine but likely inaccurate in setting of biliary obstruction)  · Ascites/SBP:  · Continue IV ceftriaxone for SBP diagnosed this admission  · Can change to PO alternative when extubated and tolerating PO  · Will need ongoing lifelong antibiotics in setting of SBP but if planned to go hospice, can discontinue  · Repeat therapeutic paracentesis possibly today; check fluid cell count/differential and Gram stain/culture  · 2 gm sodium restriction  · Optimize nutritional status  · Consider starting diuretic regimen once hemodynamics are stable and patient is extubated  · Alternatively, if patient will go hospice, can consider placement of Tenckhoff catheter  · Follow clinically  · Portal hypertension:  · Small esophageal varices on EGD on 02/11 and mild PHG; no high risk stigmata  · No visible varices on EGD on 02/22 but noted severe esophagitis and gastritis  · No indication for NSBB at this time  · Follow clinically  · PSE:  · Intubated and sedated at this time  · Resume lactulose once extubated  · No indication for rifaximin  · Hopeful for extubated later today  · Serial neuro exams  · CAM-ICU per protocol  · Avoid unnecessary narcotics, sedatives, benzodiazepines, anticholinergics  · HCC screening:  · Can discontinue further screening in setting of advanced cholangiocarcinoma  · Transplant:  · Not a candidate for transplant due to age, co-morbidities, and cholangiocarcinoma with local metastases    3  Upper GI bleed, esophagitis, gastritis, anemia - secondary to mucosal irritation/tear at the GE junction in context of severe esophagitis and gastritis  Hemostatic clips x2 placed successfully with hemostasis at time of EGD/ERCP on 02/22  Hb with slight drop to 9 6 this morning but overall relatively stable  Low suspicion for active bleeding    · Continue IV PPI Q12h; transition to PO PPI BID once tolerating PO medications  · Avoid antiplatelets, anticoagulants, and NSAIDs  · Okay to start DVT prophylaxis from GI standpoint  · Monitor blood counts and transfuse as needed  · No plan for repeat endoscopy at this time    ______________________________________________________________________    SUBJECTIVE:     Patient seen and examined at bedside  Intubated and sedated       Medication Administration - last 24 hours from 02/21/2022 0717 to 02/22/2022 5459       Date/Time Order Dose Route Action Action by     02/21/2022 1745 lactulose oral solution 10 g   Oral MAR Unhold Merna Fort, DO     02/21/2022 1702 lactulose oral solution 10 g   Oral MAR Hold Automatic Transfer Provider     02/21/2022 1010 lactulose oral solution 10 g 10 g Oral Not Given Olive Remy RN     02/22/2022 0600 levothyroxine tablet 200 mcg 200 mcg Oral Given Jing Reis, MARTHA     02/21/2022 1745 levothyroxine tablet 200 mcg   Oral MAR Unhold Merna Fort, DO     02/21/2022 1702 levothyroxine tablet 200 mcg   Oral MAR Hold Automatic Transfer Provider     02/21/2022 1602 heparin (porcine) subcutaneous injection 5,000 Units 5,000 Units Subcutaneous Not Given Carla Briggs RN     02/22/2022 0001 cefTRIAXone (ROCEPHIN) 2,000 mg in dextrose 5 % 50 mL IVPB 0 mg Intravenous Stopped Timmy Bills, AMRTHA     02/21/2022 2138 cefTRIAXone (ROCEPHIN) 2,000 mg in dextrose 5 % 50 mL IVPB 2,000 mg Intravenous [address] Timmy Bills, MARTHA     02/21/2022 1745 cefTRIAXone (ROCEPHIN) 2,000 mg in dextrose 5 % 50 mL IVPB   Intravenous MAR Unhold Merna Fort, DO     02/21/2022 1702 cefTRIAXone (ROCEPHIN) 2,000 mg in dextrose 5 % 50 mL IVPB   Intravenous MAR Hold Automatic Transfer Provider     02/21/2022 2354 melatonin tablet 6 mg 6 mg Oral Not Given Timmy Bills, MARTHA     02/21/2022 1745 melatonin tablet 6 mg   Oral MAR Arley Howe DO     02/21/2022 1702 melatonin tablet 6 mg   Oral [facility] Hold Automatic Transfer Provider     02/21/2022 0690 mirtazapine (REMERON) tablet 7 5 mg 7 5 mg Oral Given Almmonicae Onur, RN     02/21/2022 1745 mirtazapine (REMERON) tablet 7 5 mg   Oral MAR Unhold Yoselyn Drain, DO     02/21/2022 1702 mirtazapine (REMERON) tablet 7 5 mg   Oral MAR Hold Automatic Transfer Provider     02/21/2022 2355 potassium chloride (K-DUR,KLOR-CON) CR tablet 40 mEq 40 mEq Oral Given Hiroe Onur, RN     02/21/2022 1745 potassium chloride (K-DUR,KLOR-CON) CR tablet 40 mEq   Oral MAR Unhold Yoselyn Drain, DO     02/21/2022 1702 potassium chloride (K-DUR,KLOR-CON) CR tablet 40 mEq   Oral MAR Hold Automatic Transfer Provider     02/21/2022 1500 iohexol (OMNIPAQUE) 240 MG/ML solution 50 mL 33 mL Injection Given by Other Marcie Foster     02/21/2022 2143 pantoprazole (PROTONIX) injection 40 mg 40 mg Intravenous Given Karen Mohr, RN     02/21/2022 1745 pantoprazole (PROTONIX) injection 40 mg   Intravenous MAR Unhold Yoselyn Drain, DO     02/21/2022 1702 pantoprazole (PROTONIX) injection 40 mg   Intravenous MAR Hold Automatic Transfer Provider     02/21/2022 1712 propofol (DIPRIVAN) 1000 mg in 100 mL infusion (premix) **ADS Override Pull**    Override pull for Anesthesia Izora Alert     02/21/2022 1927 phenylephrine HCl (VAZCULEP) 10 MG/ML solution **ADS Override Pull**    Override Pull Karen Mohr RN     02/21/2022 1927 propofol (DIPRIVAN) 1000 mg in 100 mL infusion (premix) 0 mcg/kg/min Intravenous Override Pull Karen Mohr RN     02/21/2022 1924 propofol (DIPRIVAN) 1000 mg in 100 mL infusion (premix) 0 mcg/kg/min Intravenous Stopped Karen Mohr, MARTHA     02/22/2022 0600 norepinephrine (LEVOPHED) 4 mg (STANDARD CONCENTRATION) IV in sodium chloride 0 9% 250 mL 4 mcg/min Intravenous Rate/Dose Change Lanny Clarke RN     02/21/2022 2321 norepinephrine (LEVOPHED) 4 mg (STANDARD CONCENTRATION) IV in sodium chloride 0 9% 250 mL 5 mcg/min Intravenous Rate/Dose Change Karen Mohr RN     02/21/2022 1955 norepinephrine (LEVOPHED) 4 mg (STANDARD CONCENTRATION) IV in sodium chloride 0 9% 250 mL 4 mcg/min Intravenous Rate/Dose Change Sindi Blanco, RN     02/21/2022 1926 norepinephrine (LEVOPHED) 4 mg (STANDARD CONCENTRATION) IV in sodium chloride 0 9% 250 mL 5 mcg/min Intravenous [employer], RN     02/22/2022 3978 propofol (DIPRIVAN) 1000 mg in 100 mL infusion (premix) 45 mcg/kg/min Intravenous [address] [place] [address], [address] [place] [address]     02/22/2022 0358 propofol (DIPRIVAN) 1000 mg in 100 mL infusion (premix) 45 mcg/kg/min Intravenous [address] [place] [address], [address] [place] [address]     02/22/2022 0054 propofol (DIPRIVAN) 1000 mg in 100 mL infusion (premix) 40 mcg/kg/min Intravenous [address] [place] [address], [address] [place] [address]     02/21/2022 2129 propofol (DIPRIVAN) 1000 mg in 100 mL infusion (premix) 40 mcg/kg/min Intravenous Rate/Dose Change Sindi Blanco, RN     02/21/2022 2128 propofol (DIPRIVAN) 1000 mg in 100 mL infusion (premix) 45 mcg/kg/min Intravenous [employer], RN     02/21/2022 2127 propofol (DIPRIVAN) 1000 mg in 100 mL infusion (premix) 0 mcg/kg/min Intravenous Stopped Sindi Blanco, RN     02/21/2022 1955 propofol (DIPRIVAN) 1000 mg in 100 mL infusion (premix) 45 mcg/kg/min Intravenous Rate/Dose Change Sindi Blanco RN     02/21/2022 1926 propofol (DIPRIVAN) 1000 mg in 100 mL infusion (premix)   Intravenous Canceled Entry Sindi Blanco, RN     02/21/2022 1925 propofol (DIPRIVAN) 1000 mg in 100 mL infusion (premix) 50 mcg/kg/min Intravenous [address] Sindi Blanco RN     02/21/2022 2143 chlorhexidine (PERIDEX) 0 12 % oral rinse 15 mL 15 mL Mouth/Throat Given Sindi Blanco, RN     02/22/2022 0200 albumin human (FLEXBUMIN) 5 % injection 25 g 0 g Intravenous Stopped Isabela Nelson, MARTHA     02/22/2022 0119 albumin human (FLEXBUMIN) 5 % injection 25 g 25 g Intravenous [facility], [facility]     02/22/2022 0600 albumin human (FLEXBUMIN) 5 % injection 25 g 0 g Intravenous Bailey Nelson RN     02/22/2022 0450 albumin human (FLEXBUMIN) 5 % injection 25 g 25 g Intravenous [address], [address] [place] [address] OBJECTIVE:     Objective   Blood pressure 131/56, pulse 74, temperature 98 °F (36 7 °C), temperature source Oral, resp  rate 19, height 6' 4" (1 93 m), weight 114 kg (252 lb 3 3 oz), SpO2 99 %  Body mass index is 30 7 kg/m²  Intake/Output Summary (Last 24 hours) at 2/22/2022 9671  Last data filed at 2/22/2022 0600  Gross per 24 hour   Intake 3025 23 ml   Output 635 ml   Net 2390 23 ml       PHYSICAL EXAM:   General Appearance: Intubated and sedated; chronically ill-appearing, jaundiced  Abdomen: Non-tender, softly distended, +fluid shift; bowel sounds normal; no masses or no organomegaly    Invasive Devices  Report    Peripheral Intravenous Line            Peripheral IV 02/19/22 Left;Ventral (anterior) Forearm 3 days    Peripheral IV 02/21/22 1 day    Peripheral IV 02/21/22 Right Hand 1 day    Peripheral IV 02/21/22 Left Hand <1 day          Drain            NG/OG/Enteral Tube Orogastric 18 Fr Right mouth <1 day    Urethral Catheter Temperature probe 16 Fr  <1 day          Airway            ETT  Cuffed;Oral;Straight; Inflated 8 mm <1 day                LAB RESULTS:  No results displayed because visit has over 200 results  RADIOLOGY RESULTS: I have personally reviewed pertinent imaging studies  FRACISCO Raphael  Gastroenterology Fellow  [facility]  Available on [email or web address]  [email or web address]

## 2022-02-23 NOTE — PROGRESS NOTES
[facility] - Progress Note  Aileen Record 80 y o  male MRN: [id number]  Unit/Bed#: MICU 08 Encounter: [id number]      ASSESSMENT & PLAN:    80-year-old male with history of hypertension, hypothyroidism, and hyperlipidemia who presented with weakness and obstructive jaundice from suspected CBD stricture/mass  Patient transferred from [facility] for GI evaluation and intervention with EUS and ERCP      1  Cholangiocarcinoma, obstructive jaundice, elevated LFTs - secondary to mass in the bile duct as seen on EUS status post FNB  Biopsies confirm cholangiocarcinoma  CA 19-9 markedly elevated at 28,356  LFTs with gradual improvement but with increasing bilirubin likely secondary to small duct metastatic lesions  Slight increase in transaminases likely secondary procedure possibly with acute decompensation of cirrhosis, DILI, hypoperfusion, or other  Status post repeat ERCP on 02/23 with removal of existing stent and exchange with uncovered biliary stent into the right intrahepatic duct  Please see ERCP report for details  Labs this morning remain pending  · Follow-up AM labs and continue to monitor LFTs  · If bilirubin continues to increase, can consider IR percutaneous drain placement but overall prognosis is guarded in setting of advanced malignancy; would recommend continued GOC discussion and consideration for hospice as patient is not candidate for surgery and not a candidate for chemotherapy with current hyperbilirubinemia  · Follow-up oncology, palliative recommendations  · Follow clinically with serial abdominal exams  · Avoid unnecessary hepatotoxic agents  · Supportive care per primary team     2  Decompensated cirrhosis - imaging findings suggestive of ascites and new cirrhosis  Complicated by ascites/SBP and small esophageal varices were seen on EGD which does support presence of portal hypertension  No varices seen on repeat EGD yesterday  Normal platelet count   Cause for liver disease, if present, unknown but may be secondary to malignancy versus fatty liver versus possibly chronic underlying PSC although uncertain vs other  · MELD score 26 (driven by bilirubin and creatinine but likely inaccurate in setting of biliary obstruction)  · Ascites/SBP:  · Continue IV ceftriaxone for SBP diagnosed this admission but can change to PO alternative when extubated and tolerating PO  · Repeat paracentesis on 02/22 with markedly improved cell count/differential;   · Will need ongoing lifelong antibiotics in setting of SBP but if planned to go hospice, can discontinue  · 2 gm sodium restriction  · Optimize nutritional status  · Consider starting diuretic regimen once hemodynamics are stable and patient is extubated  · Alternatively, if patient will go hospice, can consider placement of Tenckhoff catheter  · Follow clinically  · Portal hypertension:  · Small esophageal varices on EGD on 02/11 and mild PHG; no high risk stigmata  · No visible varices on EGD on 02/22 but noted severe esophagitis and gastritis  · No indication for NSBB at this time  · Follow clinically  · PSE:  · Intubated and sedated at this time  · Resume lactulose once extubated or via OG tube if remains intubated  · No indication for rifaximin  · Wean sedation and possible extubation at discretion of critical care team  · Serial neuro exams  · CAM-ICU per protocol  · Avoid unnecessary narcotics, sedatives, benzodiazepines, anticholinergics  · [facility] [address]  screening:  · Defer further screening in setting of advanced cholangiocarcinoma  · Transplant:  · Not a candidate for transplant due to age, co-morbidities, and cholangiocarcinoma with local metastases     3  Upper GI bleed, esophagitis, gastritis, anemia - secondary to mucosal irritation/tear at the GE junction in context of severe esophagitis and gastritis  Hemostatic clips x2 placed successfully with hemostasis at time of EGD/ERCP on 02/22   Hb relatively stable at 9 1 this morning  No overt bleeding  · Continue IV PPI Q12h; transition to PO PPI BID once tolerating PO medications  · Avoid antiplatelets, anticoagulants, and NSAIDs  · Okay to start DVT prophylaxis from GI standpoint  · Monitor blood counts and transfuse as needed  · No plan for repeat endoscopy at this time    ______________________________________________________________________    SUBJECTIVE:     Patient seen and evaluated at bedside  Remains intubated  Currently on PS/SBT trial off sedation  Remains on levo at 6  Medication Administration - last 24 hours from 02/22/2022 0555 to 02/23/2022 1710       Date/Time Order Dose Route Action Action by     02/22/2022 0801 lactulose oral solution 10 g 10 g Oral Given Ma  Isaiahide , RN     02/23/2022 3766 levothyroxine tablet 200 mcg 200 mcg Oral Given [place] Silence, RN     02/22/2022 0600 levothyroxine tablet 200 mcg 200 mcg Oral Given [place] Silence, RN     02/22/2022 2101 melatonin tablet 6 mg 6 mg Oral Given [place] Silence, RN     02/22/2022 2102 mirtazapine (REMERON) tablet 7 5 mg 7 5 mg Oral Given [place] Silence, RN     02/22/2022 2007 pantoprazole (PROTONIX) injection 40 mg 40 mg Intravenous Given [place] Silence, RN     02/22/2022 0801 pantoprazole (PROTONIX) injection 40 mg 40 mg Intravenous Given Ma  Isaiahide , RN     02/22/2022 3830 propofol (DIPRIVAN) 1000 mg in 100 mL infusion (premix) **ADS Override Pull** 0 mcg/kg/min  Stopped Ramiro Colonide , RN     02/23/2022 0404 norepinephrine (LEVOPHED) 4 mg (STANDARD CONCENTRATION) IV in sodium chloride 0 9% 250 mL 5 mcg/min Intravenous Rate/Dose Change Pratibha Silence, RN     02/23/2022 0300 norepinephrine (LEVOPHED) 4 mg (STANDARD CONCENTRATION) IV in sodium chloride 0 9% 250 mL 6 mcg/min Intravenous Rate/Dose Change [place] Silence, RN     02/23/2022 0145 norepinephrine (LEVOPHED) 4 mg (STANDARD CONCENTRATION) IV in sodium chloride 0 9% 250 mL 7 mcg/min Intravenous [address]     02/22/2022 8459 norepinephrine (LEVOPHED) 4 mg (STANDARD CONCENTRATION) IV in sodium chloride 0 9% 250 mL 6 mcg/min Intravenous Rate/Dose Change Jing Reis, MARTHA     02/22/2022 1947 norepinephrine (LEVOPHED) 4 mg (STANDARD CONCENTRATION) IV in sodium chloride 0 9% 250 mL 5 mcg/min Intravenous Rate/Dose Change Jing Reis, MARTHA     02/22/2022 1848 norepinephrine (LEVOPHED) 4 mg (STANDARD CONCENTRATION) IV in sodium chloride 0 9% 250 mL 4 mcg/min Intravenous Rate/Dose Change Ramiro Kirk RN     02/22/2022 1648 norepinephrine (LEVOPHED) 4 mg (STANDARD CONCENTRATION) IV in sodium chloride 0 9% 250 mL 3 mcg/min Intravenous Rate/Dose Change Ramiro Kirk RN     02/22/2022 1643 norepinephrine (LEVOPHED) 4 mg (STANDARD CONCENTRATION) IV in sodium chloride 0 9% 250 mL 4 mcg/min Intravenous Rate/Dose Change Ramiro Kirk RN     02/22/2022 1533 norepinephrine (LEVOPHED) 4 mg (STANDARD CONCENTRATION) IV in sodium chloride 0 9% 250 mL 5 mcg/min Intravenous Rate/Dose Change Ramiro Kirk RN     02/22/2022 1459 norepinephrine (LEVOPHED) 4 mg (STANDARD CONCENTRATION) IV in sodium chloride 0 9% 250 mL 4 mcg/min Intravenous Rate/Dose Change Ramiro Kirk RN     02/22/2022 1401 norepinephrine (LEVOPHED) 4 mg (STANDARD CONCENTRATION) IV in sodium chloride 0 9% 250 mL 3 mcg/min Intravenous Rate/Dose Change Ramiro Kirk RN     02/22/2022 1008 norepinephrine (LEVOPHED) 4 mg (STANDARD CONCENTRATION) IV in sodium chloride 0 9% 250 mL 2 mcg/min Intravenous Rate/Dose Change Ramiro Kirk RN     02/22/2022 1457 norepinephrine (LEVOPHED) 4 mg (STANDARD CONCENTRATION) IV in sodium chloride 0 9% 250 mL 3 mcg/min Intravenous Rate/Dose Change Ramiro Kirk RN     02/22/2022 6517 norepinephrine (LEVOPHED) 4 mg (STANDARD CONCENTRATION) IV in sodium chloride 0 9% 250 mL 2 mcg/min Intravenous New Bag Ramiro Kirk RN     02/22/2022 1511 norepinephrine (LEVOPHED) 4 mg (STANDARD CONCENTRATION) IV in sodium chloride 0 9% 250 mL 0 mcg/min Intravenous Stopped Ramiro Saini, RN     02/22/2022 9085 norepinephrine (LEVOPHED) 4 mg (STANDARD CONCENTRATION) IV in sodium chloride 0 9% 250 mL 2 mcg/min Intravenous Rate/Dose Change Ramiro Saini, RN     02/22/2022 0700 norepinephrine (LEVOPHED) 4 mg (STANDARD CONCENTRATION) IV in sodium chloride 0 9% 250 mL 3 mcg/min Intravenous Rate/Dose Change Ramiro Saini, RN     02/22/2022 0600 norepinephrine (LEVOPHED) 4 mg (STANDARD CONCENTRATION) IV in sodium chloride 0 9% 250 mL 4 mcg/min Intravenous Rate/Dose Change Daniel Roa, RN     02/22/2022 2303 propofol (DIPRIVAN) 1000 mg in 100 mL infusion (premix) 15 mcg/kg/min Intravenous [address] Ruben Carpio, RN     02/22/2022 1605 propofol (DIPRIVAN) 1000 mg in 100 mL infusion (premix) 15 mcg/kg/min Intravenous New Bag Ramiro Saini, RN     02/22/2022 1604 propofol (DIPRIVAN) 1000 mg in 100 mL infusion (premix) 0 mcg/kg/min Intravenous Stopped Ramiro Saini, RN     02/22/2022 1401 propofol (DIPRIVAN) 1000 mg in 100 mL infusion (premix) 20 mcg/kg/min Intravenous Rate/Dose Change Ramiro Saini, RN     02/22/2022 1019 propofol (DIPRIVAN) 1000 mg in 100 mL infusion (premix) 25 mcg/kg/min Intravenous New Bag Ramiro Saini, RN     02/22/2022 1017 propofol (DIPRIVAN) 1000 mg in 100 mL infusion (premix) 0 mcg/kg/min Intravenous Stopped Ramiro Saini, RN     02/22/2022 1009 propofol (DIPRIVAN) 1000 mg in 100 mL infusion (premix) 25 mcg/kg/min Intravenous Rate/Dose Change Ramiro Saini, RN     02/22/2022 0941 propofol (DIPRIVAN) 1000 mg in 100 mL infusion (premix) 30 mcg/kg/min Intravenous Rate/Dose Change Ma  Skip Yeny, RN     02/22/2022 0431 propofol (DIPRIVAN) 1000 mg in 100 mL infusion (premix) 35 mcg/kg/min Intravenous Rate/Dose Change Ramiro Saini RN     02/22/2022 0749 propofol (DIPRIVAN) 1000 mg in 100 mL infusion (premix) 40 mcg/kg/min Intravenous Rate/Dose Change Ma  Laurie Yelena, RN     02/22/2022 0637 propofol (DIPRIVAN) 1000 mg in 100 mL infusion (premix) 45 mcg/kg/min Intravenous 700 [facility]     02/22/2022 2007 chlorhexidine (PERIDEX) 0 12 % oral rinse 15 mL 15 mL Mouth/Throat Given Jing Bleacher, RN     02/22/2022 0801 chlorhexidine (PERIDEX) 0 12 % oral rinse 15 mL 15 mL Mouth/Throat Given Ramiro Sullivan Yelena, RN     02/22/2022 0600 albumin human (FLEXBUMIN) 5 % injection 25 g 0 g Intravenous Stopped Jing Bleacher, RN     02/22/2022 1758 lactulose oral solution 20 g 20 g Oral Given Ma  Laurie Yelena, RN     02/23/2022 8507 heparin (porcine) subcutaneous injection 5,000 Units 5,000 Units Subcutaneous Given Jing Bleacher, RN     02/22/2022 2102 heparin (porcine) subcutaneous injection 5,000 Units 5,000 Units Subcutaneous Given Jing Bleacher, RN     02/22/2022 1505 heparin (porcine) subcutaneous injection 5,000 Units 5,000 Units Subcutaneous Not Given Ramiro Lubine Yelena, RN     02/22/2022 1107 spironolactone (ALDACTONE) tablet 50 mg 50 mg Per G Tube Given Ramiro Sullivan Yelena, RN     02/22/2022 1107 furosemide (LASIX) tablet 20 mg 20 mg Per G Tube Given Ma  Laurie Kirk, RN     02/22/2022 1759 albumin human (FLEXBUMIN) 5 % injection 12 5 g 0 g Intravenous Stopped Ma  Laurie Yelena, RN     02/22/2022 1643 albumin human (FLEXBUMIN) 5 % injection 12 5 g 12 5 g Intravenous New Bag Ramiro Kirk, RN     02/22/2022 2100 albumin human (FLEXBUMIN) 25 % injection 25 g 0 g Intravenous Stopped Jing Bleacher, RN     02/22/2022 2007 albumin human (FLEXBUMIN) 25 % injection 25 g 25 g Intravenous 700 [facility]     02/23/2022 0201 albumin human (FLEXBUMIN) 5 % injection 25 g 0 g Intravenous Stopped Ijng Bleacher, RN     02/23/2022 0200 albumin human (FLEXBUMIN) 5 % injection 25 g 0 g Intravenous Stopped Jing Reis RN     02/23/2022 0002 albumin human (FLEXBUMIN) 5 % injection 25 g 25 g Intravenous [address],  OBJECTIVE:     Objective   Blood pressure 109/51, pulse 72, temperature 99 3 °F (37 4 °C), temperature source Bladder, resp  rate 18, height 6' 4" (1 93 m), weight 114 kg (252 lb 3 3 oz), SpO2 99 %  Body mass index is 30 7 kg/m²  Intake/Output Summary (Last 24 hours) at 2/23/2022 0555  Last data filed at 2/23/2022 0400  Gross per 24 hour   Intake 2336 51 ml   Output 6250 ml   Net -3913 49 ml       PHYSICAL EXAM:   General Appearance: Intubated, responsive to physical stimuli, ill-appearing, jaundiced  Abdomen: Non-tender, softly distended; bowel sounds normal; no masses or no organomegaly    Invasive Devices  Report    Peripheral Intravenous Line            Peripheral IV 02/19/22 Left;Ventral (anterior) Forearm 4 days    Peripheral IV 02/21/22 Right Hand 2 days    Peripheral IV 02/21/22 Left Hand 1 day          Drain            NG/OG/Enteral Tube Orogastric 18 Fr Right mouth 1 day    Urethral Catheter Temperature probe 16 Fr  1 day          Airway            ETT  Cuffed;Oral;Straight; Inflated 8 mm 1 day                LAB RESULTS:  No results displayed because visit has over 200 results  RADIOLOGY RESULTS: I have personally reviewed pertinent imaging studies  FRACISCO Qureshi  Gastroenterology Fellow  [facility]  Available on [email or web address]

## 2022-02-23 NOTE — RESPIRATORY THERAPY NOTE
RT Ventilator Management Note  Georgia Ano 80 y o  male MRN: [id number]  Unit/Bed#: MICU 08 Encounter: [id number]      Daily Screen       2/22/2022  0745 2/23/2022  0746          Patient safety screen outcome[de-identified] Passed Passed      Spont breathing trial % for 30 min: Yes --      Spont breathing trial outcome[de-identified] Passed Passed      RSBI: 45 41              Physical Exam:   Assessment Type: Assess only  General Appearance: Awake,Alert  Respiratory Pattern: Assisted  Chest Assessment: Chest expansion symmetrical  Bilateral Breath Sounds: Diminished,Coarse  L Breath Sounds: Diminished  Cough: Non-productive      Resp Comments: Rec'd patient resting comfortably on CPAP/PS 7/6 40%  Alarms are set and working  Airway is secured properly in place  No resp  intervention needed at this time   WIll continue to monitor as per protocol

## 2022-02-23 NOTE — QUICK NOTE
Called and spoke with daughter regarding plans to extubate today  She and her mother, the patient's wife, would both like to be present  They will both be here by 1300 today, plan for extubation then  Daughter understands that because he is level 3 he will not be reintubated  All questions were answered

## 2022-02-23 NOTE — PLAN OF CARE
Problem: MOBILITY - ADULT  Goal: Maintain or return to baseline ADL function  Description: INTERVENTIONS:  -  Assess patient's ability to carry out ADLs; assess patient's baseline for ADL function and identify physical deficits which impact ability to perform ADLs (bathing, care of mouth/teeth, toileting, grooming, dressing, etc )  - Assess/evaluate cause of self-care deficits   - Assess range of motion  - Assess patient's mobility; develop plan if impaired  - Assess patient's need for assistive devices and provide as appropriate  - Encourage maximum independence but intervene and supervise when necessary  - Involve family in performance of ADLs  - Assess for home care needs following discharge   - Consider OT consult to assist with ADL evaluation and planning for discharge  - Provide patient education as appropriate  Outcome: Progressing  Goal: Maintains/Returns to pre admission functional level  Description: INTERVENTIONS:  - Perform BMAT or MOVE assessment daily    - Set and communicate daily mobility goal to care team and patient/family/caregiver  - Collaborate with rehabilitation services on mobility goals if consulted  - Perform Range of Motion   - Reposition patient every 2 hours    - Dangle patient   - Stand patient   - Ambulate patient   - Out of bed to chair   - Out of bed for meals   - Out of bed for toileting  - Record patient progress and toleration of activity level   Outcome: Progressing     Problem: Potential for Falls  Goal: Patient will remain free of falls  Description: INTERVENTIONS:  - Educate patient/family on patient safety including physical limitations  - Instruct patient to call for assistance with activity   - Consult OT/PT to assist with strengthening/mobility   - Keep Call bell within reach  - Keep bed low and locked with side rails adjusted as appropriate  - Keep care items and personal belongings within reach  - Initiate and maintain comfort rounds  - Make Fall Risk Sign visible to staff  - Offer Toileting every 2 Hours, in advance of need  - Initiate/Maintain alarm  - Obtain necessary fall risk management equipment  - Apply yellow socks and bracelet for high fall risk patients  - Consider moving patient to room near nurses station  Outcome: Progressing     Problem: INFECTION - ADULT  Goal: Absence or prevention of progression during hospitalization  Description: INTERVENTIONS:  - Assess and monitor for signs and symptoms of infection  - Monitor lab/diagnostic results  - Monitor all insertion sites, i e  indwelling lines, tubes, and drains  - Monitor endotracheal if appropriate and nasal secretions for changes in amount and color  - [place] appropriate cooling/warming therapies per order  - Administer medications as ordered  - Instruct and encourage patient and family to use good hand hygiene technique  - Identify and instruct in appropriate isolation precautions for identified infection/condition  Outcome: Progressing  Goal: Absence of fever/infection during neutropenic period  Description: INTERVENTIONS:  - Monitor WBC    Outcome: Progressing     Problem: Prexisting or High Potential for Compromised Skin Integrity  Goal: Skin integrity is maintained or improved  Description: INTERVENTIONS:  - Identify patients at risk for skin breakdown  - Assess and monitor skin integrity  - Assess and monitor nutrition and hydration status  - Monitor labs   - Assess for incontinence   - Turn and reposition patient  - Assist with mobility/ambulation  - Relieve pressure over bony prominences  - Avoid friction and shearing  - Provide appropriate hygiene as needed including keeping skin clean and dry  - Evaluate need for skin moisturizer/barrier cream  - Collaborate with interdisciplinary team   - Patient/family teaching  - Consider wound care consult   Outcome: Progressing

## 2022-02-23 NOTE — PLAN OF CARE
Problem: MOBILITY - ADULT  Goal: Maintain or return to baseline ADL function  Description: INTERVENTIONS:  -  Assess patient's ability to carry out ADLs; assess patient's baseline for ADL function and identify physical deficits which impact ability to perform ADLs (bathing, care of mouth/teeth, toileting, grooming, dressing, etc )  - Assess/evaluate cause of self-care deficits   - Assess range of motion  - Assess patient's mobility; develop plan if impaired  - Assess patient's need for assistive devices and provide as appropriate  - Encourage maximum independence but intervene and supervise when necessary  - Involve family in performance of ADLs  - Assess for home care needs following discharge   - Consider OT consult to assist with ADL evaluation and planning for discharge  - Provide patient education as appropriate  Outcome: Progressing  Goal: Maintains/Returns to pre admission functional level  Description: INTERVENTIONS:  - Perform BMAT or MOVE assessment daily    - Set and communicate daily mobility goal to care team and patient/family/caregiver  - Collaborate with rehabilitation services on mobility goals if consulted  - Perform Range of Motion   - Reposition patient every 2 hours    - Dangle patient   - Stand patient   - Ambulate patient   - Out of bed to chair   - Out of bed for meals   - Out of bed for toileting  - Record patient progress and toleration of activity level   Outcome: Progressing     Problem: Potential for Falls  Goal: Patient will remain free of falls  Description: INTERVENTIONS:  - Educate patient/family on patient safety including physical limitations  - Instruct patient to call for assistance with activity   - Consult OT/PT to assist with strengthening/mobility   - Keep Call bell within reach  - Keep bed low and locked with side rails adjusted as appropriate  - Keep care items and personal belongings within reach  - Initiate and maintain comfort rounds  - Make Fall Risk Sign visible to staff  - Offer Toileting every 2 Hours, in advance of need  - Initiate/Maintain alarm  - Obtain necessary fall risk management equipment  - Apply yellow socks and bracelet for high fall risk patients  - Consider moving patient to room near nurses station  Outcome: Progressing     Problem: INFECTION - ADULT  Goal: Absence or prevention of progression during hospitalization  Description: INTERVENTIONS:  - Assess and monitor for signs and symptoms of infection  - Monitor lab/diagnostic results  - Monitor all insertion sites, i e  indwelling lines, tubes, and drains  - Monitor endotracheal if appropriate and nasal secretions for changes in amount and color  - [place] appropriate cooling/warming therapies per order  - Administer medications as ordered  - Instruct and encourage patient and family to use good hand hygiene technique  - Identify and instruct in appropriate isolation precautions for identified infection/condition  Outcome: Progressing  Goal: Absence of fever/infection during neutropenic period  Description: INTERVENTIONS:  - Monitor WBC    Outcome: Progressing     Problem: Prexisting or High Potential for Compromised Skin Integrity  Goal: Skin integrity is maintained or improved  Description: INTERVENTIONS:  - Identify patients at risk for skin breakdown  - Assess and monitor skin integrity  - Assess and monitor nutrition and hydration status  - Monitor labs   - Assess for incontinence   - Turn and reposition patient  - Assist with mobility/ambulation  - Relieve pressure over bony prominences  - Avoid friction and shearing  - Provide appropriate hygiene as needed including keeping skin clean and dry  - Evaluate need for skin moisturizer/barrier cream  - Collaborate with interdisciplinary team   - Patient/family teaching  - Consider wound care consult   Outcome: Progressing

## 2022-02-23 NOTE — PROGRESS NOTES
Progress Note - Critical Care   Misa Lombardo 80 y o  male MRN: [id number]  [facility]: [id number]    SUBJECTIVE:  Pt examined at bedside this AM  Is sedated and intubated, but does open eyes to voice         HPI/24HR Event:  No acute events overnight  Paracentesis took off 4 5L of clear sheri colored fluid yesterday  ROS  Review of Systems   Unable to perform ROS: Intubated         Invasive lines and devices: Invasive Devices  Report    Peripheral Intravenous Line            Peripheral IV 22 Right Hand 2 days    Peripheral IV 22 Left Hand 1 day          Drain            NG/OG/Enteral Tube Orogastric 18 Fr Right mouth 1 day    Urethral Catheter Temperature probe 16 Fr  1 day          Airway            ETT  Cuffed;Oral;Straight; Inflated 8 mm 1 day                   Physical exam  General: Resting comfortably in bed in no acute distress  Neuro: sedated, opens eyes to voice but does not follow commands   HENT: Normocephalic and atraumatic, PERRL  Heart: RRR, pulses intact  Lungs: Bilateral breath sounds present  Abdomen: Distended  Bowel sounds present, soft  Skin:  Jaundice   Warm, dry skin/Incision site clean dry and intact    Vitals  Temperature:   Temp (24hrs), Av 1 °F (37 3 °C), Min:97 8 °F (36 6 °C), Max:100 °F (37 8 °C)    Current: Temperature: 99 °F (37 2 °C)    Vitals:    22 0333 22 0400 22 0500 22 0600   BP:  109/51 112/55 (!) 102/46   BP Location:  Right arm     Pulse:  72 70 82   Resp:  18 (!) 25 19   Temp:  99 3 °F (37 4 °C) 99 3 °F (37 4 °C) 99 °F (37 2 °C)   TempSrc:  Bladder     SpO2: 99% 99% 99% 99%   Weight:    110 kg (241 lb 10 oz)   Height:                 Labs:   Results from last 7 days   Lab Units 22  0524 22  0559 22  1856 22  0000 22  0000 22  0459 22  0459   WBC Thousand/uL 9 72 11 58* 13 30*   < > 8 91   < > 7 58   HEMOGLOBIN g/dL 9 1* 9 6* 10 1*   < > 10 2*   < > 10 5*   HEMATOCRIT % 25 7* 26 7* 27 4*   < > 28 2*   < > 29 9*   PLATELETS Thousands/uL 153 164 198   < > 154   < > 166   NEUTROS PCT % 75  --   --   --   --   --   --    MONOS PCT % 12  --   --   --   --   --   --    MONO PCT %  --   --   --   --  6  --  11    < > = values in this interval not displayed  Results from last 7 days   Lab Units 02/22/22  0559 02/21/22  0659 02/20/22  1102   SODIUM mmol/L 134* 138 138   POTASSIUM mmol/L 3 9 3 3* 3 8   CHLORIDE mmol/L 104 108 109*   CO2 mmol/L 23 21 21   BUN mg/dL 37* 34* 32*   CREATININE mg/dL 0 92 1 06 0 85   CALCIUM mg/dL 7 7* 8 4 9 1   ALK PHOS U/L 325* 383* 448*   ALT U/L 89* 43 50   AST U/L 245* 57* 69*     Results from last 7 days   Lab Units 02/17/22  0519   MAGNESIUM mg/dL 2 1          Results from last 7 days   Lab Units 02/21/22  1856 02/21/22  0608 02/18/22  0459   INR  1 45* 1 33* 1 33*     Results from last 7 days   Lab Units 02/22/22  0559   LACTIC ACID mmol/L 1 9       Other Labs    Intake and Outputs:  I/O       02/21 0701  02/22 0700 02/22 0701  02/23 0700 02/23 0701  02/24 0700    I V  (mL/kg) 1335 2 (11 7) 743 4 (6 8)     NG/ 200     IV Piggyback 1550 850     Total Intake(mL/kg) 3025 2 (26 5) 1793 4 (16 3)     Urine (mL/kg/hr) 635 (0 2) 1800 (0 7)     Emesis/NG output 0 0     Other  4500     Stool       Total Output 635 6300     Net +2390 2 -4506 6                  Imaging Studies    Assessment & Plan:   Neuro:   Diagnosis: Hyperammonemia  · Due to liver disease  · Increased  Lactulose to 20 grams PO Qdaily for a goal 2-3 soft bowel movements per 24 hours - do not have to discharge on lactulose per GI  Sedation: propofol ggt, held this AM     CV:   · Pressure support: levophed         Pulm:  Diagnosis: Lung Nodule  · CT AP: Left lower lobe 5 mm nodules   Bibasilar reticular changes and/or mild atelectasis   Paraesophageal varices    · Outpatient follow-up  Intubated  · Patient is presently off of propofol, plan for spontaneous breathing trial, possible extubation today         GI:   Diagnosis: Obstructive Jaundice   · Obstructive jaundice Status post ERCP with sphincterotomy and stent placement 2/10, stent replacement on 2/21 found significant internal bleeding, which was cauterized, Pt was intubated for airway protection following ERCP   · Biopsy from initial ERCP on 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  · GI, onc and surg/onc consulted, following   · Per chart review patient and family are now agreeable to rehab placement at the [address] [place] [address] [address] off on hospice for now  Diagnosis: Liver cirrhosis  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 with ascites  New onset   · Continue giving albumin  Diagnosis: SBP  · Status post abdominal paracentesis with IR 2/13 for 2L  WBC noted, no bacteria on gram stain  Started on empiric Ceftriaxone for possible SBP      · Per chart review, plan to treat with ceftriaxone until resolution of SBP on fluid studies, discharge on lifetime Cipro or Bactrim  · Ceftriaxone discontinued yesterday   · S/p repeat paracentesis yesterday, took of 4 5L of clear, sheri fluid, WBC count is 402 (down from 1179 from his last paracentesis on 2/16)  · Culture and gram stain pending from paracentesis on 2/22       :   Diagnosis: GERSON  · Resolved   · Likely multifactorial in the setting of contrast 2/9, possible hepatorenal, ARB use, possible intra abdominal HTN  · Retroperitoneal ultrasound no hydronephrosis  · Nephrology consult appreciated  · S/p IV albumin  Diagnosis: Mass in inguinal region   · Venous duplex of the bilateral lower extremities (02/08/2022) as outpatient showed 5 1 cm non vascularized cystic structure in the right groin  · Plan for outpatient surgery follow-up     F/E/N:   Plan: NPO  Monitor CMP, replete as needed      Heme/Onc:   Diagnosis: 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· Plan: per oncology, no oncology follow up as there are no treatment options      Endo:   Diagnosis: Hypothyroidism  · Plan: continue home levothyroxine 200mcg     ID:   Diagnosis: SBP  · Plan: ceftriaxone discontinued   · Paracentesis done yesterday   · WBC from paracentesis fluid yesterday is 402, down from 1179 on 2/16          Disposition: discuss comfort care/home hospice with family          Non-Invasive/Invasive Ventilation Settings:  Respiratory  Report   Lab Data (Last 4 hours)    None         O2/Vent Data (Last 4 hours)    None              No results found for: PHART, NAY7RUZ, PO2ART, KLA6HBL, M3BPGUMK, BEART, SOURCE  SpO2: SpO2: 100 %, SpO2 Device: O2 Device: Ventilator    Imaging:   XR chest portable ICU   Final Result by Rosa Isela Nicole DO (02/22 4075)      ET tube as above  Right mid lung opacity, possibly related to known pleural plaque  See additional comments  Cannot exclude trace left pleural effusion  Workstation performed: [id number]         XR abdomen 1 view kub   Final Result by Trixie Chand MD (48/44 0049)      Likely mild ileus  Workstation performed: [id number]         FL ERCP biliary only   Final Result by Dhiraj Rutledge MD (02/21 2048)      Status post stent removal   Mild biliary ductal dilatation  8 cm x 8 mm stent placed  Workstation performed: [id number]         MRI abdomen w wo contrast and mrcp   Final Result by Alexy [place] MD (02/18 1631)      Abnormal central hepatic signal with associated diffuse biliary wall enhancement suspicious for intrahepatic extension of this patient's known CBD cholangiocarcinoma  Margins of the mass are not well delineated; suspected dimensions of 8 9 x 5 2 cm on    #14/45  Peripheral intrahepatic biliary dilation with narrowing of the biliary tree through the suspected area of central hepatic mass  Portal vein is partially obscured by motion artifact on the delayed postcontrast images  It is likely severely narrowed through the andreas hepatis by surrounding mass  Perigastric varices are present        The study was marked in EPIC for immediate notification  Workstation performed: [id number]         XR abdomen 1 view kub   Final Result by Prerna Newby MD (02/18 1004)      Biliary stent projects over the right upper quadrant  Workstation performed: [id number]         IR INPATIENT Paracentesis   Final Result by Angelito Ramirez MD (02/17 5569)   Impression:      Successful ultrasound-guided paracentesis  Workstation performed: [id number]         US kidney and bladder   Final Result by Irma Couch MD (02/13 1607)      No hydronephrosis  Partially imaged cirrhotic liver and ascites  Workstation performed: [id number]         IR INPATIENT Paracentesis   Final Result by Leslie Casillas MD (1934)   Impression:      Ultrasound-guided paracentesis  Workstation performed: [id number]         FL ERCP biliary only   Final Result by Juliana James DO (02/12 7701)      Fluoroscopic guidance provided for ERCP and biliary stent placement as above  Please see procedure report for further details  Workstation performed: [id number]         CT chest wo contrast   Final Result by Edgar English MD (02/11 1253)   1  Left lower lobe pulmonary nodules identified, largest measuring up to 5 mm  Based on current Fleischner Society 2017 Guidelines on incidental pulmonary nodule, optional follow-up CT at 12 months can be considered  2   Right greater than left calcified pleural plaques indicating prior as best as exposure  3   Hepatic cirrhosis with ascites and evidence of portal hypertension  The study was marked in EPIC for significant notification  Workstation performed: [id number]         IR INPATIENT Paracentesis    (Results Pending)     I have personally reviewed pertinent reports  Micro:  Lab Results   Component Value Date    BLOODCX Received in Microbiology Lab  Culture in Progress   02/22/2022    BLOODCX Received in Microbiology Lab  Culture in Progress  02/21/2022    URINECX 60,000-69,000 cfu/ml  02/09/2022         Allergies: No Known Allergies      Medications:   Scheduled Meds:  Current Facility-Administered Medications   Medication Dose Route Frequency Provider Last Rate    chlorhexidine  15 mL Mouth/Throat Q12H [address] Sol Jazmine Eis, DO      fentanyl citrate (PF)  50 mcg Intravenous Q2H PRN Abimbola Inks, DO      furosemide  20 mg Per G Tube Daily [facility] Minnix, DO      heparin (porcine)  5,000 Units Subcutaneous [facility] Kathi F Minnix, DO      lactulose  20 g Oral BID Kathi F Minnix, DO      levothyroxine  200 mcg Oral Early Morning Vanita, DO      melatonin  6 mg Oral HS [place], DO      midodrine  5 mg Oral TID [facility] [place], DO      mirtazapine  7 5 mg Oral HS [place], [place]      norepinephrine  1-30 mcg/min Intravenous Titrated [place], DO 6 mcg/min (02/23/22 0600)    pantoprazole  40 mg Intravenous Q12H [address] [place], DO      propofol  5-50 mcg/kg/min Intravenous Titrated [place], DO Stopped (02/23/22 0630)    spironolactone  50 mg Per G Tube Daily Kathi F Minnix, DO       Continuous Infusions:norepinephrine, 1-30 mcg/min, Last Rate: 6 mcg/min (02/23/22 0600)  propofol, 5-50 mcg/kg/min, Last Rate: Stopped (02/23/22 0630)      PRN Meds:  fentanyl citrate (PF), 50 mcg, Q2H PRN        Counseling / Coordination of Care  30 minutes     Code Status: Level 3 - DNAR and DNI     Portions of the record may have been created with voice recognition software  Occasional wrong word or "sound a like" substitutions may have occurred due to the inherent limitations of voice recognition software  Read the chart carefully and recognize, using context, where substitutions have occurred       DO Vanita  Family Medicine Resident, PGY1  7:51 AM

## 2022-02-23 NOTE — PHYSICAL THERAPY NOTE
PT orders received  Chart reviewed  Pt currently intubated/sedated and not appropriate for PT at this time will hold   Will continue to follow  Ara Ward, PT, DPT       02/23/22 8025   PT Last Visit   PT Visit Date 02/23/22   Note Type   Note Type Treatment   Cancel Reasons Intubated/sedated

## 2022-02-23 NOTE — CONSULTS
Consultation - Palliative and Supportive Care   Shirly Snellen 80 y o  male [id number]    Patient Active Problem List   Diagnosis    Hypothyroidism    Essential hypertension    Class 1 obesity without serious comorbidity with body mass index (BMI) of 31 0 to 31 9 in adult    Impaired fasting glucose    Chronic osteomyelitis of knee (HCC)    Male erectile disorder    Exudative age-related macular degeneration, bilateral, with active choroidal neovascularization (HCC)    Asymmetrical sensorineural hearing loss    Osteoarthritis    Rosacea    Bilateral lower extremity edema    Platelets decreased (HCC)    Transaminitis    Hyperbilirubinemia    Hyperammonemia (HCC)    Elevated MCV    Obstructive jaundice    Elevated TSH    Abnormal MRI, liver    Thrombosis, portal vein    Common bile duct dilatation    Mass of right inguinal region    Diastolic dysfunction    Abnormal EKG    Liver cirrhosis (HCC)    Abnormal urinalysis    Lung nodule    Acute kidney injury ([facility] [address] )     Active issues specifically addressed today include: metastatic adenocarcinoma  Hyperbilirubinemia     Plan:  1  Symptom management  -  Per critical care     2  Goals   -  Limits set at DNAR/DNI  -  Goals of care ongoing  Code Status: DNAR/DNI  - Level 3   Decisional apparatus:  Patient is marginally  competent on my exam today  If competence is lost, patient's substitute decision maker would default to spouse by [place] Act 169  Advance Directive / Living Will / POLST:  On file      I have reviewed the patient's controlled substance dispensing history in the Prescription Drug Monitoring Program in compliance with the [facility] regulations before prescribing any controlled substances  We appreciate the invitation to be involved in this patient's care  We will continue to follow    Please do not hesitate to reach our on call provider through our clinic answering service at  should you have acute symptom control concerns  YOLETTE Archuleta  Palliative and Supportive Care  Clinic/Answering Service: [phone]  You can find me on [employer]! IDENTIFICATION:  Inpatient consult to Palliative Care  Consult performed by: YOLETTE Archuleta  Consult ordered by: Yoselyn Sommer DO        Physician Requesting Consult: Aleyda Tierney,*  Reason for Consult / Principal Problem: goals of care, support  Hx and PE limited by: patient limited involvement    HISTORY OF PRESENT ILLNESS:       Carlos Alberto Herrera is a 80 y o  male with a past medical history significant for htn, hypothyroid, HLD  presented to [address] [place] [address] ED on 2/9 with weakness and jaundice  CT revealed biliary ductal dilation and potential CBD stricture and soft tissue mass  The patient was transferred to [address] [place] [address] for ERCP/EUS  Biopsy was completed and identified adenocarcinoma  EUS and ERCP w/sphincterotomy with stenting was performed on 2/11 which highlighted a mass in the CBD measuring 1 9x1 6cm, as well as a 1 6cm peripancreatic lymph node  Pathology from biopsies and brushings revealed malignant adenocarcinoma  CT chest also revealed likely metastatic disease  The patient underwent an EGD/ERCP there were unexpected findings of generalized mucosal friability and the patient remained temporarily intubated  There have been ongoing goals of care conversations  At this time, the patient has clear limits of DNAR/DNI  They are aware the overall prognosis is poor  The goal currently is rehab with a goal to get stronger prior to pursuing hospice  Palliative care will continue to follow for goals and for patient family support  He was seen by his PCP in January after his family noticed a general decline in health; generalized weakness, balance issues  Previously he lived at home independently          Review of Systems   Unable to perform ROS: other (just extubated)       Past Medical History:   Diagnosis Date    Hypertension     Hypothyroidism      Past Surgical History:   Procedure Laterality Date    HERNIA REPAIR      IR PARACENTESIS  2/13/2022    IR PARACENTESIS  2/16/2022    PROSTATE SURGERY      REPLACEMENT TOTAL KNEE      last assessed: 7/11/17     Social History     Socioeconomic History    Marital status: /Civil Union     Spouse name: Not on file    Number of children: Not on file    Years of education: Not on file    Highest education level: Not on file   Occupational History    Not on file   Tobacco Use    Smoking status: Never Smoker    Smokeless tobacco: Never Used   Vaping Use    Vaping Use: Never used   Substance and Sexual Activity    Alcohol use: Never    Drug use: No    Sexual activity: Not on file   Other Topics Concern    Not on file   Social History Narrative    Not on file     Social Determinants of Health     Financial Resource Strain: Not on file   Food Insecurity: No Food Insecurity    Worried About [email or web address] in the Last Year: Never true    [address]  [employer] in the Last Year: Never true   Transportation Needs: No Transportation Needs    Lack of Transportation (Medical): No    Lack of Transportation (Non-Medical): No   Physical Activity: Not on file   Stress: Not on file   Social Connections: Not on file   Intimate Partner Violence: Not on file   Housing Stability: Low Risk     Unable to Pay for Housing in the Last Year: No    Number of Places Lived in the Last Year: 1    Unstable Housing in the Last Year: No     History reviewed  No pertinent family history      MEDICATIONS / ALLERGIES:    current meds:   Current Facility-Administered Medications   Medication Dose Route Frequency    albumin human (FLEXBUMIN) 5 % injection 25 g  25 g Intravenous Q8H    chlorhexidine (PERIDEX) 0 12 % oral rinse 15 mL  15 mL Mouth/Throat Q12H JOSÉ ANTONIO    fentanyl citrate (PF) 100 MCG/2ML 50 mcg  50 mcg Intravenous Q2H PRN    heparin (porcine) subcutaneous injection 5,000 Units 5,000 Units Subcutaneous Q8H [address]    lactulose oral solution 20 g  20 g Oral TID    levothyroxine tablet 200 mcg  200 mcg Oral Early Morning    melatonin tablet 6 mg  6 mg Oral HS    midodrine (PROAMATINE) tablet 5 mg  5 mg Oral TID AC    mirtazapine (REMERON) tablet 7 5 mg  7 5 mg Oral HS    norepinephrine (LEVOPHED) 4 mg (STANDARD CONCENTRATION) IV in sodium chloride 0 9% 250 mL  1-30 mcg/min Intravenous Titrated    pantoprazole (PROTONIX) injection 40 mg  40 mg Intravenous Q12H [address]    propofol (DIPRIVAN) 1000 mg in 100 mL infusion (premix)  5-50 mcg/kg/min Intravenous Titrated       No Known Allergies    OBJECTIVE:    Physical Exam  Physical Exam  Vitals and nursing note reviewed  Constitutional:       General: He is awake  Appearance: He is ill-appearing  HENT:      Head: Normocephalic and atraumatic  Jaw: There is normal jaw occlusion  Mouth/Throat:      Lips: Pink  Mouth: Mucous membranes are moist       Pharynx: Oropharynx is clear  Eyes:      General: Lids are normal       Extraocular Movements: Extraocular movements intact  Conjunctiva/sclera: Conjunctivae normal    Cardiovascular:      Rate and Rhythm: Normal rate and regular rhythm  Pulmonary:      Effort: Pulmonary effort is normal  No prolonged expiration, respiratory distress or retractions  Abdominal:      Palpations: Abdomen is soft  Musculoskeletal:      Cervical back: Normal range of motion and neck supple  Comments: Generally weak   Skin:     General: Skin is cool and dry  Coloration: Skin is jaundiced and pale  Neurological:      General: No focal deficit present  Mental Status: He is alert  Psychiatric:         Attention and Perception: Attention and perception normal          Mood and Affect: Affect is angry           Lab Results:   CBC:   Lab Results   Component Value Date    WBC 9 72 02/23/2022    HGB 9 1 (L) 02/23/2022    HCT 25 7 (L) 02/23/2022     (H) 02/23/2022    PLT 153 02/23/2022    MCH 35 5 (H) 02/23/2022    MCHC 35 4 02/23/2022    RDW 21 5 (H) 02/23/2022    MPV 11 6 02/23/2022    NRBC 0 02/23/2022   , CMP:   Lab Results   Component Value Date    SODIUM 138 02/23/2022    K 4 4 02/23/2022     (H) 02/23/2022    CO2 17 (L) 02/23/2022    BUN 41 (H) 02/23/2022    CREATININE 2 39 (H) 02/23/2022    CALCIUM 8 5 02/23/2022     (H) 02/23/2022    ALT 87 (H) 02/23/2022    ALKPHOS 308 (H) 02/23/2022    EGFR 23 02/23/2022   , PT/PTT:No results found for: PT, PTT      Counseling / Coordination of Care    Total floor / unit time spent today 45+ minutes  Greater than 50% of total time was spent with the patient and / or family counseling and / or coordination of care  A description of the counseling / coordination of care: review of chart, goalsof care with patient and family, support for family, collaboration with critical care

## 2022-02-23 NOTE — RESPIRATORY THERAPY NOTE
RT Ventilator Management Note  Jose Raul Yun 80 y o  male MRN: [id number]  Unit/Bed#: [facility] 08 Encounter: [id number]      Daily Screen       2/22/2022  0745             Patient safety screen outcome[de-identified] Passed    Spont breathing trial % for 30 min: Yes    Spont breathing trial outcome[de-identified] Passed    RSBI: 45            Physical Exam:   Assessment Type: Assess only  General Appearance: Sedated  Respiratory Pattern: Assisted  Chest Assessment: Chest expansion symmetrical  Bilateral Breath Sounds: Diminished      Resp Comments: (P) Pt remains on  the current vent setting with no issues at this time  Will continue to monitor

## 2022-02-23 NOTE — OCCUPATIONAL THERAPY NOTE
OT CANCEL NOTE    Pt chart reviewed  Pt is currently intubated/sedated and not appropriate to engage in skilled OT services at this time  Will continue to follow pt on caseload and see pt when medically stable and as clinically appropriate         02/23/22 0801   OT Last Visit   OT Visit Date 02/23/22   Note Type   Note type Cancelled Session   Cancel Reasons Intubated/sedated     Braydon Oliveira MS, OTR/L

## 2022-02-24 PROBLEM — C22.1 CHOLANGIOCARCINOMA (HCC): Status: ACTIVE | Noted: 2022-01-01

## 2022-02-24 PROBLEM — K65.2 SBP (SPONTANEOUS BACTERIAL PERITONITIS) (HCC): Status: ACTIVE | Noted: 2022-01-01

## 2022-02-24 NOTE — OCCUPATIONAL THERAPY NOTE
Occupational Therapy Evaluation     Patient Name: Jose Raul Yun  [id number] Date: 2/24/2022  Problem List  Principal Problem:    Obstructive jaundice  Active Problems:    Essential hypertension    Class 1 obesity without serious comorbidity with body mass index (BMI) of 31 0 to 31 9 in adult    Hyperammonemia (HCC)    Mass of right inguinal region    Liver cirrhosis (HCC)    Lung nodule    Acute kidney injury [facility])    Past Medical History  Past Medical History:   Diagnosis Date    Hypertension     Hypothyroidism      Past Surgical History  Past Surgical History:   Procedure Laterality Date    HERNIA REPAIR      IR PARACENTESIS  2/13/2022    IR PARACENTESIS  2/16/2022    PROSTATE SURGERY      REPLACEMENT TOTAL KNEE      last assessed: 7/11/17 02/24/22 0930   OT Last Visit   OT Visit Date 02/24/22   Note Type   Note type Re-Evaluation   Restrictions/Precautions   Weight Bearing Precautions Per Order No   Other Precautions Cognitive; Bed Alarm;Multiple lines;Telemetry; Fall Risk;Pain;Hard of hearing   Pain Assessment   Pain Assessment Tool FLACC   Pain Location/Orientation Orientation: Right;Location: Arm   Patient's Stated Pain Goal No pain   Hospital Pain Intervention(s) Repositioned; Ambulation/increased activity; Emotional support;Elevated   Pain Rating: FLACC (Rest) - Face 0   Pain Rating: FLACC (Rest) - Legs 0   Pain Rating: FLACC (Rest) - Activity 0   Pain Rating: FLACC (Rest) - Cry 1   Pain Rating: FLACC (Rest) - Consolability 0   Score: FLACC (Rest) 1   Pain Rating: FLACC (Activity) - Face 0   Pain Rating: FLACC (Activity) - Legs 0   Pain Rating: FLACC (Activity) - Activity 0   Pain Rating: FLACC (Activity) - Cry 1   Pain Rating: FLACC (Activity) - Consolability 0   Score: FLACC (Activity) 1   Home Living   Additional Comments see initial OT eval   Prior Function   Comments see initial OT eval   Lifestyle   Autonomy PTA, pt reports being I with ADLs/IADLs, walking stick for fnxl mobility, (+)     Reciprocal Relationships Wife, family   Service to Others Retired - was in the [place] war and a     Intrinsic Gratification "I enjoy life"   Psychosocial   Psychosocial (WDL) X   Patient Behaviors/Mood Cooperative;Verbal;Irritable   ADL   Where Assessed Edge of bed   Eating Assistance 4  Minimal Assistance   Grooming Assistance 3  Moderate Assistance   UB Bathing Assistance 3  Moderate Assistance   LB [address] 2  Maximal [address] 3  Moderate Assistance   LB Dressing Assistance 2  Maximal [address]  1  Total Assistance   Toileting Deficit Setup;Steadying;Verbal cueing;Supervison/safety; Increased time to complete;Use of bedpan/urinal setup   Functional Assistance 2  Maximal Assistance   Additional Comments Pt requires Mod A w/ most of UB ADLS and Max A w/ LB ADLS but needed total A w/ toileting due to generalized weakness and fatigue  Pt verbalized he was going to have a BM and needs total A on bedpan but did not go  Bed Mobility   Rolling R 2  Maximal assistance   Additional items Assist x 2; Increased time required;Verbal cues;LE management; Bedrails   Rolling L 2  Maximal assistance   Additional items Assist x 2;Bedrails; Increased time required;Verbal cues;LE management   Supine to Sit 2  Maximal assistance   Additional items Assist x 2; Increased time required;Verbal cues;LE management   Additional Comments Pt required Max A w/ bed rolling and Max A w/ sit to supine bed mobility w/ VC to use bedrails to ensure safety  Pt needed extended time due to fatigue  Transfers   Sit to Stand 2  Maximal assistance  (did not clear bed)   Additional items Assist x 2; Increased time required;Verbal cues   Stand to Sit 2  Maximal assistance   Additional items Assist x 2; Increased time required;Verbal cues   Sit pivot 2  Maximal assistance   Additional items Assist x 3; Increased time required;Verbal cues   Toilet transfer 2  Maximal assistance Additional Comments Pt required Max A w/ STS transfer w/ HHA but did not clear bed  Pt needed Max x 3 w/ sit pivot from EOB to chair due to fatigue and generalized weakness   Functional Mobility   Additional Comments unable to asess   Balance   Static Sitting Poor  (L lateral lean)   Dynamic Sitting Poor   Static Standing Poor -   Dynamic Standing Poor -   Ambulatory Zero   Activity Tolerance   Activity Tolerance Patient limited by fatigue   Medical Staff Made Aware Yes, PT Harper   Nurse Made Aware Yes   RUE Assessment   RUE Assessment X  (gross weakness, reports "BAD shoulder")   LUE Assessment   LUE Assessment WFL  (generalized weakness)   Hand Function   Gross Motor Coordination Functional   Fine Motor Coordination Functional   Sensation   Light Touch No apparent deficits   Proprioception   Proprioception Not tested   Vision-Basic Assessment   Current Vision No visual deficits   Vision - Complex Assessment   Ocular Range of Motion WFL   Perception   Inattention/Neglect Appears intact   Cognition   Overall Cognitive Status Impaired   Arousal/Participation Responsive; Cooperative; Alert   Attention Attends with cues to redirect   Orientation Level Oriented to person;Oriented to time;Disoriented to place; Disoriented to situation  (needed cues for date; knew year/month)   Memory Decreased recall of precautions;Decreased recall of recent events;Decreased short term memory   Following Commands Follows one step commands with increased time or repetition   Comments Pt was verbal throughout assessment but seemed confused and agitated to leave hospital  Pt was confused on situation and needed repetitive VC to attend to tasks and follow commands  Assessment   Limitation Decreased ADL status; Decreased UE ROM; Decreased UE strength;Decreased Safe judgement during ADL;Decreased cognition;Decreased endurance;Decreased self-care trans;Decreased high-level ADLs;Mood limitation   Prognosis Fair   Assessment Pt was seen for an OT re-evaluation on 2/24 due to prolonged intubation as well as goal expiration  Pt was initially seen on 2/10/2022 w/  painless jaundice, hyperbilirubinemia  PTA, pt was I w/ all ADLS, IADLS, Mod I w/ walking stick for transfers and functional mobility  Initial evaluation pt was performing at S/Min A w/ UB ADLS and Mod A w/ LB ADLS, Mod A w/ bed mobility, transfers and functional mobility w/ RW  Pt was assessed today (2/24) and needed Mod A w/ UB ADLS, Max A w/ LB ADLs, Max A x2 w/ bed rolling and bed mobility, Mod A x1 w/ EOB sitting, Max A x3 w/ sit pivot (STS- did not clear bed) and functional mobility was not appropriate at this time  Pt is currently limited due to: poor endurance, generalized weakness, instability, poor trunk/motor control, fatigue, decreased safety and independence in bed mobility and transfers  Recommend pt to continue skilled OT services and recommend inpatient rehab upon D/C once medically stable and clinically appropriate  Will continue to address goals 3-5x/wk within the next 10-14 days   Goals   Patient Goals to go home   LTG Time Frame 10-14   Long Term Goal #1 see goals listed below   Plan   Treatment Interventions ADL retraining;Functional transfer training;UE strengthening/ROM; Endurance training;Cognitive reorientation;Patient/family training;Equipment evaluation/education; Compensatory technique education;Continued evaluation; Energy conservation; Activityengagement   Goal Expiration Date 03/10/22   OT Treatment Day 1   OT Frequency 3-5x/wk   Additional Treatment Session   Start Time 0930   End Time 0944   Treatment Assessment Pt was seen for an additional OT treatment session to address the following: bed mobility, rolling, toileting, endurance, safety awareness, EOB sitting tolerance, trunk support, functional transfers, activity tolerance  Pt required Max A x2 w/ bed mobility, rolling using the bedpan  Pt reported unable to use bedpan and was educated on the use of BSC   Pt educated on the importance of building LE strength w/ EOB standing tolerance to work way up to using BSC  Now, pt is unable to safety use commode due to requiring Max A x3 w/ sit pivot from EOB to drop arm chair  Pt needed VC/TC, extended time and redirection while engaging in functional tasks  Pt is still performing below baseline for occupational performance and will benefit from continuation of skilled OT services to increase engagement/independence in meaningful activities  Recommend inpatient rehab upon D/C once medically and clinically appropriate      Additional Treatment Day 1   Recommendation   OT Discharge Recommendation Post acute rehabilitation services   OT - OK to Discharge Yes   AM-PAC Daily Activity Inpatient   Lower Body Dressing 2   Bathing 2   Toileting 2   Upper Body Dressing 2   Grooming 2   Eating 3   Daily Activity Raw Score 13   Daily Activity Standardized Score (Calc for Raw Score >=11) 32 03   AM-PAC Applied Cognition Inpatient   Following a Speech/Presentation 2   Understanding Ordinary Conversation 2   Taking Medications 2   Remembering Where Things Are Placed or Put Away 2   Remembering List of 4-5 Errands 2   Taking Care of Complicated Tasks 2   Applied Cognition Raw Score 12   Applied Cognition Standardized Score 28 82       Pt will be A/O X 4 for all treatment sessions 100% of the time without environmental cues    Pt will independently initiate sequencing and terminating functional task w/ no more than 2 verbal cues    Pt will perform all functional transfers Mod A on/off all surfaces while using DME as needed    Pt will demonstrate/carry over all safety measures and education when handling DME for 100% of functional tasks    Pt will complete all LB ADL tasks Mod A with/without AD    Pt will complete all UB ADL tasks Min A with/without AD    Pt will attend to functional tasks for 10 minutes without need for re-direction 100% of the time    Pt will improve standing tolerance for 5 minutes w/ Mod A while engaging in functional tasks     Pt will improve unsupported static/dynamic sitting to fair plus (F+) for 10 minutes while engaging in functional tasks     Pt will demonstrate proper hand placement for all bed mobility, transfers and functional mobility with 1 verbal cue 100% of the time    Pt will follow 1 step commands without redirection 100% of the time    Pt will independently verbalize and implement 1-2 good coping strategies/skills during functional tasks w/ 1 verbal cue to manage stress and promote overall well-being    Pt will perform bed mobility Min A w/ no more than 1 verbal cue to maintain proper technique to increase engagement in functional tasks    OTR to see functional mobility    [address], OTS

## 2022-02-24 NOTE — PHYSICAL THERAPY NOTE
Physical Therapy re-evaluation and treatment note     Patient Name: Gopi Ward    VSLWT'U Date: 2/24/2022     Problem List  Principal Problem:    Obstructive jaundice  Active Problems:    Essential hypertension    Class 1 obesity without serious comorbidity with body mass index (BMI) of 31 0 to 31 9 in adult    Hyperammonemia (HCC)    Mass of right inguinal region    Liver cirrhosis ([address] )    Lung nodule    Acute kidney injury ([address] )    SBP (spontaneous bacterial peritonitis) ([address] )       Past Medical History  Past Medical History:   Diagnosis Date    Hypertension     Hypothyroidism         Past Surgical History  Past Surgical History:   Procedure Laterality Date    HERNIA REPAIR      IR PARACENTESIS  2/13/2022    IR PARACENTESIS  2/16/2022    PROSTATE SURGERY      REPLACEMENT TOTAL KNEE      last assessed: 7/11/17   re-evaluation 09:15-09:30am   02/24/22 0946   PT Last Visit   PT Visit Date 02/24/22   Note Type   Note type Re-Evaluation   Pain Assessment   Pain Assessment Tool FLACC   Pain Rating: FLACC (Rest) - Face 0   Pain Rating: FLACC (Rest) - Legs 0   Pain Rating: FLACC (Rest) - Activity 0   Pain Rating: FLACC (Rest) - Cry 1   Pain Rating: FLACC (Rest) - Consolability 0   Score: FLACC (Rest) 1   Pain Rating: FLACC (Activity) - Face 0   Pain Rating: FLACC (Activity) - Legs 0   Pain Rating: FLACC (Activity) - Activity 0   Pain Rating: FLACC (Activity) - Cry 1   Pain Rating: FLACC (Activity) - Consolability 0   Score: FLACC (Activity) 1   Restrictions/Precautions   Weight Bearing Precautions Per Order No   Other Precautions Cognitive; Chair Alarm; Bed Alarm;Multiple lines;Telemetry; Fall Risk   Home Living   Additional Comments see IE for additional information   Prior Function   Comments see IE for additional information   General   Family/Caregiver Present No   Cognition   Overall Cognitive Status Impaired   Arousal/Participation Cooperative Attention Attends with cues to redirect   Orientation Level Oriented to person;Oriented to time;Disoriented to place; Disoriented to situation   RLE Assessment   RLE Assessment X   Strength RLE   R Knee Extension 3-/5   R Ankle Dorsiflexion 3-/5   LLE Assessment   LLE Assessment X   Strength LLE   L Knee Extension 3-/5   L Ankle Dorsiflexion 3-/5   Bed Mobility   Rolling R 2  Maximal assistance   Additional items Assist x 2   Rolling L 2  Maximal assistance   Additional items Assist x 2   Supine to Sit 2  Maximal assistance   Additional items Assist x 2   Additional Comments sitting EOB mod A x1   Transfers   Sit to Stand 2  Maximal assistance   Additional items Assist x 2   Stand to Sit 2  Maximal assistance   Additional items Assist x 2   Sit pivot 2  Maximal assistance   Additional items Assist x 3   Balance   Static Sitting Poor -   Dynamic Sitting Poor -   Static Standing Poor -   Dynamic Standing Poor -   Ambulatory Zero   Endurance Deficit   Endurance Deficit Yes   Activity Tolerance   Activity Tolerance Patient limited by fatigue   Medical Staff Made Aware OT   Nurse Made Aware nurs approved therapy sessions   Assessment   Prognosis Good   Problem List Decreased strength;Decreased endurance; Impaired balance;Decreased mobility; Decreased cognition;Pain   Assessment Pt presents to therapy today for a re-eval due to being intubated s/p procedure  Pt extubated on 2/23/2022  DX: hyperammonemia, lung nodule, obstructive jaundice, GERSON, mass in inguinal region, cholangiocarcinoma, hypothyroidism  Pt's impairments include reduced mobility, poor endurance, high risk of falling, reduced BLE Strength, pain  These impairments limit the patient by requiring increased assistance and places him at a high risk of falling  Pt would benefit from continued skilled therapy while in the hospital to improve overall mobility and work towards a safe d/c  Recommend rehab   At end of session patient was left seated in the chair with call bell within reach  Pt motivated to get OOB to chair at this time  Barriers to Discharge Inaccessible home environment   Goals   STG Expiration Date 03/10/22   Short Term Goal #1 STG 1: Pt will perform transfers at a mod Ax1 level to return to baseline of function  STG 2: Pt will perform bed mobility at a mod A to safety return to PLOF  STG 3: Pt will sit EOB at a S level for 5-10 minutes to improve sitting tolerance and balance  PT Treatment Day 4   Plan   Treatment/Interventions Functional transfer training;LE strengthening/ROM; Therapeutic exercise; Endurance training;Bed mobility; Equipment eval/education;OT   PT Frequency 3-5x/wk   Recommendation   PT Discharge Recommendation Post acute rehabilitation services   AM-PAC Basic Mobility Inpatient   Turning in Bed Without Bedrails 1   Lying on Back to Sitting on Edge of Flat Bed 1   Moving Bed to Chair 1   Standing Up From Chair 1   Walk in Room 1   Climb 3-5 Stairs 1   Basic Mobility Inpatient Raw Score 6   Turning Head Towards Sound 3   Follow Simple Instructions 3   Low Function Basic Mobility Raw Score 12   Low Function Basic Mobility Standardized Score 18 33   Highest Level Of Mobility   [facility] Goal 2: Bed activities/Dependent transfer   09:31-09:45  Sitting EOB for 5-10 minutes at a mod A level  Pt tolerated session with assistance   Pt required increased cueing throughout  Sarah Wagner, PT, DPT

## 2022-02-24 NOTE — QUICK NOTE
GI Quick Note:    Chart reviewed  SW note reviewed  Patient/family jointly deciding to proceed with comfort-focused care  Agree with hospice in context of advanced malignancy, multiple significant comorbidities, and overall guarded prognosis  GI will sign off at this time  Please contact the GI fellow on-call with any questions or concerns  FRACISCO Maguire  Gastroenterology Fellow  [facility]  Available on [email or web address]

## 2022-02-24 NOTE — PROGRESS NOTES
Progress Note - Critical Care   Carlos Alberto Herrera 80 y o  male MRN: [id number]  [facility]: [id number]    SUBJECTIVE:  Pt examined at bedside this AM  Pt is awake and alert but confused  HPI/24HR Event:  Was extubated yesterday  No acute events overnight, was bolused for low urine out put  Medications      ROS  Review of Systems   Unable to perform ROS: Dementia         Invasive lines and devices: Invasive Devices  Report    Peripheral Intravenous Line            Peripheral IV 22 Right Hand 3 days    Peripheral IV 22 Left;Ventral (anterior) Forearm <1 day          Drain            Urethral Catheter Temperature probe 16 Fr  2 days                   Physical exam  General:  Resting comfortably in bed in no acute distress  Neuro: awake and alert, confused   HENT: Normocephalic and atraumatic, PERRL  Heart:  RRR, pulses intact  Lungs: Bilateral breath sounds present  Abdomen: Distended, bowel sounds present, soft  Skin:  Jaundice  Warm, dry skin/Incision site clean dry and intact    Vitals  Temperature:   Temp (24hrs), Av 5 °F (36 9 °C), Min:97 2 °F (36 2 °C), Max:100 °F (37 8 °C)    Current: Temperature: 97 9 °F (36 6 °C)    Vitals:    22 0300 22 0400 22 0555 22 0600   BP: (!) 100/45 117/59  (!) 116/47   Pulse: 78 80  78   Resp:    Temp: 97 9 °F (36 6 °C) 98 2 °F (36 8 °C)  97 9 °F (36 6 °C)   TempSrc:       SpO2: 100% 100%  100%   Weight:   109 kg (239 lb 13 8 oz)    Height:                 Labs:   Results from last 7 days   Lab Units 22  0546 22  0524 22  0559 22  1856 22  0000   WBC Thousand/uL 7 66 9 72 11 58*   < > 8 91   HEMOGLOBIN g/dL 8 9* 9 1* 9 6*   < > 10 2*   HEMATOCRIT % 24 4* 25 7* 26 7*   < > 28 2*   PLATELETS Thousands/uL 131* 153 164   < > 154   NEUTROS PCT % 74 75  --   --   --    MONOS PCT % 11 12  --   --   --    MONO PCT %  --   --   --   --  6    < > = values in this interval not displayed  Results from last 7 days   Lab Units 02/23/22  1322 02/23/22  0524 02/22/22  0559 02/21/22  0659 02/21/22  0659   SODIUM mmol/L 138 139 134*   < > 138   POTASSIUM mmol/L 4 4 3 5 3 9   < > 3 3*   CHLORIDE mmol/L 112* 108 104   < > 108   CO2 mmol/L 17* 22 23   < > 21   BUN mg/dL 41* 44* 37*   < > 34*   CREATININE mg/dL 2 39* 1 70* 0 92   < > 1 06   CALCIUM mg/dL 8 5 8 5 7 7*   < > 8 4   ALK PHOS U/L  --  308* 325*  --  383*   ALT U/L  --  87* 89*  --  43   AST U/L  --  227* 245*  --  57*    < > = values in this interval not displayed  Results from last 7 days   Lab Units 02/24/22  0546 02/23/22  0524   MAGNESIUM mg/dL 2 1 1 9     Results from last 7 days   Lab Units 02/23/22  0524   PHOSPHORUS mg/dL 2 7      Results from last 7 days   Lab Units 02/21/22  1856 02/21/22  0608 02/18/22  0459   INR  1 45* 1 33* 1 33*     Results from last 7 days   Lab Units 02/22/22  0559   LACTIC ACID mmol/L 1 9       Other Labs    Intake and Outputs:  I/O       02/22 0701  02/23 0700 02/23 0701  02/24 0700    I V  (mL/kg) 743 4 (6 8) 102 9 (0 9)    NG/ 100    IV Piggyback 850 2050    Total Intake(mL/kg) 1793 4 (16 3) 2252 9 (20 7)    Urine (mL/kg/hr) 1800 (0 7) 2185 (0 8)    Emesis/NG output 0     Other 4500     Total Output 6300 2185    Net -4506 6 +67 9                Imaging Studies      Assessment & Plan:   Neuro:   Diagnosis: Hyperammonemia  · Due to liver disease  · Increased  Lactulose to 20 grams PO Qdaily for a goal 2-3 soft bowel movements per 24 hours - do not have to discharge on lactulose per GI  · Extubated, no longer sedated      CV:   Off levophed         Pulm:  Diagnosis: Lung Nodule  · CT AP: Left lower lobe 5 mm nodules   Bibasilar reticular changes and/or mild atelectasis   Paraesophageal varices    · Outpatient follow-up  · Pt was extubated yesterday and is now on room air        GI:   Diagnosis: Obstructive Jaundice   · Obstructive jaundice Status post ERCP with sphincterotomy and stent placement 2/10, stent replacement on 2/21 found significant internal bleeding, which was cauterized, Pt was intubated for airway protection following ERCP   · Biopsy from initial ERCP on 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  · GI, onc and surg/onc consulted, following   · Palliative following, on going goals of care discussion, family leaning towards home hospice  Diagnosis: Liver cirrhosis  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 with ascites  New onset   · Continue giving albumin, 25g q8h  Diagnosis: SBP  · Status post abdominal paracentesis with IR 2/13 for 2L  WBC noted, no bacteria on gram stain  Started on empiric Ceftriaxone for possible SBP      · Per chart review, plan to treat with ceftriaxone until resolution of SBP on fluid studies, discharge on lifetime Cipro or Bactrim  · Ceftriaxone discontinued   · S/p repeat paracentesis on 2/22, took of 4 5L of clear, sheri fluid, WBC count is 402 (down from 1179 from his last paracentesis on 2/16)  · Culture and gram stain from paracentesis on 2/22 negative        :   Diagnosis: GERSON  · Resolved   · Likely multifactorial in the setting of contrast 2/9, possible hepatorenal, ARB use, possible intra abdominal HTN  · Retroperitoneal ultrasound no hydronephrosis  · Nephrology consult appreciated  · S/p IV albumin  Diagnosis: Mass in inguinal region   · Venous duplex of the bilateral lower extremities (02/08/2022) as outpatient showed 5 1 cm non vascularized cystic structure in the right groin  · Plan for outpatient surgery follow-up     F/E/N:   Diet: dysphagia diet  Monitor CMP, replete as needed      Heme/Onc:   Diagnosis: 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· Plan: per oncology, no oncology follow up as there are no treatment options      Endo:   Diagnosis: Hypothyroidism  · Plan: continue home levothyroxine 200mcg     ID:   Diagnosis: SBP  Plan: ceftriaxone discontinued   Paracentesis done on 2/22; WBC from paracentesis fluid on 2/22 is 402, down from 1179 on 2/16        Disposition: discharge to home for home hospice       Non-Invasive/Invasive Ventilation Settings:  Respiratory  Report   Lab Data (Last 4 hours)    None         O2/Vent Data (Last 4 hours)    None              No results found for: PHART, JQC0CLM, PO2ART, FRW9WUQ, T5VVERSI, BEART, SOURCE  SpO2: SpO2: 100 %, SpO2 Device: O2 Device: Ventilator    Imaging:   XR chest portable ICU   Final Result by Geraldine Campos DO (02/22 1413)      ET tube as above  Right mid lung opacity, possibly related to known pleural plaque  See additional comments  Cannot exclude trace left pleural effusion  Workstation performed: [id number]         XR abdomen 1 view kub   Final Result by Michael Barraza MD (35/85 9512)      Likely mild ileus  Workstation performed: [id number]         FL ERCP biliary only   Final Result by Chester Cheatham MD (02/21 2048)      Status post stent removal   Mild biliary ductal dilatation  8 cm x 8 mm stent placed  Workstation performed: [id number]         MRI abdomen w wo contrast and mrcp   Final Result by Shreyas Bloom MD (02/18 5371)      Abnormal central hepatic signal with associated diffuse biliary wall enhancement suspicious for intrahepatic extension of this patient's known CBD cholangiocarcinoma  Margins of the mass are not well delineated; suspected dimensions of 8 9 x 5 2 cm on    #14/45  Peripheral intrahepatic biliary dilation with narrowing of the biliary tree through the suspected area of central hepatic mass  Portal vein is partially obscured by motion artifact on the delayed postcontrast images  It is likely severely narrowed through the andreas hepatis by surrounding mass  Perigastric varices are present  The study was marked in [facility] for immediate notification  Workstation performed: [id number]         XR abdomen 1 view kub   Final Result by Shreyas Bloom MD (02/18 1004)      Biliary stent projects over the right upper quadrant  Workstation performed: [id number]         IR INPATIENT Paracentesis   Final Result by Braden Francsico MD (02/17 1206)   Impression:      Successful ultrasound-guided paracentesis  Workstation performed: [id number]         US kidney and bladder   Final Result by Wilmer Canales MD (02/13 1607)      No hydronephrosis  Partially imaged cirrhotic liver and ascites  Workstation performed: [id number]         IR INPATIENT Paracentesis   Final Result by Carmen Hi MD (1934)   Impression:      Ultrasound-guided paracentesis  Workstation performed: [id number]         FL ERCP biliary only   Final Result by Aneesh Chavez DO (02/12 6561)      Fluoroscopic guidance provided for ERCP and biliary stent placement as above  Please see procedure report for further details  Workstation performed: [id number]         CT chest wo contrast   Final Result by Avinash Walter MD (02/11 9844)   1  Left lower lobe pulmonary nodules identified, largest measuring up to 5 mm  Based on current Fleischner Society 2017 Guidelines on incidental pulmonary nodule, optional follow-up CT at 12 months can be considered  2   Right greater than left calcified pleural plaques indicating prior as best as exposure  3   Hepatic cirrhosis with ascites and evidence of portal hypertension  The study was marked in EPIC for significant notification  Workstation performed: [id number]           I have personally reviewed pertinent reports        Micro:  Lab Results   Component Value Date    BLOODCX No Growth at 24 hrs  02/22/2022    BLOODCX No Growth at 24 hrs  02/21/2022    URINECX 60,000-69,000 cfu/ml  02/09/2022         Allergies: No Known Allergies      Medications:   Scheduled Meds:  Current Facility-Administered Medications   Medication Dose Route Frequency Provider Last Rate    albumin human  25 g Intravenous Rahul Freire MD Stopped (02/24/22 0319)   Pamela Marte chlorhexidine  15 mL Mouth/Throat Q12H [address] Familia Mohr,       fentanyl citrate (PF)  50 mcg Intravenous Q2H PRN Familia Mohr DO      heparin (porcine)  5,000 Units Subcutaneous [facility] Kathi FAUSTIN Minnix, DO      lactulose  20 g Oral TID [place] Minnix, DO      levothyroxine  200 mcg Oral Early Morning [facility], [facility]      melatonin  6 mg Oral HS [place], [facility]      midodrine  5 mg Oral TID [facility] Vanita, DO      mirtazapine  7 5 mg Oral HS Vanita, [facility]      norepinephrine  1-30 mcg/min Intravenous Titrated [place]ett, DO Stopped (02/23/22 1020)    pantoprazole  40 mg Intravenous Q12H [address] Vanita, DO      propofol  5-50 mcg/kg/min Intravenous Titrated Vanita, DO Stopped (02/23/22 0630)     Continuous Infusions:norepinephrine, 1-30 mcg/min, Last Rate: Stopped (02/23/22 1020)  propofol, 5-50 mcg/kg/min, Last Rate: Stopped (02/23/22 0630)      PRN Meds:  fentanyl citrate (PF), 50 mcg, Q2H PRN        Counseling / Coordination of Care  30 minutes      Code Status: Level 3 - DNAR and DNI     Portions of the record may have been created with voice recognition software  Occasional wrong word or "sound a like" substitutions may have occurred due to the inherent limitations of voice recognition software  Read the chart carefully and recognize, using context, where substitutions have occurred       DO Vanita  Family Medicine Resident, PGY1  6:49 AM

## 2022-02-24 NOTE — SOCIAL WORK
A family meeting was necessary to allow for thorough discussion regarding patient's clinical presentation, expected disease trajectory, and comfort care versus continuing disease directed therapy  Please refer to [facility] provider note for medical specifics  Questions related to medical diagnoses, comfort care and prognosis were answered and all agree:      The patient/family have jointly decided on comfort focused care   Individuals present at meeting include: wife, daughter, and son-in-law, Austin Zhang (CM), Deshaun Arroyo ( hospice)   Per family patient is fully vested with VA benefits    Rehana Franks will be reaching out to [place] to review hospice benefits for a facility    Palliative will continue to follow for support while APARNA attempts placement with hospice

## 2022-02-24 NOTE — PLAN OF CARE
Problem: OCCUPATIONAL THERAPY ADULT  Goal: Performs self-care activities at highest level of function for planned discharge setting  See evaluation for individualized goals  Description: Treatment Interventions: ADL retraining,Functional transfer training,UE strengthening/ROM,Endurance training,Patient/family training,Equipment evaluation/education,Cognitive reorientation,Compensatory technique education,Continued evaluation,Energy conservation,Activityengagement          See flowsheet documentation for full assessment, interventions and recommendations  Outcome: Progressing  Note: Limitation: Decreased ADL status,Decreased UE ROM,Decreased UE strength,Decreased Safe judgement during ADL,Decreased cognition,Decreased endurance,Decreased self-care trans,Decreased high-level ADLs,Mood limitation  Prognosis: Fair  Assessment: Pt was seen for an OT re-evaluation on 2/24 due to prolonged intubation as well as goal expiration  Pt was initially seen on 2/10/2022 w/  painless jaundice, hyperbilirubinemia  PTA, pt was I w/ all ADLS, IADLS, Mod I w/ walking stick for transfers and functional mobility  Initial evaluation pt was performing at S/Min A w/ UB ADLS and Mod A w/ LB ADLS, Mod A w/ bed mobility, transfers and functional mobility w/ RW  Pt was assessed today (2/24) and needed Mod A w/ UB ADLS, Max A w/ LB ADLs, Max A x2 w/ bed rolling and bed mobility, Mod A x1 w/ EOB sitting, Max A x3 w/ sit pivot (STS- did not clear bed) and functional mobility was not appropriate at this time  Pt is currently limited due to: poor endurance, generalized weakness, instability, poor trunk/motor control, fatigue, decreased safety and independence in bed mobility and transfers  Recommend pt to continue skilled OT services and recommend inpatient rehab upon D/C once medically stable and clinically appropriate   Will continue to address goals 3-5x/wk within the next 10-14 days     OT Discharge Recommendation: Post acute rehabilitation services  OT - OK to Discharge:  Yes     Maya Tran, OTS

## 2022-02-24 NOTE — CASE MANAGEMENT
Case Management Discharge Planning Note    Patient name Priyank Amaro  Location [facility] MRN [id number]  : 1934 Date 2022       Current Admission Date: 2/10/2022  Current Admission Diagnosis:Obstructive jaundice   Patient Active Problem List    Diagnosis Date Noted    Acute kidney injury ([facility] ) 2022    Elevated TSH 2022    Abnormal MRI, liver 2022    Thrombosis, portal vein 2022    Common bile duct dilatation 2022    Mass of right inguinal region     Diastolic dysfunction     Abnormal EKG 2022    [facility] 20[facility]    Abnormal urinalysis 2022    Lung nodule 2022    Transaminitis 2022    Hyperbilirubinemia 2022    [facility]mon[facility] 2022    Elevated MCV 2022    Obstructive jaundice 2022    Platelets decreased [facility] ) 2022    Bilateral lower extremity edema 2021    Exudative age-related macular degeneration, bilateral, with active choroidal neovascularization ([facility] ) 07/15/2020    Asymmetrical sensorineural hearing loss 07/15/2020    Osteoarthritis 07/15/2020    Rosacea 07/15/2020    Male erectile disorder 2019    Impaired fasting glucose 2019    Class 1 obesity without serious comorbidity with body mass index (BMI) of 31 0 to 31 9 in adult 03/15/2019    Hypothyroidism 2018    Essential hypertension 2018    Chronic osteomyelitis of knee ([facility] ) 10/08/2013      LOS (days): 14  Geometric Mean LOS (GMLOS) (days): 4 80  Days to GMLOS:-9 8     OBJECTIVE:  Risk of Unplanned Readmission Score: 21         Current admission status: Inpatient   Preferred Pharmacy:   [facility] [address]  Phone: [phone] Fax: 21 [address] DERECK Srivastava  [address]  [address] [facility] [address]  [address] [place] [address]  Phone: [phone] Fax: 3 [facility], [address] [place] [address] Janie [place]rell [place] [address]  Phone: [phone] Fax: [phone]    Primary Care Provider: Mickey Nevilel MD    Primary Insurance: [facility]  Secondary Insurance:     DISCHARGE DETAILS:                                         Additional Comments: Consult received for hospice  Messages received from [facility] HAMLET with palliative asking cm to meet with her & hospice liaison for family mtg  Family asked cm [facility] social work [phone] ext [phone] to see if pt has hospice benefits & to coordination hospice  Family prefers IP hospice at [address] [place] [address], possible [place] or hospice at [facility] as 2nd choice at [facility]  Left VM  Main family contact is daughter Luis Fernando Greco [phone]

## 2022-02-24 NOTE — PLAN OF CARE
Problem: PHYSICAL THERAPY ADULT  Goal: Performs mobility at highest level of function for planned discharge setting  See evaluation for individualized goals  Description: Treatment/Interventions: Functional transfer training,LE strengthening/ROM,Endurance training,Patient/family training,Equipment eval/education,Bed mobility,Gait training,Spoke to nursing,Spoke to case management,OT  Equipment Recommended: Candy Membreno       See flowsheet documentation for full assessment, interventions and recommendations  Note: Prognosis: Good  Problem List: Decreased strength,Decreased endurance,Impaired balance,Decreased mobility,Decreased cognition,Pain  Assessment: Pt presents to therapy today for a re-eval due to being intubated s/p procedure  Pt extubated on 2/23/2022  DX: hyperammonemia, lung nodule, obstructive jaundice, GERSON, mass in inguinal region, cholangiocarcinoma, hypothyroidism  Pt's impairments include reduced mobility, poor endurance, high risk of falling, reduced BLE Strength, pain  These impairments limit the patient by requiring increased assistance and places him at a high risk of falling  Pt would benefit from continued skilled therapy while in the hospital to improve overall mobility and work towards a safe d/c  Recommend rehab  At end of session patient was left seated in the chair with call bell within reach  Pt motivated to get OOB to chair at this time  Barriers to Discharge: Inaccessible home environment        PT Discharge Recommendation: Post acute rehabilitation services          See flowsheet documentation for full assessment

## 2022-02-24 NOTE — SOCIAL WORK
LONGW joined Chantel Monzon (YOLETTE) to introduce palliative supportive care to patient, wife, daughter, and son-in-law  Patient expresses frustration regarding being intubated, not being able to drink until swallow study completed, and wanting to get out of bed  Patient making statements about wanting to get out of her and go home  Family requests to reconvene on Thursday to discuss next steps    Palliative will follow up on Thursday

## 2022-02-24 NOTE — PROGRESS NOTES
[facility] - Progress Note  Thang Dunn 80 y o  male MRN: [id number]  Unit/Bed#: MICU 08 Encounter: [id number]      ASSESSMENT & PLAN:    51-year-old male with history of hypertension, hypothyroidism, and hyperlipidemia who presented with weakness and obstructive jaundice from suspected CBD stricture/mass  Patient transferred from [facility] for GI evaluation and intervention with EUS and ERCP      1  Cholangiocarcinoma, obstructive jaundice, elevated LFTs - secondary to mass in the bile duct as seen on EUS status post FNB  Biopsies confirm cholangiocarcinoma  CA 19-9 markedly elevated at 28,356  LFTs with gradual improvement but with increasing bilirubin likely secondary to small duct metastatic lesions  Slight increase in transaminases likely secondary procedure possibly with acute decompensation of cirrhosis, DILI, hypoperfusion, or other  Status post repeat ERCP on 02/23 with removal of existing stent and exchange with uncovered biliary stent into the right intrahepatic duct  Please see ERCP report for details  Transaminases and ALP continue to improve  TBili was improving but increased to 31 today  Likely from intrahepatic duct metastases  · Monitor LFTs  · Given increasing bilirubin, can consider IR percutaneous drain placement but overall prognosis is guarded in setting of advanced malignancy  · Agree with consideration for hospice as patient is not candidate for surgery or chemotherapy with current hyperbilirubinemia  · Follow-up oncology, palliative recommendations  · Follow clinically with serial abdominal exams  · Avoid unnecessary hepatotoxic agents  · Supportive care per primary team     2  Decompensated cirrhosis - imaging findings suggestive of ascites and new cirrhosis  Complicated by ascites/SBP and small esophageal varices were seen on EGD which does support presence of portal hypertension  No varices seen on repeat EGD yesterday  Normal platelet count   Cause for liver disease, if present, unknown but may be secondary to malignancy versus fatty liver versus possibly chronic underlying PSC although uncertain vs other  · MELD score 26 (driven by bilirubin and creatinine but likely inaccurate in setting of biliary obstruction)  · Ascites/SBP:  · Repeat paracentesis on 02/22 with markedly improved cell count/differential;   · Completed course of IV ceftriaxone  · Will need ongoing lifelong antibiotics for prophylaxis in setting of SBP but if planned to go hospice, can defer this  · 2 gm sodium restriction  · Optimize nutritional status  · Consider starting diuretic regimen if hemodynamics remain stable but again, can defer this if going hospice and alternatively, can consider placement of Tenckhoff catheter  · Follow clinically  · Portal hypertension:  · Small esophageal varices on EGD on 02/11 and mild PHG; no high risk stigmata  · No visible varices on EGD on 02/22 but noted severe esophagitis and gastritis  · No indication for NSBB at this time  · No indication for octreotide gtt  · Follow clinically  · PSE:  · Extubated; somewhat confused  · Continue lactulose and titrate dosing to maintain three bowel movements daily  · Defer rifaximin for now but can consider starting if needed  · Serial neuro exams  · Avoid unnecessary narcotics, sedatives, benzodiazepines, anticholinergics  · HCC screening:  · Defer further screening in setting of advanced cholangiocarcinoma  · Transplant:  · Not a candidate for transplant due to age, co-morbidities, and cholangiocarcinoma with local metastases  · Agree with continued GOC discussion and recommend hospice  · Prognosis remains guarded     3  Upper GI bleed, esophagitis, gastritis, anemia - secondary to mucosal irritation/tear at the GE junction in context of severe esophagitis and gastritis  Hemostatic clips x2 placed successfully with hemostasis at time of EGD/ERCP on 02/22  Hb relatively stable at 8 9 this morning   No overt bleeding  · Can transition to PO PPI BID  · Avoid antiplatelets, anticoagulants, and NSAIDs  · Okay for DVT prophylaxis from GI standpoint  · Monitor blood counts and transfuse as needed  · No plan for repeat endoscopy at this time    ______________________________________________________________________    SUBJECTIVE:     Patient seen and evaluated  Extubated  Currently having BM       Medication Administration - last 24 hours from 02/23/2022 0840 to 02/24/2022 0840       Date/Time Order Dose Route Action Action by     02/24/2022 0555 levothyroxine tablet 200 mcg 200 mcg Oral Given [employer], RN     02/23/2022 2327 melatonin tablet 6 mg 6 mg Oral Given [employer] [employer], RN     02/23/2022 2327 mirtazapine (REMERON) tablet 7 5 mg 7 5 mg Oral Given [employer], RN     02/24/2022 2127 pantoprazole (PROTONIX) injection 40 mg 40 mg Intravenous Given [employer], RN     02/23/2022 2327 pantoprazole (PROTONIX) injection 40 mg 40 mg Intravenous Given [employer], RN     02/23/2022 0854 pantoprazole (PROTONIX) injection 40 mg 40 mg Intravenous Given [employer], RN     02/23/2022 1020 norepinephrine (LEVOPHED) 4 mg (STANDARD CONCENTRATION) IV in sodium chloride 0 9% 250 mL 0 mcg/min Intravenous Stopped Ulises West, RN     02/24/2022 0828 chlorhexidine (PERIDEX) 0 12 % oral rinse 15 mL 15 mL Mouth/Throat Given [employer], RN     02/23/2022 2327 chlorhexidine (PERIDEX) 0 12 % oral rinse 15 mL 15 mL Mouth/Throat Given [employer], RN     02/23/2022 0853 chlorhexidine (PERIDEX) 0 12 % oral rinse 15 mL 15 mL Mouth/Throat Given [employer], RN     02/23/2022 0854 lactulose oral solution 20 g 20 g Oral Given [employer], RN     02/24/2022 0555 heparin (porcine) subcutaneous injection 5,000 Units 5,000 Units Subcutaneous Given Mina Chew RN     02/23/2022 2327 heparin (porcine) subcutaneous injection 5,000 Units 5,000 Units Subcutaneous Given Mina Chew RN     02/23/2022 1400 heparin (porcine) subcutaneous injection 5,000 Units 5,000 Units Subcutaneous Given [employer], RN     02/23/2022 1029 spironolactone (ALDACTONE) tablet 50 mg 50 mg Per G Tube Given [employer], RN     02/23/2022 0854 furosemide (LASIX) tablet 20 mg 20 mg Per G Tube Given [employer], RN     02/24/2022 0601 midodrine (PROAMATINE) tablet 5 mg 5 mg Oral Given Vicie My, RN     02/23/2022 1704 midodrine (PROAMATINE) tablet 5 mg 5 mg Oral Given [employer], RN     02/23/2022 1030 midodrine (PROAMATINE) tablet 5 mg 5 mg Oral Given [employer], RN     02/23/2022 1245 potassium chloride 20 mEq IVPB (premix) 0 mEq Intravenous Stopped [employer], RN     02/23/2022 1035 potassium chloride 20 mEq IVPB (premix) 20 mEq Intravenous [facility], RN     02/24/2022 7088 lactulose oral solution 20 g 20 g Oral Given [employer], RN     02/23/2022 2327 lactulose oral solution 20 g 20 g Oral Given Vicie My, RN     02/23/2022 1704 lactulose oral solution 20 g 20 g Oral Given [employer], RN     02/24/2022 0315 albumin human (FLEXBUMIN) 5 % injection 25 g 0 g Intravenous Stopped Vicie My, RN     02/24/2022 0209 albumin human (FLEXBUMIN) 5 % injection 25 g 25 g Intravenous [facility]     02/23/2022 1854 albumin human (FLEXBUMIN) 5 % injection 25 g 0 g Intravenous Stopped Ulises West, MARTHA     02/23/2022 1755 albumin human (FLEXBUMIN) 5 % injection 25 g 25 g Intravenous [employer] Ramiro Diallo, MARTHA     02/23/2022 1140 albumin human (FLEXBUMIN) 5 % injection 25 g 0 g Intravenous Stopped [employer], RN     02/23/2022 1031 albumin human (FLEXBUMIN) 5 % injection 25 g 25 g Intravenous New Bag Ulises West, MARTHA          OBJECTIVE:     Objective   Blood pressure (!) 97/38, pulse 68, temperature 98 2 °F (36 8 °C), resp  rate 20, height 6' 4" (1 93 m), weight 109 kg (239 lb 13 8 oz), SpO2 99 %  Body mass index is 29 2 kg/m²      Intake/Output Summary (Last 24 hours) at 2/24/2022 0840  Last data filed at 2/24/2022 0600  Gross per 24 hour   Intake 2102 5 ml   Output 2035 ml   Net 67 5 ml       PHYSICAL EXAM:   General Appearance: Awake and alert, ill-appearing, jaundiced  Abdomen: Non-tender, softly distended; bowel sounds normal; no masses or no organomegaly    Invasive Devices  Report    Peripheral Intravenous Line            Peripheral IV 02/21/22 Right Hand 3 days    Peripheral IV 02/24/22 Left;Ventral (anterior) Forearm <1 day          Drain            Urethral Catheter Temperature probe 16 Fr  2 days                LAB RESULTS:  No results displayed because visit has over 200 results  RADIOLOGY RESULTS: I have personally reviewed pertinent imaging studies  FRACISCO Harris  Gastroenterology Fellow  [facility]  Available on [email or web address]

## 2022-02-24 NOTE — PROGRESS NOTES
[address] [facility]  Transfer Note - Margie Sanabria 1934, 80 y o  male MRN: [id number]  Unit/Bed#: MICU 08 Encounter: [id number]  Primary Care Provider: Debbie Powell MD   Date and time admitted to hospital: 2/10/2022 12:54 AM    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[facility])  Assessment & Plan  · Plan: per oncology, no oncology follow up as there are no treatment options   · Patient is now comfort care       Liver cirrhosis ([facility] )  Assessment & Plan  · CT AP: Left lower lobe 5 mm nodules   Bibasilar reticular changes and/or mild atelectasis   Paraesophageal varices  · S/p paracentesis on 2/13, 2/16, and 2/22  · Is now comfort care     SBP (spontaneous bacterial peritonitis) ([facility] )  Assessment & Plan  · Status post abdominal paracentesis with IR 2/13 for 2L  WBC noted, no bacteria on gram stain  Started on empiric Ceftriaxone for possible SBP      · Per chart review, plan to treat with ceftriaxone until resolution of SBP on fluid studies, discharge on lifetime Cipro or Bactrim  · Ceftriaxone discontinued   · S/p repeat paracentesis on 2/22, took of 4 5L of clear, sheri fluid, WBC count is 402 (down from 1179 from his last paracentesis on 2/16)  · Culture and gram stain from paracentesis on 2/22 negative  · Off abx, comfort care    * Obstructive jaundice  Assessment & Plan  · Obstructive jaundice Status post ERCP with sphincterotomy and stent placement 2/10, stent replacement on 2/21 found significant internal bleeding, which was cauterized, Pt was intubated for airway protection following ERCP   · Biopsy from initial ERCP on 2/10 showing adenocarcinoma of CBD  · Patient is now comfort care     Acute kidney injury [facility])  Assessment & Plan  · Resolved   · Likely multifactorial in the setting of contrast 2/9, possible hepatorenal, ARB use, possible intra abdominal HTN  · Retroperitoneal ultrasound no hydronephrosis  · Nephrology consult appreciated  · S/p IV albumin  · Continue to follow BMP    Lung nodule  Assessment & Plan  · CT AP: Left lower lobe 5 mm nodules   Bibasilar reticular changes and/or mild atelectasis  Paraesophageal varices  · Outpatient follow-up    Mass of right inguinal region  Assessment & Plan  · Venous duplex of the bilateral lower extremities (02/08/2022) as outpatient showed 5 1 cm non vascularized cystic structure in the right groin  · Plan was for outpatient surgery follow-up, but patient is now comfort care     Hyperammonemia ([facility] )  Assessment & Plan  · Due to liver disease  · Increased  Lactulose to 20 grams PO Qdaily for a goal 2-3 soft bowel movements per 24 hours - do not have to discharge on lactulose per GI        Code Status: Level 4 - Comfort Care  POA:    POLST:      Reason for ICU admission: Intubated after ERCP on 2/21/22 for obstructive jaundice     Active problems:   Principal Problem:    Obstructive jaundice  Active Problems:    Liver cirrhosis ([facility] )    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(HCC)    SBP (spontaneous bacterial peritonitis) ([facility] )    Essential hypertension    Class 1 obesity without serious comorbidity with body mass index (BMI) of 31 0 to 31 9 in adult    Hyperammonemia (HCC)    Mass of right inguinal region    Lung nodule    Acute kidney injury ([facility] )  Resolved Problems:    * No resolved hospital problems  *      Consultants:   Gastroenterology  Nephrology  Oncology   Behavioral Health   Palliative Care     History of Present Illness:   Anne Osullivan is a 80 y o  male who presents with new onset liver cirrhosis and ascites, and adenocarcinoma of the CBD  He presented to the [facility] on 2/10 with generalized weakness, and was found to have jaundice, transaminitis, and high ammonia level, per chart review  Abdominal CT showed diffuse intrahepatic biliary dilation, and ill-defined soft tissue concerning for obstructing mass or stricture   Abdominal MRI showed soft tissue density in the andreas hepaticus with numerous large gallstones and occlusion of the main portal vein  He was transferred to [facility] for ERCP  ERCP done on 2/10, and stent placed in the common bile duct  Cytologic samples from common bile duct showed malignant adenocarcinoma  MRI from 2/18 concerning for spread of cholangiocarcinoma to the liver  Had paracentesis on 2/13 and 2/16  was treated with cerftriaxone for SBP for 7 days  Repeat ERCP done on 2/21, replaced stent placed in common bile duct, with unexpected finding of mucosal friability in the esophagus and stomach  A clot was removed and the source of bleeding was cauterized  Patient was intubated to protect airway  Was transferred to MICU  Summary of clinical course:   Patient arrived in the MICU intubated on 2/21 after he was found to have generalized mucosal friability of his esophagus and stomach during an ERCP for obstructive jaundice due to adenocarcinoma of the common bile duce  He presented to the MICU with significant jaundice and abdominal distension with fluid wave due to ascites  Given his history of SBP during this admission and ascites, a paracentesis was performed on 2/22 in the MICU, which removed 4 5L of clear sheri fluid  The fluid differential showed a decreased WBC count and decreased percentage of neutrophils, so it was determined patient no longer needed acute antibiotics  The decision was made to delay starting life long prophylactic antibiotics as recommended by GI as patient's family was strongly considering making him comfort care by that time  Patient was given lactulose for his hyperammonemia  On 2/23 patient was extubated without complication  On 2/24 patient's family met with palliative care and the decision was made to make patient comfort care while finding placement in hospice  Recent or scheduled procedures:   ERCP  Paracentesis    Outstanding/pending diagnostics:   N/a    Cultures:    Body fluid cx 2/22/22: no growth       Mobilization Plan:       Nutrition Plan:   Pleasure feeds     Invasive Devices Review  Invasive Devices  Report    Peripheral Intravenous Line            Peripheral IV 02/21/22 Right Hand 3 days    Peripheral IV 02/24/22 Left;Ventral (anterior) Forearm <1 day          Drain            Urethral Catheter Temperature probe 16 Fr  2 days                Rationale for remaining devices: N/a     VTE Pharmacologic Prophylaxis: heparin discontinued as patient is now comfort care  VTE Mechanical Prophylaxis: sequential compression device    Discharge Plan:    [address] [facility] awaiting hospice placement    Spoke with Dr Onnie Mohs regarding transfer  Please contact critical care via [employer] with any questions or concerns  Portions of the record may have been created with voice recognition software  Occasional wrong word or "sound a like" substitutions may have occurred due to the inherent limitations of voice recognition software    Read the chart carefully and recognize, using context, where substitutions have occurred    Arlette Friend, [address] [place] [address] Radha Resident, PGY1  3:58 PM

## 2022-02-25 PROBLEM — Z51.5 COMFORT MEASURES ONLY STATUS: Status: ACTIVE | Noted: 2022-01-01

## 2022-02-25 NOTE — CASE MANAGEMENT
Case Management Discharge Planning Note    Patient name Aileen Record  Location [facility]45 MRN [id number]  : 1934 Date 2022       Current Admission Date: 2/10/2022  Current Admission Diagnosis:Comfort measures only status   Patient Active Problem List    Diagnosis Date Noted    Comfort measures only status 2022    SBP (spontaneous bacterial peritonitis) ([facility] ) 2022    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 2022    Acute kidney injury ([facility] ) 2022    Elevated TSH 2022    Abnormal MRI, liver 2022    Thrombosis, portal vein 2022    Common bile duct dilatation 2022    Mass of right inguinal region     Diastolic dysfunction     Abnormal EKG 2022    Liver cirrhosis ([facility] ) 2022    Abnormal urinalysis 2022    Lung nodule 2022    Transaminitis 2022    Hyperbilirubinemia 2022    Hyperammonemia ([facility] ) 2022    Elevated MCV 2022    Obstructive jaundice 2022    Platelets decreased ([facility] ) 2022    Bilateral lower extremity edema 2021    Exudative age-related macular degeneration, bilateral, with active choroidal neovascularization ([facility] ) 07/15/2020    Asymmetrical sensorineural hearing loss 07/15/2020    Osteoarthritis 07/15/2020    Rosacea 07/15/2020    Male erectile disorder 2019    Impaired fasting glucose 2019    Class 1 obesity without serious comorbidity with body mass index (BMI) of 31 0 to 31 9 in adult 03/15/2019    Hypothyroidism 2018    Essential hypertension 2018    Chronic osteomyelitis of knee ([facility] ) 10/08/2013      LOS (days): 15  Geometric Mean LOS (GMLOS) (days): 4 80  Days to GMLOS:-10 9     OBJECTIVE:  Risk of Unplanned Readmission Score: 25         Current admission status: Inpatient   Preferred Pharmacy:   [facility] [address] [place] [address]  Phone: [phone] Fax: [facility], [address]  [facility] [place] [address]  Phone: [phone] Fax: [phone] AID-129 [id number] [facility] 18, [address]  [address] [place] [address]  Phone: [phone] Fax: [phone]    Primary Care Provider: Letty Cook MD    Primary Insurance: [facility]  Secondary Insurance:     DISCHARGE DETAILS:    Discharge planning discussed with[de-identified] CM spoke with pt's spouse, Terry [place] of Choice: Yes  Comments - Freedom of Choice: Family prefers the pt be referred to [facility] affiliated SNF/IPU  CM contacted family/caregiver?: Yes  Were Treatment Team discharge recommendations reviewed with patient/caregiver?: Yes  Did patient/caregiver verbalize understanding of patient care needs?: Yes  Were patient/caregiver advised of the risks associated with not following Treatment Team discharge recommendations?: Yes    Contacts  Relationship to Patient[de-identified] Family  Contact Method: Phone  Reason/Outcome: Continuity of [address]         Is the patient interested in [facility] at discharge?: No    DME Referral Provided  Referral made for DME?: No    Other Referral/Resources/Interventions Provided:  Interventions: Hospice    Would you like to participate in our [address] service program?  : No - Declined    Treatment Team Recommendation: SNF,Hospice  Discharge Destination Plan[de-identified] Hospice,SNF         Additional Comments:     CM is aware the pt transferred from ICU to  on 2/24  LOS upon service transfer is 14days  Previous CM documentation was reviewed and is appreciated  Of note, CM received peripheral endorsement of this case from Palliative SW and was informed hospice placement was initiated during the pt's ICU stay   The pt is service connected through the [facility] and qualifies for placement to SNF's that hold a [facility] contract for hospice care  CM contacted Yesenia Alejandro [facility] [phone] ext [phone] whom informed me she is not directly involved with facilitating [facility] admission to [facility]'s hospice program  Ms Morenita Sands provided the contact information for Blair Elizondo [phone] ext [phone] whom manages inpt admission  CM spoke with Mr Lior Jones whom requested clinical reflecting hospice appropriatness be faxed to [phone] for review  CM faxed requested clinical on this date  Of note, Mr Lior Jones informed CM [facility] does not have available beds for admission as of this date  He emailed CM a list of [facility] SNF's and CM will provide the list to the pt's wife and dtr to review  Of the facilities on the available list, the pt's family is interested in [facility]  Updated clinical will be forwarded for review  CM will continue to follow for disposition planning  Hospital discharge is anticipated the week of 2/28         Lisbet Robins, MSW, LCSW, CCM, [place]-SW, [facility]

## 2022-02-25 NOTE — UTILIZATION REVIEW
Continued Stay Review    Date: 2- 25-22                       Current Patient Class:  Inpatient  Current Level of Care: med surg    HPI:87 y o  male initially admitted on 2-10-22     Assessment/Plan:     Level 4 DNR/Comfort Care     Patient arrived in the MICU intubated on 2/21 after he was found to have generalized mucosal friability of his esophagus and stomach during an ERCP for obstructive jaundice due to adenocarcinoma of the common bile duce  He presented to the MICU with significant jaundice and abdominal distension with fluid wave due to ascites  Given his history of SBP during this admission and ascites, a paracentesis was performed on 2/22 in the MICU, which removed 4 5L of clear sheri fluid  The fluid differential showed a decreased WBC count and decreased percentage of neutrophils, so it was determined patient no longer needed acute antibiotics  The decision was made to delay starting life long prophylactic antibiotics as recommended by GI as patient's family was strongly considering making him comfort care by that time  Patient was given lactulose for his hyperammonemia  On 2/23 patient was extubated without complication  On 2/24 patient's family met with palliative care and the decision was made to make patient comfort care while finding placement in hospice              Vital Signs:     No further evaluations          Pertinent Labs/Diagnostic Results:       Results from last 7 days   Lab Units 02/24/22  0546 02/23/22  0524 02/22/22  0559 02/21/22  1856 02/21/22  1856 02/21/22  0000 02/21/22  0000   WBC Thousand/uL 7 66 9 72 11 58*   < > 13 30*   < > 8 91   HEMOGLOBIN g/dL 8 9* 9 1* 9 6*  --  10 1*  --  10 2*   HEMATOCRIT % 24 4* 25 7* 26 7*  --  27 4*  --  28 2*   PLATELETS Thousands/uL 131* 153 164   < > 198   < > 154   NEUTROS ABS Thousands/µL 5 64 7 23  --   --   --   --   --    BANDS PCT %  --   --   --   --   --   --  2    < > = values in this interval not displayed           Results from last 7 days   Lab Units 02/24/22  0820 02/24/22  0546 02/23/22  1322 02/23/22  0524 02/22/22  0559 02/21/22  0659   SODIUM mmol/L 142  --  138 139 134* 138   POTASSIUM mmol/L 3 5  --  4 4 3 5 3 9 3 3*   CHLORIDE mmol/L 114*  --  112* 108 104 108   CO2 mmol/L 20*  --  17* 22 23 21   ANION GAP mmol/L 8  --  9 9 7 9   BUN mg/dL 39*  --  41* 44* 37* 34*   CREATININE mg/dL 1 87*  --  2 39* 1 70* 0 92 1 06   EGFR ml/min/1 73sq m 31  --  23 35 74 62   CALCIUM mg/dL 8 6  --  8 5 8 5 7 7* 8 4   MAGNESIUM mg/dL  --  2 1  --  1 9  --   --    PHOSPHORUS mg/dL 1 9*  --   --  2 7  --   --      Results from last 7 days   Lab Units 02/24/22  0820 02/23/22  0524 02/22/22  0559 02/21/22  0659 02/20/22  1102 02/19/22  0508 02/19/22  0508   AST U/L 141* 227* 245* 57* 69*   < > 66*   ALT U/L 71 87* 89* 43 50   < > 47   ALK PHOS U/L 268* 308* 325* 383* 448*   < > 394*   TOTAL PROTEIN g/dL 5 4* 5 5* 5 7* 5 1* 6 0*   < > 5 6*   ALBUMIN g/dL 3 1* 3 1* 3 0* 2 4* 2 8*   < > 2 6*   TOTAL BILIRUBIN mg/dL 31 07* 25 95* 28 96* 30 38* 33 82*   < > 30 37*   BILIRUBIN DIRECT mg/dL  --   --   --   --   --   --  26 48*    < > = values in this interval not displayed           Results from last 7 days   Lab Units 02/24/22  0820 02/23/22  1322 02/23/22  0524 02/22/22  0559 02/21/22  0659 02/20/22  1102 02/19/22  0508   GLUCOSE RANDOM mg/dL 121 102 99 119 140 134 118       Results from last 7 days   Lab Units 02/22/22  0436   PH ART  7 393   PCO2 ART mm Hg 34 9*   PO2 ART mm Hg 187 9*   HCO3 ART mmol/L 20 8*   BASE EXC ART mmol/L -3 5   O2 CONTENT ART mL/dL 15 0*   O2 HGB, ARTERIAL % 98 0*   ABG SOURCE  Radial, Left     Results from last 7 days   Lab Units 02/21/22  1936   PH JEANNE  7 341   PCO2 JEANNE mm Hg 40 4*   PO2 JEANNE mm Hg 61 6*   HCO3 JEANNE mmol/L 21 4*   BASE EXC JEANNE mmol/L -4 1   O2 CONTENT JEANNE ml/dL 13 4   O2 HGB, VENOUS % 89 0*       Results from last 7 days   Lab Units 02/21/22  1856 02/21/22  0608   PROTIME seconds 17 0* 15 9*   INR  1 45* 1 33* Results from last 7 days   Lab Units 02/22/22  0559 02/22/22  0308 02/21/22  2138 02/21/22  1856   LACTIC ACID mmol/L 1 9 2 2* 2 3* 2 3*       Results from last 7 days   Lab Units 02/22/22  1638 02/22/22  0027 02/21/22  2348   BLOOD CULTURE   --  No Growth at 72 hrs  No Growth at 72 hrs  GRAM STAIN RESULT  1+ Mononuclear Cells  No Polys or Bacteria seen  --   --    BODY FLUID CULTURE, STERILE  No growth  --   --        Scheduled Medications:    lactulose, 20 g, Oral, TID  levothyroxine, 200 mcg, Oral, Early Morning  melatonin, 6 mg, Oral, HS  mirtazapine, 7 5 mg, Oral, HS  oxyCODONE, 2 5 mg, Oral, Q6H [facility]      Continuous IV Infusions:     PRN Meds:  haloperidol lactate, 1 mg, Intravenous, Q4H PRN  HYDROmorphone, 0 5 mg, Intravenous, Q1H PRN  LORazepam, 0 5 mg, Intravenous, Q4H PRN  LORazepam, 0 5 mg, Oral, Q6H PRN  oxyCODONE, 5 mg, Oral, Q4H PRN        Discharge Plan: to be determined     Network Utilization Review Department  ATTENTION: Please call with any questions or concerns to [phone] and carefully listen to the prompts so that you are directed to the right person  All voicemails are confidential   Aburto Rashida all requests for admission clinical reviews, approved or denied determinations and any other requests to dedicated fax number below belonging to the campus where the patient is receiving treatment   List of dedicated fax numbers for the Facilities:  [address] DENIALS (Administrative/Medical Necessity) [phone]   [address] (Maternity/NICU/Pediatrics) [phone]   [address]  [address] [place] [address] [place] [address] [address] [place] [address] Vicky Egan 14[address] [phone]

## 2022-02-25 NOTE — PROGRESS NOTES
Progress note - Palliative and Supportive Care   Misa Lombardo 80 y o  male [id number]    Patient Active Problem List   Diagnosis    Hypothyroidism    Essential hypertension    Class 1 obesity without serious comorbidity with body mass index (BMI) of 31 0 to 31 9 in adult    Impaired fasting glucose    Chronic osteomyelitis of knee (HCC)    Male erectile disorder    Exudative age-related macular degeneration, bilateral, with active choroidal neovascularization (HCC)    Asymmetrical sensorineural hearing loss    Osteoarthritis    Rosacea    Bilateral lower extremity edema    Platelets decreased (HCC)    Transaminitis    Hyperbilirubinemia    Hyperammonemia (HCC)    Elevated MCV    Obstructive jaundice    Elevated TSH    Abnormal MRI, liver    Thrombosis, portal vein    Common bile duct dilatation    Mass of right inguinal region    Diastolic dysfunction    Abnormal EKG    Liver cirrhosis (HCC)    Abnormal urinalysis    Lung nodule    Acute kidney injury ([address] )    SBP [facility]spontaneous [facility] per[facility]itis) [facility]    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(HCC)     Active issues specifically addressed today include:   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Comfort measures only status  Goals of care discussion    Plan:  1  Symptom management -   Comfort measures   - patient tolerating PO   - will add oxyIR 2 5mg PO Q4H [facility] and oxycodone 5mg PO Q4H PRN pain or increased work of breathing   - ativan 0 5mg PO QHS and Q4H PRN anxiety or increased work of breathing    - continue home remeron and melatonin HS   - IV ativan 0 5mg Q1H PRN, dilaudid 0 5mg IV Q1H PRN and haldol 1mg Q4H PRN available should patient be unable to tolerate PO   - continue lactulose to optimize mental status as patient tolerates, may hold if patient refuses or unable to tolerate PO   - Patient has difficulty articulating his symptoms  Please assess for non-verbal signs of discomfort and medicate appropriately   Page palliative if symptoms are poorly controlled  2  Goals - level 4 comfort care   - Comfort measures only while admitted   - CM assisting with d/c planning to hospice, but complicated by [facility] status     Code Status: comfort - Level 4   Decisional apparatus:  Patient is not competent on my exam today  If competence is lost, patient's substitute decision maker would default to spouse by [place]  Advance Directive / Living Will / POLST:  On file    3  Social support   - [place], [place] following and update family today    Interval history:       Patient was  Transferred out of ICU last night due to comfort care status  He was resting comfortably at time of evaluation  Wakes with tactile stimulation  He denied pain but admitted to generalized discomfort  Offered pain medication and at that time refused  However, on LSW return patient reported severe discomfort and agreed to pain medication at which time scheduled oxycodone was ordered  Patient admits to poor sleep, some help from remeron and melatonin which he requested are continued, but is also anxious as his family is unable to visit today  MEDICATIONS / ALLERGIES:     all current active meds have been reviewed    No Known Allergies    OBJECTIVE:    Physical Exam  Physical Exam  Constitutional:       General: He is not in acute distress  Appearance: He is ill-appearing  Comments: Initially resting, wakes with stimulation   Eyes:      General: Scleral icterus present  Cardiovascular:      Rate and Rhythm: Normal rate  Pulmonary:      Effort: Pulmonary effort is normal  No respiratory distress  Abdominal:      Tenderness: There is no guarding  Skin:     Coloration: Skin is jaundiced  Neurological:      Comments: Alert, follows commands, oriented to self and place   Otherwise somewhat confused   Psychiatric:      Comments: Pleasantly confused         Lab Results: no new  Imaging Studies: reviewed pertinent studies  EKG, Pathology, and Other Studies: reviewed pertinent studies    Counseling / Coordination of Care    Total floor / unit time spent today 35+ minutes  Greater than 50% of total time was spent with the patient and / or family counseling and / or coordination of care  A description of the counseling / coordination of care: time spent assessing patient, communicating with RN, complex symptom management

## 2022-02-25 NOTE — PROGRESS NOTES
[facility]  Progress Note - Jean Marie Hoskins 1934, 80 y o  male MRN: [id number]  Unit/Bed#: [facility] 711-01 Encounter: [id number]  Primary Care Provider: Marie Tolliver MD   Date and time admitted to hospital: 2/10/2022 12:54 AM    * Comfort measures only status  Assessment & Plan  CMO initiated on 02/24  Continue pleasure feeds  Continue scheduled for oxycodone  Continue PRN Haldol, IV Dilaudid, IV Ativan and Oxycodone  Pending placement to hospice facility per CM guidance    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[facility])  Assessment & Plan  Patient previously followed by Oncology  Patient is not a candidate for treatment per oncology  Continue comfort measures    SBP (spontaneous bacterial peritonitis) ([facility] )  Assessment & Plan  Patient had paracentesis on 02/13 and started on empiric Rocephin for possible SBP  Repeat paracentesis performed on 02/22 with NEGATIVE culture  Empiric Rocephin discontinued as patient is CMO    Liver cirrhosis ([facility] )  Assessment & Plan  "Suspected cirrhosis with ascites  New onset  · Status post abdominal paracentesis with IR 2/13 for 2L  WBC noted, no bacteria on gram stain  Started on empiric Ceftriaxone for possible SBP  · s/p paracentesis 2/16"  See plan above    Mass of right inguinal region  Assessment & Plan  Venous duplex of the BLE (02/08/2022) in outpatient setting identified a 5 1 cm non vascularized cystic structure in the right groin    Obstructive jaundice  Assessment & Plan  "Obstructive jaundice Status post ERCP with sphincterotomy and stent placement 2/10  · Biopsy showing adenocarcinoma  · consulted GI, onc and surg/onc   · 2/17/22:  Held a discussion with Previous provide  and Hematology-Oncology, patient and family are now agreeable to rehab placement at the [facility]    Holding off on hospice for now  · continue tx ceftriaxone for SBP and confirm further response with repeat thoracentesis fluid studies  ·  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 "    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  Continue with comfort care measures    Hyperammonemia ([address] )  Assessment & Plan  Secondary to liver disease in the setting of malignancy  Continue lactulose daily with goal bowel movement 2-3 to reduce possible hepatic encephalopathy    Class 1 obesity without serious comorbidity with body mass index (BMI) of 31 0 to 31 9 in adult  Assessment & Plan  Body mass index is 29 2 kg/m²  Counseling deferred as patient is comfort measures only  Continue Regular diet pleasure feeds          VTE Pharmacologic Prophylaxis: VTE Score: 5 Low Risk (Score 0-2) - Encourage Ambulation  Patient Centered Rounds: I performed bedside rounds with nursing staff today  Discussions with Specialists or Other Care Team Provider: Nurse and      Education and Discussions with Family / Patient: Updated  (wife) via phone  Time Spent for Care: 20 minutes  More than 50% of total time spent on counseling and coordination of care as described above  Current Length of Stay: 15 day(s)  Current Patient Status: Inpatient   Certification Statement: The patient will continue to require additional inpatient hospital stay due to Pending hospice placement  Discharge Plan: Anticipate discharge in 24-48 hrs to Hospice facility    Code Status: Level 4 - Comfort Care    Subjective:   Patient requested that he get a straw so he can drink his Gatorade  He denies any complaints this time  Patient's wife was contacted over the phone and provided with information about attending guiding hospital care at this time  Objective:     Vitals:   No data recorded  HR:  [74] 74  Resp:  [16] 16  SpO2:  [97 %] 97 %  Body mass index is 29 2 kg/m²  Input and Output Summary (last 24 hours):   No intake or output data in the 24 hours ending 02/25/22 1428    Physical Exam:   Physical Exam  Vitals and nursing note reviewed  Constitutional:       General: He is not in acute distress  Appearance: He is toxic-appearing  Comments: Frail in appearance   HENT:      Head: Normocephalic and atraumatic  Right Ear: External ear normal       Left Ear: External ear normal       Nose: Nose normal       Mouth/Throat:      Mouth: Mucous membranes are dry  Eyes:      General: Scleral icterus present  Right eye: No discharge  Left eye: No discharge  Extraocular Movements: Extraocular movements intact  Cardiovascular:      Rate and Rhythm: Normal rate and regular rhythm  Heart sounds: No friction rub  No gallop  Pulmonary:      Effort: Pulmonary effort is normal       Breath sounds: Normal breath sounds  Comments: Anterior chest only  Reduced bibasilar breath sounds  Abdominal:      General: Bowel sounds are normal  There is distension  Palpations: Abdomen is soft  Tenderness: There is no abdominal tenderness  Musculoskeletal:         General: No tenderness, deformity or signs of injury  Skin:     Coloration: Skin is jaundiced  Findings: No lesion or rash  Neurological:      General: No focal deficit present  Mental Status: He is alert  Motor: Weakness present  Additional Data  Labs:  Results from last 7 days   Lab Units 02/24/22  0546 02/21/22  1856 02/21/22  0000   WBC Thousand/uL 7 66   < > 8 91   HEMOGLOBIN g/dL 8 9*   < > 10 2*   HEMATOCRIT % 24 4*   < > 28 2*   PLATELETS Thousands/uL 131*   < > 154   BANDS PCT %  --   --  2   NEUTROS PCT % 74   < >  --    LYMPHS PCT % 11*   < >  --    LYMPHO PCT %  --   --  12*   MONOS PCT % 11   < >  --    MONO PCT %  --   --  6   EOS PCT % 2   < > 1    < > = values in this interval not displayed       Results from last 7 days   Lab Units 02/24/22  0820   SODIUM mmol/L 142   POTASSIUM mmol/L 3 5   CHLORIDE mmol/L 114*   CO2 mmol/L 20*   BUN mg/dL 39*   CREATININE mg/dL 1 87*   ANION GAP mmol/L 8   CALCIUM mg/dL 8 6 ALBUMIN g/dL 3 1*   TOTAL BILIRUBIN mg/dL 31 07*   ALK PHOS U/L 268*   ALT U/L 71   AST U/L 141*   GLUCOSE RANDOM mg/dL 121     Results from last 7 days   Lab Units 02/21/22  1856   INR  1 45*             Results from last 7 days   Lab Units 02/22/22  0559 02/22/22  0308 02/21/22  2138 02/21/22  1856   LACTIC ACID mmol/L 1 9 2 2* 2 3* 2 3*       Lines/Drains:  Invasive Devices  Report    Peripheral Intravenous Line            Peripheral IV 02/21/22 Right Hand 4 days    Peripheral IV 02/24/22 Left;Ventral (anterior) Forearm 1 day          Drain            Urethral Catheter Temperature probe 16 Fr  3 days              Urinary Catheter:  Goal for removal: N/A - Chronic Kennedy               Imaging: No pertinent imaging reviewed  Recent Cultures (last 7 days):   Results from last 7 days   Lab Units 02/22/22  1638 02/22/22  0027 02/21/22  2348   BLOOD CULTURE   --  No Growth at 72 hrs  No Growth at 72 hrs  GRAM STAIN RESULT  1+ Mononuclear Cells  No Polys or Bacteria seen  --   --    BODY FLUID CULTURE, STERILE  No growth  --   --        Last 24 Hours Medication List:   Current Facility-Administered Medications   Medication Dose Route Frequency Provider Last Rate    haloperidol lactate  1 mg Intravenous Q4H PRN King Age, DO      HYDROmorphone  0 5 mg Intravenous Q1H PRN Vidhi Galicia PA-C      lactulose  20 g Oral TID [facility], DO      levothyroxine  200 mcg Oral Early Morning [place], [place]      LORazepam  0 5 mg Intravenous Q4H PRN [facility], DO      LORazepam  0 5 mg Oral Q6H PRN Vidhi Galicia PA-C      melatonin  6 mg Oral HS King Age, DO      mirtazapine  7 5 mg Oral HS [facility]      oxyCODONE  2 5 mg Oral Q6H [facility] Sol Alvardao PA-C      oxyCODONE  5 mg Oral Q4H PRN Vidhi Galicia PA-C          Today, Patient Was Seen By: Jamaica Aquino MD    **Please Note: This note may have been constructed using a voice recognition system  **

## 2022-02-26 NOTE — ASSESSMENT & PLAN NOTE
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
Acceptable BP  Continue losartan
Body mass index is 29 2 kg/m²     Counseling deferred as patient is comfort measures only  Continue Regular diet pleasure feeds
Body mass index is 29 2 kg/m²     Counseling deferred as patient is comfort measures only  Continue Regular diet pleasure feeds
CMO initiated on 02/24  Continue pleasure feeds  Continue scheduled for oxycodone  Continue PRN Haldol, IV Dilaudid, IV Ativan and Oxycodone  Pending IR thoracentesis for therapeutic relief and possible Aspira drain placement for continued therapeutic paracentesis  Placement to inpatient hospice facility completed with CM guidance and inpatient hospice team
CMO initiated on 02/24  Continue pleasure feeds  Continue scheduled for oxycodone  Continue PRN Haldol, IV Dilaudid, IV Ativan and Oxycodone  Pending placement to hospice facility per CM guidance
Continue levothyroxine
Follow on urine culture
Left lower lobe 5 mm nodules  Recommend nonemergent dedicated chest CT 
Likely multifactorial  Worsening kidney function  Retroperitoneal ultrasound no hydronephrosis  Discussed with Nephrology  Receiving IV albumin  Monitor kidney function closely  Avoid nephrotoxins
Likely multifactorial in the setting of contrast 2/9, possible hepatorenal, ARB use, possible intra abdominal HTN  Retroperitoneal ultrasound no hydronephrosis  Nephrology consult appreciated  Receiving IV albumin  Creatinine starting to trend down   Baseline around 0 8
Likely multifactorial in the setting of contrast 2/9, possible hepatorenal, ARB use, possible intra abdominal HTN  Retroperitoneal ultrasound no hydronephrosis  Nephrology consult appreciated  S/p IV albumin      -GERSON resolved
Likely multifactorial in the setting of contrast 2/9, possible hepatorenal, ARB use, possible intra abdominal HTN  Retroperitoneal ultrasound no hydronephrosis  Nephrology consult appreciated  S/p IV albumin      -GERSON resolved
Likely multifactorial in the setting of contrast 2/9, possible hepatorenal, ARB use, possible intra abdominal HTN  Retroperitoneal ultrasound no hydronephrosis  Nephrology consult appreciated  S/p IV albumin  Creatinine 0 7 this morning   Baseline around 0 8   -acute kidney injury resolved
Likely multifactorial in the setting of contrast 2/9, possible hepatorenal, ARB use, possible intra abdominal HTN  Retroperitoneal ultrasound no hydronephrosis  Nephrology consult appreciated  S/p IV albumin  Creatinine starting to trend down   Baseline around 0 8
Likely multifactorial in the setting of contrast 2/9, possible hepatorenal, ARB use, possible intra abdominal HTN  Worsening kidney function  Retroperitoneal ultrasound no hydronephrosis  Discussed with Nephrology  Receiving IV albumin  Monitor kidney function closely  Avoid nephrotoxins  Labs pending today
Likely prerenal  Monitor kidney function closely  Avoid nephrotoxins  Monitor postvoid residuals
Likely prerenal  Monitor kidney function closely  Hold losartan  Avoid nephrotoxins  Monitor postvoid residuals  If kidney function worsens will consider Nephrology evaluation
Obstructive jaundice  Status post ERCP with sphincterotomy and stent placement 2/10  Follow-up on biopsy studies  Monitor LFTs  GI following
Obstructive jaundice  Status post ERCP with sphincterotomy and stent placement 2/10  Follow-up on biopsy studies - high suspicion for cholangiocarcinoma  CA19 - 28,354  Monitor LFTs  GI following  Biopsy showing adenocarcinoma  D/W GI, consult onc and surg/onc         2/17/22:  Held a discussion with myself and Hematology-Oncology, patient and family are now agreeable to rehab placement at the [facility] off on hospice for now  -will obtain MRI MRCP with contrast, kidney function is fine, either way new gadolinium does not pose a significant risk of an as after as it used to in the past, due to using a different agent    MRI will help delineate whether he truly has metastasis, and also assessed position/placement of stent
Obstructive jaundice  Status post ERCP with sphincterotomy and stent placement 2/10  Follow-up on biopsy studies - high suspicion for cholangiocarcinoma  CA19 - 28,354  Monitor LFTs  GI following  Biopsy showing adenocarcinoma  D/W GI, consult onc and surg/onc         2/17/22:  Held a discussion with myself and Hematology-Oncology, patient and family are now agreeable to rehab placement at the [facility] off on hospice for now  -will obtain MRI MRCP with contrast, kidney function is fine, either way new gadolinium does not pose a significant risk of an as after as it used to in the past, due to using a different agent    MRI will help delineate whether he truly has metastasis, and also assessed position/placement of stent      2/18/22: Pending xray for stent placement, mri pending for liver mets  -bilirubin trending up  -continue tx ceftriaxone and confirm further response with repeat thoracentesis fluid studies      2/19/22:  Reviewed 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
Obstructive jaundice  Status post ERCP with sphincterotomy and stent placement 2/10  Follow-up on biopsy studies - high suspicion for cholangiocarcinoma  CA19 - 28,354  Monitor LFTs  GI following  Biopsy showing adenocarcinoma  D/W GI, consult onc and surg/onc         2/17/22:  Held a discussion with myself and Hematology-Oncology, patient and family are now agreeable to rehab placement at the [facility] off on hospice for now  -will obtain MRI MRCP with contrast, kidney function is fine, either way new gadolinium does not pose a significant risk of an as after as it used to in the past, due to using a different agent    MRI will help delineate whether he truly has metastasis, and also assessed position/placement of stent      2/18/22: Pending xray for stent placement, mri pending for liver mets  -bilirubin trending up  -continue tx ceftriaxone and confirm further response with repeat thoracentesis fluid studies      2/19/22:  Reviewed 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
Obstructive jaundice  Status post ERCP with sphincterotomy and stent placement 2/10  Follow-up on biopsy studies - high suspicion for cholangiocarcinoma  CA19 - 28,354  Monitor LFTs  GI following  Biopsy showing adenocarcinoma  D/W GI, consult onc and surg/onc         2/17/22:  Held a discussion with myself and Hematology-Oncology, patient and family are now agreeable to rehab placement at the [facility]  Holding off on hospice for now  -will obtain MRI MRCP with contrast, kidney function is fine, either way new gadolinium does not pose a significant risk of an as after as it used to in the past, due to using a different agent    MRI will help delineate whether he truly has metastasis, and also assessed position/placement of stent      2/18/22: Pending xray for stent placement, mri pending for liver mets  -bilirubin trending up  -continue tx ceftriaxone and confirm further response with repeat thoracentesis fluid studies
Obstructive jaundice  Status post ERCP with sphincterotomy and stent placement 2/10  Follow-up on biopsy studies - high suspicion for cholangiocarcinoma  CA19 - 28,354  Monitor LFTs  GI following  Biopsy showing adenocarcinoma  D/W GI, consult onc and surg/onc     Patient continues to refuse rehab and has poor insight into medical condition  Will obtain competency evaluation  Continuing to have [address] discussion with family  Patient made DNR/DNI  Oncology input will be helpful  Patient will need rehab 
Obstructive jaundice  Status post ERCP with sphincterotomy and stent placement 2/10  Follow-up on biopsy studies - high suspicion for cholangiocarcinoma  CA19 - 28,354  Monitor LFTs  GI following  Will need surg/onc follow up after biopsy results available  Started to discuss Haydee Caballero with family  Gave extensive update to wife and daughter today  Discussed options going forward including rehab and possible transition to hospice  Family agreeable to change to DNR/DNI 3  They will continue to discuss Haydee Caballero as a family 
Obstructive jaundice  Status post ERCP with stent placement  Follow-up on biopsy studies  Monitor LFTs  GI following
Obstructive jaundice  Status post ERCP with stent placement  Follow-up on biopsy studies  Monitor LFTs  GI following
Obstructive jaundice transferred for GI evaluation  For ERCP GI today  Supportive care  GI following
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
Patient had paracentesis on 02/13 and started on empiric Rocephin for possible SBP  Repeat paracentesis performed on 02/22 with NEGATIVE culture  Empiric Rocephin discontinued as patient is CMO
Patient had paracentesis on 02/13 and started on empiric Rocephin for possible SBP  Repeat paracentesis performed on 02/22 with NEGATIVE culture  Therapeutic paracentesis completed at bedside on 02/28  Empiric Rocephin discontinued as patient is CMO
Patient presented with weakness, fatigue and jaundice  Was found to have elevated total bilirubin  Abdominal CT scan concerning for CBD obstructing mass or stricture as well as few possible ill-defined hypodense areas in the liver which cannot exclude metastases  Abdominal MRI,  there is heterogeneous soft tissue density seen at the andreas hepatis on prior CT that may arise   from the pancreatic head or could have primary biliary origin    Evaluated by GI  Patient was transferred to [facility] for ERCP
Patient previously followed by Oncology  Patient is not a candidate for treatment per oncology  Continue comfort measures
Patient previously followed by Oncology  Patient is not a candidate for treatment per oncology  Continue comfort measures
Patient with transaminitis and jaundice  CT scan with 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  Moderate volume ascites    GI eval  Follow on serology workup
Secondary to liver disease in the setting of malignancy  Continue lactulose daily with goal bowel movement 2-3 to reduce possible hepatic encephalopathy
Secondary to liver disease in the setting of malignancy  Continue lactulose daily with goal bowel movement 2-3 to reduce possible hepatic encephalopathy
Therapeutic lifestyle modification
Venous duplex of the BLE (02/08/2022) in outpatient setting identified a 5 1 cm non vascularized cystic structure in the right groin
Venous duplex of the BLE (02/08/2022) in outpatient setting identified a 5 1 cm non vascularized cystic structure in the right groin
Venous duplex of the bilateral lower extremities (02/08/2022):  Note: There is a well defined hypoechoic non-vascularized cystic-type structure  in the groin, 5 1 cm     Outpatient general surgery follow-up
· CT AP: Left lower lobe 5 mm nodules  Bibasilar reticular changes and/or mild atelectasis  Paraesophageal varices    · Outpatient follow-up
· CT AP: Left lower lobe 5 mm nodules  Bibasilar reticular changes and/or mild atelectasis  Paraesophageal varices    · Will obtain CT of the chest to assess for underlying malignancy given the presence of painless jaundice
· CT AP: Left lower lobe 5 mm nodules   Bibasilar reticular changes and/or mild atelectasis  Paraesophageal varices    · Outpatient follow-up
· CT AP: Left lower lobe 5 mm nodules   Bibasilar reticular changes and/or mild atelectasis   Paraesophageal varices  · S/p paracentesis on 2/13, 2/16, and 2/22     · Is now comfort care
· CT and MRCP shows 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 in the area of abnormal soft tissue  · Await ERCP/EUS tomorrow
· Due to liver disease  · Continue lactulose 10 grams PO Qdaily for a goal 2-3 soft bowel movements per 24 hours
· Due to liver disease  · Continue lactulose 10 grams PO Qdaily for a goal 2-3 soft bowel movements per 24 hours - do not have to discharge on lactulose per GI
· Due to liver disease  · Continue lactulose 10 grams PO Qdaily for a goal 2-3 soft bowel movements per 24 hours - so not have to discharge on lactulose per GI
· Due to liver disease  · Increased  Lactulose to 20 grams PO Qdaily for a goal 2-3 soft bowel movements per 24 hours - do not have to discharge on lactulose per GI
· Due to liver disease  · Initiate lactulose 10 grams PO Qdaily for a goal 2-3 soft bowel movements per 24 hours
· Due to liver disease  · Not encephalopathic at this time  · Monitor mental status  · Continue lactulose 10 grams PO Qdaily for a goal 2-3 soft bowel movements per 24 hours
· Likely cirrhosis of liver  · GI following
· Likely cirrhosis with ascites  · GI consult for abdominal paracentesis with IR noted  · GI following
· Likely cirrhosis with ascites  · Status post abdominal paracentesis with IR  · GI following
· Likely cirrhosis with ascites  · Status post abdominal paracentesis with IR 2/13 for 2L  WBC noted, no bacteria on gram stain   Follow  · GI following
· Monitor blood pressures   · avoid hypotension
· Monitor blood pressures   · avoid hypotension  · Blood pressure well controlled off all antihypertensive agents  · Give 1 time Lasix dose today for lower extremity edema, will hold dose for tomorrow as patient is pending a repeat paracentesis, will likely require albumin as I suspect 4-5 liter drainage 
· Monitor blood pressures   · avoid hypotension  · Blood pressure well controlled off all antihypertensive agents  · recieved 1 time Lasix dose yesterday for lower extremity edema, will hold dose for today as patient is pending a repeat paracentesis, will likely require albumin as I suspect 4-5 liter drainage 
· No known history of underlying liver disease  · Patient noted to have CT scan and MRI evidence of cirrhosis as well as ascites  · MRI is noted to show evidence of volume overload  · Obtaining iron panel  · HIV, MELODIE, ASMA, AMA, acute hepatitis panel negative  · May consider paracentesis during this admission  · varcies noted on CT abdomen
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
· Obstructive jaundice Status post ERCP with sphincterotomy and stent placement 2/10, stent replacement on 2/21 found significant internal bleeding, which was cauterized, Pt was intubated for airway protection following ERCP   · Biopsy from initial ERCP on 2/10 showing adenocarcinoma of CBD  · Patient is now comfort care
· Obstructive jaundice Status post ERCP with sphincterotomy and stent placement 2/10, stent replacement on 2/21 found significant internal bleeding, which was cauterized, Pt was intubated for airway protection following ERCP   · Biopsy from initial ERCP on 2/10 showing adenocarcinoma of CBD  · Patient is now comfort care
· Patient noted to have mild elevated bilirubin as an outpatient and was plan for outpatient MRCP  · Given increasing weakness and jaundice, presented to [facility]  · Found to have bili of 16 at this time  · Planning for ERCP/EUS 2/11/22  · MRCP: Biliary ductal dilatation with abrupt cut off of the common duct at the andreas hepatis  Hheterogeneous soft tissue density seen at the andreas hepatis on prior CT that may arise  from the pancreatic head or could have primary biliary origin  Though there are numerous large gallstones, including at the gallbladder neck, these do not appear to cause extrinsic compression of the duct (i e , no suggestion of Mirizzi syndrome)  · GI following  · NPO at midnight  · Serial LFTs  · CA 19-9 pending  CEA 6 8 
· Plan: per oncology, no oncology follow up as there are no treatment options   · Patient is now comfort care
· Resolved   · Likely multifactorial in the setting of contrast 2/9, possible hepatorenal, ARB use, possible intra abdominal HTN  · Retroperitoneal ultrasound no hydronephrosis  · Nephrology consult appreciated  · S/p IV albumin  · Continue to follow BMP
· Status post abdominal paracentesis with IR 2/13 for 2L  WBC noted, no bacteria on gram stain  Started on empiric Ceftriaxone for possible SBP      · Per chart review, plan to treat with ceftriaxone until resolution of SBP on fluid studies, discharge on lifetime Cipro or Bactrim  · Ceftriaxone discontinued   · S/p repeat paracentesis on 2/22, took of 4 5L of clear, sheri fluid, WBC count is 402 (down from 1179 from his last paracentesis on 2/16)  · Culture and gram stain from paracentesis on 2/22 negative  · Off abx, comfort care
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 with ascites  New onset  · Status post abdominal paracentesis with IR 2/13 for 2L  WBC noted, no bacteria on gram stain  Started on empiric Ceftriaxone for possible SBP  Day 4/5  For repeat paracentesis 2/16 to follow up fluid studies  Patient non-toxic appearing, afebrile, and without pain  Findings may be secondary to malignancy, but with high mortality rate from SBP, will treat    · GI following
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 with ascites  New onset  · Status post abdominal paracentesis with IR 2/13 for 2L  WBC noted, no bacteria on gram stain  Started on empiric Ceftriaxone for possible SBP  Day 4/5  For repeat paracentesis 2/16 to follow up fluid studies  Patient non-toxic appearing, afebrile, and without pain  Findings may be secondary to malignancy, but with high mortality rate from SBP, will treat    · GI following    -reviewed ascitic fluid studies still has elevated PMNs above 400, will continue to treat and so under to 250  -plan to treat with ceftriaxone for 7 days, then decrease to prophylaxis dosing, discharge on lifetime Cipro or Bactrim
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 with ascites  New onset  · Status post abdominal paracentesis with IR 2/13 for 2L  WBC noted, no bacteria on gram stain  Started on empiric Ceftriaxone for possible SBP  Day 4/5  For repeat paracentesis 2/16 to follow up fluid studies  Patient non-toxic appearing, afebrile, and without pain  Findings may be secondary to malignancy, but with high mortality rate from SBP, will treat    · GI following    -reviewed ascitic fluid studies still has elevated PMNs above 400, will continue to treat and so under to 250  -plan to treat with ceftriaxone for 7 days, then decrease to prophylaxis dosing, discharge on lifetime Cipro or Bactrim
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 with ascites  New onset  · Status post abdominal paracentesis with IR 2/13 for 2L  WBC noted, no bacteria on gram stain  Started on empiric Ceftriaxone for possible SBP  Day 4/5  For repeat paracentesis 2/16 to follow up fluid studies  Patient non-toxic appearing, afebrile, and without pain  Findings may be secondary to malignancy, but with high mortality rate from SBP, will treat    · GI following    -reviewed ascitic fluid studies still has elevated PMNs above 400, will continue to treat and so under to 250  -plan to treat with ceftriaxone until resolution of SBP on fluid studies, discharge on lifetime Cipro or Bactrim
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 with ascites  New onset  · Status post abdominal paracentesis with IR 2/13 for 2L  WBC noted, no bacteria on gram stain  Started on empiric Ceftriaxone for possible SBP  Day 4/5  For repeat paracentesis 2/16 to follow up fluid studies  Patient non-toxic appearing, afebrile, and without pain  Findings may be secondary to malignancy, but with high mortality rate from SBP, will treat    · GI following    -reviewed ascitic fluid studies still has elevated PMNs above 400, will continue to treat and so under to 50  -plan to treat with ceftriaxone for 7 days, then decrease to prophylaxis dosing, discharge on lifetime Cipro or Bactrim
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 with ascites  New onset  · Status post abdominal paracentesis with IR 2/13 for 2L  WBC noted, no bacteria on gram stain  Started on empiric Ceftriaxone for possible SBP  · s/p paracentesis 2/16 to follow up fluid studies  · Patient non-toxic appearing, afebrile, and without pain  Findings may be secondary to malignancy, but with high mortality rate from SBP, will treat    · GI following    - ascitic fluid studies still has elevated PMNs above 400, will continue to treat and so under to 250  -plan to treat with ceftriaxone until resolution of SBP on fluid studies, discharge on lifetime Cipro or Bactrim  -Repeat paracentesis today
· Systolic blood pressure currently 110  · Continue losartan
· Venous duplex of the bilateral lower extremities (02/08/2022) as outpatient showed 5 1 cm non vascularized cystic structure in the right groin  · Plan for outpatient surgery follow-up
· Venous duplex of the bilateral lower extremities (02/08/2022) as outpatient showed 5 1 cm non vascularized cystic structure in the right groin  · Plan for patient surgery follow-up
· Venous duplex of the bilateral lower extremities (02/08/2022) as outpatient showed 5 1 cm non vascularized cystic structure in the right groin  · Plan was for outpatient surgery follow-up, but patient is now comfort care
no

## 2022-02-26 NOTE — ANESTHESIA POSTPROCEDURE EVALUATION
Post-Op Assessment Note             Reason for prolonged intubation > 24 hours:  N/AReason for prolonged intubation > 48 hours:  N/A      No complications documented      BP      Temp      Pulse     Resp      SpO2

## 2022-02-26 NOTE — PROGRESS NOTES
[address] [facility]  Progress Note - Mirna Deter 1934, 80 y o  male MRN: [id number]  Unit/Bed#: [facility] 711-01 Encounter: [id number]  Primary Care Provider: Luzmaria Chappell MD   Date and time admitted to hospital: 2/10/2022 12:54 AM    * Comfort measures only status  Assessment & Plan  CMO initiated on 02/24  Continue pleasure feeds  Continue scheduled for oxycodone  Continue PRN Haldol, IV Dilaudid, IV Ativan and Oxycodone  Pending IR thoracentesis for therapeutic relief and possible Aspira drain placement for continued therapeutic paracentesis  Pending placement to hospice facility per CM guidance    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[facility])  Assessment & Plan  Patient previously followed by Oncology  Patient is not a candidate for treatment per oncology  Continue comfort measures    SBP (spontaneous bacterial peritonitis) ([facility] [address] )  Assessment & Plan  Patient had paracentesis on 02/13 and started on empiric Rocephin for possible SBP  Repeat paracentesis performed on 02/22 with NEGATIVE culture  Empiric Rocephin discontinued as patient is CMO    Liver cirrhosis ([facility] [address] )  Assessment & Plan  "Suspected cirrhosis with ascites  New onset  · Status post abdominal paracentesis with IR 2/13 for 2L  WBC noted, no bacteria on gram stain  Started on empiric Ceftriaxone for possible SBP  · s/p paracentesis 2/16"  See plan above    Mass of right inguinal region  Assessment & Plan  Venous duplex of the BLE (02/08/2022) in outpatient setting identified a 5 1 cm non vascularized cystic structure in the right groin    Obstructive jaundice  Assessment & Plan  "Obstructive jaundice Status post ERCP with sphincterotomy and stent placement 2/10  · Biopsy showing adenocarcinoma  · consulted GI, onc and surg/onc   · 2/17/22:  Held a discussion with Previous provide  and Hematology-Oncology, patient and family are now agreeable to rehab placement at the [place]    Holding off on hospice for now  · continue tx ceftriaxone for SBP and confirm further response with repeat thoracentesis fluid studies  ·  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 "    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  Continue with comfort care measures    Hyperammonemia ([address] )  Assessment & Plan  Secondary to liver disease in the setting of malignancy  Continue lactulose daily with goal bowel movement 2-3 to reduce possible hepatic encephalopathy    Class 1 obesity without serious comorbidity with body mass index (BMI) of 31 0 to 31 9 in adult  Assessment & Plan  Body mass index is 29 2 kg/m²  Counseling deferred as patient is comfort measures only  Continue Regular diet pleasure feeds        VTE Pharmacologic Prophylaxis: VTE Score: 5 Low Risk (Score 0-2) - Encourage Ambulation  Patient Centered Rounds: I performed bedside rounds with nursing staff today  Discussions with Specialists or Other Care Team Provider: Nurse and      Education and Discussions with Family / Patient: Updated  (wife and daughter) at bedside  Time Spent for Care: 30 minutes  More than 50% of total time spent on counseling and coordination of care as described above  Current Length of Stay: 16 day(s)  Current Patient Status: Inpatient   Certification Statement: The patient will continue to require additional inpatient hospital stay due to Pending hospice placement  Discharge Plan: Anticipate discharge in 24-48 hrs to Hospice facility    Code Status: Level 4 - Comfort Care    Subjective:   Patient's daughter and wife were present at bedside and requested if paracentesis would make the patient more comfortable  Options for therapeutic paracentesis were discussed; patient's family was agreeable to paracentesis with drain insertion  Objective:     Vitals:   No data recorded  Body mass index is 29 2 kg/m²  Input and Output Summary (last 24 hours):      Intake/Output Summary (Last 24 hours) at 2/26/2022 1418  Last data filed at 2/25/2022 2133  Gross per 24 hour   Intake --   Output 1150 ml   Net -1150 ml       Physical Exam:   Physical Exam  Vitals and nursing note reviewed  Constitutional:       Appearance: He is not ill-appearing, toxic-appearing or diaphoretic  Comments: Frail in appearance  Lethargic but easily awoken by verbal stimuli   HENT:      Head: Normocephalic and atraumatic  Right Ear: External ear normal       Left Ear: External ear normal       Nose: Nose normal       Mouth/Throat:      Mouth: Mucous membranes are dry  Eyes:      General: Scleral icterus present  Right eye: No discharge  Left eye: No discharge  Cardiovascular:      Rate and Rhythm: Normal rate and regular rhythm  Heart sounds: No friction rub  No gallop  Pulmonary:      Breath sounds: Normal breath sounds  No stridor  No rhonchi  Comments: Anterior chest only  Reduced bibasilar breath sounds  Abdominal:      General: Bowel sounds are normal  There is distension  Palpations: Abdomen is soft  Tenderness: There is no guarding or rebound  Musculoskeletal:         General: No swelling or tenderness  Skin:     General: Skin is dry  Coloration: Skin is jaundiced  Findings: No bruising or erythema  Neurological:      General: No focal deficit present  Motor: Weakness present  Additional Data  Labs:  Results from last 7 days   Lab Units 02/24/22  0546 02/21/22  1856 02/21/22  0000   WBC Thousand/uL 7 66   < > 8 91   HEMOGLOBIN g/dL 8 9*   < > 10 2*   HEMATOCRIT % 24 4*   < > 28 2*   PLATELETS Thousands/uL 131*   < > 154   BANDS PCT %  --   --  2   NEUTROS PCT % 74   < >  --    LYMPHS PCT % 11*   < >  --    LYMPHO PCT %  --   --  12*   MONOS PCT % 11   < >  --    MONO PCT %  --   --  6   EOS PCT % 2   < > 1    < > = values in this interval not displayed       Results from last 7 days   Lab Units 02/24/22  0820   SODIUM mmol/L 142   POTASSIUM mmol/L 3  5   CHLORIDE mmol/L 114*   CO2 mmol/L 20*   BUN mg/dL 39*   CREATININE mg/dL 1 87*   ANION GAP mmol/L 8   CALCIUM mg/dL 8 6   ALBUMIN g/dL 3 1*   TOTAL BILIRUBIN mg/dL 31 07*   ALK PHOS U/L 268*   ALT U/L 71   AST U/L 141*   GLUCOSE RANDOM mg/dL 121     Results from last 7 days   Lab Units 02/21/22  1856   INR  1 45*             Results from last 7 days   Lab Units 02/22/22  0559 02/22/22  0308 02/21/22  2138 02/21/22  1856   LACTIC ACID mmol/L 1 9 2 2* 2 3* 2 3*       Lines/Drains:  Invasive Devices  Report    Peripheral Intravenous Line            Peripheral IV 02/24/22 Left;Ventral (anterior) Forearm 2 days          Drain            Urethral Catheter Temperature probe 16 Fr  4 days              Urinary Catheter:  Goal for removal: N/A - Chronic Kennedy             Imaging: No pertinent imaging reviewed  Recent Cultures (last 7 days):   Results from last 7 days   Lab Units 02/22/22  1638 02/22/22  0027 02/21/22  2348   BLOOD CULTURE   --  No Growth After 4 Days  No Growth After 4 Days     GRAM STAIN RESULT  1+ Mononuclear Cells  No Polys or Bacteria seen  --   --    BODY FLUID CULTURE, STERILE  No growth  --   --        Last 24 Hours Medication List:   Current Facility-Administered Medications   Medication Dose Route Frequency Provider Last Rate    haloperidol lactate  1 mg Intravenous Q4H PRN Tristen Saenz MD      HYDROmorphone  0 5 mg Intravenous Q1H PRN Jef Mcgrath, PA-C      lactulose  20 g Oral TID Lissette Clark, DO      levothyroxine  200 mcg Oral Early Morning Lissette Clark [place]      LORazepam  0 5 mg Intravenous Q4H PRN Lissette Clark, DO      LORazepam  0 5 mg Oral Q6H PRN Jef Mcgrath, PA-MANUEL      LORazepam  0 5 mg Oral HS Sol Griggs PA-C      melatonin  6 mg Oral HS Lissette Clark, DO      mirtazapine  7 5 mg Oral HS Lissette Clark [place] oxyCODONE  2 5 mg Oral Q4H [address] Sol Griggs PA-C      oxyCODONE  5 mg Oral Q4H PRN Jef Mcgrath PALADAN          Today, Patient Was Seen By: Amilcar Montenegro MD    **Please Note: This note may have been constructed using a voice recognition system  **

## 2022-02-27 NOTE — PROGRESS NOTES
Minerva Cisseick is sitting up comfortably in bed, he is pleasant and not complaining of any pain or discomfort

## 2022-02-27 NOTE — PROGRESS NOTES
[address] [facility]  Progress Note - Janice Edmar 1934, 80 y o  male MRN: [id number]  Unit/Bed#: [facility] 711-01 Encounter: [id number]  Primary Care Provider: Woody Valdes MD   Date and time admitted to hospital: 2/10/2022 12:54 AM    * Comfort measures only status  Assessment & Plan  CMO initiated on 02/24  Continue pleasure feeds  Continue scheduled for oxycodone  Continue PRN Haldol, IV Dilaudid, IV Ativan and Oxycodone  Pending IR thoracentesis for therapeutic relief and possible Aspira drain placement for continued therapeutic paracentesis  Pending placement to hospice facility per CM guidance tomorrow morning    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[facility])  Assessment & Plan  Patient previously followed by Oncology  Patient is not a candidate for treatment per oncology  Continue comfort measures    SBP (spontaneous bacterial peritonitis) ([facility] [address] )  Assessment & Plan  Patient had paracentesis on 02/13 and started on empiric Rocephin for possible SBP  Repeat paracentesis performed on 02/22 with NEGATIVE culture  Empiric Rocephin discontinued as patient is CMO    Liver cirrhosis ([facility] [address] )  Assessment & Plan  "Suspected cirrhosis with ascites  New onset  · Status post abdominal paracentesis with IR 2/13 for 2L  WBC noted, no bacteria on gram stain  Started on empiric Ceftriaxone for possible SBP  · s/p paracentesis 2/16"  See plan above    Mass of right inguinal region  Assessment & Plan  Venous duplex of the BLE (02/08/2022) in outpatient setting identified a 5 1 cm non vascularized cystic structure in the right groin    Obstructive jaundice  Assessment & Plan  "Obstructive jaundice Status post ERCP with sphincterotomy and stent placement 2/10  · Biopsy showing adenocarcinoma  · consulted GI, onc and surg/onc   · 2/17/22:  Held a discussion with Previous provide  and Hematology-Oncology, patient and family are now agreeable to rehab placement at the [place]    Holding off on hospice for now  · continue tx ceftriaxone for SBP and confirm further response with repeat thoracentesis fluid studies  ·  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 "    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  Continue with comfort care measures    Hyperammonemia ([address] )  Assessment & Plan  Secondary to liver disease in the setting of malignancy  Continue lactulose daily with goal bowel movement 2-3 to reduce possible hepatic encephalopathy    Class 1 obesity without serious comorbidity with body mass index (BMI) of 31 0 to 31 9 in adult  Assessment & Plan  Body mass index is 29 2 kg/m²  Counseling deferred as patient is comfort measures only  Continue Regular diet pleasure feeds        VTE Pharmacologic Prophylaxis: VTE Score: 5 Moderate Risk (Score 3-4) - Pharmacological DVT Prophylaxis Contraindicated  Sequential Compression Devices Ordered  Patient Centered Rounds: I performed bedside rounds with nursing staff today  Discussions with Specialists or Other Care Team Provider: Nurse and      Education and Discussions with Family / Patient: Updated  (wife and daughter) at bedside  Time Spent for Care: 20 minutes  More than 50% of total time spent on counseling and coordination of care as described above  Current Length of Stay: 17 day(s)  Current Patient Status: Inpatient   Certification Statement: The patient will continue to require additional inpatient hospital stay due to Pending hospice placement  Discharge Plan: Anticipate discharge in 24-48 hrs to Hospice facility    Code Status: Level 4 - Comfort Care    Subjective:   Patient denies any pain at this time  He knows that his wife and daughter present at bedside  Patient's wife inquired about hospice placement would occur tomorrow  The patient's daughter asked if paracentesis and drain placement would delay the patient's discharge  Objective  Vitals:   No data recorded         Body mass index is 29 2 kg/m²  Input and Output Summary (last 24 hours): Intake/Output Summary (Last 24 hours) at 2/27/2022 1319  Last data filed at 2/27/2022 0601  Gross per 24 hour   Intake --   Output 500 ml   Net -500 ml       Physical Exam:   Physical Exam  Vitals and nursing note reviewed  Constitutional:       General: He is not in acute distress  Appearance: He is ill-appearing and toxic-appearing  He is not diaphoretic  Comments: Frail in appearance   HENT:      Head: Normocephalic and atraumatic  Right Ear: External ear normal       Left Ear: External ear normal       Nose: Nose normal       Mouth/Throat:      Mouth: Mucous membranes are dry  Eyes:      General: Scleral icterus present  Right eye: No discharge  Left eye: No discharge  Extraocular Movements: Extraocular movements intact  Cardiovascular:      Rate and Rhythm: Normal rate and regular rhythm  Heart sounds: No friction rub  No gallop  Pulmonary:      Effort: Pulmonary effort is normal       Breath sounds: Normal breath sounds  No stridor  No rhonchi  Comments: Anterior chest only  Reduced bibasilar breath sounds  Abdominal:      General: Bowel sounds are normal  There is distension  Palpations: Abdomen is soft  Tenderness: There is no abdominal tenderness  There is no guarding or rebound  Musculoskeletal:         General: No tenderness, deformity or signs of injury  Skin:     Coloration: Skin is jaundiced  Skin is not pale  Findings: No lesion  Neurological:      General: No focal deficit present  Mental Status: He is alert  Motor: Weakness present  Psychiatric:         Behavior: Behavior normal          Thought Content:  Thought content normal          Additional Data  Labs:  Results from last 7 days   Lab Units 02/24/22  0546 02/21/22  1856 02/21/22  0000   WBC Thousand/uL 7 66   < > 8 91   HEMOGLOBIN g/dL 8 9*   < > 10 2*   HEMATOCRIT % 24 4*   < > 28 2*   PLATELETS Thousands/uL 131*   < > 154   BANDS PCT %  --   --  2   NEUTROS PCT % 74   < >  --    LYMPHS PCT % 11*   < >  --    LYMPHO PCT %  --   --  12*   MONOS PCT % 11   < >  --    MONO PCT %  --   --  6   EOS PCT % 2   < > 1    < > = values in this interval not displayed  Results from last 7 days   Lab Units 02/24/22  0820   SODIUM mmol/L 142   POTASSIUM mmol/L 3 5   CHLORIDE mmol/L 114*   CO2 mmol/L 20*   BUN mg/dL 39*   CREATININE mg/dL 1 87*   ANION GAP mmol/L 8   CALCIUM mg/dL 8 6   ALBUMIN g/dL 3 1*   TOTAL BILIRUBIN mg/dL 31 07*   ALK PHOS U/L 268*   ALT U/L 71   AST U/L 141*   GLUCOSE RANDOM mg/dL 121     Results from last 7 days   Lab Units 02/21/22  1856   INR  1 45*             Results from last 7 days   Lab Units 02/22/22  0559 02/22/22  0308 02/21/22  2138 02/21/22  1856   LACTIC ACID mmol/L 1 9 2 2* 2 3* 2 3*       Lines/Drains:  Invasive Devices  Report    Peripheral Intravenous Line            Peripheral IV 02/24/22 Left;Ventral (anterior) Forearm 3 days          Drain            Urethral Catheter Temperature probe 16 Fr  5 days              Urinary Catheter:  Goal for removal: N/A - Chronic Kennedy           Imaging: No pertinent imaging reviewed  Recent Cultures (last 7 days):   Results from last 7 days   Lab Units 02/22/22  1638 02/22/22  0027 02/21/22  2348   BLOOD CULTURE   --  No Growth After 5 Days  No Growth After 5 Days     GRAM STAIN RESULT  1+ Mononuclear Cells  No Polys or Bacteria seen  --   --    BODY FLUID CULTURE, STERILE  No growth  --   --        Last 24 Hours Medication List:   Current Facility-Administered Medications   Medication Dose Route Frequency Provider Last Rate    haloperidol lactate  1 mg Intravenous Q4H PRN Bjorn Peralta MD      HYDROmorphone  0 5 mg Intravenous Q1H PRN Armand Hicks PA-C      lactulose  20 g Oral TID Ivett Wilson DO      levothyroxine  200 mcg Oral Early Morning [place]      LORazepam  0 5 mg Intravenous Q4H PRN Kel Katie, DO      LORazepam  0 5 mg Oral Q6H PRN Sepideh Cartagena PA-C      LORazepam  0 5 mg Oral HS Sol Ponce PA-C      melatonin  6 mg Oral HS Maryann Che, DO      mirtazapine  7 5 mg Oral HS Maryann Che, DO      oxyCODONE  2 5 mg Oral Q4H [address] Sol Ponce PA-C      oxyCODONE  5 mg Oral Q4H PRN Sepideh Cartagena PA-C          Today, Patient Was Seen By: Nurys Pierson MD    **Please Note: This note may have been constructed using a voice recognition system  **

## 2022-02-27 NOTE — PLAN OF CARE
Problem: MOBILITY - ADULT  Goal: Maintain or return to baseline ADL function  Description: INTERVENTIONS:  -  Assess patient's ability to carry out ADLs; assess patient's baseline for ADL function and identify physical deficits which impact ability to perform ADLs (bathing, care of mouth/teeth, toileting, grooming, dressing, etc )  - Assess/evaluate cause of self-care deficits   - Assess range of motion  - Assess patient's mobility; develop plan if impaired  - Assess patient's need for assistive devices and provide as appropriate  - Encourage maximum independence but intervene and supervise when necessary  - Involve family in performance of ADLs  - Assess for home care needs following discharge   - Consider OT consult to assist with ADL evaluation and planning for discharge  - Provide patient education as appropriate  Outcome: Progressing  Goal: Maintains/Returns to pre admission functional level  Description: INTERVENTIONS:  - Perform BMAT or MOVE assessment daily    - Set and communicate daily mobility goal to care team and patient/family/caregiver  - Collaborate with rehabilitation services on mobility goals if consulted  - Perform Range of Motion   - Reposition patient every 2 hours    - Dangle patient   - Stand patient   - Ambulate patient   - Out of bed to chair   - Out of bed for meals   - Out of bed for toileting  - Record patient progress and toleration of activity level   Outcome: Progressing     Problem: Potential for Falls  Goal: Patient will remain free of falls  Description: INTERVENTIONS:  - Educate patient/family on patient safety including physical limitations  - Instruct patient to call for assistance with activity   - Consult OT/PT to assist with strengthening/mobility   - Keep Call bell within reach  - Keep bed low and locked with side rails adjusted as appropriate  - Keep care items and personal belongings within reach  - Initiate and maintain comfort rounds  - Make Fall Risk Sign visible to staff  - Offer Toileting every 2 Hours, in advance of need  - Initiate/Maintain alarm  - Obtain necessary fall risk management equipment  - Apply yellow socks and bracelet for high fall risk patients  - Consider moving patient to room near nurses station  Outcome: Progressing     Problem: Prexisting or High Potential for Compromised Skin Integrity  Goal: Skin integrity is maintained or improved  Description: INTERVENTIONS:  - Identify patients at risk for skin breakdown  - Assess and monitor skin integrity  - Assess and monitor nutrition and hydration status  - Monitor labs   - Assess for incontinence   - Turn and reposition patient  - Assist with mobility/ambulation  - Relieve pressure over bony prominences  - Avoid friction and shearing  - Provide appropriate hygiene as needed including keeping skin clean and dry  - Evaluate need for skin moisturizer/barrier cream  - Collaborate with interdisciplinary team   - Patient/family teaching  - Consider wound care consult   Outcome: Progressing     Problem: Prexisting or High Potential for Compromised Skin Integrity  Goal: Skin integrity is maintained or improved  Description: INTERVENTIONS:  - Identify patients at risk for skin breakdown  - Assess and monitor skin integrity  - Assess and monitor nutrition and hydration status  - Monitor lab  - Assess for incontinence   - Turn and reposition patient  - Assist with mobility/ambulation  - Relieve pressure over bony prominences  - Avoid friction and shearing  - Provide appropriate hygiene as needed including keeping skin clean and dry  - Evaluate need for skin moisturizer/barrier cream  - Collaborate with interdisciplinary team   - Patient/family teaching  - Consider wound care consult   Outcome: Progressing

## 2022-02-28 NOTE — CASE MANAGEMENT
Case Management Discharge Planning Note    Patient name Kelsi Tellesist  Location [facility] 711/[facility] 819-45 MRN [id number]  : 1934 Date 2022       Current Admission Date: 2/10/2022  Current Admission Diagnosis:Comfort measures only status   Patient Active Problem List    Diagnosis Date Noted    Comfort measures only status 2022    SBP (spontaneous bacterial peritonitis) ([facility] ) 2022    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 20[facility]    Acute kidney injury ([facility] ) 2022    Elevated TSH 2022    Abnormal MRI, liver 2022    Thrombosis, portal vein 2022    Common bile duct dilatation 2022    Mass of right inguinal region     Diastolic dysfunction     Abnormal EKG 2022    Liver cirrhosis ([facility] ) 2022    Abnormal urinalysis 2022    Lung nodule 2022    Transaminitis 2022    Hyperbilirubinemia 2022    Hyperammonemia ([facility] ) 2022    Elevated MCV 2022    Obstructive jaundice 2022    Platelets decreased ([facility] ) 2022    Bilateral lower extremity edema 2021    Exudative age-related macular degeneration, bilateral, with active choroidal neovascularization ([facility] ) 07/15/2020    Asymmetrical sensorineural hearing loss 07/15/2020    Osteoarthritis 07/15/2020    Rosacea 07/15/2020    Male erectile disorder 2019    Impaired fasting glucose 2019    Class 1 obesity without serious comorbidity with body mass index (BMI) of 31 0 to 31 9 in adult 03/15/2019    Hypothyroidism 2018    Essential hypertension 2018    Chronic osteomyelitis of knee ([facility] ) 10/08/2013      LOS (days): 18  Geometric Mean LOS (GMLOS) (days): 4 80  Days to GMLOS:-13 9     OBJECTIVE:  Risk of Unplanned Readmission Score: 17         Current admission status: Inpatient   Preferred Pharmacy:   [facility] 121 MARCUS Maloney [address] [place] [address]  Phone: [phone] Fax: [facility], [address]  [facility] [address] [address]  Phone: [phone] Fax: [phone] AID-129 [address] [facility] 18, [address]  [address] [place] [address]  Phone: [phone] Fax: [phone]    Primary Care Provider: Luzmaria Chappell MD    Primary Insurance: Roverto DOLL  Secondary Insurance:     DISCHARGE DETAILS:    Discharge planning discussed with[de-identified] CM spoke with pt's spouse, Milotn Brice, and Ezio Dallas  Freedom of Choice: Yes  Comments - Freedom of Choice: Family has elected to accept referral to Critical access hospital  CM contacted family/caregiver?: Yes  Were Treatment Team discharge recommendations reviewed with patient/caregiver?: Yes  Did patient/caregiver verbalize understanding of patient care needs?: Yes  Were patient/caregiver advised of the risks associated with not following Treatment Team discharge recommendations?: Yes    Contacts  Patient Contacts: Lyudmila(spouse) Anita(Dtr)  Relationship to Patient[de-identified] Family  Contact Method: In Person  Reason/Outcome: Continuity of [address]         Is the patient interested in [facility] at discharge?: No    DME Referral Provided  Referral made for DME?: No    Other Referral/Resources/Interventions Provided:  Interventions: Hospice  Referral Comments: Pt referred to Critical access hospital    Would you like to participate in our [address] service program?  : No - Declined    Treatment Team Recommendation: Hospice  Discharge Destination Plan[de-identified] Hospice  Transport at Discharge : S Ambulance  Dispatcher Contacted: Yes     Transported by [employer] and Unit #): SLETS  ETA of Transport (Date): 02/28/22  ETA of Transport (Time): 1900     Transfer Mode: Stretcher  Accompanied by: Alone  Transfer Equipment: BLS devices           Additional Comments:  The pt is medically and socially cleared for hospital discharge  CM and Ms Kvng Jones met with the pt's spouse, Penelope Wells, and Jenniefr Asif to discuss post acute hospice options  CM explained that the [address] [facility] has not provided a time frame related to inpatient hospice bed availability at the [facility] site  The pt's family expressed being in [facility], as the [address] [facility] is not providing an adequate time frame for possible admission  Of note, [facility] IPU approved admission on this date  The pt's spouse and dtr are in agreement with accepting the bed offer  BLS  is confirmed for 1900  No further CM intervention is warranted at this time           Veverly MS NileshW, FAUSTOW, CCM, ACM-SW, [facility]

## 2022-02-28 NOTE — BRIEF OP NOTE (RAD/CATH)
Right IR PARACENTESIS Procedure Note    PATIENT NAME: Anne Osullivan  : 1934  MRN: [id number]    Pre-op Diagnosis:   1  Jaundice    2  Transaminitis    3  Acute kidney injury ([facility] )    4  Obstructive jaundice      Post-op Diagnosis:   1  Jaundice    2  Transaminitis    3  Acute kidney injury ([facility] )    4  Obstructive jaundice        Provider:  Bi Gavin PA-C    No qualified resident was available, Resident is only observing    Estimated Blood Loss: minimal    Findings: Right lower quadrant paracentesis  3250cc clear sehri ascites removed      Specimens: none    Complications:  None immediate    Anesthesia: local    Bi Gavin PA-C     Date: 2022  Time: 1:53 PM

## 2022-02-28 NOTE — QUICK NOTE
Met with patient's spouse and daughter at bedside  Provided support  Patient appeared comfortable at that time, but required transition to IV medications to achieve level of comfort  Communicated with SAI Sotelo who states patient tolerated paracentesis well  Spoke with Dr Iraheta Para [facility]) who ultimately approved patient for IPU level of care  Patient's spouse and daughter are aware and appreciative of palliative support  Discharge anticipated this evening  Zenia Brewer through email [email or web address]

## 2022-02-28 NOTE — PROGRESS NOTES
Spiritual Care Progress Note    2022  Patient: Arianne Norman : 1934  Admission Date & Time: 2/10/2022 005  MRN: [id number] CSN: [id number]      [address] and I donned appropriate PPE for visit with pt "Marianne Borrero" who is reportedly on [address]  Met with pt, pt's daughter "Mariann Knutson" and pt's wife "Darrius Walker " Offered prayer, supportive conversation and empathetic listening  Both Darrius Walker and Anita expressed gratitude for visit  Darriusjoce Walker names that her  at [facility], [address] [place] [address] plans on visiting on 3/1/22  Will follow as requested  If spiritual care is requested, please contact [facility] [employer]  via [employer]  Thank you!                Chaplaincy Interventions Utilized:   Empowerment: Facilitated group experience and Provided chaplaincy education    Exploration: Explored spiritual needs & resources and Facilitated story telling    Relationship Building: Cultivated a relationship of care and support, Listened empathically and Provided silent and supportive presence    Ritual: Provided prayer    Chaplaincy Outcomes Achieved:  Catharsis and Expressed gratitude    Spiritual Coping Strategies Utilized:   Connectedness and Spiritual practices       22 0960   Clinical Encounter Type   Visited With Patient and family together   Routine Visit Introduction   Crisis Visit Critical Care  (Pt Level 4 Comfort Care)

## 2022-02-28 NOTE — HOSPICE NOTE
Received hospice referral   I met with pt's wife Gianni Zamora and daughter Vidya David to discuss hospice services  Hospice benefits reviewed per Medicare guidelines and all questions answered  Dr Layne Najera approved for inpatient hospice LOC  Consents reviewed and signed by pt's wife  Consents faxed to [phone] and report given to charge nurse  APARNA arranged transport for 7pm tonkathrin  Nurse informed to call Eric Landeros with report 30 minutes before discharge time  Any IVs and catheters remain in place  Pt may be medicated prior to discharge for comfort during transport

## 2022-02-28 NOTE — DISCHARGE SUMMARY
[address] [facility]  Discharge- Carlos Alberto Herrera 1934, 80 y o  male MRN: [id number]  Unit/Bed#: [facility] 711-01 Encounter: [id number]  Primary Care Provider: Trang Shrestha MD   Date and time admitted to hospital: 2/10/2022 12:54 AM    * Comfort measures only status  Assessment & Plan  CMO initiated on 02/24  Continue pleasure feeds  Continue scheduled for oxycodone  Continue PRN Haldol, IV Dilaudid, IV Ativan and Oxycodone  Pending IR thoracentesis for therapeutic relief and possible Aspira drain placement for continued therapeutic paracentesis  Placement to inpatient hospice facility completed with CM guidance and inpatient hospice team    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[facility])  Assessment & Plan  Patient previously followed by Oncology  Patient is not a candidate for treatment per oncology  Continue comfort measures    SBP (spontaneous bacterial peritonitis) [facility])  Assessment & Plan  Patient had paracentesis on 02/13 and started on empiric Rocephin for possible SBP  Repeat paracentesis performed on 02/22 with NEGATIVE culture  Therapeutic paracentesis completed at bedside on 02/28  Empiric Rocephin discontinued as patient is CMO    Liver cirrhosis ([address] )  Assessment & Plan  "Suspected cirrhosis with ascites  New onset  · Status post abdominal paracentesis with IR 2/13 for 2L  WBC noted, no bacteria on gram stain  Started on empiric Ceftriaxone for possible SBP  · s/p paracentesis 2/16"  See plan above    Mass of right inguinal region  Assessment & Plan  Venous duplex of the BLE (02/08/2022) in outpatient setting identified a 5 1 cm non vascularized cystic structure in the right groin    Obstructive jaundice  Assessment & Plan  "Obstructive jaundice Status post ERCP with sphincterotomy and stent placement 2/10  · Biopsy showing adenocarcinoma    · consulted GI, onc and surg/onc   · 2/17/22:  Held a discussion with Previous provide  and Hematology-Oncology, patient and family are now agreeable to rehab placement at the [facility] off on hospice for now  · continue tx ceftriaxone for SBP and confirm further response with repeat thoracentesis fluid studies  ·  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 "    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  Continue with comfort care measures    Hyperammonemia ([address] )  Assessment & Plan  Secondary to liver disease in the setting of malignancy  Continue lactulose daily with goal bowel movement 2-3 to reduce possible hepatic encephalopathy    Class 1 obesity without serious comorbidity with body mass index (BMI) of 31 0 to 31 9 in adult  Assessment & Plan  Body mass index is 29 2 kg/m²  Counseling deferred as patient is comfort measures only  Continue Regular diet pleasure feeds        Medical Problems             Resolved Problems  Date Reviewed: 2/28/2022    None              Discharging Physician / Practitioner: Lori Allen MD  PCP: Marie Tolliver MD  Admission Date:   Admission Orders (From admission, onward)     Ordered        02/10/22 0058  Inpatient Admission  Once                      Discharge Date: 02/28/22    Consultations During Hospital Stay:  · Palliative care  · Neuropsychiatry  · Surgical Oncology  · Medical Oncology   · Nephrology  · Gastroenterology    Procedures Performed:   · IR paracentesis  · Chest x-ray  · KUB  · MRI abdomen with without contrast and MRCP  · US kidney bladder    Significant Findings / Test Results:   XR chest portable ICU   Final Result by Calixto Croft DO (02/22 3340)      ET tube as above  Right mid lung opacity, possibly related to known pleural plaque  See additional comments  Cannot exclude trace left pleural effusion  Workstation performed: [id number]         XR abdomen 1 view kub   Final Result by Leonardo Garcia MD (37/17 9242)      Likely mild ileus                 Workstation performed: [id number]         FL ERCP biliary only Final Result by Venus Ruiz MD (02/21 2048)      Status post stent removal   Mild biliary ductal dilatation  8 cm x 8 mm stent placed  Workstation performed: [id number]         MRI abdomen w wo contrast and mrcp   Final Result by Judy Weiner MD (02/18 1631)      Abnormal central hepatic signal with associated diffuse biliary wall enhancement suspicious for intrahepatic extension of this patient's known CBD cholangiocarcinoma  Margins of the mass are not well delineated; suspected dimensions of 8 9 x 5 2 cm on    #14/45  Peripheral intrahepatic biliary dilation with narrowing of the biliary tree through the suspected area of central hepatic mass  Portal vein is partially obscured by motion artifact on the delayed postcontrast images  It is likely severely narrowed through the andreas hepatis by surrounding mass  Perigastric varices are present  The study was marked in [facility] for immediate notification  Workstation performed: [id number]         XR abdomen 1 view kub   Final Result by Judy Weiner MD (02/18 1004)      Biliary stent projects over the right upper quadrant  Workstation performed: [id number]         IR INPATIENT Paracentesis   Final Result by Ramírez Stovall MD (02/17 6141)   Impression:      Successful ultrasound-guided paracentesis  Workstation performed: [id number]         US kidney and bladder   Final Result by Adam Tracy MD (02/13 1607)      No hydronephrosis  Partially imaged cirrhotic liver and ascites  Workstation performed: [id number]         IR INPATIENT Paracentesis   Final Result by Jana Simpson MD (1934)   Impression:      Ultrasound-guided paracentesis  Workstation performed: [id number]         FL ERCP biliary only   Final Result by Erik Dior DO (02/12 4513)      Fluoroscopic guidance provided for ERCP and biliary stent placement as above  Please see procedure report for further details  Workstation performed: [id number]         CT chest wo contrast   Final Result by Ezio Swann MD (02/11 0125)   1  Left lower lobe pulmonary nodules identified, largest measuring up to 5 mm  Based on current Fleischner Society 2017 Guidelines on incidental pulmonary nodule, optional follow-up CT at 12 months can be considered  2   Right greater than left calcified pleural plaques indicating prior as best as exposure  3   Hepatic cirrhosis with ascites and evidence of portal hypertension  The study was marked in EPIC for significant notification  Workstation performed: [id number]         IR INPATIENT Paracentesis    (Results Pending)        Incidental Findings:   · See above    Test Results Pending at Discharge (will require follow up): · None     Outpatient Tests Requested:  · None    Complications:  Terminal illness    Reason for Admission:     Hospital Course:   Kelsey Farmer is a 80 y o  male patient who originally presented to the hospital on 2/10/2022 due to new onset liver cirrhosis 2/2 cholangiocarcinoma with malignant adenocarcinoma  Excerpt from H&P written on 02/21 as follows:    "Kelsey Farmer is a 80 y o  male who presents with new onset liver cirrhosis and ascites, and adenocarcinoma of the CBD  He presented to the [facility] on 2/10 with generalized weakness, and was found to have jaundice, transaminitis, and high ammonia level, per chart review  Abdominal CT showed diffuse intrahepatic biliary dilation, and ill-defined soft tissue concerning for obstructing mass or stricture  Abdominal MRI showed soft tissue density in the andreas hepaticus with numerous large gallstones and occlusion of the main portal vein  He was transferred to [facility] for ERCP  ERCP done on 2/10, and stent placed in the common bile duct  Cytologic samples from common bile duct showed malignant adenocarcinoma   MRI from 2/18 concerning for spread of cholangiocarcinoma to the liver  Had paracentesis on 2/13 and 2/16  was treated with cerftriaxone for SBP for 7 days  Repeat ERCP done on 2/21, replaced stent placed in common bile duct  Found extensive bleeding, which was cauterized  Patient was intubated to protect airway  Was transferred to MICU  "    Throughout this hospitalization, patient remained hemodynamically stable following extubation on 02/23/2022   and Palliative care were involved in inpatient hospice transfer for continued comfort measures care  Please see above list of diagnoses and related plan for additional information  Condition at Discharge: terminal    Discharge Day Visit / Exam:   Subjective:  Patient is nonverbal and has no complaints at this time  Patient's wife and daughter were present outside patient's room while he was getting paracentesis  Vitals: Blood Pressure: (!) 81/46 (02/28/22 1245)  Pulse: 72 (02/28/22 1245)  Temperature: 98 2 °F (36 8 °C) (02/24/22 1400)  Temp Source: Bladder (02/23/22 1900)  Respirations: 16 (02/28/22 1052)  Height: 6' 4" (193 cm) (02/10/22 7689)  Weight - Scale: 109 kg (239 lb 13 8 oz) (02/24/22 0555)  SpO2: 95 % (02/28/22 1100)      Exam:   Physical Exam  Vitals and nursing note reviewed  Constitutional:       General: He is not in acute distress  Appearance: Normal appearance  He is ill-appearing and toxic-appearing  He is not diaphoretic  HENT:      Head: Normocephalic and atraumatic  Right Ear: External ear normal       Left Ear: External ear normal       Nose: Nose normal       Mouth/Throat:      Mouth: Mucous membranes are dry  Eyes:      General: Scleral icterus present  Right eye: No discharge  Left eye: No discharge  Abdominal:      General: Bowel sounds are normal  There is distension  Palpations: Abdomen is soft  Tenderness: There is no guarding or rebound        Comments: Paracentesis during inserted in RLQ   Neurological:      Mental Status: He is alert  Comments: Nonverbal and unresponsive  Psychiatric:      Comments: Unable to assess as patient is nonverbal         Discussion with Family: Updated  (wife and daughter) at bedside  Discharge instructions/Information to patient and family:   See after visit summary for information provided to patient and family  Provisions for Follow-Up Care:  See after visit summary for information related to follow-up care and any pertinent home health orders  Disposition:   [address]: [facility] Texted SNF Physician  Planned Readmission: No     Discharge Statement:  I spent 40 minutes discharging the patient  This time was spent on the day of discharge  I had direct contact with the patient on the day of discharge  Greater than 50% of the total time was spent examining patient, answering all patient questions, arranging and discussing plan of care with patient as well as directly providing post-discharge instructions  Additional time then spent on discharge activities  Discharge Medications:  See after visit summary for reconciled discharge medications provided to patient and/or family        **Please Note: This note may have been constructed using a voice recognition system**

## 2022-02-28 NOTE — SEDATION DOCUMENTATION
3,250 mL clear sheri fluid removed via ultrasound guided bedside paracentesis  Report called to [facility], RN

## 2022-02-28 NOTE — SEDATION DOCUMENTATION
Paracentesis will be postponed given potential for Tenckhoff catheter placement this afternoon  Nasal cannula at 2LPM placed for oxygen saturation in mid 80s on room air  MARTHA Cisneros updated via phone

## 2022-02-28 NOTE — CASE MANAGEMENT
Case Management Discharge Planning Note    Patient name Lily Willis  [facility] MRN [id number]  : 1934 Date 2022       Current Admission Date: 2/10/2022  Current Admission Diagnosis:Comfort measures only status   Patient Active Problem List    Diagnosis Date Noted    Comfort measures only status 2022    SBP (spontaneous bacterial peritonitis) ([facility] ) 2022    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 20[facility]    Acute kidney injury ([facility] ) 2022    Elevated TSH 2022    Abnormal MRI, liver 2022    Thrombosis, portal vein 2022    Common bile duct dilatation 2022    Mass of right inguinal region     Diastolic dysfunction 07/10/3223    Abnormal EKG 2022    Liver cirrhosis ([facility] ) 2022    Abnormal urinalysis 2022    Lung nodule 2022    Transaminitis 2022    Hyperbilirubinemia 2022    Hyperammonemia ([facility] ) 2022    Elevated MCV 2022    Obstructive jaundice 2022    Platelets decreased ([facility] ) 2022    Bilateral lower extremity edema 2021    Exudative age-related macular degeneration, bilateral, with active choroidal neovascularization ([facility] ) 07/15/2020    Asymmetrical sensorineural hearing loss 07/15/2020    Osteoarthritis 07/15/2020    Rosacea 07/15/2020    Male erectile disorder 2019    Impaired fasting glucose 2019    Class 1 obesity without serious comorbidity with body mass index (BMI) of 31 0 to 31 9 in adult 03/15/2019    Hypothyroidism 2018    Essential hypertension 2018    Chronic osteomyelitis of knee ([facility] ) 10/08/2013      LOS (days): 18  Geometric Mean LOS (GMLOS) (days): 4 80  Days to GMLOS:-13 9     OBJECTIVE:  Risk of Unplanned Readmission Score: 17         Current admission status: Inpatient   Preferred Pharmacy:   [facility] MARCUS Blake [address] [place] [id number]  Phone: [phone] Fax: [facility], [address]  [facility] [address] [address]  Phone: [phone] Fax: [phone] [id number] [facility] 18, [address]  [address] [place] [id number]  Phone: [phone] Fax: [phone]    Primary Care Provider: Brijesh Reinoso MD    Primary Insurance: Ben Bounds REP  Secondary Insurance:     DISCHARGE DETAILS:    Discharge planning discussed with[de-identified] CM spoke with pt's spouse, Avis Bauman, and Cuauhtemoc Salmeron  [place]ams of Choice: Yes  Comments - Freedom of Choice: Family has elected to accept referral to [facility]  CM contacted family/caregiver?: Yes  Were Treatment Team discharge recommendations reviewed with patient/caregiver?: Yes  Did patient/caregiver verbalize understanding of patient care needs?: Yes  Were patient/caregiver advised of the risks associated with not following Treatment Team discharge recommendations?: Yes    Contacts  Patient Contacts: Lyudmila(spouse) Anita(Dtr)  Relationship to Patient[de-identified] Family  Contact Method: In Person  Reason/Outcome: Continuity of [address]         Is the patient interested in [facility] at discharge?: No    DME Referral Provided  Referral made for DME?: No    Other Referral/Resources/Interventions Provided:  Interventions: Hospice  Referral Comments: Pt referred to [facility]    Would you like to participate in our [address] service program?  : No - Declined    Treatment Team Recommendation: Hospice  Discharge Destination Plan[de-identified] Hospice  Transport at Discharge : [facility] Ambulance  Dispatcher Contacted: Yes     Transported by [employer] and Unit #): SLETS  ETA of Transport (Date): 02/28/22  ETA of Transport (Time): 1900     Transfer Mode: Stretcher  Accompanied by: Alone  Transfer Equipment: BLS devices           Additional Comments:  The pt is medically and socially cleared for hospital discharge  CM and Owen Perry, Ms Rickey Mann met with the pt's spouse, Naman Erwin, and Dennise Cooney to discuss post acute hospice options  CM explained that the [place] has not provided a time frame related to inpatient hospice bed availability at the [facility] site  The pt's family expressed being in agreement with referral to [facility] for hospice care, as the [place] is not providing an adequate time frame for possible admission  Of note,  IPU approved admission on this date  The pt's spouse and dtr are in agreement with accepting the bed offer  BLS  is confirmed for 1900  No further CM intervention is warranted at this time         Bharath Kim, MSW, LCSW, CCM, ACM-SW, [facility]

## 2022-02-28 NOTE — QUICK NOTE
Discussion with palliative care and primary team today  Patient appears to be declining  He is currently level 4 comfort measures  Decision made to perform paracentesis for comfort measures  Tenkoff discussion was had with palliative care  Patient appears to be nearing end of life and unsure tenkoff placement would be of benefit to patient at this time  Please reach out to IR with questions/concerns

## 2022-02-28 NOTE — PROGRESS NOTES
Progress note - Palliative and Supportive Care   Priyank Amaro 80 y o  male [id number]    Patient Active Problem List   Diagnosis    Hypothyroidism    Essential hypertension    Class 1 obesity without serious comorbidity with body mass index (BMI) of 31 0 to 31 9 in adult    Impaired fasting glucose    Chronic osteomyelitis of knee (HCC)    Male erectile disorder    Exudative age-related macular degeneration, bilateral, with active choroidal neovascularization (HCC)    Asymmetrical sensorineural hearing loss    Osteoarthritis    Rosacea    Bilateral lower extremity edema    Platelets decreased (HCC)    Transaminitis    Hyperbilirubinemia    Hyperammonemia (HCC)    Elevated MCV    Obstructive jaundice    Elevated TSH    Abnormal MRI, liver    Thrombosis, portal vein    Common bile duct dilatation    Mass of right inguinal region    Diastolic dysfunction    Abnormal EKG    Liver cirrhosis (HCC)    Abnormal urinalysis    Lung nodule    Acute [facility] injury ([facility] )    SBP (spontaneous bacterial peritonitis) ([facility] )    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(HCC)    Comfort measures only status     Active issues specifically addressed today include:   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Comfort measures only status  Goals of care discussion  Ascites  Cancer associated pain  anxiety    Plan:  1   Symptom management -   Comfort measures   - patient tolerating PO   -  oxycodone 5mg PO Q4H [facility] and PRN pain or increased work of breathing   - low threshold to transition to IV dilaudid should patient's symptoms be challenging to manage and unsafe to take PO   - ativan 1mg PO BID and Q4H PRN anxiety or increased work of breathing    - IV ativan 0 5mg Q1H PRN, dilaudid 0 5mg IV Q1H PRN and haldol 1mg Q4H PRN available should patient be unable to tolerate PO   - continue home remeron and melatonin HS   - continue lactulose to optimize mental status as patient tolerates, may hold if patient refuses or unable to tolerate PO   - IR considering therapeutic paracentesis   - Patient has difficulty articulating his symptoms  Please assess for non-verbal signs of discomfort and medicate appropriately  Page palliative if symptoms are poorly controlled  2  Goals - level 4 comfort care   - Comfort measures only while admitted   - CM assisting with d/c planning to hospice, previously planning for d/c to SNF but given escalating symptom burden may benefit from reconsidering level of care to [facility] if stable for transport  Code Status: comfort - Level 4   Decisional apparatus:  Patient is not competent on my exam today  If competence is lost, patient's substitute decision maker would default to spouse [place]  Advance Directive / Living Will / POLST:  On file    3  Social support   - [place], [place] following and update family today    Interval history:       Patient received scheduled doses of oxyIR in addition to 1 dose of ativan  At time of evaluation patient is resting with his eyes closed, does not open them to verbal or tactile stimuli  However, appears mildly uncomfortable with slight grimace  PO medications were adjusted to account for this in addition to night RN reporting patient's difficulty falling asleep despite current orders  Call received about 2 hours later reporting pain and anxiety have escalated and inappropriate for PO medications at this time  Instructed RN to administer IV dilaudid and ativan and reassess  MEDICATIONS / ALLERGIES:     all current active meds have been reviewed    No Known Allergies    OBJECTIVE:    Physical Exam  Physical Exam  Constitutional:       General: He is not in acute distress  Appearance: He is ill-appearing  HENT:      Head: Normocephalic and atraumatic  Eyes:      General: Scleral icterus present  Cardiovascular:      Rate and Rhythm: Normal rate  Pulmonary:      Effort: Pulmonary effort is normal  No respiratory distress  Abdominal:      General: There is distension  Tenderness: There is no abdominal tenderness  There is no guarding  Genitourinary:     Comments: Urinary catheter in place  Urine is concentrated  Skin:     Coloration: Skin is jaundiced  Neurological:      Comments: Unresponsive to light tactile or verbal stimuli  Psychiatric:      Comments: No anxiety or agitation         Lab Results: no new  Imaging Studies: no new  EKG, Pathology, and Other Studies: no new    Counseling / Coordination of Care    Total floor / unit time spent today 25+ minutes  Greater than 50% of total time was spent with the patient and / or family counseling and / or coordination of care  A description of the counseling / coordination of care: time spent assessing patient, communicating with RN, CM, IR and LSW

## 2022-02-28 NOTE — SOCIAL WORK
LSW was called to bedside due to family feeling very anxious about his potential transition to another facility and symptom management    Reached out to Croona Kim (SAI) to assist in symptom management    Reached out to CM and [facility] hospice liaison to assist in next steps

## 2022-02-28 NOTE — PROGRESS NOTES
Pt resting comfortably in bed  Family remains at bedside with no complaints  Pt repositioned and given scheduled IV pain medication  Call pan remains within close proximity of pt  Bed alarm remains on

## 2022-03-01 NOTE — UTILIZATION REVIEW
Notification of Discharge   This is a Notification of Discharge from our facility [address]  Please be advised that this patient has been discharge from our facility  Below you will find the admission and discharge date and time including the patients disposition  UTILIZATION REVIEW CONTACT:  Pavan Evans  Utilization   Network Utilization Review Department  Phone: [phone] x carefully listen to the prompts  All voicemails are confidential   Email: [email or web address]     PHYSICIAN ADVISORY SERVICES:  FOR [id number]-[id number] REVIEW - MEDICAL NECESSITY DENIAL  Phone: [phone]  Fax: [phone]  Email: [email or web address]     PRESENTATION DATE: 2/10/2022 12:54 AM  OBERVATION ADMISSION DATE:   INPATIENT ADMISSION DATE: 2/10/22 12:54 AM   DISCHARGE DATE: 2/28/2022  7:36 PM  DISPOSITION: [facility] House/Swing Bed [facility]Swing Bed      IMPORTANT INFORMATION:  Send all requests for admission clinical reviews, approved or denied determinations and any other requests to dedicated fax number below belonging to the campus where the patient is receiving treatment   List of dedicated fax numbers:  [address] DENIALS (Administrative/Medical Necessity) [phone]   [address] (Maternity/NICU/Pediatrics) [phone]   Yann Do [phone]   Iona Fulton [phone]   Candelaria Mcneal [phone]   [address] [place] [address],[address] [phone]   [facility]  [phone]   [address] [place] [address], [facility]  [address] [phone]

## 2022-03-11 PROCEDURE — 0FB98ZX EXCISION OF COMMON BILE DUCT, VIA NATURAL OR ARTIFICIAL OPENING ENDOSCOPIC, DIAGNOSTIC: ICD-10-PCS | Performed by: INTERNAL MEDICINE

## 2024-10-03 NOTE — PLAN OF CARE
Problem: Potential for Falls  Goal: Patient will remain free of falls  Description: INTERVENTIONS:  - Educate patient/family on patient safety including physical limitations  - Instruct patient to call for assistance with activity   - Consult OT/PT to assist with strengthening/mobility   - Keep Call bell within reach  - Keep bed low and locked with side rails adjusted as appropriate  - Keep care items and personal belongings within reach  - Initiate and maintain comfort rounds  - Make Fall Risk Sign visible to staff  - Offer Toileting every 2 Hours, in advance of need  - Initiate/Maintain alarm  - Obtain necessary fall risk management equipment  - Apply yellow socks and bracelet for high fall risk patients  - Consider moving patient to room near nurses station  Outcome: Progressing Refill policies:    Allow 2-3 business days for refills; controlled substances may take longer.  Contact your pharmacy at least 5 days prior to running out of medication and have them send an electronic request or submit request through the “request refill” option in your [employer] account.  Refills are not addressed on weekends; covering physicians do not authorize routine medications on weekends.  No narcotics or controlled substances are refilled after noon on Fridays or by on call physicians.  By law, narcotics must be electronically prescribed.  A 30 day supply with no refills is the maximum allowed.  If your prescription is due for a refill, you may be due for a follow up appointment.  To best provide you care, patients receiving routine medications need to be seen at least once a year.  Patients receiving narcotic/controlled substance medications need to be seen at least once every 3 months.  In the event that your preferred pharmacy does not have the requested medication in stock (e.g. Backordered), it is your responsibility to find another pharmacy that has the requested medication available.  We will gladly send a new prescription to that pharmacy at your request.    Scheduling Tests:    If your physician has ordered radiology tests such as MRI or CT scans, please contact Central Scheduling at [phone] right away to schedule the test.  Once scheduled, the [facility] Centralized Referral Team will work with your insurance carrier to obtain pre-certification or prior authorization.  Depending on your insurance carrier, approval may take 3-10 days.  It is highly recommended patients assure they have received an authorization before having a test performed.  If test is done without insurance authorization, patient may be responsible for the entire amount billed.      Precertification and Prior Authorizations:  If your physician has recommended that you have a procedure or additional testing performed the [facility]  Centralized Referral Team will contact your insurance carrier to obtain pre-certification or prior authorization.    You are strongly encouraged to contact your insurance carrier to verify that your procedure/test has been approved and is a COVERED benefit.  Although the [facility] Centralized Referral Team does its due diligence, the insurance carrier gives the disclaimer that \"Although the procedure is authorized, this does not guarantee payment.\"    Ultimately the patient is responsible for payment.   Thank you for your understanding in this matter.  Paperwork Completion:  If you require FMLA or disability paperwork for your recovery, please make sure to either drop it off or have it faxed to our office at [phone]. Be sure the form has your name and date of birth on it.  The form will be faxed to our Forms Department and they will complete it for you.  There is a 25$ fee for all forms that need to be filled out.  Please be aware there is a 10-14 day turnaround time.  You will need to sign a release of information (SUNIL) form if your paperwork does not come with one.  You may call the Forms Department with any questions at [phone].  Their fax number is [phone].